# Patient Record
Sex: MALE | Race: WHITE | NOT HISPANIC OR LATINO | Employment: OTHER | URBAN - METROPOLITAN AREA
[De-identification: names, ages, dates, MRNs, and addresses within clinical notes are randomized per-mention and may not be internally consistent; named-entity substitution may affect disease eponyms.]

---

## 2017-02-21 ENCOUNTER — GENERIC CONVERSION - ENCOUNTER (OUTPATIENT)
Dept: OTHER | Facility: OTHER | Age: 62
End: 2017-02-21

## 2017-07-12 ENCOUNTER — GENERIC CONVERSION - ENCOUNTER (OUTPATIENT)
Dept: OTHER | Facility: OTHER | Age: 62
End: 2017-07-12

## 2017-07-24 ENCOUNTER — ALLSCRIPTS OFFICE VISIT (OUTPATIENT)
Dept: OTHER | Facility: OTHER | Age: 62
End: 2017-07-24

## 2017-09-14 LAB
25(OH)D3 SERPL-MCNC: 38 NG/ML (ref 30–100)
A/G RATIO (HISTORICAL): 1.6 (CALC) (ref 1–2.5)
ALBUMIN SERPL BCP-MCNC: 4 G/DL (ref 3.6–5.1)
ALP SERPL-CCNC: 68 U/L (ref 40–115)
ALT SERPL W P-5'-P-CCNC: 21 U/L (ref 9–46)
AST SERPL W P-5'-P-CCNC: 22 U/L (ref 10–35)
BILIRUB SERPL-MCNC: 1.3 MG/DL (ref 0.2–1.2)
BUN SERPL-MCNC: 11 MG/DL (ref 7–25)
BUN/CREA RATIO (HISTORICAL): ABNORMAL (CALC) (ref 6–22)
CALCIUM (ADJUSTED FOR ALBUMIN) (HISTORICAL): 9.9 MG/DL (CALC) (ref 8.6–10.2)
CALCIUM SERPL-MCNC: 9.6 MG/DL (ref 8.6–10.3)
CHLORIDE SERPL-SCNC: 104 MMOL/L (ref 98–110)
CHOLEST SERPL-MCNC: 182 MG/DL
CHOLEST/HDLC SERPL: 3.8 (CALC)
CO2 SERPL-SCNC: 29 MMOL/L (ref 20–31)
CREAT SERPL-MCNC: 0.98 MG/DL (ref 0.7–1.25)
DEPRECATED RDW RBC AUTO: 13.1 % (ref 11–15)
EGFR AFRICAN AMERICAN (HISTORICAL): 96 ML/MIN/1.73M2
EGFR-AMERICAN CALC (HISTORICAL): 83 ML/MIN/1.73M2
EST. AVERAGE GLUCOSE BLD GHB EST-MCNC: 111 (CALC)
EST. AVERAGE GLUCOSE BLD GHB EST-MCNC: 6.2 (CALC)
GAMMA GLOBULIN (HISTORICAL): 2.5 G/DL (CALC) (ref 1.9–3.7)
GLUCOSE (HISTORICAL): 103 MG/DL (ref 65–99)
HBA1C MFR BLD HPLC: 5.5 % OF TOTAL HGB
HCT VFR BLD AUTO: 46.7 % (ref 38.5–50)
HDLC SERPL-MCNC: 48 MG/DL
HGB BLD-MCNC: 15.4 G/DL (ref 13.2–17.1)
LDL CHOLESTEROL (HISTORICAL): 108 MG/DL (CALC)
MCH RBC QN AUTO: 28.4 PG (ref 27–33)
MCHC RBC AUTO-ENTMCNC: 33 G/DL (ref 32–36)
MCV RBC AUTO: 86.2 FL (ref 80–100)
NON-HDL-CHOL (CHOL-HDL) (HISTORICAL): 134 MG/DL (CALC)
PLATELET # BLD AUTO: 299 THOUSAND/UL (ref 140–400)
PMV BLD AUTO: 10.5 FL (ref 7.5–12.5)
POTASSIUM SERPL-SCNC: 3.8 MMOL/L (ref 3.5–5.3)
PROSTATE SPECIFIC ANTIGEN TOTAL (HISTORICAL): 1.2 NG/ML
RBC # BLD AUTO: 5.42 MILLION/UL (ref 4.2–5.8)
SODIUM SERPL-SCNC: 142 MMOL/L (ref 135–146)
TOTAL PROTEIN (HISTORICAL): 6.5 G/DL (ref 6.1–8.1)
TRIGL SERPL-MCNC: 145 MG/DL
TSH SERPL DL<=0.05 MIU/L-ACNC: 3.51 MIU/L (ref 0.4–4.5)
WBC # BLD AUTO: 8.6 THOUSAND/UL (ref 3.8–10.8)

## 2018-01-09 ENCOUNTER — ALLSCRIPTS OFFICE VISIT (OUTPATIENT)
Dept: OTHER | Facility: OTHER | Age: 63
End: 2018-01-09

## 2018-01-09 NOTE — RESULT NOTES
Message   Patient's blood work appeared stable  Thank you     Verified Results  (Q) CBC (INCLUDES DIFF/PLT) (REFL) [id number] 08:59AM Justin Abdalla     Test Name Result Flag Reference   WHITE BLOOD CELL COUNT 9 1 Thousand/uL  3 8-10 8   RED BLOOD CELL COUNT 5 21 Million/uL  4 20-5 80   HEMOGLOBIN 15 2 g/dL  13 2-17 1   HEMATOCRIT 46 6 %  38 5-50 0   MCV 89 4 fL  80 0-100 0   MCH 29 2 pg  27 0-33 0   MCHC 32 7 g/dL  32 0-36 0   RDW 13 4 %  11 0-15 0   PLATELET COUNT 689 Thousand/uL  140-400   MPV 9 1 fL  7 5-12 5   ABSOLUTE NEUTROPHILS 6452 cells/uL  6613-9277   ABSOLUTE LYMPHOCYTES 1629 cells/uL  850-3900   ABSOLUTE MONOCYTES 573 cells/uL  200-950   ABSOLUTE EOSINOPHILS 373 cells/uL     ABSOLUTE BASOPHILS 73 cells/uL  0-200   NEUTROPHILS 70 9 %     LYMPHOCYTES 17 9 %     MONOCYTES 6 3 %     EOSINOPHILS 4 1 %     BASOPHILS 0 8 %       (Q) COMPREHENSIVE METABOLIC PNL W/ADJUSTED CALCIUM [id number] 08:59AM Lianne, Posiba     Test Name Result Flag Reference   GLUCOSE 105 mg/dL H 65-99   Fasting reference interval   UREA NITROGEN (BUN) 13 mg/dL  7-25   CREATININE 1 00 mg/dL  0 70-1 25   For patients >52years of age, the reference limit  for Creatinine is approximately 13% higher for people  identified as -American  eGFR NON-AFR   AMERICAN 81 mL/min/1 73m2  > OR = 60   eGFR AFRICAN AMERICAN 94 mL/min/1 73m2  > OR = 60   BUN/CREATININE RATIO   4-77   NOT APPLICABLE (calc)   SODIUM 139 mmol/L  135-146   POTASSIUM 3 8 mmol/L  3 5-5 3   CHLORIDE 101 mmol/L     CARBON DIOXIDE 31 mmol/L  20-31   CALCIUM 9 2 mg/dL  8 6-10 3   CALCIUM (ADJUSTED FOR$ALBUMIN) 9 4 mg/dL (calc)  8 6-10 2   PROTEIN, TOTAL 6 4 g/dL  6 1-8 1   ALBUMIN 4 1 g/dL  3 6-5 1   GLOBULIN 2 3 g/dL (calc)  1 9-3 7   ALBUMIN/GLOBULIN RATIO 1 8 (calc)  1 0-2 5   BILIRUBIN, TOTAL 1 5 mg/dL H 0 2-1 2   ALKALINE PHOSPHATASE 70 U/L     AST 19 U/L  10-35   ALT 18 U/L  9-46     (Q) HEMOGLOBIN A1c WITH eAG [id number] 08:59AM Amelia Abdalla REPORT COMMENT:  FASTING:YES     Test Name Result Flag Reference   HEMOGLOBIN A1c 5 6 % of total Hgb  <5 7   According to ADA guidelines, hemoglobin A1c <7 0%  represents optimal control in non-pregnant diabetic  patients  Different metrics may apply to specific  patient populations  Standards of Medical Care in    Diabetes Care  2013;36:s11-s66     For the purpose of screening for the presence of  diabetes  <5 7%       Consistent with the absence of diabetes  5 7-6 4%    Consistent with increased risk for diabetes              (prediabetes)  >or=6 5%    Consistent with diabetes     This assay result is consistent with a decreased risk  of diabetes  Currently, no consensus exists for use of hemoglobin  A1c for diagnosis of diabetes for children  eAG (mg/dL) 114 (calc)     eAG (mmol/L) 6 3 (calc)       (Q) LIPID PANEL WITH REFLEX TO DIRECT LDL [id number] 08:59AM Justin Abdalla     Test Name Result Flag Reference   CHOLESTEROL, TOTAL 181 mg/dL  125-200   HDL CHOLESTEROL 47 mg/dL  > OR = 40   TRIGLICERIDES 796 mg/dL  <150   LDL-CHOLESTEROL 111 mg/dL (calc)  <130   Desirable range <100 mg/dL for patients with CHD or  diabetes and <70 mg/dL for diabetic patients with  known heart disease  CHOL/HDLC RATIO 3 9 (calc)  < OR = 5 0   NON HDL CHOLESTEROL 134 mg/dL (calc)     Target for non-HDL cholesterol is 30 mg/dL higher than   LDL cholesterol target       (Q) TSH, 3RD GENERATION W/REFLEX TO FT4 [id number] 08:59AM Justin Abdalla     Test Name Result Flag Reference   TSH W/REFLEX TO FT4 2 66 mIU/L  0 40-4 50     *(Q) VITAMIN D, 25-HYDROXY, LC/MS/MS [id number] 08:59AM Justin Abdalla     Test Name Result Flag Reference   VITAMIN D, 25-OH, TOTAL 29 ng/mL L    Vitamin D Status         25-OH Vitamin D:     Deficiency:                    <20 ng/mL  Insufficiency:             20 - 29 ng/mL  Optimal:                 > or = 30 ng/mL     For 25-OH Vitamin D testing on patients on   D2-supplementation and patients for whom quantitation   of D2 and D3 fractions is required, the [facility](TM)  25-OH VIT D, (D2,D3), LC/MS/MS is recommended: order   code 28555 (patients >2yrs)  For more information on this test, go to:  http://"2,10E+07"/faq/FON992  (This link is being provided for   informational/educational purposes only )

## 2018-01-12 NOTE — PROGRESS NOTES
Assessment   1  Generalized anxiety disorder (300 02) (F41 1)   2  Benign essential hypertension (401 1) (I10)   3  Positive depression screening (796 4) (Z13 89)    Plan   Benign essential hypertension    · HydroCHLOROthiazide 25 MG Oral Tablet; TAKE ONE TABLET BY MOUTH    EVERY DAY    Discussion/Summary      Patient is a 80-year-old M generalized anxiety disorder - patient's symptoms appear increasing secondary to his job function  He states that he has been noticing persistent stress and anxiety all day when he is at work  When he is off work, he has no symptoms  His blood pressure has been persistently elevated as well  At this time, letter was written at patient's request to advised his employer to place patient in a more stress free position  At this time, continue with current treatment Bystolic as well as hydrochlorothiazide  He was advised that at this time, medical treatment for anxiety may not be warranted as if he changes job function, his anxiety disorder will significantly decreased  Follow up if any symptoms are persisting  Chief Complaint   Pt presents for BP f/u  History of Present Illness   Patient is a 80-year-old male presents today with a CC of increasing anxiety and elevated blood pressures  He states that over the past few months, he has been having increasing stress at work  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  This has been causing significant anxiety for him  He has been noticing his blood pressure continuously elevated as well secondary to his anxiety  He has been taking his Bystolic as well as his hydrochlorothiazide  He does check his blood pressures at home however has seen persistent elevations  Review of Systems        Constitutional: not feeling poorly-- and-- not feeling tired  Eyes: no eyesight problems-- and-- no purulent discharge from the eyes  ENT: no sore throat-- and-- no nasal discharge        Cardiovascular: no chest pain-- and-- no palpitations  Respiratory: no cough-- and-- no shortness of breath during exertion  Gastrointestinal: no nausea-- and-- no diarrhea  Genitourinary: no dysuria-- and-- no nocturia  Musculoskeletal: no arthralgias-- and-- no myalgias  Integumentary: no rashes-- and-- no skin lesions  Neurological: no numbness-- and-- no dizziness  Psychiatric: no anxiety-- and-- no depression  Endocrine: no proptosis-- and-- no hot flashes  Hematologic/Lymphatic: no swollen glands-- and-- no tendency for easy bleeding  Active Problems   1  Benign essential hypertension (401 1) (I10)   2  Benign prostatic hypertrophy (600 00) (N40 0)   3  Chronic rhinitis (472 0) (J31 0)   4  Depression screen (V79 0) (Z13 89)   5  Esophageal reflux (530 81) (K21 9)   6  Generalized anxiety disorder (300 02) (F41 1)   7  Hyperlipidemia (272 4) (E78 5)   8  Family history of Lung Cancer (V16 1)   9  Prediabetes (790 29) (R73 03)   10  Umbilical hernia (836 8) (K42 9)   11  Vitamin D deficiency (268 9) (E55 9)    Past Medical History   1  History of Abdominal pain, right lower quadrant (789 03) (R10 31)   2  History of Abnormal blood chemistry (790 6) (R79 9)   3  History of Acute conjunctivitis of right eye (372 00) (H10 31)   4  Acute upper respiratory infection (465 9) (J06 9)   5  History of Blood pressure elevated (401 9) (I10)   6  History of Chronic rhinitis (472 0) (J31 0)   7  History of Contact dermatitis due to poison ivy (692 6) (L23 7)   8  History of Disturbances Of The Oral Epithelium, Including The Tongue (528 79)   9  History of Dysphagia (787 20) (R13 10)   10  History of Hematochezia (578 1) (K92 1)   11  History of acute conjunctivitis (V12 49) (Z86 69)   12  History of allergic urticaria (V13 3) (Z87 2)   13  History of disease of skin and subcutaneous tissue (V13 3) (Z87 2)   14  History of hemorrhoids (V13 89) (Z87 19)   15   History of irritable bowel syndrome (V12 79) (Z87 19)   16  History of Influenza due to unidentified influenza virus with other respiratory manifestation      (487 1) (J11 1)   17  History of Irritable bowel syndrome (564 1) (K58 9)   18  History of Open wound of left lower leg (891 0) (S81 802A)   19  History of Other acute sinusitis (461 8) (J01 80)   20  History of Screening for prostate cancer (V76 44) (Z12 5)   21  History of Skin rash (782 1) (R21)   22  History of Special screening examination for neoplasm of prostate (V76 44) (Z12 5)   23  History of Tingling (782 0) (R20 2)     The active problems and past medical history were reviewed and updated today  Surgical History   1  History of Colonoscopic Polypectomy Via Colostomy     The surgical history was reviewed and updated today  Family History   Mother    1  Family history of Breast cancer (174 9) (C50 919)  Family History    2  Family history of Lung Cancer (V16 1)     The family history was reviewed and updated today  Social History    · Denied: History of Drug use   · Former smoker (V15 82) (P42 064)   · Social alcohol use (Z78 9)  The social history was reviewed and updated today  The social history was reviewed and is unchanged  Current Meds    1  Azelastine HCl - 0 15 % Nasal Solution; INSTILL 2 SPRAYS IN EACH NOSTRIL ONCE     DAILY; Therapy: 48EZY6919 to (Last Rx:20Jun2016) Ordered   2  Bystolic 10 MG Oral Tablet; take 1 tablet by mouth daily; Therapy: 34NTH8260 to (Evaluate:03Feb2018)  Requested for: 89Pgh7152; Last     Rx:20Whp6223 Ordered   3  Fluticasone Propionate 50 MCG/ACT Nasal Suspension; 2 sprays each nostril daily; Therapy: 40TCY1354 to (Last Rx:29Mar2017)  Requested for: 29Mar2017; Status: ACTIVE     - Transmit to Pharmacy - Awaiting Verification Ordered   4  HydroCHLOROthiazide 25 MG Oral Tablet; TAKE ONE TABLET BY MOUTH EVERY DAY;      Therapy: 32Wsc0193 to (Evaluate:07Jan2018)  Requested for: 40RQP4160; Last     Rx:57Frk6020; Status: ACTIVE - Transmit to Pharmacy - Awaiting Verification Ordered     The medication list was reviewed and updated today  Allergies   1  Lisinopril TABS   2  DilTIAZem HCl TABS   3  Norvasc TABS  4  No Known Environmental Allergies   5  No Known Food Allergies    Vitals   Vital Signs    Recorded: 74EXU3518 01:49PM   Temperature 98 4 F, Tympanic   Heart Rate 87   Pulse Quality Normal   Respiration Quality Normal   Respiration 20   Systolic 588, LUE, Sitting   Diastolic 86, LUE, Sitting   Height 5 ft 8 in   Weight 208 lb 4 oz   BMI Calculated 31 66   BSA Calculated 2 08   O2 Saturation 97, RA   Pain Scale 0     Physical Exam        Constitutional      General appearance: No acute distress, well appearing and well nourished  Eyes      Conjunctiva and lids: No swelling, erythema, or discharge  Pupils and irises: Equal, round and reactive to light  Ears, Nose, Mouth, and Throat      External inspection of ears and nose: Normal        Nasal mucosa, septum, and turbinates: Normal without edema or erythema  Oropharynx: Normal with no erythema, edema, exudate or lesions  Pulmonary      Respiratory effort: No increased work of breathing or signs of respiratory distress  Auscultation of lungs: Clear to auscultation, equal breath sounds bilaterally, no wheezes, no rales, no rhonci  Cardiovascular      Auscultation of heart: Normal rate and rhythm, normal S1 and S2, without murmurs  Examination of extremities for edema and/or varicosities: Normal        Abdomen      Abdomen: Non-tender, no masses  Liver and spleen: No hepatomegaly or splenomegaly  Lymphatic      Palpation of lymph nodes in neck: No lymphadenopathy  Musculoskeletal      Gait and station: Normal        Inspection/palpation of joints, bones, and muscles: Normal        Neurologic      Cranial nerves: Cranial nerves 2-12 intact  Sensation: No sensory loss         Psychiatric      Orientation to person, place and time: Normal        Mood and affect: Normal           Results/Data   PHQ-9 Adult Depression Screening 14JAW3266 01:49PM User, Primo      Test Name Result Flag Reference   PHQ-9 Adult Depression Score 7     Over the last two weeks, how often have you been bothered by any of the following problems? Little interest or pleasure in doing things: Several days - 1     Feeling down, depressed, or hopeless: More than half the days - 2     Trouble falling or staying asleep, or sleeping too much: Not at all - 0     Feeling tired or having little energy: Several days - 1     Poor appetite or over eating: Not at all - 0     Feeling bad about yourself - or that you are a failure or have let yourself or your family down: Not at all - 0     Trouble concentrating on things, such as reading the newspaper or watching television: Not at all - 0     Moving or speaking so slowly that other people could have noticed  Or the opposite -  being so fidgety or restless that you have been moving around a lot more than usual: Nearly every day - 3     Thoughts that you would be better off dead, or of hurting yourself in some way: Not at all - 0   PHQ-9 Adult Depression Screening Negative     PHQ-9 Difficulty Level Not difficult at all     PHQ-9 Severity Mild Depression          Health Management   History of Special screening examination for neoplasm of prostate   (1) PSA, DIAGNOSTIC (FOLLOW-UP); every 1 year; Last 31MQX9887; Next Due: 21Evn6309; Overdue    Signatures    Electronically signed by :  Yogesh Meyer DO; Jan 11 2018 10:52AM EST                       (Author)

## 2018-01-14 VITALS
OXYGEN SATURATION: 93 % | BODY MASS INDEX: 30.94 KG/M2 | TEMPERATURE: 98.1 F | HEART RATE: 73 BPM | DIASTOLIC BLOOD PRESSURE: 80 MMHG | WEIGHT: 204.13 LBS | SYSTOLIC BLOOD PRESSURE: 132 MMHG | RESPIRATION RATE: 20 BRPM | HEIGHT: 68 IN

## 2018-01-15 NOTE — RESULT NOTES
Message   Please call patient, all of his recent bloodwork appeared stable  Thank you     Verified Results  (1) CBC/PLT/DIFF [id number] 08:34AM Lianne ab     Test Name Result Flag Reference   WHITE BLOOD CELL COUNT 8 8 Thousand/uL  3 8-10 8   RED BLOOD CELL COUNT 4 88 Million/uL  4 20-5 80   HEMOGLOBIN 14 2 g/dL  13 2-17 1   HEMATOCRIT 43 3 %  38 5-50 0   MCV 88 7 fL  80 0-100 0   MCH 29 0 pg  27 0-33 0   MCHC 32 7 g/dL  32 0-36 0   RDW 13 9 %  11 0-15 0   PLATELET COUNT 029 Thousand/uL  140-400   MPV 9 2 fL  7 5-11 5   ABSOLUTE NEUTROPHILS 5790 cells/uL  6551-3881   ABSOLUTE LYMPHOCYTES 2015 cells/uL  850-3900   ABSOLUTE MONOCYTES 607 cells/uL  200-950   ABSOLUTE EOSINOPHILS 352 cells/uL     ABSOLUTE BASOPHILS 35 cells/uL  0-200   NEUTROPHILS 65 8 %     LYMPHOCYTES 22 9 %     MONOCYTES 6 9 %     EOSINOPHILS 4 0 %     BASOPHILS 0 4 %       (1) COMPREHENSIVE METABOLIC PANEL 46MZZ2295 83:02VQ Lianne ab     Test Name Result Flag Reference   GLUCOSE 100 mg/dL H 65-99   Fasting reference interval   UREA NITROGEN (BUN) 11 mg/dL  7-25   CREATININE 0 91 mg/dL  0 70-1 25   For patients >52years of age, the reference limit  for Creatinine is approximately 13% higher for people  identified as -American  eGFR NON-AFR   AMERICAN 91 mL/min/1 73m2  > OR = 60   eGFR AFRICAN AMERICAN 106 mL/min/1 73m2  > OR = 60   BUN/CREATININE RATIO   6-04   NOT APPLICABLE (calc)   SODIUM 143 mmol/L  135-146   POTASSIUM 3 6 mmol/L  3 5-5 3   CHLORIDE 102 mmol/L     CARBON DIOXIDE 27 mmol/L  19-30   CALCIUM 9 0 mg/dL  8 6-10 3   PROTEIN, TOTAL 6 1 g/dL  6 1-8 1   ALBUMIN 3 9 g/dL  3 6-5 1   GLOBULIN 2 2 g/dL (calc)  1 9-3 7   ALBUMIN/GLOBULIN RATIO 1 8 (calc)  1 0-2 5   BILIRUBIN, TOTAL 1 4 mg/dL H 0 2-1 2   ALKALINE PHOSPHATASE 59 U/L     AST 18 U/L  10-35   ALT 19 U/L  9-46     (Q) HEMOGLOBIN A1c 29Jun2016 08:34AM Lianne [facility]   REPORT COMMENT:  FASTING:YES     Test Name Result Flag Reference   HEMOGLOBIN A1c 5 8 % of total Hgb H <5 7   According to ADA guidelines, hemoglobin A1c <7 0%  represents optimal control in non-pregnant diabetic  patients  Different metrics may apply to specific  patient populations  Standards of Medical Care in    Diabetes Care  2013;36:s11-s66     For the purpose of screening for the presence of  diabetes  <5 7%       Consistent with the absence of diabetes  5 7-6 4%    Consistent with increased risk for diabetes              (prediabetes)  >or=6 5%    Consistent with diabetes     This assay result is consistent with an increased risk  of diabetes  Currently, no consensus exists for use of hemoglobin  A1c for diagnosis of diabetes for children

## 2018-01-16 ENCOUNTER — GENERIC CONVERSION - ENCOUNTER (OUTPATIENT)
Dept: OTHER | Facility: OTHER | Age: 63
End: 2018-01-16

## 2018-01-23 VITALS
TEMPERATURE: 98.4 F | WEIGHT: 208.25 LBS | OXYGEN SATURATION: 97 % | HEART RATE: 87 BPM | RESPIRATION RATE: 20 BRPM | HEIGHT: 68 IN | SYSTOLIC BLOOD PRESSURE: 130 MMHG | BODY MASS INDEX: 31.56 KG/M2 | DIASTOLIC BLOOD PRESSURE: 86 MMHG

## 2018-01-23 NOTE — MISCELLANEOUS
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

## 2018-01-24 VITALS
HEART RATE: 77 BPM | SYSTOLIC BLOOD PRESSURE: 128 MMHG | DIASTOLIC BLOOD PRESSURE: 80 MMHG | OXYGEN SATURATION: 95 % | RESPIRATION RATE: 16 BRPM | HEIGHT: 67 IN | WEIGHT: 209.19 LBS | TEMPERATURE: 98.2 F | BODY MASS INDEX: 32.83 KG/M2

## 2018-03-26 ENCOUNTER — TELEPHONE (OUTPATIENT)
Dept: FAMILY MEDICINE CLINIC | Facility: CLINIC | Age: 63
End: 2018-03-26

## 2018-03-26 DIAGNOSIS — E55.9 VITAMIN D DEFICIENCY: ICD-10-CM

## 2018-03-26 DIAGNOSIS — I10 BENIGN ESSENTIAL HYPERTENSION: Primary | ICD-10-CM

## 2018-03-26 DIAGNOSIS — R73.03 PREDIABETES: ICD-10-CM

## 2018-03-26 DIAGNOSIS — E78.2 MIXED HYPERLIPIDEMIA: ICD-10-CM

## 2018-03-26 NOTE — TELEPHONE ENCOUNTER
Pt called lm that he is due for his routine blood work that he gets every 6m [facility]  Pt would like a call back to let him know order is ready to be picked up  Thank you!

## 2018-04-11 LAB
25(OH)D3 SERPL-MCNC: 42 NG/ML (ref 30–100)
ALBUMIN SERPL-MCNC: 4.1 G/DL (ref 3.6–5.1)
ALBUMIN/GLOB SERPL: 1.7 (CALC) (ref 1–2.5)
ALP SERPL-CCNC: 72 U/L (ref 40–115)
ALT SERPL-CCNC: 20 U/L (ref 9–46)
AST SERPL-CCNC: 17 U/L (ref 10–35)
BILIRUB SERPL-MCNC: 1.2 MG/DL (ref 0.2–1.2)
BUN SERPL-MCNC: 13 MG/DL (ref 7–25)
BUN/CREAT SERPL: ABNORMAL (CALC) (ref 6–22)
CALCIUM SERPL-MCNC: 9.4 MG/DL (ref 8.6–10.3)
CHLORIDE SERPL-SCNC: 101 MMOL/L (ref 98–110)
CHOLEST SERPL-MCNC: 192 MG/DL
CHOLEST/HDLC SERPL: 3.8 (CALC)
CO2 SERPL-SCNC: 30 MMOL/L (ref 20–31)
CREAT SERPL-MCNC: 1.18 MG/DL (ref 0.7–1.25)
GLOBULIN SER CALC-MCNC: 2.4 G/DL (CALC) (ref 1.9–3.7)
GLUCOSE SERPL-MCNC: 108 MG/DL (ref 65–99)
HBA1C MFR BLD: 5.5 % OF TOTAL HGB
HDLC SERPL-MCNC: 51 MG/DL
LDLC SERPL CALC-MCNC: 114 MG/DL (CALC)
NONHDLC SERPL-MCNC: 141 MG/DL (CALC)
POTASSIUM SERPL-SCNC: 3.9 MMOL/L (ref 3.5–5.3)
PROT SERPL-MCNC: 6.5 G/DL (ref 6.1–8.1)
SL AMB EGFR AFRICAN AMERICAN: 76 ML/MIN/1.73M2
SL AMB EGFR NON AFRICAN AMERICAN: 66 ML/MIN/1.73M2
SODIUM SERPL-SCNC: 141 MMOL/L (ref 135–146)
TRIGL SERPL-MCNC: 155 MG/DL

## 2018-04-20 ENCOUNTER — OFFICE VISIT (OUTPATIENT)
Dept: FAMILY MEDICINE CLINIC | Facility: CLINIC | Age: 63
End: 2018-04-20
Payer: COMMERCIAL

## 2018-04-20 VITALS
HEIGHT: 66 IN | DIASTOLIC BLOOD PRESSURE: 80 MMHG | RESPIRATION RATE: 17 BRPM | WEIGHT: 208.1 LBS | HEART RATE: 82 BPM | OXYGEN SATURATION: 97 % | BODY MASS INDEX: 33.44 KG/M2 | SYSTOLIC BLOOD PRESSURE: 130 MMHG | TEMPERATURE: 97.2 F

## 2018-04-20 DIAGNOSIS — R73.03 PREDIABETES: ICD-10-CM

## 2018-04-20 DIAGNOSIS — I10 BENIGN ESSENTIAL HYPERTENSION: Primary | ICD-10-CM

## 2018-04-20 DIAGNOSIS — E55.9 VITAMIN D DEFICIENCY: ICD-10-CM

## 2018-04-20 DIAGNOSIS — E78.2 MIXED HYPERLIPIDEMIA: ICD-10-CM

## 2018-04-20 DIAGNOSIS — F41.1 GENERALIZED ANXIETY DISORDER: ICD-10-CM

## 2018-04-20 PROCEDURE — 3075F SYST BP GE 130 - 139MM HG: CPT | Performed by: FAMILY MEDICINE

## 2018-04-20 PROCEDURE — 3079F DIAST BP 80-89 MM HG: CPT | Performed by: FAMILY MEDICINE

## 2018-04-20 PROCEDURE — 99214 OFFICE O/P EST MOD 30 MIN: CPT | Performed by: FAMILY MEDICINE

## 2018-04-20 RX ORDER — FLUTICASONE PROPIONATE 50 MCG
2 SPRAY, SUSPENSION (ML) NASAL DAILY
COMMUNITY
Start: 2012-10-06 | End: 2018-06-11 | Stop reason: SDUPTHER

## 2018-04-20 RX ORDER — HYDROCHLOROTHIAZIDE 25 MG/1
1 TABLET ORAL DAILY
COMMUNITY
Start: 2013-12-20 | End: 2018-08-03 | Stop reason: SDUPTHER

## 2018-04-20 RX ORDER — NEBIVOLOL 10 MG/1
1 TABLET ORAL DAILY
COMMUNITY
Start: 2014-01-15 | End: 2018-08-03 | Stop reason: SDUPTHER

## 2018-04-20 RX ORDER — AZELASTINE HCL 205.5 UG/1
2 SPRAY NASAL DAILY
COMMUNITY
Start: 2015-12-17 | End: 2018-06-11 | Stop reason: SDUPTHER

## 2018-04-20 NOTE — LETTER
April 20, 2018     Patient: Aleida Postal   YOB: 1955   Date of Visit: 4/20/2018       To Whom it May Concern:    Hadley Meehan is under my professional care  He was seen in my office on 4/20/2018  He is clinically stable to return back to his previous work function with no restrictions  If you have any questions or concerns, please don't hesitate to call           Sincerely,                Justin Abdalla, DO

## 2018-04-20 NOTE — PROGRESS NOTES
Assessment/Plan:   1  Benign essential hypertension  Patient's blood pressure appears stable today  He may benefit greatly from checking his blood pressure at home  At this time, continue with his current treatment of Bystolic and hydrochlorothiazide  2  Generalized anxiety disorder  Patient's symptoms have been improving  He states that he has been seeing a psychologist regularly  He believes that he is ready to return to work with no restrictions  He still attempt to try a different position at his work  Letter releasing patient back to work was written for him today  3  Mixed hyperlipidemia  Reviewed fasting lipid panel with patient today  His cholesterol levels appeared mildly elevated  He was advised on the importance of maintaining a strict low-fat/low-cholesterol diet  Recheck his blood work this summer  If persistently elevated consider further treatment options  4  Prediabetes  A1c appears very well controlled  Continue with a strict diet and exercise plan  5  Vitamin D deficiency  Vitamin-D level also appeared stable today  Continue with routine monitoring as well as daily supplementation  Follow-up in 6 months  There are no diagnoses linked to this encounter  Subjective:    Chief Complaint   Patient presents with    Follow-up     discuss blood work from 04/10/2018        Patient ID: Maegan Benitez is a 58 y o  male  Patient is a 60-year-old male presents today for follow-up on his chronic conditions  He has hypertension, hyperlipidemia, CHRISTINA, prediabetes and vitamin-D deficiency  He states that he has been taking his medications regularly and tolerating them very well  Denies adverse reactions with his medications  He states that he has been off work since January  This was secondary to his worsening anxiety  During his time of, he states that he has been following up regularly with a psychologist   He states that he believes his anxiety has been improving    He would like to return to work  He would like a note stating that he may return to his previous position with no restrictions  Review of Systems   Constitutional: Negative for activity change, chills, fatigue and fever  HENT: Negative for congestion, ear pain, sinus pressure and sore throat  Eyes: Negative for redness, itching and visual disturbance  Respiratory: Negative for cough and shortness of breath  Cardiovascular: Negative for chest pain and palpitations  Gastrointestinal: Negative for abdominal pain, diarrhea and nausea  Endocrine: Negative for cold intolerance and heat intolerance  Genitourinary: Negative for dysuria, flank pain and frequency  Musculoskeletal: Negative for arthralgias, back pain, gait problem and myalgias  Skin: Negative for color change  Allergic/Immunologic: Negative for environmental allergies  Neurological: Negative for dizziness, numbness and headaches  Psychiatric/Behavioral: Negative for behavioral problems and sleep disturbance  The following portions of the patient's history were reviewed and updated as appropriate : past family history, past medical history, past social history and past surgical history  Objective:    Vitals:    04/20/18 1621   BP: 130/80   Pulse: 82   Resp: 17   Temp: (!) 97 2 °F (36 2 °C)   TempSrc: Tympanic   SpO2: 97%   Weight: 94 4 kg (208 lb 1 6 oz)   Height: 5' 6" (1 676 m)        Physical Exam   Constitutional: He is oriented to person, place, and time  He appears well-developed and well-nourished  HENT:   Head: Normocephalic and atraumatic  Nose: Nose normal    Mouth/Throat: No oropharyngeal exudate  Eyes: Pupils are equal, round, and reactive to light  Right eye exhibits no discharge  Left eye exhibits no discharge  Neck: Normal range of motion  Neck supple  No tracheal deviation present  Cardiovascular: Normal rate, regular rhythm and intact distal pulses  Exam reveals no gallop and no friction rub  No murmur heard  Pulses:       Dorsalis pedis pulses are 2+ on the right side, and 2+ on the left side  Posterior tibial pulses are 2+ on the right side, and 2+ on the left side  Pulmonary/Chest: Effort normal and breath sounds normal  No respiratory distress  He has no wheezes  He has no rales  Abdominal: Soft  Bowel sounds are normal  He exhibits no distension  There is no tenderness  There is no rebound and no guarding  Musculoskeletal: Normal range of motion  He exhibits no edema  Lymphadenopathy:        Head (right side): No submental and no submandibular adenopathy present  Head (left side): No submental and no submandibular adenopathy present  He has no cervical adenopathy  Right cervical: No superficial cervical, no deep cervical and no posterior cervical adenopathy present  Left cervical: No superficial cervical, no deep cervical and no posterior cervical adenopathy present  Neurological: He is alert and oriented to person, place, and time  No cranial nerve deficit or sensory deficit  Skin: Skin is warm, dry and intact  Psychiatric: His speech is normal and behavior is normal  Judgment normal  His mood appears not anxious  Cognition and memory are normal  He does not exhibit a depressed mood  Vitals reviewed

## 2018-04-23 ENCOUNTER — TELEPHONE (OUTPATIENT)
Dept: FAMILY MEDICINE CLINIC | Facility: CLINIC | Age: 63
End: 2018-04-23

## 2018-04-23 NOTE — TELEPHONE ENCOUNTER
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

## 2018-04-23 NOTE — TELEPHONE ENCOUNTER
Dot patient has had significant improvement in his symptoms  This is why he is cleared to return back to work    Thank you

## 2018-04-24 NOTE — TELEPHONE ENCOUNTER
Patient Calls     Justin Abdalla, DO   You 1 minute ago (12:39 PM)         Can you please look into this, this is very odd         Documentation       Sebas Socks routed conversation to Matt Chowdhury DO 1 hour ago (10:53 AM)      Kelley Freedman [phone]  Sherle Socks 1 hour ago (10:51 AM)      Sherle Socks 1 hour ago (10:50 AM)         Pt called and would appreciate if you would not give the postal [employer] any more info than what was in the note to clear him to go back to work         Documentation

## 2018-04-24 NOTE — TELEPHONE ENCOUNTER
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

## 2018-06-11 DIAGNOSIS — J31.0 CHRONIC RHINITIS: Primary | ICD-10-CM

## 2018-06-12 RX ORDER — AZELASTINE HCL 205.5 UG/1
2 SPRAY NASAL DAILY
Qty: 30 ML | Refills: 2 | Status: SHIPPED | OUTPATIENT
Start: 2018-06-12 | End: 2022-03-23

## 2018-06-12 RX ORDER — FLUTICASONE PROPIONATE 50 MCG
2 SPRAY, SUSPENSION (ML) NASAL DAILY
Qty: 16 G | Refills: 0 | Status: SHIPPED | OUTPATIENT
Start: 2018-06-12 | End: 2018-11-02 | Stop reason: SDUPTHER

## 2018-06-23 ENCOUNTER — APPOINTMENT (EMERGENCY)
Dept: RADIOLOGY | Facility: HOSPITAL | Age: 63
DRG: 690 | End: 2018-06-23
Payer: COMMERCIAL

## 2018-06-23 ENCOUNTER — HOSPITAL ENCOUNTER (INPATIENT)
Facility: HOSPITAL | Age: 63
LOS: 4 days | Discharge: HOME | DRG: 690 | End: 2018-06-27
Attending: EMERGENCY MEDICINE | Admitting: INTERNAL MEDICINE
Payer: COMMERCIAL

## 2018-06-23 DIAGNOSIS — N12 PYELONEPHRITIS: Primary | ICD-10-CM

## 2018-06-23 DIAGNOSIS — A41.9 SEPSIS (HCC): ICD-10-CM

## 2018-06-23 DIAGNOSIS — R65.10 SIRS (SYSTEMIC INFLAMMATORY RESPONSE SYNDROME) (HCC): ICD-10-CM

## 2018-06-23 LAB
ALBUMIN SERPL BCP-MCNC: 3.6 G/DL (ref 3.5–5)
ALP SERPL-CCNC: 65 U/L (ref 46–116)
ALT SERPL W P-5'-P-CCNC: 31 U/L (ref 12–78)
ANION GAP SERPL CALCULATED.3IONS-SCNC: 8 MMOL/L (ref 4–13)
APTT PPP: 39 SECONDS (ref 24–36)
AST SERPL W P-5'-P-CCNC: 29 U/L (ref 5–45)
BACTERIA UR QL AUTO: ABNORMAL /HPF
BASOPHILS # BLD AUTO: 0.04 THOUSANDS/ΜL (ref 0–0.1)
BASOPHILS NFR BLD AUTO: 0 % (ref 0–1)
BILIRUB SERPL-MCNC: 1.53 MG/DL (ref 0.2–1)
BILIRUB UR QL STRIP: NEGATIVE
BUN SERPL-MCNC: 12 MG/DL (ref 5–25)
CALCIUM SERPL-MCNC: 9 MG/DL (ref 8.3–10.1)
CHLORIDE SERPL-SCNC: 101 MMOL/L (ref 100–108)
CLARITY UR: CLEAR
CO2 SERPL-SCNC: 27 MMOL/L (ref 21–32)
COLOR UR: ABNORMAL
CREAT SERPL-MCNC: 1.19 MG/DL (ref 0.6–1.3)
EOSINOPHIL # BLD AUTO: 0 THOUSAND/ΜL (ref 0–0.61)
EOSINOPHIL NFR BLD AUTO: 0 % (ref 0–6)
ERYTHROCYTE [DISTWIDTH] IN BLOOD BY AUTOMATED COUNT: 12.5 % (ref 11.6–15.1)
GFR SERPL CREATININE-BSD FRML MDRD: 65 ML/MIN/1.73SQ M
GLUCOSE SERPL-MCNC: 122 MG/DL (ref 65–140)
GLUCOSE UR STRIP-MCNC: NEGATIVE MG/DL
HCT VFR BLD AUTO: 43.2 % (ref 36.5–49.3)
HGB BLD-MCNC: 14.6 G/DL (ref 12–17)
HGB UR QL STRIP.AUTO: ABNORMAL
HYALINE CASTS #/AREA URNS LPF: ABNORMAL /LPF
IMM GRANULOCYTES # BLD AUTO: 0.03 THOUSAND/UL (ref 0–0.2)
IMM GRANULOCYTES NFR BLD AUTO: 0 % (ref 0–2)
INR PPP: 1.09 (ref 0.86–1.17)
KETONES UR STRIP-MCNC: NEGATIVE MG/DL
LACTATE SERPL-SCNC: 1.2 MMOL/L (ref 0.5–2)
LEUKOCYTE ESTERASE UR QL STRIP: NEGATIVE
LYMPHOCYTES # BLD AUTO: 0.64 THOUSANDS/ΜL (ref 0.6–4.47)
LYMPHOCYTES NFR BLD AUTO: 7 % (ref 14–44)
MCH RBC QN AUTO: 29.7 PG (ref 26.8–34.3)
MCHC RBC AUTO-ENTMCNC: 33.8 G/DL (ref 31.4–37.4)
MCV RBC AUTO: 88 FL (ref 82–98)
MONOCYTES # BLD AUTO: 0.58 THOUSAND/ΜL (ref 0.17–1.22)
MONOCYTES NFR BLD AUTO: 6 % (ref 4–12)
NEUTROPHILS # BLD AUTO: 7.82 THOUSANDS/ΜL (ref 1.85–7.62)
NEUTS SEG NFR BLD AUTO: 87 % (ref 43–75)
NITRITE UR QL STRIP: NEGATIVE
NON-SQ EPI CELLS URNS QL MICRO: ABNORMAL /HPF
NRBC BLD AUTO-RTO: 0 /100 WBCS
PH UR STRIP.AUTO: 6 [PH] (ref 4.5–8)
PLATELET # BLD AUTO: 193 THOUSANDS/UL (ref 149–390)
PMV BLD AUTO: 10.2 FL (ref 8.9–12.7)
POTASSIUM SERPL-SCNC: 3.3 MMOL/L (ref 3.5–5.3)
PROT SERPL-MCNC: 7.4 G/DL (ref 6.4–8.2)
PROT UR STRIP-MCNC: ABNORMAL MG/DL
PROTHROMBIN TIME: 14.2 SECONDS (ref 11.8–14.2)
RBC # BLD AUTO: 4.91 MILLION/UL (ref 3.88–5.62)
RBC #/AREA URNS AUTO: ABNORMAL /HPF
SODIUM SERPL-SCNC: 136 MMOL/L (ref 136–145)
SP GR UR STRIP.AUTO: 1.02 (ref 1–1.03)
UROBILINOGEN UR QL STRIP.AUTO: 0.2 E.U./DL
WBC # BLD AUTO: 9.11 THOUSAND/UL (ref 4.31–10.16)
WBC #/AREA URNS AUTO: ABNORMAL /HPF

## 2018-06-23 PROCEDURE — 36415 COLL VENOUS BLD VENIPUNCTURE: CPT

## 2018-06-23 PROCEDURE — 85610 PROTHROMBIN TIME: CPT | Performed by: EMERGENCY MEDICINE

## 2018-06-23 PROCEDURE — 93005 ELECTROCARDIOGRAM TRACING: CPT

## 2018-06-23 PROCEDURE — 99223 1ST HOSP IP/OBS HIGH 75: CPT | Performed by: INTERNAL MEDICINE

## 2018-06-23 PROCEDURE — 96365 THER/PROPH/DIAG IV INF INIT: CPT

## 2018-06-23 PROCEDURE — 74176 CT ABD & PELVIS W/O CONTRAST: CPT

## 2018-06-23 PROCEDURE — 87040 BLOOD CULTURE FOR BACTERIA: CPT | Performed by: EMERGENCY MEDICINE

## 2018-06-23 PROCEDURE — 99285 EMERGENCY DEPT VISIT HI MDM: CPT

## 2018-06-23 PROCEDURE — 81001 URINALYSIS AUTO W/SCOPE: CPT | Performed by: EMERGENCY MEDICINE

## 2018-06-23 PROCEDURE — 85730 THROMBOPLASTIN TIME PARTIAL: CPT | Performed by: EMERGENCY MEDICINE

## 2018-06-23 PROCEDURE — 71046 X-RAY EXAM CHEST 2 VIEWS: CPT

## 2018-06-23 PROCEDURE — 83605 ASSAY OF LACTIC ACID: CPT | Performed by: EMERGENCY MEDICINE

## 2018-06-23 PROCEDURE — 85025 COMPLETE CBC W/AUTO DIFF WBC: CPT | Performed by: EMERGENCY MEDICINE

## 2018-06-23 PROCEDURE — 80053 COMPREHEN METABOLIC PANEL: CPT | Performed by: EMERGENCY MEDICINE

## 2018-06-23 RX ORDER — FLUTICASONE PROPIONATE 50 MCG
2 SPRAY, SUSPENSION (ML) NASAL DAILY
Status: DISCONTINUED | OUTPATIENT
Start: 2018-06-24 | End: 2018-06-27 | Stop reason: HOSPADM

## 2018-06-23 RX ORDER — ACETAMINOPHEN 325 MG/1
650 TABLET ORAL EVERY 6 HOURS PRN
Status: DISCONTINUED | OUTPATIENT
Start: 2018-06-23 | End: 2018-06-27 | Stop reason: HOSPADM

## 2018-06-23 RX ORDER — IBUPROFEN 600 MG/1
600 TABLET ORAL ONCE
Status: COMPLETED | OUTPATIENT
Start: 2018-06-23 | End: 2018-06-23

## 2018-06-23 RX ORDER — AZELASTINE 1 MG/ML
1 SPRAY, METERED NASAL DAILY
Status: DISCONTINUED | OUTPATIENT
Start: 2018-06-24 | End: 2018-06-27 | Stop reason: HOSPADM

## 2018-06-23 RX ORDER — NEBIVOLOL 10 MG/1
10 TABLET ORAL DAILY
Status: DISCONTINUED | OUTPATIENT
Start: 2018-06-24 | End: 2018-06-27 | Stop reason: HOSPADM

## 2018-06-23 RX ORDER — POTASSIUM CHLORIDE 20 MEQ/1
40 TABLET, EXTENDED RELEASE ORAL ONCE
Status: COMPLETED | OUTPATIENT
Start: 2018-06-23 | End: 2018-06-23

## 2018-06-23 RX ORDER — ACETAMINOPHEN 325 MG/1
650 TABLET ORAL ONCE
Status: COMPLETED | OUTPATIENT
Start: 2018-06-23 | End: 2018-06-23

## 2018-06-23 RX ORDER — HYDROCHLOROTHIAZIDE 25 MG/1
25 TABLET ORAL DAILY
Status: DISCONTINUED | OUTPATIENT
Start: 2018-06-24 | End: 2018-06-27 | Stop reason: HOSPADM

## 2018-06-23 RX ADMIN — ACETAMINOPHEN 650 MG: 325 TABLET ORAL at 17:34

## 2018-06-23 RX ADMIN — CEFTRIAXONE SODIUM 1000 MG: 10 INJECTION, POWDER, FOR SOLUTION INTRAVENOUS at 20:27

## 2018-06-23 RX ADMIN — SODIUM CHLORIDE 1000 ML: 0.9 INJECTION, SOLUTION INTRAVENOUS at 20:27

## 2018-06-23 RX ADMIN — POTASSIUM CHLORIDE 40 MEQ: 1500 TABLET, EXTENDED RELEASE ORAL at 22:27

## 2018-06-23 RX ADMIN — IBUPROFEN 600 MG: 600 TABLET ORAL at 22:28

## 2018-06-23 RX ADMIN — ACETAMINOPHEN 650 MG: 325 TABLET ORAL at 20:26

## 2018-06-23 NOTE — ED ATTENDING ATTESTATION
Lucille Dubois MD, saw and evaluated the patient  I have discussed the patient with the resident/non-physician practitioner and agree with the resident's/non-physician practitioner's findings, Plan of Care, and MDM as documented in the resident's/non-physician practitioner's note, except where noted  All available labs and Radiology studies were reviewed  At this point I agree with the current assessment done in the Emergency Department  I have conducted an independent evaluation of this patient a history and physical is as follows:  r flank pain off and on for 1 month now with fevers and occasional burning when he urinates  Patient denies nausea vomiting or diarrhea  Patient denies cough or upper respiratory symptoms patient denies headache or neck pain  PE alert heart regular lungs clear neck supple there is no rash abdomen soft mild right flank tenderness to palpation MDM:  Will do CT abdomen to rule out stone will do a urine and laboratory work to evaluate for the fever suspect will require IV antibiotics for possible complicated urinary tract infection      Critical Care Time  CritCare Time    Procedures

## 2018-06-24 ENCOUNTER — APPOINTMENT (INPATIENT)
Dept: RADIOLOGY | Facility: HOSPITAL | Age: 63
DRG: 690 | End: 2018-06-24
Payer: COMMERCIAL

## 2018-06-24 PROBLEM — E87.6 HYPOKALEMIA: Status: ACTIVE | Noted: 2018-06-24

## 2018-06-24 PROBLEM — A41.9 SEPSIS (HCC): Status: ACTIVE | Noted: 2018-06-24

## 2018-06-24 PROBLEM — I10 HYPERTENSION: Status: ACTIVE | Noted: 2018-06-24

## 2018-06-24 PROBLEM — K58.9 IBS (IRRITABLE BOWEL SYNDROME): Status: ACTIVE | Noted: 2018-06-24

## 2018-06-24 PROBLEM — N12 PYELONEPHRITIS: Status: ACTIVE | Noted: 2018-06-24

## 2018-06-24 LAB
ANION GAP SERPL CALCULATED.3IONS-SCNC: 7 MMOL/L (ref 4–13)
ATRIAL RATE: 116 BPM
BUN SERPL-MCNC: 14 MG/DL (ref 5–25)
CALCIUM SERPL-MCNC: 8.3 MG/DL (ref 8.3–10.1)
CHLORIDE SERPL-SCNC: 105 MMOL/L (ref 100–108)
CO2 SERPL-SCNC: 26 MMOL/L (ref 21–32)
CREAT SERPL-MCNC: 1.09 MG/DL (ref 0.6–1.3)
ERYTHROCYTE [DISTWIDTH] IN BLOOD BY AUTOMATED COUNT: 12.7 % (ref 11.6–15.1)
GFR SERPL CREATININE-BSD FRML MDRD: 72 ML/MIN/1.73SQ M
GLUCOSE SERPL-MCNC: 108 MG/DL (ref 65–140)
HCT VFR BLD AUTO: 44.7 % (ref 36.5–49.3)
HGB BLD-MCNC: 14.9 G/DL (ref 12–17)
LACTATE SERPL-SCNC: 1.5 MMOL/L (ref 0.5–2)
MAGNESIUM SERPL-MCNC: 2.2 MG/DL (ref 1.6–2.6)
MCH RBC QN AUTO: 29.7 PG (ref 26.8–34.3)
MCHC RBC AUTO-ENTMCNC: 33.3 G/DL (ref 31.4–37.4)
MCV RBC AUTO: 89 FL (ref 82–98)
P AXIS: 31 DEGREES
PLATELET # BLD AUTO: 151 THOUSANDS/UL (ref 149–390)
PMV BLD AUTO: 10.6 FL (ref 8.9–12.7)
POTASSIUM SERPL-SCNC: 3.5 MMOL/L (ref 3.5–5.3)
PR INTERVAL: 132 MS
PROCALCITONIN SERPL-MCNC: 2.32 NG/ML
QRS AXIS: 33 DEGREES
QRSD INTERVAL: 84 MS
QT INTERVAL: 304 MS
QTC INTERVAL: 422 MS
RBC # BLD AUTO: 5.02 MILLION/UL (ref 3.88–5.62)
SODIUM SERPL-SCNC: 138 MMOL/L (ref 136–145)
T WAVE AXIS: 47 DEGREES
VENTRICULAR RATE: 116 BPM
WBC # BLD AUTO: 11.69 THOUSAND/UL (ref 4.31–10.16)

## 2018-06-24 PROCEDURE — 83605 ASSAY OF LACTIC ACID: CPT | Performed by: PHYSICIAN ASSISTANT

## 2018-06-24 PROCEDURE — 84145 PROCALCITONIN (PCT): CPT | Performed by: PHYSICIAN ASSISTANT

## 2018-06-24 PROCEDURE — 80048 BASIC METABOLIC PNL TOTAL CA: CPT | Performed by: INTERNAL MEDICINE

## 2018-06-24 PROCEDURE — 74177 CT ABD & PELVIS W/CONTRAST: CPT

## 2018-06-24 PROCEDURE — 85027 COMPLETE CBC AUTOMATED: CPT | Performed by: INTERNAL MEDICINE

## 2018-06-24 PROCEDURE — 83735 ASSAY OF MAGNESIUM: CPT | Performed by: INTERNAL MEDICINE

## 2018-06-24 PROCEDURE — 99232 SBSQ HOSP IP/OBS MODERATE 35: CPT | Performed by: PHYSICIAN ASSISTANT

## 2018-06-24 PROCEDURE — 93010 ELECTROCARDIOGRAM REPORT: CPT | Performed by: INTERNAL MEDICINE

## 2018-06-24 RX ORDER — IBUPROFEN 400 MG/1
400 TABLET ORAL ONCE
Status: COMPLETED | OUTPATIENT
Start: 2018-06-24 | End: 2018-06-24

## 2018-06-24 RX ORDER — ONDANSETRON 2 MG/ML
4 INJECTION INTRAMUSCULAR; INTRAVENOUS EVERY 6 HOURS PRN
Status: DISCONTINUED | OUTPATIENT
Start: 2018-06-24 | End: 2018-06-27 | Stop reason: HOSPADM

## 2018-06-24 RX ORDER — SODIUM CHLORIDE 9 MG/ML
125 INJECTION, SOLUTION INTRAVENOUS CONTINUOUS
Status: DISCONTINUED | OUTPATIENT
Start: 2018-06-24 | End: 2018-06-26

## 2018-06-24 RX ORDER — IBUPROFEN 400 MG/1
400 TABLET ORAL EVERY 6 HOURS PRN
Status: DISCONTINUED | OUTPATIENT
Start: 2018-06-24 | End: 2018-06-27 | Stop reason: HOSPADM

## 2018-06-24 RX ADMIN — FLUTICASONE PROPIONATE 2 SPRAY: 50 SPRAY, METERED NASAL at 10:36

## 2018-06-24 RX ADMIN — SODIUM CHLORIDE 125 ML/HR: 0.9 INJECTION, SOLUTION INTRAVENOUS at 20:43

## 2018-06-24 RX ADMIN — ENOXAPARIN SODIUM 40 MG: 40 INJECTION SUBCUTANEOUS at 10:35

## 2018-06-24 RX ADMIN — AZELASTINE HYDROCHLORIDE 1 SPRAY: 137 SPRAY, METERED NASAL at 10:36

## 2018-06-24 RX ADMIN — ACETAMINOPHEN 650 MG: 325 TABLET ORAL at 06:11

## 2018-06-24 RX ADMIN — ACETAMINOPHEN 650 MG: 325 TABLET ORAL at 12:13

## 2018-06-24 RX ADMIN — VANCOMYCIN HYDROCHLORIDE 1250 MG: 10 INJECTION, POWDER, LYOPHILIZED, FOR SOLUTION INTRAVENOUS at 17:30

## 2018-06-24 RX ADMIN — ONDANSETRON 4 MG: 2 INJECTION, SOLUTION INTRAMUSCULAR; INTRAVENOUS at 14:07

## 2018-06-24 RX ADMIN — IBUPROFEN 400 MG: 400 TABLET, FILM COATED ORAL at 23:23

## 2018-06-24 RX ADMIN — SODIUM CHLORIDE 100 ML/HR: 0.9 INJECTION, SOLUTION INTRAVENOUS at 12:14

## 2018-06-24 RX ADMIN — CEFEPIME HYDROCHLORIDE 2000 MG: 2 INJECTION, POWDER, FOR SOLUTION INTRAVENOUS at 16:48

## 2018-06-24 RX ADMIN — IBUPROFEN 400 MG: 400 TABLET, FILM COATED ORAL at 14:11

## 2018-06-24 RX ADMIN — IOHEXOL 100 ML: 350 INJECTION, SOLUTION INTRAVENOUS at 17:10

## 2018-06-24 NOTE — ASSESSMENT & PLAN NOTE
· SIRS vs sepsis, POA, evidenced by fever, tachycardia  · No clear cause of infection  · There was concern for pyelo cause patient was having intermittent RIGHT flank pain  However, CT scan showed some mild LEFT perinephric stranding and mild bladder wall thickening and UA was bland  He is on Rocephin for this  · F/U culture results, trend white count, fever  Check PCT  · Continue Rocephin for now  · Consider viral syndrome or non-infectious cause of fever  · BPs getting soft   Give fluids

## 2018-06-24 NOTE — ASSESSMENT & PLAN NOTE
· He reports diarrhea which is not unusual for him   No recent antibiotic usage  · If persisting fevers and no other source of infection, check stool

## 2018-06-24 NOTE — PROGRESS NOTES
Progress Note - Ronna Patel 1955, 58 y o  male MRN: [id number]    Unit/Bed#: [facility] 609-01 Encounter: [id number]    Primary Care Provider: Brett Hyatt DO   Date and time admitted to hospital: 6/23/2018  6:33 PM    * Sepsis [facility])   Assessment & Plan    · SIRS vs sepsis, POA, evidenced by fever, tachycardia  · No clear cause of infection  · There was concern for pyelo cause patient was having intermittent RIGHT flank pain  However, CT scan showed some mild LEFT perinephric stranding and mild bladder wall thickening and UA was bland  He is on Rocephin for this  · F/U culture results, trend white count, fever  Check PCT  · Continue Rocephin for now  · Consider viral syndrome or non-infectious cause of fever  · BPs getting soft  Give fluids        Hypertension   Assessment & Plan    · BPs soft this morning  · Hold parameters on BP meds        IBS (irritable bowel syndrome)   Assessment & Plan    · He reports diarrhea which is not unusual for him  No recent antibiotic usage  · If persisting fevers and no other source of infection, check stool          VTE Pharmacologic Prophylaxis:   Pharmacologic: Enoxaparin (Lovenox)  Mechanical VTE Prophylaxis in Place: Yes    Patient Centered Rounds: Discussed with RN    Discussions with Specialists or Other Care Team Provider:      Education and Discussions with Family / Patient: Spouse at bedside    Time Spent for Care: 30 minutes  More than 50% of total time spent on counseling and coordination of care as described above  Current Length of Stay: 1 day(s)    Current Patient Status: Inpatient   Certification Statement: The patient will continue to require additional inpatient hospital stay due to sepsis workup, ongoing fevers  Discharge Plan: Pending clinical course  Possibly tomorrow if afebrile overnight and no infectious source found  Code Status: Level 1 - Full Code      Subjective:   Reports fevers and rigors overnight but now he feels better   Reports fevers since Friday  States he occasionally has intermittent right flank pain but has not over the past few days  Reports occasional dysuria but none recently  Has a history of IBS  Reports diarrhea this morning which is not unusual  He gets routine colonoscopies every 3 years for this and has history of polyps  No congestion, otalgia, sore throat, weight loss, joint swelling, abdominal pain, nausea, vomiting, calf pain, leg swelling, travel, recent antibiotic use, lymphadenopathy, cough, SOB  Objective:     Vitals:   Temp (24hrs), Av 6 °F (38 1 °C), Min:98 9 °F (37 2 °C), Max:102 1 °F (38 9 °C)    HR:  [] 81  Resp:  [18-20] 18  BP: (109-166)/(56-76) 109/56  SpO2:  [94 %-97 %] 96 %  Body mass index is 31 21 kg/m²  Input and Output Summary (last 24 hours): Intake/Output Summary (Last 24 hours) at 18 1139  Last data filed at 18 0857   Gross per 24 hour   Intake             1890 ml   Output              875 ml   Net             1015 ml       Physical Exam:     Physical Exam   Constitutional: He is oriented to person, place, and time  No distress  NAD  Non-toxic appearing  HENT:   Head: Normocephalic and atraumatic  Eyes: No scleral icterus  Neck: Normal range of motion  Neck supple  Cardiovascular: Normal rate, regular rhythm and normal heart sounds  Pulmonary/Chest: Effort normal and breath sounds normal  No respiratory distress  He has no wheezes  He has no rales  Abdominal: Soft  Bowel sounds are normal  He exhibits no distension  There is no tenderness  There is no rebound  Musculoskeletal: He exhibits no edema  Mahad negative   Lymphadenopathy:     He has no cervical adenopathy  Neurological: He is alert and oriented to person, place, and time  Skin: Skin is warm and dry  He is not diaphoretic  Psychiatric: He has a normal mood and affect   His behavior is normal  Thought content normal        Additional Data:     Labs:      Results from last 7 days  Lab Units 06/24/18  0538 06/23/18  1748   WBC Thousand/uL 11 69* 9 11   HEMOGLOBIN g/dL 14 9 14 6   HEMATOCRIT % 44 7 43 2   PLATELETS Thousands/uL 151 193   NEUTROS PCT %  --  87*   LYMPHS PCT %  --  7*   MONOS PCT %  --  6   EOS PCT %  --  0       Results from last 7 days  Lab Units 06/24/18  0538 06/23/18  1748   SODIUM mmol/L 138 136   POTASSIUM mmol/L 3 5 3 3*   CHLORIDE mmol/L 105 101   CO2 mmol/L 26 27   BUN mg/dL 14 12   CREATININE mg/dL 1 09 1 19   CALCIUM mg/dL 8 3 9 0   TOTAL PROTEIN g/dL  --  7 4   BILIRUBIN TOTAL mg/dL  --  1 53*   ALK PHOS U/L  --  65   ALT U/L  --  31   AST U/L  --  29   GLUCOSE RANDOM mg/dL 108 122       Results from last 7 days  Lab Units 06/23/18  1748   INR  1 09       * I Have Reviewed All Lab Data Listed Above  * Additional Pertinent Lab Tests Reviewed: All Labs Within Last 24 Hours Reviewed    Imaging:    Imaging Reports Reviewed Today Include: CXR- normal, CT renal stone study- left perinephric stranding  Mild urinary bladder wall thickening  Imaging Personally Reviewed by Myself Includes:  None    Recent Cultures (last 7 days):           Last 24 Hours Medication List:     Current Facility-Administered Medications:  acetaminophen 650 mg Oral Q6H PRN Aneta Fam MD   azelastine 1 spray Each Nare Daily Aneta Fam MD   cefTRIAXone 1,000 mg Intravenous Q24H Aneta Fam MD   enoxaparin 40 mg Subcutaneous Daily Aneta Fam MD   fluticasone 2 spray Nasal Daily Aneta Fam MD   hydrochlorothiazide 25 mg Oral Daily Aneta Fam MD   nebivolol 10 mg Oral Daily Aneta Fam MD   sodium chloride 100 mL/hr Intravenous Continuous Fanny Cornell PA-C        Today, Patient Was Seen By: Fanny Cornell PA-C    ** Please Note: Dragon 360 Dictation voice to text software may have been used in the creation of this document   **

## 2018-06-24 NOTE — CASE MANAGEMENT
Initial Clinical Review    Thank you,  [facility] in the [facility] by Kenn Clifton for 2017  Network Utilization Review Department  Phone: [phone]; Fax [phone]  ATTENTION: The Network Utilization Review Department is now centralized for our 7 Facilities  Make a note that we have a new phone and fax numbers for our Department  Please call with any questions or concerns to [phone] and carefully follow the prompts so that you are directed to the right person  All voicemails are confidential  Fax any determinations, approvals, denials, and requests for initial or continue stay review clinical to [phone]  Due to HIGH CALL volume, it would be easier if you could please send faxed requests to expedite your requests and in part, help us provide discharge notifications faster  Admission: Date/Time/Statement: 6/23/18 @ 2046     Orders Placed This Encounter   Procedures    Inpatient Admission (expected length of stay for this patient is greater than two midnights)     Standing Status:   Standing     Number of Occurrences:   1     Order Specific Question:   Admitting Physician     Answer:   Ila Julian [[id number]]     Order Specific Question:   Level of Care     Answer:   Med Surg [16]     Order Specific Question:   Estimated length of stay     Answer:   More than 2 Midnights     Order Specific Question:   Certification     Answer:   I certify that inpatient services are medically necessary for this patient for a duration of greater than two midnights  See H&P and MD Progress Notes for additional information about the patient's course of treatment         ED: Date/Time/Mode of Arrival:   ED Arrival Information     Expected Arrival Acuity Means of Arrival Escorted By Service Admission Type    - 6/23/2018 17:19 Urgent Walk-In Self General Medicine Urgent    Arrival Complaint    flank pain          Chief Complaint:   Chief Complaint   Patient presents with  Flank Pain     Pt has a c/o R flank pain since yesterday  Pt states that he had it off/and on for a month  History of Illness:  58 y o  male with a medical history significant for GERD, IBS, HLD, HTN, anxiety who presents with fever  He says that yesterday around 6-7pm, he had chills, felt warm and did not feel well  Today, he went to work but still did not feel well and had low energy and came to the ER  He says he has right sided flank pain that has been present intermittently for over 1 month and occasionally has dysuria  He notes heaviness in his chest on Wednesday but none presently  He does note feeing gassy at times  No shortness of breath but has a dry cough today  No sick contacts, rashes, headache, neck stiffness, diarrhea, vomiting, or abdominal pain  He has had melena at times with last episode on Monday  No leg swelling, weakness/numbness      ED Vital Signs:   ED Triage Vitals [06/23/18 1726]   Temperature Pulse Respirations Blood Pressure SpO2   (!) 101 5 °F (38 6 °C) (!) 110 18 166/76 97 %      Temp Source Heart Rate Source Patient Position - Orthostatic VS BP Location FiO2 (%)   Tympanic Monitor Sitting Left arm --      Pain Score       2        Wt Readings from Last 1 Encounters:   06/23/18 90 4 kg (199 lb 4 7 oz)       Vital Signs:  06/23 0701  06/24 0700 06/24 0701  06/24 1715  Most Recent     Temperature (°F)  1 98 9-103  100 2 (37 9)    Pulse    92    Respirations 18-20 18-20  20    Blood Pressure 141//76 109//83  135/63    SpO2 (%) 94-97 95-96  95        Abnormal Labs/Diagnostic Test Results: K 3 3, Tl bili 1 53  Color, UA  Orange    Clarity, UA  Clear    Specific [place], UA 1 003 - 1 030 1 020    pH, UA 4 5 - 8 0 6 0    Leukocytes, UA Negative Negative    Nitrite, UA Negative Negative    Protein, UA Negative mg/dl Trace     Glucose, UA Negative mg/dl Negative    Ketones, UA Negative mg/dl Negative    Urobilinogen, UA 0 2, 1 0 E U /dl E U /dl 0 2 Bilirubin, UA Negative Negative    Blood, UA Negative Large       CT a/p --  1   Mild left-sided perinephric stranding  Clinical correlation for infection is recommended  No evidence of hydronephrosis or nephrolithiasis  2   Mild urinary bladder wall thickening which may represent cystitis  Follow-up is recommended       ED Treatment:   Medication Administration from 06/23/2018 1719 to 06/23/2018 2141       Date/Time Order Dose Route Action Action by Comments     06/23/2018 1734 acetaminophen (TYLENOL) tablet 650 mg 650 mg Oral Given Nils Garcia RN      06/23/2018 2026 acetaminophen (TYLENOL) tablet 650 mg 650 mg Oral Given Lurdes Dozier RN      06/23/2018 2027 sodium chloride 0 9 % bolus 1,000 mL 1,000 mL Intravenous New Bag Lurdes Dozier RN                 06/23/2018 2027 ceftriaxone (ROCEPHIN) 1 g/50 mL in dextrose IVPB 1,000 mg Intravenous New Bag Lurdes Dozier RN           Past Medical/Surgical History:    Active Ambulatory Problems     Diagnosis Date Noted    Benign essential hypertension 08/29/2013    Hyperlipidemia 11/25/2014    Prediabetes 05/20/2014    Vitamin D deficiency 11/25/2014    Chronic rhinitis 12/17/2015    Esophageal reflux 09/14/2012    Benign prostatic hyperplasia 12/12/2012    Generalized anxiety disorder 05/20/2014     Past Medical History:   Diagnosis Date    Abdominal pain, right lower quadrant     Abnormal blood chemistry     Acid reflux     Contact dermatitis due to poison ivy     Depression screen     Flu     High blood pressure     History of acute conjunctivitis     History of allergic urticaria     History of chronic rhinitis     History of disease of skin and subcutaneous tissue     History of dysphagia     History of hemorrhoids     History of IBS     History of URI (upper respiratory infection)     Open wound of left lower leg     Other acute sinusitis     Other disturbances of oral epithelium, including tongue     Positive depression screening     Screening for prostate cancer     Skin rash     Special screening examination for neoplasm of prostate     Tingling        Admitting Diagnosis: Pyelonephritis [N12]  Flank pain [R10 9]  Sepsis ([address] ) [A41 9]    Age/Sex: 58 y o  male    Assessment/Plan:  58 y o  male with a medical history significant for GERD, IBS, HLD, HTN, anxiety who presents with fever      # Fever, sepsis   - perinephric stranding noted on CT abdomen, CXR clear, no other sources of infection  UA is negative however except for microscopic hematuria  - cont CTX for pyelonephritis as there is no other source  - f/u BCx     # Hypokalemia  - kdur ordered for replacement     # HTN - cont nebivolol and HCTZ  # HLD - not on meds     FEN: Diet Regular; Regular House  VTE Prophylaxis: Enoxaparin (Lovenox)  / sequential compression device   Code: Level 1 - Full Code, as discussed at bedside on admission     I have discussed the plan of care with: patient     Anticipated Length of Stay:  Patient will be admitted on an Inpatient basis with an anticipated length of stay of greater than 2 midnights     Justification for Hospital Stay: sepsis, pyelonephritis       Admission Orders:  M/S/Tele unit  Regular diet  SCD's  Monitor labs    Scheduled Meds:   Current Facility-Administered Medications:  acetaminophen 650 mg Oral Q6H PRN Katherine Alegre MD    azelastine 1 spray Each Nare Daily Katherine Alegre MD    cefepime 2,000 mg Intravenous Q12H Dean Hermosillo PA-C Last Rate: 2,000 mg (06/24/18 1648)   enoxaparin 40 mg Subcutaneous Daily Katherine Alegre MD    fluticasone 2 spray Nasal Daily Katherine Alegre MD    hydrochlorothiazide 25 mg Oral Daily Katherine Alegre MD    nebivolol 10 mg Oral Daily Katherine Alegre MD    ondansetron 4 mg Intravenous Q6H PRN MIRI Hackett-BETO    sodium chloride 125 mL/hr Intravenous Continuous Dean Hermosillo PA-BETO Last Rate: 125 mL/hr (06/24/18 1412)   vancomycin 15 mg/kg Intravenous Q12H Dean Hermosillo PA-BETO      Continuous Infusions:   sodium chloride 125 mL/hr Last Rate: 125 mL/hr (06/24/18 1412)     PRN Meds:   acetaminophen    ondansetron      6/24 --       * Sepsis ([address] )   Assessment & Plan     · SIRS vs sepsis, POA, evidenced by fever, tachycardia  · No clear cause of infection  · There was concern for pyelo cause patient was having intermittent RIGHT flank pain  However, CT scan showed some mild LEFT perinephric stranding and mild bladder wall thickening and UA was bland  He is on Rocephin for this  · F/U culture results, trend white count, fever  Check PCT  · Continue Rocephin for now  · Consider viral syndrome or non-infectious cause of fever  · BPs getting soft  Give fluids          Hypertension   Assessment & Plan     · BPs soft this morning  · Hold parameters on BP meds          IBS (irritable bowel syndrome)   Assessment & Plan     · He reports diarrhea which is not unusual for him   No recent antibiotic usage  · If persisting fevers and no other source of infection, check stool

## 2018-06-24 NOTE — PLAN OF CARE
Problem: INFECTION - ADULT  Goal: Absence or prevention of progression during hospitalization  INTERVENTIONS:  - Assess and monitor for signs and symptoms of infection  - Monitor lab/diagnostic results  - Monitor all insertion sites, i e  indwelling lines, tubes, and drains  - Monitor endotracheal (as able) and nasal secretions for changes in amount and color  - [place] appropriate cooling/warming therapies per order  - Administer medications as ordered  - Instruct and encourage patient and family to use good hand hygiene technique  - Identify and instruct in appropriate isolation precautions for identified infection/condition  Outcome: Progressing      Problem: SAFETY ADULT  Goal: Patient will remain free of falls  INTERVENTIONS:  - Assess patient frequently for physical needs  -  Identify cognitive and physical deficits and behaviors that affect risk of falls    -  [place] fall precautions as indicated by assessment   - Educate patient/family on patient safety including physical limitations  - Instruct patient to call for assistance with activity based on assessment  - Modify environment to reduce risk of injury  - Consider OT/PT consult to assist with strengthening/mobility  Outcome: Progressing      Problem: DISCHARGE PLANNING  Goal: Discharge to home or other facility with appropriate resources  INTERVENTIONS:  - Identify barriers to discharge w/patient and caregiver  - Arrange for needed discharge resources and transportation as appropriate  - Identify discharge learning needs (meds, wound care, etc )  - Arrange for interpretive services to assist at discharge as needed  - Refer to Case Management Department for coordinating discharge planning if the patient needs post-hospital services based on physician/advanced practitioner order or complex needs related to functional status, cognitive ability, or social support system  Outcome: Progressing

## 2018-06-24 NOTE — PLAN OF CARE
DISCHARGE PLANNING     Discharge to home or other facility with appropriate resources Progressing        INFECTION - ADULT     Absence or prevention of progression during hospitalization Progressing        SAFETY ADULT     Patient will remain free of falls Progressing

## 2018-06-24 NOTE — H&P
H&P- Mary Levy 1955, 58 y o  male MRN: [id number]    Unit/Bed#: [facility] 609-01 Encounter: [id number]    Primary Care Provider: Hedy Villar DO   Date and time admitted to hospital: 6/23/2018  6:33 PM        Assessment/Plan:  58 y o  male with a medical history significant for GERD, IBS, HLD, HTN, anxiety who presents with fever  # Fever, sepsis   - perinephric stranding noted on CT abdomen, CXR clear, no other sources of infection  UA is negative however except for microscopic hematuria  - cont CTX for pyelonephritis as there is no other source  - f/u BCx    # Hypokalemia  - kdur ordered for replacement    # HTN - cont nebivolol and HCTZ  # HLD - not on meds    FEN: Diet Regular; Regular House  VTE Prophylaxis: Enoxaparin (Lovenox)  / sequential compression device   Code: Level 1 - Full Code, as discussed at bedside on admission    I have discussed the plan of care with: patient    Anticipated Length of Stay:  Patient will be admitted on an Inpatient basis with an anticipated length of stay of greater than 2 midnights  Justification for Hospital Stay: sepsis, pyelonephritis    Total Time for Visit, including Counseling / Coordination of Care: 1 hour  Greater than 50% of this total time spent on direct patient counseling and coordination of care  Chief Complaint: fever    History of Present Illness:  Mary Levy is a 58 y o  male with a medical history significant for GERD, IBS, HLD, HTN, anxiety who presents with fever  He says that yesterday around 6-7pm, he had chills, felt warm and did not feel well  Today, he went to work but still did not feel well and had low energy and came to the ER  He says he has right sided flank pain that has been present intermittently for over 1 month and occasionally has dysuria  He notes heaviness in his chest on Wednesday but none presently  He does note feeing gassy at times  No shortness of breath but has a dry cough today   No sick contacts, rashes, headache, neck stiffness, diarrhea, vomiting, or abdominal pain  He has had melena at times with last episode on Monday  NO leg swelling, weakness/numbness  In the ER, he was given tylenol 650mg x 2, CTX, and 1L NS  Review of Systems:  All other review of systems reviewed and are negative except for as above in HPI  Past Medical History:     Past Medical History:   Diagnosis Date    Abdominal pain, right lower quadrant     Abnormal blood chemistry     Acid reflux     Contact dermatitis due to poison ivy     Depression screen     Flu     High blood pressure     History of acute conjunctivitis     right eye    History of allergic urticaria     History of chronic rhinitis     History of disease of skin and subcutaneous tissue     History of dysphagia     History of hemorrhoids     History of IBS     History of URI (upper respiratory infection)     Open wound of left lower leg     Other acute sinusitis     Other disturbances of oral epithelium, including tongue     Positive depression screening     Screening for prostate cancer     Skin rash     Special screening examination for neoplasm of prostate     Tingling        Past Surgical History:    Past Surgical History:   Procedure Laterality Date    COLONOSCOPY W/ POLYPECTOMY      Via Colostomy    TONSILLECTOMY         Home Medications:    Prior to Admission medications    Medication Sig Start Date End Date Taking? Authorizing Provider   Azelastine HCl 0 15 % SOLN 2 sprays into each nostril daily 6/12/18  Yes Justin Abdalla DO   fluticasone (FLONASE) 50 mcg/act nasal spray 2 sprays into each nostril daily 6/12/18  Yes Justin Abdalla DO   hydrochlorothiazide (HYDRODIURIL) 25 mg tablet Take 1 tablet by mouth daily 12/20/13  Yes Historical Provider, MD   nebivolol (BYSTOLIC) 10 mg tablet Take 1 tablet by mouth daily 1/15/14  Yes Historical Provider, MD       Home meds reviewed and reconciled with patient  Allergies:   Allergies   Allergen Reactions    Lisinopril Throat Swelling and Edema    Amlodipine Palpitations    Diltiazem Rash     Hot rash on feet       Social History:     Marital Status:    Substance Use History:   History   Alcohol Use    Yes     Comment: Social     History   Smoking Status    Former Smoker    Packs/day: 0 50    Years: 3 00    Quit date: 6/23/1982   Smokeless Tobacco    Never Used     History   Drug Use No       Family History:  Family History   Problem Relation Age of Onset    Breast cancer Mother     Lung cancer Family     Substance Abuse Neg Hx         Mother and Father    Mental illness Neg Hx         Mother and Father       Physical Exam:     Vitals:   Blood Pressure: 141/62 (06/23/18 2200)  Pulse: 99 (06/23/18 2200)  Temperature: 99 °F (37 2 °C) (06/23/18 2300)  Temp Source: Oral (06/23/18 2200)  Respirations: 20 (06/23/18 2200)  Height: 5' 7" (170 2 cm) (06/23/18 2200)  Weight - Scale: 90 4 kg (199 lb 4 7 oz) (06/23/18 2200)  SpO2: 94 % (06/23/18 2200)    General: Awake, alert, no acute distress  HEENT: NC/AT, EOMI, mmm  Neck: supple, no LAD  Lungs: CTA bl, no w/c/r  Heart: regular, nl S1/S2, no appreciable murmurs  Abdomen: +BS, soft, NT/ND  Back: no spinal tenderness, no CVA tenderness  Ext: no LE edema  Skin: no rash  Neuro: 5/5 strength throughout, nl sensation, O x 3    Additional Data:     Lab Results: I have personally reviewed pertinent reports     and I have personally reviewed pertinent films in PACS      Results from last 7 days  Lab Units 06/23/18  1748   WBC Thousand/uL 9 11   HEMOGLOBIN g/dL 14 6   HEMATOCRIT % 43 2   PLATELETS Thousands/uL 193   NEUTROS PCT % 87*   LYMPHS PCT % 7*   MONOS PCT % 6   EOS PCT % 0       Results from last 7 days  Lab Units 06/23/18  1748   SODIUM mmol/L 136   POTASSIUM mmol/L 3 3*   CHLORIDE mmol/L 101   CO2 mmol/L 27   BUN mg/dL 12   CREATININE mg/dL 1 19   CALCIUM mg/dL 9 0   TOTAL PROTEIN g/dL 7 4   BILIRUBIN TOTAL mg/dL 1 53*   ALK PHOS U/L 65   ALT U/L 31   AST U/L 29   GLUCOSE RANDOM mg/dL 122       Results from last 7 days  Lab Units 06/23/18  1748   INR  1 09       Imaging: I have personally reviewed pertinent reports  and I have personally reviewed pertinent films in PACS    Ct Renal Stone Study Abdomen Pelvis Wo Contrast    Result Date: 6/23/2018  Narrative: CT ABDOMEN AND PELVIS WITHOUT IV CONTRAST - LOW DOSE RENAL STONE INDICATION:   Flank pain, stone disease suspected  COMPARISON:  None  TECHNIQUE:  Low dose thin section CT examination of the abdomen and pelvis was performed without intravenous or oral contrast according to a protocol specifically designed to evaluate for urinary tract calculus  Axial, sagittal, and coronal 2D reformatted images were created from the source data and submitted for interpretation  Evaluation for pathology in the abdomen and pelvis that is unrelated to urinary tract calculi is limited  Radiation dose length product (DLP) for this visit:  541 92 mGy-cm   This examination, like all CT scans performed in the [facility], was performed utilizing techniques to minimize radiation dose exposure, including the use of iterative  reconstruction and automated exposure control  FINDINGS: RIGHT KIDNEY AND URETER: No urinary tract calculi  No hydronephrosis or hydroureter  LEFT KIDNEY AND URETER: Mild left-sided perinephric stranding is seen  There is no evidence of hydronephrosis or hydroureter  There is no evidence of nephrolithiasis  URINARY BLADDER: Mild urinary bladder wall thickening is seen  No significant abnormality in the visualized lung bases  Limited low radiation dose noncontrast CT evaluation demonstrates no clinically significant abnormality of liver, spleen, pancreas, or adrenal glands  No calcified gallstones or gallbladder wall thickening noted  No ascites or bulky lymphadenopathy on this limited noncontrast study  Bowel loops appear unremarkable   Limited evaluation demonstrates no evidence to suggest acute appendicitis  No acute fracture or destructive osseous lesion is identified  Impression: 1  Mild left-sided perinephric stranding  Clinical correlation for infection is recommended  No evidence of hydronephrosis or nephrolithiasis  2   Mild urinary bladder wall thickening which may represent cystitis  Follow-up is recommended Workstation performed: [id number]     CXR: clear on my read    EKG, Pathology, and Other Studies Reviewed on Admission:   EKbpm, sinus, no acute st-t wave changes    Allscripts / Epic Records Reviewed:  Yes

## 2018-06-25 LAB
ALBUMIN SERPL BCP-MCNC: 2.6 G/DL (ref 3.5–5)
ALP SERPL-CCNC: 88 U/L (ref 46–116)
ALT SERPL W P-5'-P-CCNC: 68 U/L (ref 12–78)
ANION GAP SERPL CALCULATED.3IONS-SCNC: 7 MMOL/L (ref 4–13)
ANION GAP SERPL CALCULATED.3IONS-SCNC: 9 MMOL/L (ref 4–13)
AST SERPL W P-5'-P-CCNC: 62 U/L (ref 5–45)
BASOPHILS # BLD AUTO: 0.02 THOUSANDS/ΜL (ref 0–0.1)
BASOPHILS NFR BLD AUTO: 0 % (ref 0–1)
BILIRUB SERPL-MCNC: 1.33 MG/DL (ref 0.2–1)
BUN SERPL-MCNC: 8 MG/DL (ref 5–25)
BUN SERPL-MCNC: 9 MG/DL (ref 5–25)
CALCIUM SERPL-MCNC: 7.8 MG/DL (ref 8.3–10.1)
CALCIUM SERPL-MCNC: 8.1 MG/DL (ref 8.3–10.1)
CHLORIDE SERPL-SCNC: 109 MMOL/L (ref 100–108)
CHLORIDE SERPL-SCNC: 110 MMOL/L (ref 100–108)
CO2 SERPL-SCNC: 23 MMOL/L (ref 21–32)
CO2 SERPL-SCNC: 24 MMOL/L (ref 21–32)
CREAT SERPL-MCNC: 0.86 MG/DL (ref 0.6–1.3)
CREAT SERPL-MCNC: 0.87 MG/DL (ref 0.6–1.3)
EOSINOPHIL # BLD AUTO: 0.1 THOUSAND/ΜL (ref 0–0.61)
EOSINOPHIL NFR BLD AUTO: 2 % (ref 0–6)
ERYTHROCYTE [DISTWIDTH] IN BLOOD BY AUTOMATED COUNT: 12.8 % (ref 11.6–15.1)
GFR SERPL CREATININE-BSD FRML MDRD: 92 ML/MIN/1.73SQ M
GFR SERPL CREATININE-BSD FRML MDRD: 93 ML/MIN/1.73SQ M
GLUCOSE SERPL-MCNC: 116 MG/DL (ref 65–140)
GLUCOSE SERPL-MCNC: 120 MG/DL (ref 65–140)
HCT VFR BLD AUTO: 39.5 % (ref 36.5–49.3)
HGB BLD-MCNC: 13.6 G/DL (ref 12–17)
IMM GRANULOCYTES # BLD AUTO: 0.03 THOUSAND/UL (ref 0–0.2)
IMM GRANULOCYTES NFR BLD AUTO: 1 % (ref 0–2)
LYMPHOCYTES # BLD AUTO: 0.54 THOUSANDS/ΜL (ref 0.6–4.47)
LYMPHOCYTES NFR BLD AUTO: 11 % (ref 14–44)
MCH RBC QN AUTO: 30 PG (ref 26.8–34.3)
MCHC RBC AUTO-ENTMCNC: 34.4 G/DL (ref 31.4–37.4)
MCV RBC AUTO: 87 FL (ref 82–98)
MONOCYTES # BLD AUTO: 0.52 THOUSAND/ΜL (ref 0.17–1.22)
MONOCYTES NFR BLD AUTO: 11 % (ref 4–12)
NEUTROPHILS # BLD AUTO: 3.68 THOUSANDS/ΜL (ref 1.85–7.62)
NEUTS SEG NFR BLD AUTO: 75 % (ref 43–75)
NRBC BLD AUTO-RTO: 0 /100 WBCS
PLATELET # BLD AUTO: 124 THOUSANDS/UL (ref 149–390)
PMV BLD AUTO: 10.8 FL (ref 8.9–12.7)
POTASSIUM SERPL-SCNC: 3.2 MMOL/L (ref 3.5–5.3)
POTASSIUM SERPL-SCNC: 3.4 MMOL/L (ref 3.5–5.3)
PROCALCITONIN SERPL-MCNC: 1.95 NG/ML
PROT SERPL-MCNC: 5.5 G/DL (ref 6.4–8.2)
RBC # BLD AUTO: 4.53 MILLION/UL (ref 3.88–5.62)
SODIUM SERPL-SCNC: 141 MMOL/L (ref 136–145)
SODIUM SERPL-SCNC: 141 MMOL/L (ref 136–145)
TSH SERPL DL<=0.05 MIU/L-ACNC: 4.19 UIU/ML (ref 0.36–3.74)
VANCOMYCIN TROUGH SERPL-MCNC: 8.5 UG/ML (ref 10–20)
WBC # BLD AUTO: 4.89 THOUSAND/UL (ref 4.31–10.16)

## 2018-06-25 PROCEDURE — 80202 ASSAY OF VANCOMYCIN: CPT | Performed by: PHYSICIAN ASSISTANT

## 2018-06-25 PROCEDURE — 80053 COMPREHEN METABOLIC PANEL: CPT | Performed by: INTERNAL MEDICINE

## 2018-06-25 PROCEDURE — 87798 DETECT AGENT NOS DNA AMP: CPT | Performed by: INTERNAL MEDICINE

## 2018-06-25 PROCEDURE — 80048 BASIC METABOLIC PNL TOTAL CA: CPT | Performed by: PHYSICIAN ASSISTANT

## 2018-06-25 PROCEDURE — 84145 PROCALCITONIN (PCT): CPT | Performed by: PHYSICIAN ASSISTANT

## 2018-06-25 PROCEDURE — 86618 LYME DISEASE ANTIBODY: CPT | Performed by: INTERNAL MEDICINE

## 2018-06-25 PROCEDURE — 99254 IP/OBS CNSLTJ NEW/EST MOD 60: CPT | Performed by: INTERNAL MEDICINE

## 2018-06-25 PROCEDURE — 84443 ASSAY THYROID STIM HORMONE: CPT | Performed by: PHYSICIAN ASSISTANT

## 2018-06-25 PROCEDURE — 99232 SBSQ HOSP IP/OBS MODERATE 35: CPT | Performed by: PHYSICIAN ASSISTANT

## 2018-06-25 PROCEDURE — 85025 COMPLETE CBC W/AUTO DIFF WBC: CPT | Performed by: PHYSICIAN ASSISTANT

## 2018-06-25 RX ORDER — METOCLOPRAMIDE HYDROCHLORIDE 5 MG/ML
5 INJECTION INTRAMUSCULAR; INTRAVENOUS ONCE
Status: DISCONTINUED | OUTPATIENT
Start: 2018-06-25 | End: 2018-06-27 | Stop reason: HOSPADM

## 2018-06-25 RX ORDER — POTASSIUM CHLORIDE 20 MEQ/1
40 TABLET, EXTENDED RELEASE ORAL ONCE
Status: COMPLETED | OUTPATIENT
Start: 2018-06-25 | End: 2018-06-25

## 2018-06-25 RX ORDER — DOXYCYCLINE HYCLATE 100 MG/1
100 CAPSULE ORAL EVERY 12 HOURS SCHEDULED
Status: DISCONTINUED | OUTPATIENT
Start: 2018-06-25 | End: 2018-06-27 | Stop reason: HOSPADM

## 2018-06-25 RX ADMIN — DOXYCYCLINE 100 MG: 100 CAPSULE ORAL at 18:11

## 2018-06-25 RX ADMIN — CEFEPIME HYDROCHLORIDE 2000 MG: 2 INJECTION, POWDER, FOR SOLUTION INTRAVENOUS at 04:50

## 2018-06-25 RX ADMIN — AZELASTINE HYDROCHLORIDE 1 SPRAY: 137 SPRAY, METERED NASAL at 10:44

## 2018-06-25 RX ADMIN — VANCOMYCIN HYDROCHLORIDE 1250 MG: 10 INJECTION, POWDER, LYOPHILIZED, FOR SOLUTION INTRAVENOUS at 05:29

## 2018-06-25 RX ADMIN — ENOXAPARIN SODIUM 40 MG: 40 INJECTION SUBCUTANEOUS at 10:41

## 2018-06-25 RX ADMIN — POTASSIUM CHLORIDE 40 MEQ: 1500 TABLET, EXTENDED RELEASE ORAL at 10:42

## 2018-06-25 RX ADMIN — VANCOMYCIN HYDROCHLORIDE 1250 MG: 10 INJECTION, POWDER, LYOPHILIZED, FOR SOLUTION INTRAVENOUS at 18:11

## 2018-06-25 RX ADMIN — FLUTICASONE PROPIONATE 2 SPRAY: 50 SPRAY, METERED NASAL at 10:42

## 2018-06-25 RX ADMIN — ACETAMINOPHEN 650 MG: 325 TABLET ORAL at 14:36

## 2018-06-25 RX ADMIN — HYDROCHLOROTHIAZIDE 25 MG: 25 TABLET ORAL at 10:41

## 2018-06-25 RX ADMIN — ONDANSETRON 4 MG: 2 INJECTION, SOLUTION INTRAMUSCULAR; INTRAVENOUS at 05:27

## 2018-06-25 RX ADMIN — SODIUM CHLORIDE 125 ML/HR: 0.9 INJECTION, SOLUTION INTRAVENOUS at 10:38

## 2018-06-25 RX ADMIN — SODIUM CHLORIDE 125 ML/HR: 0.9 INJECTION, SOLUTION INTRAVENOUS at 23:26

## 2018-06-25 NOTE — PROGRESS NOTES
Elizabeth with [facility] was notified re: pts temp of 102 8  with rigors  Stat labs orderd as well as to increase IVF's and pending labs, may change IV antibx's  Motrin administered re: tylenol not being affective

## 2018-06-25 NOTE — ASSESSMENT & PLAN NOTE
· SIRS vs sepsis, POA, evidenced by fever, tachycardia  · There was concern for pyelo given some mild perinephric stranding on imaging but patient has no urinary symptoms and bland UA  · Patient continues to spike high fevers and had elevated procalcitonin so antibiotics were broadened  Will continue for now   PCT is trending downward  · Will consult ID for assistance given unclear source  · Consider viral or noninfectious source of fever although elevated PCT suggests bacterial cause

## 2018-06-25 NOTE — CONSULTS
Consultation - Infectious Disease   Juan Mccoy 58 y o  male MRN: [id number]  Unit/Bed#: [facility] 609-01 Encounter: [id number]      IMPRESSION & RECOMMENDATIONS:   Impression/Recommendations: This is a 58 y o  male, otherwise healthy, admitted 2 days ago with fever and constitutional symptoms  Abdomen/pelvis CT with mild bilateral perinephric stranding  UA was normal in patient had no urinary symptoms  He is not improve on IV antibiotic for presumptive pyelonephritis  Patient has tick exposure  He is becoming leukopenic and thrombocytopenic  1   Fever, with constitutional symptoms  Although patient had normal platelet count and mild leukocytosis on admission, he is becoming leukopenic and thrombocytopenic  LFTs had been normal on admission  He has extensive outdoor exposure without tick repellent and history of tick bite forehead  His symptoms a suggestive of tick-borne infection, especially anaplasmosis and babesiosis  Despite fever, patient is clinically and systemically well, without evidence of sepsis or systemic toxicity  He is not hypotensive  With no travel to developing well, probability of malaria is extremely low  Anaplasmosis and especially babesiosis have symptoms similar to malaria  Check Anaplasma PCR, parasite blood smear, Lyme screen  Recheck LFTs  Monitor temperature/WBC  Follow-up on pending blood cultures  Trial of doxycycline empirically, while waiting for above studies  Trend PCT  2   Slightly abnormal abdomen/pelvis CT with mild bilateral perinephric stranding  I doubt that these findings are pathogenic  Patient has no flank pain  He has no urinary symptoms  UA is abnormal   Clinically, patient does not have UTI or pyelonephritis  PCT was elevated but could be secondary to above  Discontinue cefepime  Monitor for development of urinary symptoms  Monitor for development of flank pain  3   Headache, with mild photophobia when temperature is up    No stiff neck   Exam is not consistent with meningitis  I suspect headache is secondary to infections above  No clinical signs of meningitis  Patient has no mental status changes  Therefore, doubt encephalitis  Monitor headache  Discussed with patient in detail regarding above plan  Discussed with [facility] service  Thank you for this consultation  We will follow along with you  HISTORY OF PRESENT ILLNESS:  Reason for Consult:  Fever  HPI: Sarika Cao is a 58 y o  male, relatively healthy, came to the ER on 06/23 with 24 hours progressing fever/chills  With his fever, patient describes constitutional symptoms including headache, myalgias/arthralgias  Otherwise, he had no focal symptoms  Patient had abdomen/pelvis CT with mild left-sided perinephric stranding  Although UA was normal, ceftriaxone was started for presumptive pyelonephritis  Due to persistent fever, antibiotic was broadened to cefepime  We are asked to evaluate the patient  Patient states since admission, he has not felt any better  All his symptoms are unchanged  Patient has headache with this fever  He has mild photophobia with headache but no stiff neck  Patient has extensive outdoor exposure  He reported tick bite in his forehead a few weeks ago, while cutting the grass  Interestingly, patient states that he and his son had look at the Internet and felt that he has malaria, due to symptoms  Patient has not traveled overseas to the developing world  He denies any urinary symptoms  He denies any flank pain  No prior history of UTI  Patient is a post , delivering mail  REVIEW OF SYSTEMS:  A complete 12 point system-based review of systems was done  Except for what is noted in HPI above, ROS is otherwise negative      PAST MEDICAL HISTORY:  Past Medical History:   Diagnosis Date    Abdominal pain, right lower quadrant     Abnormal blood chemistry     Acid reflux     Contact dermatitis due to poison ivy  Depression screen     Flu     High blood pressure     History of acute conjunctivitis     right eye    History of allergic urticaria     History of chronic rhinitis     History of disease of skin and subcutaneous tissue     History of dysphagia     History of hemorrhoids     History of IBS     History of URI (upper respiratory infection)     Open wound of left lower leg     Other acute sinusitis     Other disturbances of oral epithelium, including tongue     Positive depression screening     Screening for prostate cancer     Skin rash     Special screening examination for neoplasm of prostate     Tingling      Past Surgical History:   Procedure Laterality Date    COLONOSCOPY W/ POLYPECTOMY      Via Colostomy    TONSILLECTOMY       Problem list reviewed  FAMILY HISTORY:  Non-contributory    SOCIAL HISTORY:  History   Alcohol Use    Yes     Comment: Social     History   Drug Use No     History   Smoking Status    Former Smoker    Packs/day: 0 50    Years: 3 00    Quit date: 1982   Smokeless Tobacco    Never Used       ALLERGIES:  Allergies   Allergen Reactions    Lisinopril Throat Swelling and Edema    Amlodipine Palpitations    Diltiazem Rash     Hot rash on feet       MEDICATIONS:  All current active medications have been reviewed  Patient is currently on IV cefepime  PHYSICAL EXAM:  Vitals:  HR:  [69-92] 69  Resp:  [18-20] 18  BP: (112-135)/(57-66) 112/58  SpO2:  [96 %-97 %] 96 %  Temp (24hrs), Av 2 °F (37 3 °C), Min:98 2 °F (36 8 °C), Max:100 3 °F (37 9 °C)  Current: Temperature: 99 4 °F (37 4 °C)     Physical Exam:  General:  Well-nourished, well-developed, in no acute distress  Awake, alert and oriented x 3    Eyes:  Conjunctive clear with no hemorrhages or effusions  Oropharynx:  No ulcers, no lesions, pharynx benign, no tonsillitis  Neck:  Supple, no meningismus, no lymphadenopathy, no mass, nontender  Lungs:  Expansion symmetric, no rales, no wheezing, no accessory muscle use  Cardiac:  Regular rate and rhythm, normal S1, normal S2, no murmurs  Abdomen:  Soft, nondistended, non-tender, no HSM  Extremities:  No edema, no erythema, nontender  No ulcers  Skin:  No rashes, no ulcers  Neurological:  Moves all four extremities spontaneously, sensation grossly intact    LABS, IMAGING, & OTHER STUDIES:  Lab Results:  I have personally reviewed pertinent labs  Results from last 7 days  Lab Units 06/25/18  0635 06/24/18  0538 06/23/18  1748   SODIUM mmol/L 141 138 136   POTASSIUM mmol/L 3 2* 3 5 3 3*   CHLORIDE mmol/L 109* 105 101   CO2 mmol/L 23 26 27   ANION GAP mmol/L 9 7 8   BUN mg/dL 9 14 12   CREATININE mg/dL 0 87 1 09 1 19   EGFR ml/min/1 73sq m 92 72 65   GLUCOSE RANDOM mg/dL 116 108 122   CALCIUM mg/dL 7 8* 8 3 9 0   AST U/L  --   --  29   ALT U/L  --   --  31   ALK PHOS U/L  --   --  65   TOTAL PROTEIN g/dL  --   --  7 4   BILIRUBIN TOTAL mg/dL  --   --  1 53*       Results from last 7 days  Lab Units 06/25/18  0635 06/24/18  0538 06/23/18  1748   WBC Thousand/uL 4 89 11 69* 9 11   HEMOGLOBIN g/dL 13 6 14 9 14 6   PLATELETS Thousands/uL 124* 151 193       Results from last 7 days  Lab Units 06/23/18  1748   BLOOD CULTURE  No Growth at 24 hrs  No Growth at 24 hrs  Imaging Studies:   I have personally reviewed pertinent imaging study reports and images in PACS  CXR reviewed personally  No infiltrates or consolidations  Abdomen/pelvis CT reviewed personally  Mild perinephric stranding bilaterally  Otherwise normal CT  EKG, Pathology, and Other Studies:   I have personally reviewed pertinent reports

## 2018-06-25 NOTE — SOCIAL WORK
Cm met w pt today & explained Cm role  Pt lives w S O  In ranch home w walkout basement  Was [employer] Prior, works & drives  No dme  No h/o vna or rhb  Denies any MH,D&A tx  Uses [employer] in [place]  PCP [facility]  POA sons [employer] [facility] [address] [phone]  Requested copy  Emergency contact, S O  Germán Bledsoeey [phone]  Informed will take preferences into account if any services are needed  Will have transport home  Patient/caregiver received discharge checklist  Content reviewed  Patient/caregiver encouraged to participate in discharge plan of care prior to discharge home  CM reviewed d/c planning process including the following: identifying help at home, patient preference for d/c planning needs, Discharge Lounge, [facility] Meds to Bed program, availability of treatment team to discuss questions or concerns patient and/or family may have regarding understanding medications and recognizing signs and symptoms once discharged  CM also encouraged patient to follow up with all recommended appointments after discharge  Patient advised of importance for patient and family to participate in managing patients medical well being

## 2018-06-25 NOTE — SOCIAL WORK
MCG Guide Used for Initial Round: Sepsis RRG  Optimal GLOS: 3  Hospital Day: 2  DC Readiness:   · Discharge readiness is indicated by patient meeting Recovery Milestones, including ALL of the following:  ? Hemodynamic stability  ? Fever absent or reduced  ? Hypoxemia absent  ? Mental status at baseline  ? End-organ dysfunction (eg, myocardial ischemia, renal failure) absent  ? Metabolic derangement (eg, dehydration, acidosis) absent  ? Cultures negative or infection identified and under adequate treatment  ? Ambulatory  ? Oral hydration, medications[L]  ?  Discharge plans and education understood    Identified Barriers: ID consult, iv abx  Discussion Date (Time): 06/25/18 with Drew Tubbs

## 2018-06-25 NOTE — ASSESSMENT & PLAN NOTE
· He reports diarrhea which is not unusual for him   No recent antibiotic usage  · If diarrhea recurs will send stool studies given lack of source of fever

## 2018-06-25 NOTE — PROGRESS NOTES
Pts temp is now 100 2 after tylenol and no longer has rigors  Ordered to continue to monitor  IV antibx's changed re: procalciton level elevated  LA 1 5   CT abd/pelvis ordered re: pts request

## 2018-06-25 NOTE — PROGRESS NOTES
Progress Note - Sadia Lacy 1955, 58 y o  male MRN: [id number]    Unit/Bed#: [facility] 609-01 Encounter: [id number]    Primary Care Provider: Matt Chowdhury DO   Date and time admitted to hospital: 6/23/2018  6:33 PM    * Sepsis [facility])   Assessment & Plan    · SIRS vs sepsis, POA, evidenced by fever, tachycardia  · There was concern for pyelo given some mild perinephric stranding on imaging but patient has no urinary symptoms and bland UA  · Patient continues to spike high fevers and had elevated procalcitonin so antibiotics were broadened  Will continue for now  PCT is trending downward  · Will consult ID for assistance given unclear source  · Consider viral or noninfectious source of fever although elevated PCT suggests bacterial cause        Hypertension   Assessment & Plan    · Continue meds        IBS (irritable bowel syndrome)   Assessment & Plan    · He reports diarrhea which is not unusual for him  No recent antibiotic usage  · If diarrhea recurs will send stool studies given lack of source of fever          VTE Pharmacologic Prophylaxis:   Pharmacologic: Enoxaparin (Lovenox)  Mechanical VTE Prophylaxis in Place: Yes    Patient Centered Rounds:      Discussions with Specialists or Other Care Team Provider:      Education and Discussions with Family / Patient: Patient    Time Spent for Care: 20 minutes  More than 50% of total time spent on counseling and coordination of care as described above  Current Length of Stay: 2 day(s)    Current Patient Status: Inpatient   Certification Statement: The patient will continue to require additional inpatient hospital stay due to sepsis unknown origin    Discharge Plan: None yet-pending clinical course    Code Status: Level 1 - Full Code      Subjective:   Feeling generally unwell today  Vomited once this morning and once yesterday  Does not feel like eating or getting out of bed  One episode of diarrhea yesterday morning but none today   Also reports sore throat today  No SOB, chest pain  Occasional cough  No abdominal pain or urinary symptoms  Objective:     Vitals:   Temp (24hrs), Av 6 °F (38 1 °C), Min:98 2 °F (36 8 °C), Max:103 °F (39 4 °C)    HR:  [] 69  Resp:  [18-20] 18  BP: (112-141)/(57-83) 112/58  SpO2:  [95 %-97 %] 96 %  Body mass index is 31 21 kg/m²  Input and Output Summary (last 24 hours): Intake/Output Summary (Last 24 hours) at 18 1038  Last data filed at 18 1006   Gross per 24 hour   Intake           3097 5 ml   Output             1750 ml   Net           1347 5 ml       Physical Exam:     Physical Exam   Constitutional: He is oriented to person, place, and time  Appears unwell   HENT:   Head: Normocephalic and atraumatic  Mouth/Throat: No oropharyngeal exudate or posterior oropharyngeal erythema  Difficult to assess oropharynx given significant gag reflex but limited exam unremarkable   Eyes: No scleral icterus  Neck: Normal range of motion  Neck supple  Cardiovascular: Normal rate, regular rhythm and normal heart sounds  Pulmonary/Chest: Effort normal and breath sounds normal  No respiratory distress  He has no wheezes  He has no rales  Abdominal: Soft  Bowel sounds are normal  He exhibits no distension  There is no tenderness  There is no rebound  Musculoskeletal: He exhibits no edema  Neurological: He is alert and oriented to person, place, and time  Skin: Skin is warm and dry  Psychiatric: He has a normal mood and affect   His behavior is normal  Thought content normal        Additional Data:     Labs:      Results from last 7 days  Lab Units 18  0635   WBC Thousand/uL 4 89   HEMOGLOBIN g/dL 13 6   HEMATOCRIT % 39 5   PLATELETS Thousands/uL 124*   NEUTROS PCT % 75   LYMPHS PCT % 11*   MONOS PCT % 11   EOS PCT % 2       Results from last 7 days  Lab Units 18  0635  18  1748   SODIUM mmol/L 141  < > 136   POTASSIUM mmol/L 3 2*  < > 3 3*   CHLORIDE mmol/L 109*  < > 101 CO2 mmol/L 23  < > 27   BUN mg/dL 9  < > 12   CREATININE mg/dL 0 87  < > 1 19   CALCIUM mg/dL 7 8*  < > 9 0   TOTAL PROTEIN g/dL  --   --  7 4   BILIRUBIN TOTAL mg/dL  --   --  1 53*   ALK PHOS U/L  --   --  65   ALT U/L  --   --  31   AST U/L  --   --  29   GLUCOSE RANDOM mg/dL 116  < > 122   < > = values in this interval not displayed  Results from last 7 days  Lab Units 06/23/18  1748   INR  1 09       * I Have Reviewed All Lab Data Listed Above  * Additional Pertinent Lab Tests Reviewed: All Labs Within Last 24 Hours Reviewed    Imaging:    Imaging Reports Reviewed Today Include: CT abd/pelvis: Very mild perinephric stranding  Imaging Personally Reviewed by Myself Includes:  None    Recent Cultures (last 7 days):       Results from last 7 days  Lab Units 06/23/18 1748   BLOOD CULTURE  No Growth at 24 hrs  No Growth at 24 hrs         Last 24 Hours Medication List:     Current Facility-Administered Medications:  acetaminophen 650 mg Oral Q6H PRN Kavon Zacarias MD    azelastine 1 spray Each Nare Daily Kavon Zacarias MD    cefepime 2,000 mg Intravenous Q12H Trae Jolley PA-C Last Rate: Stopped (06/25/18 0520)   enoxaparin 40 mg Subcutaneous Daily Kavon Zacarias MD    fluticasone 2 spray Nasal Daily Kavon Zacarias MD    hydrochlorothiazide 25 mg Oral Daily Kavon Zacarias MD    ibuprofen 400 mg Oral Q6H PRN Sherley Gauthier MD    metoclopramide 5 mg Intravenous Once Armaan Valdez PA-C    nebivolol 10 mg Oral Daily Kavon Zacarias MD    ondansetron 4 mg Intravenous Q6H PRN Trae Jolley PA-C    potassium chloride 40 mEq Oral Once Trae Jolley PA-C    sodium chloride 125 mL/hr Intravenous Continuous Trae Jolley PA-C Last Rate: 125 mL/hr (06/24/18 2043)   vancomycin 15 mg/kg Intravenous Q12H Trae Jolley PA-C Last Rate: 1,250 mg (06/25/18 0529)        Today, Patient Was Seen By: Trae Jolley PA-C    ** Please Note: Dragon 360 Dictation voice to text software may have been used in the creation of this document   **

## 2018-06-26 PROBLEM — R65.10 SIRS (SYSTEMIC INFLAMMATORY RESPONSE SYNDROME) (HCC): Status: ACTIVE | Noted: 2018-06-24

## 2018-06-26 LAB
ANION GAP SERPL CALCULATED.3IONS-SCNC: 8 MMOL/L (ref 4–13)
B BURGDOR IGG SER IA-ACNC: 0.09
B BURGDOR IGM SER IA-ACNC: 0.33
BASOPHILS # BLD MANUAL: 0 THOUSAND/UL (ref 0–0.1)
BASOPHILS NFR MAR MANUAL: 0 % (ref 0–1)
BUN SERPL-MCNC: 6 MG/DL (ref 5–25)
CALCIUM SERPL-MCNC: 8 MG/DL (ref 8.3–10.1)
CHLORIDE SERPL-SCNC: 108 MMOL/L (ref 100–108)
CO2 SERPL-SCNC: 25 MMOL/L (ref 21–32)
CREAT SERPL-MCNC: 0.76 MG/DL (ref 0.6–1.3)
EOSINOPHIL # BLD MANUAL: 0.23 THOUSAND/UL (ref 0–0.4)
EOSINOPHIL NFR BLD MANUAL: 5 % (ref 0–6)
ERYTHROCYTE [DISTWIDTH] IN BLOOD BY AUTOMATED COUNT: 12.7 % (ref 11.6–15.1)
GFR SERPL CREATININE-BSD FRML MDRD: 98 ML/MIN/1.73SQ M
GLUCOSE SERPL-MCNC: 109 MG/DL (ref 65–140)
HCT VFR BLD AUTO: 38.6 % (ref 36.5–49.3)
HGB BLD-MCNC: 12.7 G/DL (ref 12–17)
LYMPHOCYTES # BLD AUTO: 0.78 THOUSAND/UL (ref 0.6–4.47)
LYMPHOCYTES # BLD AUTO: 17 % (ref 14–44)
MCH RBC QN AUTO: 28.9 PG (ref 26.8–34.3)
MCHC RBC AUTO-ENTMCNC: 32.9 G/DL (ref 31.4–37.4)
MCV RBC AUTO: 88 FL (ref 82–98)
MONOCYTES # BLD AUTO: 0.14 THOUSAND/UL (ref 0–1.22)
MONOCYTES NFR BLD: 3 % (ref 4–12)
NEUTROPHILS # BLD MANUAL: 3.35 THOUSAND/UL (ref 1.85–7.62)
NEUTS SEG NFR BLD AUTO: 73 % (ref 43–75)
NRBC BLD AUTO-RTO: 0 /100 WBCS
PLATELET # BLD AUTO: 132 THOUSANDS/UL (ref 149–390)
PLATELET BLD QL SMEAR: ABNORMAL
PMV BLD AUTO: 11.7 FL (ref 8.9–12.7)
POTASSIUM SERPL-SCNC: 3.6 MMOL/L (ref 3.5–5.3)
PROCALCITONIN SERPL-MCNC: 0.97 NG/ML
RBC # BLD AUTO: 4.4 MILLION/UL (ref 3.88–5.62)
RBC MORPH BLD: NORMAL
SODIUM SERPL-SCNC: 141 MMOL/L (ref 136–145)
VARIANT LYMPHS # BLD AUTO: 2 %
WBC # BLD AUTO: 4.59 THOUSAND/UL (ref 4.31–10.16)

## 2018-06-26 PROCEDURE — 80048 BASIC METABOLIC PNL TOTAL CA: CPT | Performed by: PHYSICIAN ASSISTANT

## 2018-06-26 PROCEDURE — 84145 PROCALCITONIN (PCT): CPT | Performed by: PHYSICIAN ASSISTANT

## 2018-06-26 PROCEDURE — 99232 SBSQ HOSP IP/OBS MODERATE 35: CPT | Performed by: INTERNAL MEDICINE

## 2018-06-26 PROCEDURE — 99232 SBSQ HOSP IP/OBS MODERATE 35: CPT | Performed by: PHYSICIAN ASSISTANT

## 2018-06-26 PROCEDURE — 85027 COMPLETE CBC AUTOMATED: CPT | Performed by: PHYSICIAN ASSISTANT

## 2018-06-26 PROCEDURE — 85007 BL SMEAR W/DIFF WBC COUNT: CPT | Performed by: PHYSICIAN ASSISTANT

## 2018-06-26 PROCEDURE — 87207 SMEAR SPECIAL STAIN: CPT | Performed by: INTERNAL MEDICINE

## 2018-06-26 RX ADMIN — VANCOMYCIN HYDROCHLORIDE 1250 MG: 10 INJECTION, POWDER, LYOPHILIZED, FOR SOLUTION INTRAVENOUS at 17:03

## 2018-06-26 RX ADMIN — FLUTICASONE PROPIONATE 2 SPRAY: 50 SPRAY, METERED NASAL at 09:02

## 2018-06-26 RX ADMIN — DOXYCYCLINE 100 MG: 100 CAPSULE ORAL at 17:03

## 2018-06-26 RX ADMIN — VANCOMYCIN HYDROCHLORIDE 1250 MG: 10 INJECTION, POWDER, LYOPHILIZED, FOR SOLUTION INTRAVENOUS at 03:43

## 2018-06-26 RX ADMIN — IBUPROFEN 400 MG: 400 TABLET, FILM COATED ORAL at 14:04

## 2018-06-26 RX ADMIN — AZELASTINE HYDROCHLORIDE 1 SPRAY: 137 SPRAY, METERED NASAL at 09:01

## 2018-06-26 RX ADMIN — DOXYCYCLINE 100 MG: 100 CAPSULE ORAL at 06:10

## 2018-06-26 RX ADMIN — NEBIVOLOL HYDROCHLORIDE 10 MG: 10 TABLET ORAL at 09:01

## 2018-06-26 RX ADMIN — ENOXAPARIN SODIUM 40 MG: 40 INJECTION SUBCUTANEOUS at 09:01

## 2018-06-26 RX ADMIN — HYDROCHLOROTHIAZIDE 25 MG: 25 TABLET ORAL at 09:01

## 2018-06-26 RX ADMIN — SODIUM CHLORIDE 125 ML/HR: 0.9 INJECTION, SOLUTION INTRAVENOUS at 09:01

## 2018-06-26 NOTE — PLAN OF CARE
Problem: INFECTION - ADULT  Goal: Absence or prevention of progression during hospitalization  INTERVENTIONS:  - Assess and monitor for signs and symptoms of infection  - Monitor lab/diagnostic results  - Monitor all insertion sites  - Administer medications as ordered  - Instruct and encourage patient and family to use good hand hygiene technique   Outcome: Not Progressing      Problem: SAFETY ADULT  Goal: Patient will remain free of falls  INTERVENTIONS:  - Assess patient frequently for physical needs  -  Identify cognitive and physical deficits and behaviors that affect risk of falls    -  [place] fall precautions as indicated by assessment   - Educate patient/family on patient safety including physical limitations  - Instruct patient to call for assistance with activity based on assessment  - Modify environment to reduce risk of injury  - Consider OT/PT consult to assist with strengthening/mobility   Outcome: Progressing      Problem: DISCHARGE PLANNING  Goal: Discharge to home or other facility with appropriate resources  INTERVENTIONS:  - Identify barriers to discharge w/patient and caregiver  - Arrange for needed discharge resources and transportation as appropriate  - Identify discharge learning needs (meds, wound care, etc )  - Arrange for interpretive services to assist at discharge as needed  - Refer to Case Management Department for coordinating discharge planning if the patient needs post-hospital services based on physician/advanced practitioner order or complex needs related to functional status, cognitive ability, or social support system   Outcome: Progressing      Problem: DISCHARGE PLANNING - CARE MANAGEMENT  Goal: Discharge to post-acute care or home with appropriate resources  INTERVENTIONS:  - Conduct assessment to determine patient/family and health care team treatment goals, and need for post-acute services based on payer coverage, community resources, and patient preferences, and barriers to discharge  - Address psychosocial, clinical, and financial barriers to discharge as identified in assessment in conjunction with the patient/family and health care team  - Arrange appropriate level of post-acute services according to patient's   needs and preference and payer coverage in collaboration with the physician and health care team  - Communicate with and update the patient/family, physician, and health care team regarding progress on the discharge plan  - Arrange appropriate transportation to post-acute venues  - Home w family support   Outcome: Progressing

## 2018-06-26 NOTE — PROGRESS NOTES
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  Dramatic improvement with doxycycline is also consistent with it  PCT also decreasing  We need to consider the possibility of co-infection  Continue p o  doxycycline  Follow-up Anaplasma PCR, parasite blood smear, Lyme screen  Monitor temperature/WBC  Follow-up on pending blood cultures      2   Slightly abnormal abdomen/pelvis CT with mild bilateral perinephric stranding  I doubt that these findings are pathogenic  Patient has no flank pain  He has no urinary symptoms  UA is abnormal   Clinically, patient does not have UTI or pyelonephritis  PCT was elevated but could be secondary to above  Patient remains well off cefepime  No antibiotic for this  Monitor for development of urinary symptoms  Monitor for development of flank pain      3   Headache, with mild photophobia when temperature is up  No stiff neck  Exam is not consistent with meningitis  I suspect headache is secondary to infections above  No clinical signs of meningitis  Patient has no mental status changes  Therefore, doubt encephalitis  Symptoms much improved with doxycycline  Monitor headache      Discussed with patient in detail regarding above plan  Discussed with [facility] service  Anticipate discharge in 24 hours, if patient continues to improve  Antibiotics:  Doxycycline # 2    Subjective:  Patient feels a lot better today  Headache, myalgia, arthralgia have nearly resolved  Temperature is down  No further chills  He is tolerating antibiotic well  No nausea, vomiting or diarrhea      Objective:  Vitals:  HR:  [78-80] 80  Resp:  [18-20] 18  BP: (126-146)/(65-76) 146/76  SpO2:  [96 %-98 %] 97 %  Temp (24hrs), Av 8 °F (37 1 °C), Min:98 2 °F (36 8 °C), Max:99 4 °F (37 4 °C)  Current: Temperature: 98 8 °F (37 1 °C)    Physical Exam:     General: Awake, alert, cooperative, no distress  Neck:  Supple  No meningismus  No lymph node  No mass  Lungs: Expansion symmetric, no rales, no wheezing, respirations unlabored  Heart[de-identified]  Regular rate and rhythm, S1 and S2 normal, no murmur  Abdomen: Soft, nondistended, non-tender, bowel sounds active all four quadrants,        no masses, no organomegaly  Extremities: No edema  No erythema/warmth  No ulcer  Nontender to palpation  Skin:  No rash  Invasive Devices     Peripheral Intravenous Line            Peripheral IV 18 Left Wrist less than 1 day                Labs studies:   I have personally reviewed pertinent labs  Results from last 7 days  Lab Units 18  0629 18  1429 18  0635  18  1748   SODIUM mmol/L 141 141 141  < > 136   POTASSIUM mmol/L 3 6 3 4* 3 2*  < > 3 3*   CHLORIDE mmol/L 108 110* 109*  < > 101   CO2 mmol/L 25 24 23  < > 27   ANION GAP mmol/L 8 7 9  < > 8   BUN mg/dL 6 8 9  < > 12   CREATININE mg/dL 0 76 0 86 0 87  < > 1 19   EGFR ml/min/1 73sq m 98 93 92  < > 65   GLUCOSE RANDOM mg/dL 109 120 116  < > 122   CALCIUM mg/dL 8 0* 8 1* 7 8*  < > 9 0   AST U/L  --  62*  --   --  29   ALT U/L  --  68  --   --  31   ALK PHOS U/L  --  88  --   --  65   TOTAL PROTEIN g/dL  --  5 5*  --   --  7 4   BILIRUBIN TOTAL mg/dL  --  1 33*  --   --  1 53*   < > = values in this interval not displayed  Results from last 7 days  Lab Units 18  0629 18  0635 18  0538   WBC Thousand/uL 4 59 4 89 11 69*   HEMOGLOBIN g/dL 12 7 13 6 14 9   PLATELETS Thousands/uL 132* 124* 151       Results from last 7 days  Lab Units 18  1748   BLOOD CULTURE  No Growth at 48 hrs  No Growth at 48 hrs  Imaging Studies:   I have personally reviewed pertinent imaging study reports and images in PACS      EKG, Pathology, and Other Studies:   I have personally reviewed pertinent reports

## 2018-06-26 NOTE — PLAN OF CARE
Problem: INFECTION - ADULT  Goal: Absence or prevention of progression during hospitalization  INTERVENTIONS:  - Assess and monitor for signs and symptoms of infection  - Monitor lab/diagnostic results  - Monitor all insertion sites  - Administer medications as ordered  - Instruct and encourage patient and family to use good hand hygiene technique   Outcome: Progressing      Problem: SAFETY ADULT  Goal: Patient will remain free of falls  INTERVENTIONS:  - Assess patient frequently for physical needs  -  Identify cognitive and physical deficits and behaviors that affect risk of falls    -  [place] fall precautions as indicated by assessment   - Educate patient/family on patient safety including physical limitations  - Instruct patient to call for assistance with activity based on assessment  - Modify environment to reduce risk of injury  - Consider OT/PT consult to assist with strengthening/mobility   Outcome: Progressing      Problem: DISCHARGE PLANNING  Goal: Discharge to home or other facility with appropriate resources  INTERVENTIONS:  - Identify barriers to discharge w/patient and caregiver  - Arrange for needed discharge resources and transportation as appropriate  - Identify discharge learning needs (meds, wound care, etc )  - Arrange for interpretive services to assist at discharge as needed  - Refer to Case Management Department for coordinating discharge planning if the patient needs post-hospital services based on physician/advanced practitioner order or complex needs related to functional status, cognitive ability, or social support system   Outcome: Progressing      Problem: DISCHARGE PLANNING - CARE MANAGEMENT  Goal: Discharge to post-acute care or home with appropriate resources  INTERVENTIONS:  - Conduct assessment to determine patient/family and health care team treatment goals, and need for post-acute services based on payer coverage, community resources, and patient preferences, and barriers to discharge  - Address psychosocial, clinical, and financial barriers to discharge as identified in assessment in conjunction with the patient/family and health care team  - Arrange appropriate level of post-acute services according to patient's   needs and preference and payer coverage in collaboration with the physician and health care team  - Communicate with and update the patient/family, physician, and health care team regarding progress on the discharge plan  - Arrange appropriate transportation to post-acute venues  - Home w family support   Outcome: Progressing

## 2018-06-26 NOTE — ASSESSMENT & PLAN NOTE
· SIRS vs sepsis, POA, evidenced by fever, tachycardia  · There was concern initially for pyelo given some mild perinephric stranding on imaging but patient has no urinary symptoms and bland UA  · Appreciate ID input- suspected tick-borne infection such as anaplasmosis or babesiosis  · Started on doxycycline and feeling much better  · F/U anaplasma PCR, parasite blood smear, Lyme screen  · Discussed with Dr Kaylin Childers- anticipate discharge home tomorrow if he remains afebrile overnight

## 2018-06-26 NOTE — PROGRESS NOTES
Progress Note - Sadie Eid 1955, 58 y o  male MRN: [id number]    Unit/Bed#: [facility] 609-01 Encounter: [id number]    Primary Care Provider: Micky Landers DO   Date and time admitted to hospital: 2018  6:33 PM    * SIRS (systemic inflammatory response syndrome) ([facility])   Assessment & Plan    · SIRS vs sepsis, POA, evidenced by fever, tachycardia  · There was concern initially for pyelo given some mild perinephric stranding on imaging but patient has no urinary symptoms and bland UA  · Appreciate ID input- suspected tick-borne infection such as anaplasmosis or babesiosis  · Started on doxycycline and feeling much better  · F/U anaplasma PCR, parasite blood smear, Lyme screen  · Discussed with Dr Sukhwinder Paulino- anticipate discharge home tomorrow if he remains afebrile overnight        Hypertension   Assessment & Plan    · Continue meds        IBS (irritable bowel syndrome)   Assessment & Plan    · He reports diarrhea which is not unusual for him  No recent antibiotic usage          VTE Pharmacologic Prophylaxis:   Pharmacologic: Enoxaparin (Lovenox)  Mechanical VTE Prophylaxis in Place: Yes    Patient Centered Rounds: I have performed bedside rounds with nursing staff today  Discussions with Specialists or Other Care Team Provider: Dr Sukhwinder Paulino    Education and Discussions with Family / Patient: Patient    Time Spent for Care: 30 minutes  More than 50% of total time spent on counseling and coordination of care as described above  Current Length of Stay: 3 day(s)    Current Patient Status: Inpatient   Certification Statement: The patient will continue to require additional inpatient hospital stay due to awaiting cultures, response to therapy    Discharge Plan: Discharge home tomorrow if afebrile    Code Status: Level 1 - Full Code      Subjective:   He feels much better today  Afebrile overnight      Objective:     Vitals:   Temp (24hrs), Av 8 °F (37 1 °C), Min:98 2 °F (36 8 °C), Max:99 4 °F (37 4 °C)    HR: [78-80] 80  Resp:  [18-20] 18  BP: (126-146)/(65-76) 146/76  SpO2:  [96 %-98 %] 97 %  Body mass index is 31 21 kg/m²  Input and Output Summary (last 24 hours): Intake/Output Summary (Last 24 hours) at 06/26/18 1655  Last data filed at 06/26/18 6479   Gross per 24 hour   Intake          3033 75 ml   Output             2750 ml   Net           283 75 ml       Physical Exam:     Physical Exam   Constitutional: He is oriented to person, place, and time  No distress  HENT:   Head: Normocephalic and atraumatic  Eyes: No scleral icterus  Neck: Normal range of motion  Neck supple  Cardiovascular: Normal rate, regular rhythm and normal heart sounds  Pulmonary/Chest: Effort normal and breath sounds normal  No respiratory distress  He has no wheezes  He has no rales  Musculoskeletal: He exhibits no edema  Neurological: He is alert and oriented to person, place, and time  Skin: Skin is warm and dry  He is not diaphoretic  Psychiatric: His behavior is normal  Thought content normal        Additional Data:     Labs:      Results from last 7 days  Lab Units 06/26/18  0629 06/25/18  0635   WBC Thousand/uL 4 59 4 89   HEMOGLOBIN g/dL 12 7 13 6   HEMATOCRIT % 38 6 39 5   PLATELETS Thousands/uL 132* 124*   NEUTROS PCT %  --  75   LYMPHS PCT %  --  11*   LYMPHO PCT % 17  --    MONOS PCT %  --  11   MONO PCT MAN % 3*  --    EOS PCT %  --  2   EOSINO PCT MANUAL % 5  --        Results from last 7 days  Lab Units 06/26/18  0629 06/25/18  1429   SODIUM mmol/L 141 141   POTASSIUM mmol/L 3 6 3 4*   CHLORIDE mmol/L 108 110*   CO2 mmol/L 25 24   BUN mg/dL 6 8   CREATININE mg/dL 0 76 0 86   CALCIUM mg/dL 8 0* 8 1*   TOTAL PROTEIN g/dL  --  5 5*   BILIRUBIN TOTAL mg/dL  --  1 33*   ALK PHOS U/L  --  88   ALT U/L  --  68   AST U/L  --  62*   GLUCOSE RANDOM mg/dL 109 120       Results from last 7 days  Lab Units 06/23/18  1748   INR  1 09       * I Have Reviewed All Lab Data Listed Above    * Additional Pertinent Lab Tests Reviewed: All Labs Within Last 24 Hours Reviewed    Imaging:    Imaging Reports Reviewed Today Include: None  Imaging Personally Reviewed by Myself Includes:  None    Recent Cultures (last 7 days):       Results from last 7 days  Lab Units 06/23/18  9418   BLOOD CULTURE  No Growth at 48 hrs  No Growth at 48 hrs  Last 24 Hours Medication List:     Current Facility-Administered Medications:  acetaminophen 650 mg Oral Q6H PRN Eve Licona MD    azelastine 1 spray Each Nare Daily Eve Licona MD    doxycycline hyclate 100 mg Oral Q12H Mahamed Rico MD    enoxaparin 40 mg Subcutaneous Daily Eve Licona MD    fluticasone 2 spray Nasal Daily Eve Licona MD    hydrochlorothiazide 25 mg Oral Daily Eve Licona MD    ibuprofen 400 mg Oral Q6H PRN Tu Beatty MD    metoclopramide 5 mg Intravenous Once Onnie Ip, JESIKA    nebivolol 10 mg Oral Daily Eve Licona MD    ondansetron 4 mg Intravenous Q6H PRN Jack Patel PA-C    vancomycin 15 mg/kg Intravenous Q12H Jack Patel PA-C Last Rate: 1,250 mg (06/26/18 0343)        Today, Patient Was Seen By: Jack Patel PA-C    ** Please Note: Dragon 360 Dictation voice to text software may have been used in the creation of this document   **

## 2018-06-27 ENCOUNTER — TELEPHONE (OUTPATIENT)
Dept: FAMILY MEDICINE CLINIC | Facility: CLINIC | Age: 63
End: 2018-06-27

## 2018-06-27 VITALS
RESPIRATION RATE: 20 BRPM | BODY MASS INDEX: 31.28 KG/M2 | WEIGHT: 199.3 LBS | DIASTOLIC BLOOD PRESSURE: 75 MMHG | SYSTOLIC BLOOD PRESSURE: 136 MMHG | OXYGEN SATURATION: 97 % | TEMPERATURE: 97.4 F | HEIGHT: 67 IN | HEART RATE: 77 BPM

## 2018-06-27 LAB
% PARASITEMIA: 0
PARASITE BLD: NO
PATHOLOGIST INTERPRETATION: NORMAL

## 2018-06-27 PROCEDURE — 99239 HOSP IP/OBS DSCHRG MGMT >30: CPT | Performed by: NURSE PRACTITIONER

## 2018-06-27 PROCEDURE — 99232 SBSQ HOSP IP/OBS MODERATE 35: CPT | Performed by: INTERNAL MEDICINE

## 2018-06-27 RX ORDER — DOXYCYCLINE HYCLATE 100 MG/1
100 CAPSULE ORAL EVERY 12 HOURS SCHEDULED
Qty: 16 CAPSULE | Refills: 0 | Status: SHIPPED | OUTPATIENT
Start: 2018-06-27 | End: 2018-07-05

## 2018-06-27 RX ADMIN — VANCOMYCIN HYDROCHLORIDE 1250 MG: 10 INJECTION, POWDER, LYOPHILIZED, FOR SOLUTION INTRAVENOUS at 06:02

## 2018-06-27 RX ADMIN — FLUTICASONE PROPIONATE 2 SPRAY: 50 SPRAY, METERED NASAL at 08:24

## 2018-06-27 RX ADMIN — NEBIVOLOL HYDROCHLORIDE 10 MG: 10 TABLET ORAL at 08:24

## 2018-06-27 RX ADMIN — ENOXAPARIN SODIUM 40 MG: 40 INJECTION SUBCUTANEOUS at 08:24

## 2018-06-27 RX ADMIN — DOXYCYCLINE 100 MG: 100 CAPSULE ORAL at 06:02

## 2018-06-27 RX ADMIN — HYDROCHLOROTHIAZIDE 25 MG: 25 TABLET ORAL at 08:24

## 2018-06-27 RX ADMIN — AZELASTINE HYDROCHLORIDE 1 SPRAY: 137 SPRAY, METERED NASAL at 08:24

## 2018-06-27 NOTE — DISCHARGE INSTRUCTIONS
Tick Bite   WHAT YOU NEED TO KNOW:   Most tick bites are not dangerous, but ticks can pass disease or infection when they bite  A tick will bite and then move further into the skin, where it stays to feed on blood  Ticks need to be removed quickly  Serious symptoms of a tick bite need immediate treatment  DISCHARGE INSTRUCTIONS:   Medicines:   · Antibiotics:  Healthcare providers may give you antibiotics if you get an infection from the tick bite  Do not stop taking the antibiotics until you talk to your healthcare provider, even if you feel better  · Antihistamines:  Antihistamines decrease swelling and itching  · Local anesthetic:  This medicine helps to decrease pain and itching  · Skin protectant:  Skin protectants help soothe itchy, red skin  They may also keep out infection  Some examples of these medicines are calamine and zinc oxide  · Steroids: You may need to take steroids if you have a very bad reaction to your tick bite  Take steroids with food to keep your stomach from becoming upset from the medicine  Do not stop taking this medicine until you talk to your healthcare provider  · Topical steroids:  A topical steroid is medicine that you rub into your skin to decrease redness and itching  Topical steroids are available without a doctor's order  Do not use this medicine on areas of skin that are cut, scratched, or infected  · Take your medicine as directed  Call your healthcare provider if you think your medicine is not helping or if you have side effects  Tell him if you are allergic to any medicine  Keep a list of the medicines, vitamins, and herbs you take  Include the amounts, and when and why you take them  Bring the list or the pill bottles to follow-up visits  Carry your medicine list with you in case of an emergency  Follow up with your healthcare provider as directed:  Write down your questions so you remember to ask them during your visits     How to remove a tick: Ticks must be removed as soon as possible to help prevent them from passing disease or infection  You are less likely to get sick from a tick bite if you remove the tick within 24 hours  Do the following to remove a tick:  · Soak a cotton ball with rubbing alcohol, or use a disposable alcohol wipe  Gently clean the skin around the tick  · Grasp the tick as close to your skin as possible  Pull the tick straight up and out with tweezers, or with fingertips protected by a tissue or gloves  Do not touch the tick with your bare hands  · Pull gently until the tick lets go  Do not twist or jerk the tick suddenly, because this may break off the tick's head or mouth parts  You can buy a special V-shaped device that help lift ticks out safely  Do not leave any part of the tick in your skin  · Do not crush or squeeze the tick since its body may be infected with germs  Flush the tick down the toilet  · Do not put a hot match, petroleum jelly, or fingernail polish on the tick  This does not help and may be dangerous  · After the tick is removed, clean the area of the bite with rubbing alcohol  Then wash your hands with soap and water  Care for your tick bite:  Apply ice to the bite orly to help to decrease pain, itching and swelling  Put ice in a plastic bag  Cover the bag with a towel  Put the bag on your bite for 15 to 20 minutes every hour or as directed by your healthcare provider  Try not to scratch the bite  Prevent another tick bite:  Ticks live in areas covered by brush and grass  They may even be found in your lawn if you live in certain areas  Outdoor pets can carry ticks inside the house  Ticks can grab onto you or your clothes when you walk by grass or brush  If you go into areas that contain many trees, tall grasses, and underbrush, do the following:  · Wear protective clothing:  Wear pants and a long-sleeved shirt  Tuck your pants into your socks or boots  Tuck in your shirt   Wear sleeves that fit close to the skin at your wrists and neck  This will help prevent ticks from crawling through gaps in your clothing and onto your skin  Wear a hat in areas with trees  Wear light-colored clothing to make finding ticks easier  · Use insect repellant:  Put insect repellent on skin that is showing  The insect repellant should contain DEET  Do not put insect repellant on skin that is cut, scratched, or irritated  Do not put insect repellent on a child's face or hands  Always use soap and water to wash the insect repellant off as soon as possible once you are indoors  · Spray insect repellant onto your clothes:  Use permethrin spray  This spray kills ticks that crawl on your clothing  Be sure to spray the tops of your boots, bottom of pant legs, and sleeve cuffs  As soon as possible, wash and dry clothing that has been worn outdoors  · Check for ticks often:  Check your clothing, hair, and skin for ticks every 2 to 3 hours while you are outdoors  Carefully check the hairline, armpits, neck, and waist  Check your pets and children for ticks  Remove ticks from pets the same way as you remove them from people  · Decrease the risk of ticks in your yard:  Ticks like to live in [place], moist areas  Mojgan Freiberg your lawn regularly to keep the grass short  Trim the grass around birdbaths and fences  Cut branches that are overgrown and take them out of the yard  Clear out leaf piles  Robson ramirez in a dry, samantha area  Contact your healthcare provider if:   · You cannot remove the tick  · The tick's head is stuck in the skin  · You have questions or concerns about your condition or care  Seek care immediately or call 911 if:   · You get a fever, rash, headache, or muscle or joint pains within 1 month of a tick bite  These may be signs of a more serious disease  · You are having trouble walking or moving your legs    © 2016 [address] is for End User's use only and may not be sold, redistributed or otherwise used for commercial purposes  All illustrations and images included in CareNotes® are the copyrighted property of A D A M [employer]  or Kenn Clifton  The above information is an  only  It is not intended as medical advice for individual conditions or treatments  Talk to your doctor, nurse or pharmacist before following any medical regimen to see if it is safe and effective for you

## 2018-06-27 NOTE — DISCHARGE SUMMARY
Discharge Summary - [address] [facility]    Patient Information: Carley Woo 58 y o  male MRN: [id number]  Unit/Bed#: [facility] 335-13 Encounter: [id number]    Discharging Physician / Practitioner: GABRIELLA Matthew  PCP: Sweta Ray DO  Admission Date: 6/23/2018  Discharge Date: 06/27/18    Disposition:     Home    Reason for Admission: fever    Discharge Diagnoses:     Principal Problem:    SIRS (systemic inflammatory response syndrome) ([address] )  Active Problems:    Pyelonephritis    Hypokalemia    IBS (irritable bowel syndrome)    Hypertension  Resolved Problems:    * No resolved hospital problems  *      Consultations During Hospital Stay:  · Dr Karen Lee infectious disease    Procedures Performed:     · Ct renal stone study: 1   Mild left-sided perinephric stranding   Clinical correlation for infection is recommended   No evidence of hydronephrosis or nephrolithiasis  2   Mild urinary bladder wall thickening which may represent cystitis   Follow-up is recommended   · Chest xray: No acute cardiopulmonary disease  · Ct abdomen and pelvis: No acute abnormality within the abdomen or pelvis       There is no significant change in very mild left perinephric stranding which is not significantly different from perinephric stranding surrounding the right kidney     · Blood parasite smear no parasite seen  · procalcitonin 0 97  · Lyme antibody normal    Significant Findings / Test Results:     · See above    Incidental Findings:   · None      Test Results Pending at Discharge (will require follow up): · Blood cultures at 4 days no growth  · 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     Outpatient Tests Requested:  · Follow up with dr Karen Lee at discharge in office call to make mercedes  · Call to schedule follow up with your pcp    Complications:  None     Hospital Course:     Carley Woo is a 58 y o  male patient who originally presented to the hospital on 6/23/2018 due to 2 days of fever and constitutional symptoms   Pt was admitted and underwent a ct which demonstrated some perinephric stranding  UA was normal and pt did not have any urinary complaints  He was started on abx for suspected pyelonephritis and did not improve  ID were consulted and after further exam pt did have exposure to ticks  He was leukopenic and thrombocytopenic and was started on treatment doxy  Pt symptoms have improved and fever has since resolved some of the above mentioned testing is pending and pt is wanting to leave the hospital  At this time pt will be discharged to follow up with Dr Valadez So as an outpt to complete full course of abx  He should also follow up with pcp in one week   Condition at Discharge: fair     Discharge Day Visit / Exam:     Subjective:  Pt was very unhappy with food through to linens and overall service  He was wanting to leave  He stated the smell of the iv site dressing was making him feel sick he wanted it removed  He felt like he wanted to vomit  At this time he is requesting to leave  He has discussed this with dr Allyson Alexander and he will follow up in the office   Vitals: Blood Pressure: 136/75 (06/27/18 0755)  Pulse: 77 (06/27/18 0755)  Temperature: (!) 97 4 °F (36 3 °C) (06/27/18 0755)  Temp Source: Oral (06/27/18 0755)  Respirations: 20 (06/27/18 0755)  Height: 5' 7" (170 2 cm) (06/23/18 2200)  Weight - Scale: 90 4 kg (199 lb 4 7 oz) (06/23/18 2200)  SpO2: 97 % (06/27/18 0755)  Exam:   Physical Exam   Constitutional: He is oriented to person, place, and time  He appears well-developed and well-nourished  No distress  HENT:   Head: Normocephalic and atraumatic  Eyes: Conjunctivae are normal  Right eye exhibits no discharge  Left eye exhibits no discharge  No scleral icterus  Neck: No JVD present  No tracheal deviation present  No thyromegaly present  Cardiovascular: Normal rate and regular rhythm  Exam reveals no gallop and no friction rub  No murmur heard  Pulmonary/Chest: No stridor  No respiratory distress   He has no wheezes  He has no rales  He exhibits no tenderness  Abdominal: Soft  He exhibits no distension and no mass  There is no tenderness  There is no rebound and no guarding  Musculoskeletal: Normal range of motion  He exhibits no edema, tenderness or deformity  Lymphadenopathy:     He has no cervical adenopathy  Neurological: He is oriented to person, place, and time  Skin: No rash noted  He is not diaphoretic  No erythema  No pallor  Psychiatric:   unhappy and nauseated        Discussion with Family: none at the bedside     Discharge instructions/Information to patient and family:   See after visit summary for information provided to patient and family  Provisions for Follow-Up Care:  See after visit summary for information related to follow-up care and any pertinent home health orders  Planned Readmission: no plan      Discharge Statement:  I spent 50minutes discharging the patient  This time was spent on the day of discharge  I had direct contact with the patient on the day of discharge  Greater than 50% of the total time was spent examining patient, answering all patient questions, arranging and discussing plan of care with patient as well as directly providing post-discharge instructions  Additional time then spent on discharge activities  Discharge Medications:  See after visit summary for reconciled discharge medications provided to patient and family        ** Please Note: This note has been constructed using a voice recognition system **

## 2018-06-27 NOTE — TELEPHONE ENCOUNTER
----- Message from Cierra Garcia sent at 6/27/2018 12:59 PM EDT -----  Regarding: TCM Patient  D/C'd from [facility] on 06/26/18 for SIRS (systemic inflammatory response syndrome)

## 2018-06-27 NOTE — PROGRESS NOTES
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  Dramatic improvement with doxycycline is also consistent with it  PCT also decreasing  We need to consider the possibility of co-infection  Lyme screen negative  Admission blood cultures remain negative  Continue p o  doxycycline times 10 days total   Follow-up Anaplasma PCR, parasite blood smear  Monitor temperature/WBC  Follow-up on pending blood cultures      2   Slightly abnormal abdomen/pelvis CT with mild bilateral perinephric stranding   I doubt that these findings are pathogenic   Patient has no flank pain   He has no urinary symptoms  Lorel Ross is abnormal   Clinically, patient does not have UTI or pyelonephritis   PCT was elevated but most likely secondary to above  Patient remains well off cefepime  No antibiotic for this  Monitor for development of urinary symptoms  Monitor for development of flank pain      3   Headache, with mild photophobia when temperature is up   No stiff neck   Exam is not consistent with meningitis   I suspect headache is secondary to infections above   No clinical signs of meningitis   Patient has no mental status changes   Therefore, doubt encephalitis  Symptoms much improved with doxycycline  Monitor headache      Discussed with patient in detail regarding above plan  Discussed with slim service  Okay for discharge from ID viewpoint  Follow-up with me next week      Antibiotics:  Doxycycline # 3     Subjective:  Patient feels well  Headache, myalgia, arthralgia have resolved  Temperature is down  No further chills  He is tolerating antibiotic well  No nausea, vomiting or diarrhea      Objective:  Vitals:  HR:  [72-77] 77  Resp: [20] 20  BP: (125-136)/(75-79) 136/75  SpO2:  [95 %-97 %] 97 %  Temp (24hrs), Av °F (36 7 °C), Min:97 4 °F (36 3 °C), Max:98 4 °F (36 9 °C)  Current: Temperature: (!) 97 4 °F (36 3 °C)    Physical Exam:     General: Awake, alert, cooperative, no distress  Lungs: Expansion symmetric, no rales, no wheezing, respirations unlabored  Heart[de-identified]  Regular rate and rhythm, S1 and S2 normal, no murmur  Abdomen: Soft, nondistended, non-tender, bowel sounds active all four quadrants,        no masses, no organomegaly  Extremities: No edema  No erythema/warmth  No ulcer  Nontender to palpation  Skin:  No rash  Invasive Devices     Peripheral Intravenous Line            Peripheral IV 18 Left Wrist 1 day                Labs studies:   I have personally reviewed pertinent labs  Results from last 7 days  Lab Units 18  0629 18  1429 18  0635  18  1748   SODIUM mmol/L 141 141 141  < > 136   POTASSIUM mmol/L 3 6 3 4* 3 2*  < > 3 3*   CHLORIDE mmol/L 108 110* 109*  < > 101   CO2 mmol/L 25 24 23  < > 27   ANION GAP mmol/L 8 7 9  < > 8   BUN mg/dL 6 8 9  < > 12   CREATININE mg/dL 0 76 0 86 0 87  < > 1 19   EGFR ml/min/1 73sq m 98 93 92  < > 65   GLUCOSE RANDOM mg/dL 109 120 116  < > 122   CALCIUM mg/dL 8 0* 8 1* 7 8*  < > 9 0   AST U/L  --  62*  --   --  29   ALT U/L  --  68  --   --  31   ALK PHOS U/L  --  88  --   --  65   TOTAL PROTEIN g/dL  --  5 5*  --   --  7 4   BILIRUBIN TOTAL mg/dL  --  1 33*  --   --  1 53*   < > = values in this interval not displayed  Results from last 7 days  Lab Units 18  0629 18  0635 18  0538   WBC Thousand/uL 4 59 4 89 11 69*   HEMOGLOBIN g/dL 12 7 13 6 14 9   PLATELETS Thousands/uL 132* 124* 151       Results from last 7 days  Lab Units 18  1748   BLOOD CULTURE  No Growth at 72 hrs  No Growth at 72 hrs  Imaging Studies:   I have personally reviewed pertinent imaging study reports and images in PACS      EKG, Pathology, and Other Studies:   I have personally reviewed pertinent reports

## 2018-06-28 LAB
A PHAGOCYTOPH DNA BLD QL NAA+PROBE: NEGATIVE
BACTERIA BLD CULT: NORMAL
BACTERIA BLD CULT: NORMAL

## 2018-06-29 ENCOUNTER — TRANSITIONAL CARE MANAGEMENT (OUTPATIENT)
Dept: FAMILY MEDICINE CLINIC | Facility: CLINIC | Age: 63
End: 2018-06-29

## 2018-07-05 ENCOUNTER — OFFICE VISIT (OUTPATIENT)
Dept: INFECTIOUS DISEASES | Facility: CLINIC | Age: 63
End: 2018-07-05
Payer: COMMERCIAL

## 2018-07-05 VITALS
SYSTOLIC BLOOD PRESSURE: 102 MMHG | DIASTOLIC BLOOD PRESSURE: 78 MMHG | HEART RATE: 83 BPM | TEMPERATURE: 98.3 F | RESPIRATION RATE: 20 BRPM | BODY MASS INDEX: 30.37 KG/M2 | HEIGHT: 68 IN | WEIGHT: 200.4 LBS

## 2018-07-05 DIAGNOSIS — R50.9 FEVER, UNSPECIFIED FEVER CAUSE: Primary | ICD-10-CM

## 2018-07-05 DIAGNOSIS — R51.9 ACUTE NONINTRACTABLE HEADACHE, UNSPECIFIED HEADACHE TYPE: ICD-10-CM

## 2018-07-05 DIAGNOSIS — R93.5 ABNORMAL CT OF THE ABDOMEN: ICD-10-CM

## 2018-07-05 PROCEDURE — 1111F DSCHRG MED/CURRENT MED MERGE: CPT | Performed by: INTERNAL MEDICINE

## 2018-07-05 PROCEDURE — 99214 OFFICE O/P EST MOD 30 MIN: CPT | Performed by: INTERNAL MEDICINE

## 2018-07-05 RX ORDER — ASPIRIN 81 MG/1
81 TABLET ORAL DAILY
COMMUNITY
End: 2018-11-02

## 2018-07-05 NOTE — PROGRESS NOTES
Progress Note - Infectious Disease   Maegan Benitez 58 y o  male MRN: [id number]  Unit/Bed#:  Encounter: [id number]      Impression/Recommendations:  1   Suspect tick-borne fever  Patient had resolution of fever and symptoms of doxycycline  Lyme screen was negative  Anaplasma PCR also negative  Parasite blood smear also negative  Perhaps this is Ehrlichiosis  Regardless, patient completed empiric doxycycline course  With him clinically well now, no further antibiotic necessary  Discontinue doxycycline  Recheck labs (CBC, CMP, procalcitonin)  Will also check Ehrlichia serologies      2   Slightly abnormal abdomen/pelvis CT with mild bilateral perinephric stranding  Patient has no flank pain or urinary symptoms  Urine culture was negative  I suspect that these are nonspecific symptoms rather than pyelonephritis  No antibiotic for this  Monitor for development of urinary symptoms  Monitor for development of flank pain      3   Headache, with mild photophobia when temperature is up   No stiff neck   Symptoms have resolved  Monitor headache      Discussed with patient in detail regarding above plan  Follow-up with me p tori candelario        Antibiotics:  Doxycycline # 11     Subjective:  Patient was admitted at [facility], 2 weeks ago with fever, myalgia, arthralgia  He had leukopenia, thrombocytopenia and elevated LFTs  He was treated with doxycycline for presumptive anaplasmosis  He did well, with resolution of fever and symptoms  He continues to do well at home  He has mild fatigue but otherwise feels well  No further fever or myalgias/arthralgia  He is back to work  Objective:  Vitals:   Temperature: 98 3 °F (36 8 °C)    Physical Exam:     General: Awake, alert, cooperative, no distress  Neck:  Supple  No lymphadenopathy  No mass  Lungs: Expansion symmetric, no rales, no wheezing, respirations unlabored     Heart:  Regular rate and rhythm, S1 and S2 normal, no murmur  Abdomen: Soft, nondistended, non-tender, bowel sounds active all four quadrants,        no masses, no organomegaly  Extremities: No edema  No erythema/warmth  No ulcer  Nontender to palpation  Skin:  No rash  Neuro: Moves all extremities  Invasive Devices          No matching active lines, drains, or airways          Labs studies:   I have personally reviewed pertinent labs  Imaging Studies:   I have personally reviewed pertinent imaging study reports and images in PACS  EKG, Pathology, and Other Studies:   I have personally reviewed pertinent reports

## 2018-07-06 ENCOUNTER — OFFICE VISIT (OUTPATIENT)
Dept: FAMILY MEDICINE CLINIC | Facility: CLINIC | Age: 63
End: 2018-07-06
Payer: COMMERCIAL

## 2018-07-06 VITALS
TEMPERATURE: 97.6 F | SYSTOLIC BLOOD PRESSURE: 144 MMHG | WEIGHT: 200.4 LBS | DIASTOLIC BLOOD PRESSURE: 86 MMHG | HEART RATE: 100 BPM | BODY MASS INDEX: 30.37 KG/M2 | HEIGHT: 68 IN | OXYGEN SATURATION: 98 %

## 2018-07-06 DIAGNOSIS — R50.9 FEVER OF UNDETERMINED ORIGIN: ICD-10-CM

## 2018-07-06 DIAGNOSIS — N12 PYELONEPHRITIS: ICD-10-CM

## 2018-07-06 DIAGNOSIS — R65.10 SIRS (SYSTEMIC INFLAMMATORY RESPONSE SYNDROME) (HCC): Primary | ICD-10-CM

## 2018-07-06 PROCEDURE — 99495 TRANSJ CARE MGMT MOD F2F 14D: CPT | Performed by: FAMILY MEDICINE

## 2018-07-06 NOTE — PROGRESS NOTES
Assessment/Plan:   1  SIRS (systemic inflammatory response syndrome) ([facility])/Fever of undetermined origin  Reviewed patient's hospital record with him  At this time, he appears clinically stable  He states that he has been feeling significant improvement  It is unclear as to the exact cause of his symptoms  He has been following up with Infectious Disease  Working diagnosis is a suspected tick borne illness  He has completed his doxycycline  He states that he has been able to return to work and has been working normally  At this time, he was advised to continue with proper hydration  He was educated on specific precautions to call if his symptoms are worsening or go directly to the emergency department  2  Pyelonephritis  Patient denies any urologic symptoms today  He denies pain as well  He has been afebrile  It is unclear as to the reason for the development of his pyelonephritis  Will continue with routine evaluation  There are no diagnoses linked to this encounter  Subjective:    Chief Complaint   Patient presents with    Transition of Care Management        Patient ID: Anant Boo is a 58 y o  male      Date and time hospital follow up call was made:  6/27/2018  2:04 PM  Hospital care reviewed:  Records reviewed  Patient was hopsitalized at:  [facility]  Date of admission:  6/23/18  Date of discharge:  6/27/18  Diagnosis:  SIRS (systemic inflammatory response syndrome) ([facility] [address] )   Disposition:  Home  Were the patients medicaitons reviewed and updated:  Yes  Current symptoms:  None  Post hospital issues:  None  Should patient be enrolled in anticoag monitoring?:  No  Scheduled for follow up?:  Yes  Patients specialists:  Other (comment)  Other specialists Name:  Kaylee Martínez   Other specialists contact #:  [phone]  Referrals needed:  None   Did you obtain your prescribed medications:  Yes  Do you need help managing your perscriptions or medications:  No  Is transportation to your appointments needed:  No  I have advised the patient to call PCP with any new or worsening symptoms   (please type in name along with any credentials):  Leodan Lieberman MA   Comments:  Pt is scheduled for a TCM on 7/06/2018             Review of Systems   Constitutional: Negative for activity change, chills, fatigue and fever  HENT: Negative for congestion, ear pain, sinus pressure and sore throat  Eyes: Negative for redness, itching and visual disturbance  Respiratory: Negative for cough and shortness of breath  Cardiovascular: Negative for chest pain and palpitations  Gastrointestinal: Negative for abdominal pain, diarrhea and nausea  Endocrine: Negative for cold intolerance and heat intolerance  Genitourinary: Negative for dysuria, flank pain and frequency  Musculoskeletal: Negative for arthralgias, back pain, gait problem and myalgias  Skin: Negative for color change  Allergic/Immunologic: Negative for environmental allergies  Neurological: Negative for dizziness, numbness and headaches  Psychiatric/Behavioral: Negative for behavioral problems and sleep disturbance  The following portions of the patient's history were reviewed and updated as appropriate : past family history, past medical history, past social history and past surgical history        Current Outpatient Prescriptions:     aspirin (ECOTRIN LOW STRENGTH) 81 mg EC tablet, Take 81 mg by mouth daily, Disp: , Rfl:     Azelastine HCl 0 15 % SOLN, 2 sprays into each nostril daily, Disp: 30 mL, Rfl: 2    fluticasone (FLONASE) 50 mcg/act nasal spray, 2 sprays into each nostril daily, Disp: 16 g, Rfl: 0    hydrochlorothiazide (HYDRODIURIL) 25 mg tablet, Take 1 tablet by mouth daily, Disp: , Rfl:     nebivolol (BYSTOLIC) 10 mg tablet, Take 1 tablet by mouth daily, Disp: , Rfl:     Objective:    Vitals:    07/06/18 0918   BP: 144/86   BP Location: Left arm   Patient Position: Sitting   Pulse: 100   Temp: 97 6 °F (36 4 °C) TempSrc: Tympanic   SpO2: 98%   Weight: 90 9 kg (200 lb 6 4 oz)   Height: 5' 8" (1 727 m)        Physical Exam   Constitutional: He is oriented to person, place, and time  He appears well-developed and well-nourished  HENT:   Head: Normocephalic and atraumatic  Nose: Nose normal    Mouth/Throat: No oropharyngeal exudate  Eyes: Pupils are equal, round, and reactive to light  Right eye exhibits no discharge  Left eye exhibits no discharge  Neck: Normal range of motion  Neck supple  No tracheal deviation present  Cardiovascular: Normal rate, regular rhythm and intact distal pulses  Exam reveals no gallop and no friction rub  No murmur heard  Pulses:       Dorsalis pedis pulses are 2+ on the right side, and 2+ on the left side  Posterior tibial pulses are 2+ on the right side, and 2+ on the left side  Pulmonary/Chest: Effort normal and breath sounds normal  No respiratory distress  He has no wheezes  He has no rales  Abdominal: Soft  Bowel sounds are normal  He exhibits no distension  There is no tenderness  There is no rebound and no guarding  Musculoskeletal: Normal range of motion  He exhibits no edema  Lymphadenopathy:        Head (right side): No submental and no submandibular adenopathy present  Head (left side): No submental and no submandibular adenopathy present  He has no cervical adenopathy  Right cervical: No superficial cervical, no deep cervical and no posterior cervical adenopathy present  Left cervical: No superficial cervical, no deep cervical and no posterior cervical adenopathy present  Neurological: He is alert and oriented to person, place, and time  No cranial nerve deficit or sensory deficit  Skin: Skin is warm, dry and intact  Psychiatric: His speech is normal and behavior is normal  Judgment normal  His mood appears not anxious  Cognition and memory are normal  He does not exhibit a depressed mood  Vitals reviewed

## 2018-07-10 ENCOUNTER — APPOINTMENT (OUTPATIENT)
Dept: LAB | Facility: HOSPITAL | Age: 63
End: 2018-07-10
Attending: INTERNAL MEDICINE
Payer: COMMERCIAL

## 2018-07-10 LAB
ALBUMIN SERPL BCP-MCNC: 3.5 G/DL (ref 3.5–5)
ALP SERPL-CCNC: 72 U/L (ref 46–116)
ALT SERPL W P-5'-P-CCNC: 27 U/L (ref 12–78)
ANION GAP SERPL CALCULATED.3IONS-SCNC: 8 MMOL/L (ref 4–13)
AST SERPL W P-5'-P-CCNC: 16 U/L (ref 5–45)
BASOPHILS # BLD AUTO: 0.07 THOUSANDS/ΜL (ref 0–0.1)
BASOPHILS NFR BLD AUTO: 1 % (ref 0–1)
BILIRUB SERPL-MCNC: 1.03 MG/DL (ref 0.2–1)
BUN SERPL-MCNC: 12 MG/DL (ref 5–25)
CALCIUM SERPL-MCNC: 9 MG/DL (ref 8.3–10.1)
CHLORIDE SERPL-SCNC: 105 MMOL/L (ref 100–108)
CO2 SERPL-SCNC: 28 MMOL/L (ref 21–32)
CREAT SERPL-MCNC: 0.99 MG/DL (ref 0.6–1.3)
EOSINOPHIL # BLD AUTO: 0.19 THOUSAND/ΜL (ref 0–0.61)
EOSINOPHIL NFR BLD AUTO: 3 % (ref 0–6)
ERYTHROCYTE [DISTWIDTH] IN BLOOD BY AUTOMATED COUNT: 12.7 % (ref 11.6–15.1)
GFR SERPL CREATININE-BSD FRML MDRD: 81 ML/MIN/1.73SQ M
GLUCOSE P FAST SERPL-MCNC: 103 MG/DL (ref 65–99)
HCT VFR BLD AUTO: 41.8 % (ref 36.5–49.3)
HGB BLD-MCNC: 13.6 G/DL (ref 12–17)
IMM GRANULOCYTES # BLD AUTO: 0.03 THOUSAND/UL (ref 0–0.2)
IMM GRANULOCYTES NFR BLD AUTO: 0 % (ref 0–2)
LYMPHOCYTES # BLD AUTO: 1.65 THOUSANDS/ΜL (ref 0.6–4.47)
LYMPHOCYTES NFR BLD AUTO: 22 % (ref 14–44)
MCH RBC QN AUTO: 29.1 PG (ref 26.8–34.3)
MCHC RBC AUTO-ENTMCNC: 32.5 G/DL (ref 31.4–37.4)
MCV RBC AUTO: 90 FL (ref 82–98)
MONOCYTES # BLD AUTO: 0.64 THOUSAND/ΜL (ref 0.17–1.22)
MONOCYTES NFR BLD AUTO: 8 % (ref 4–12)
NEUTROPHILS # BLD AUTO: 5.08 THOUSANDS/ΜL (ref 1.85–7.62)
NEUTS SEG NFR BLD AUTO: 66 % (ref 43–75)
NRBC BLD AUTO-RTO: 0 /100 WBCS
PLATELET # BLD AUTO: 358 THOUSANDS/UL (ref 149–390)
PMV BLD AUTO: 10.4 FL (ref 8.9–12.7)
POTASSIUM SERPL-SCNC: 3.7 MMOL/L (ref 3.5–5.3)
PROCALCITONIN SERPL-MCNC: <0.05 NG/ML
PROT SERPL-MCNC: 6.8 G/DL (ref 6.4–8.2)
RBC # BLD AUTO: 4.67 MILLION/UL (ref 3.88–5.62)
SODIUM SERPL-SCNC: 141 MMOL/L (ref 136–145)
WBC # BLD AUTO: 7.66 THOUSAND/UL (ref 4.31–10.16)

## 2018-07-10 PROCEDURE — 85025 COMPLETE CBC W/AUTO DIFF WBC: CPT | Performed by: INTERNAL MEDICINE

## 2018-07-10 PROCEDURE — 36415 COLL VENOUS BLD VENIPUNCTURE: CPT | Performed by: INTERNAL MEDICINE

## 2018-07-10 PROCEDURE — 80053 COMPREHEN METABOLIC PANEL: CPT | Performed by: INTERNAL MEDICINE

## 2018-07-10 PROCEDURE — 86666 EHRLICHIA ANTIBODY: CPT | Performed by: INTERNAL MEDICINE

## 2018-07-10 PROCEDURE — 84145 PROCALCITONIN (PCT): CPT | Performed by: INTERNAL MEDICINE

## 2018-07-13 LAB
A PHAGOCYTOPH IGG TITR SER IF: NEGATIVE {TITER}
A PHAGOCYTOPH IGM TITR SER IF: NEGATIVE {TITER}
E CHAFFEENSIS IGG TITR SER IF: NEGATIVE {TITER}
E CHAFFEENSIS IGM TITR SER IF: NEGATIVE {TITER}

## 2018-07-24 ENCOUNTER — OFFICE VISIT (OUTPATIENT)
Dept: FAMILY MEDICINE CLINIC | Facility: CLINIC | Age: 63
End: 2018-07-24
Payer: COMMERCIAL

## 2018-07-24 VITALS
OXYGEN SATURATION: 98 % | WEIGHT: 200.9 LBS | HEIGHT: 68 IN | HEART RATE: 72 BPM | BODY MASS INDEX: 30.45 KG/M2 | RESPIRATION RATE: 15 BRPM | DIASTOLIC BLOOD PRESSURE: 86 MMHG | SYSTOLIC BLOOD PRESSURE: 134 MMHG | TEMPERATURE: 98.7 F

## 2018-07-24 DIAGNOSIS — R19.09 INGUINAL SWELLING: Primary | ICD-10-CM

## 2018-07-24 PROCEDURE — 99213 OFFICE O/P EST LOW 20 MIN: CPT | Performed by: FAMILY MEDICINE

## 2018-07-24 NOTE — PROGRESS NOTES
Assessment/Plan:   1  Inguinal swelling  Unclear as to the exact cause of patient's symptoms today  At this time, he does not appear to have any constitutional symptoms  Will check ultrasound of groin/inguinal area  He was advised to continue to monitor for signs of fevers or chills  He develops any worsening symptoms, he must call immediately  Will hold off on starting antibiotics empirically  - US groin/inguinal area; Future     There are no diagnoses linked to this encounter  Subjective:    Chief Complaint   Patient presents with    Mass     Pt c/o mass on upper right thigh x1 week  Pt states at times the area at times feels warm to the touch and slight tenderness  Patient ID: Mj Reyes is a 58 y o  male  Patient is a 70-year-old male presents today with a CC of right anterior groin swelling  He has noticed mild warmth in this area  He states his symptoms started over the past few weeks  He is concerned if this may be secondary to his recent infection  He denies fevers or chills at this time  He denies any fatigue  He has not been taking anything for his current symptoms  He has completed his antibiotics since his discharge from inpatient  Review of Systems   Constitutional: Negative for activity change, chills, fatigue and fever  HENT: Negative for congestion, ear pain, sinus pressure and sore throat  Eyes: Negative for redness, itching and visual disturbance  Respiratory: Negative for cough and shortness of breath  Cardiovascular: Negative for chest pain and palpitations  Gastrointestinal: Negative for abdominal pain, diarrhea and nausea  Endocrine: Negative for cold intolerance and heat intolerance  Genitourinary: Negative for dysuria, flank pain and frequency  Musculoskeletal: Negative for arthralgias, back pain, gait problem and myalgias  Skin: Negative for color change  Allergic/Immunologic: Negative for environmental allergies     Neurological: Negative for dizziness, numbness and headaches  Psychiatric/Behavioral: Negative for behavioral problems and sleep disturbance  The following portions of the patient's history were reviewed and updated as appropriate : past family history, past medical history, past social history and past surgical history  Current Outpatient Prescriptions:     aspirin (ECOTRIN LOW STRENGTH) 81 mg EC tablet, Take 81 mg by mouth daily, Disp: , Rfl:     Azelastine HCl 0 15 % SOLN, 2 sprays into each nostril daily, Disp: 30 mL, Rfl: 2    fluticasone (FLONASE) 50 mcg/act nasal spray, 2 sprays into each nostril daily, Disp: 16 g, Rfl: 0    hydrochlorothiazide (HYDRODIURIL) 25 mg tablet, Take 1 tablet by mouth daily, Disp: , Rfl:     nebivolol (BYSTOLIC) 10 mg tablet, Take 1 tablet by mouth daily, Disp: , Rfl:     Objective:    Vitals:    07/24/18 1543   BP: 134/86   BP Location: Right arm   Patient Position: Sitting   Cuff Size: Adult   Pulse: 72   Resp: 15   Temp: 98 7 °F (37 1 °C)   TempSrc: Tympanic   SpO2: 98%   Weight: 91 1 kg (200 lb 14 4 oz)   Height: 5' 8" (1 727 m)        Physical Exam   Constitutional: He is oriented to person, place, and time  Vital signs are normal  He appears well-developed and well-nourished  HENT:   Head: Normocephalic and atraumatic  Right Ear: Hearing normal    Left Ear: Hearing normal    Nose: Nose normal  No septal deviation  Mouth/Throat: Oropharynx is clear and moist and mucous membranes are normal    Eyes: EOM and lids are normal  Pupils are equal, round, and reactive to light  Neck: Trachea normal and normal range of motion  No thyroid mass and no thyromegaly present  Cardiovascular: Normal rate  Pulmonary/Chest: Effort normal  No respiratory distress  He has no decreased breath sounds  Abdominal: Normal appearance  There is no guarding  Musculoskeletal: Normal range of motion  Neurological: He is alert and oriented to person, place, and time   He has normal strength  No cranial nerve deficit or sensory deficit  Skin: Skin is warm and dry  Psychiatric: He has a normal mood and affect   His speech is normal and behavior is normal  Judgment and thought content normal  Cognition and memory are normal

## 2018-07-26 ENCOUNTER — HOSPITAL ENCOUNTER (OUTPATIENT)
Dept: RADIOLOGY | Facility: HOSPITAL | Age: 63
Discharge: HOME | End: 2018-07-26
Payer: COMMERCIAL

## 2018-07-26 DIAGNOSIS — R19.09 INGUINAL SWELLING: ICD-10-CM

## 2018-07-26 PROCEDURE — 76705 ECHO EXAM OF ABDOMEN: CPT

## 2018-08-03 DIAGNOSIS — I10 BENIGN ESSENTIAL HYPERTENSION: Primary | ICD-10-CM

## 2018-08-04 RX ORDER — NEBIVOLOL 10 MG/1
10 TABLET ORAL DAILY
Qty: 30 TABLET | Refills: 5 | Status: SHIPPED | OUTPATIENT
Start: 2018-08-04 | End: 2019-06-14 | Stop reason: SDUPTHER

## 2018-08-04 RX ORDER — HYDROCHLOROTHIAZIDE 25 MG/1
25 TABLET ORAL DAILY
Qty: 90 TABLET | Refills: 1 | Status: SHIPPED | OUTPATIENT
Start: 2018-08-04 | End: 2019-02-01 | Stop reason: SDUPTHER

## 2018-08-16 ENCOUNTER — OFFICE VISIT (OUTPATIENT)
Dept: FAMILY MEDICINE CLINIC | Facility: CLINIC | Age: 63
End: 2018-08-16
Payer: COMMERCIAL

## 2018-08-16 VITALS
BODY MASS INDEX: 31.22 KG/M2 | TEMPERATURE: 97.9 F | RESPIRATION RATE: 16 BRPM | SYSTOLIC BLOOD PRESSURE: 138 MMHG | OXYGEN SATURATION: 97 % | HEIGHT: 67 IN | DIASTOLIC BLOOD PRESSURE: 82 MMHG | HEART RATE: 84 BPM | WEIGHT: 198.9 LBS

## 2018-08-16 DIAGNOSIS — R07.9 CHEST PAIN, UNSPECIFIED TYPE: ICD-10-CM

## 2018-08-16 DIAGNOSIS — I26.99 RIGHT PULMONARY EMBOLUS (HCC): Primary | ICD-10-CM

## 2018-08-16 PROCEDURE — 99214 OFFICE O/P EST MOD 30 MIN: CPT | Performed by: FAMILY MEDICINE

## 2018-08-16 NOTE — PROGRESS NOTES
Assessment/Plan:    1  Right pulmonary embolus (HCC)/Chest pain, unspecified type  Patient's symptoms appear much better controlled  Reviewed the pathophysiology this problem with him  At this time, there does not appear to be a cause for his symptoms  Will continue with his Eliquis 5 mg b i d  He does not need to take aspirin on top dot he was advised that he may return back to his regular work position  If any symptoms should worsen, he should call immediately  He was advised that he would likely need to be on his anticoagulants for 3 months  Follow-up in 3 months to reassess his symptom control  There are no diagnoses linked to this encounter  Subjective:    Chief Complaint   Patient presents with    Follow-up        Patient ID: Juan Mccoy is a 58 y o  male  Patient is a 28-year-old male presents today for an ER follow-up  He states he was in the ED on 08/11 secondary to the development of chest pain across his chest   He presented himself to the ED  He states that he did have extensive testing  He was found to have a right-sided pulmonary embolism  He was placed initially on Lovenox and then transitioned to Eliquis  Since discharge, he has been taking his medications regularly  He dot adverse reactions with this medication  Review of Systems   Constitutional: Negative for activity change, chills, fatigue and fever  HENT: Negative for congestion, ear pain, sinus pressure and sore throat  Eyes: Negative for redness, itching and visual disturbance  Respiratory: Negative for cough and shortness of breath  Cardiovascular: Negative for chest pain and palpitations  Gastrointestinal: Negative for abdominal pain, diarrhea and nausea  Endocrine: Negative for cold intolerance and heat intolerance  Genitourinary: Negative for dysuria, flank pain and frequency  Musculoskeletal: Negative for arthralgias, back pain, gait problem and myalgias     Skin: Negative for color change  Allergic/Immunologic: Negative for environmental allergies  Neurological: Negative for dizziness, numbness and headaches  Psychiatric/Behavioral: Negative for behavioral problems and sleep disturbance  The following portions of the patient's history were reviewed and updated as appropriate : past family history, past medical history, past social history and past surgical history  Current Outpatient Prescriptions:     apixaban (ELIQUIS) 5 mg, Take 5 mg by mouth 2 (two) times a day, Disp: , Rfl:     Azelastine HCl 0 15 % SOLN, 2 sprays into each nostril daily, Disp: 30 mL, Rfl: 2    fluticasone (FLONASE) 50 mcg/act nasal spray, 2 sprays into each nostril daily, Disp: 16 g, Rfl: 0    hydrochlorothiazide (HYDRODIURIL) 25 mg tablet, Take 1 tablet (25 mg total) by mouth daily, Disp: 90 tablet, Rfl: 1    nebivolol (BYSTOLIC) 10 mg tablet, Take 1 tablet (10 mg total) by mouth daily, Disp: 30 tablet, Rfl: 5    aspirin (ECOTRIN LOW STRENGTH) 81 mg EC tablet, Take 81 mg by mouth daily, Disp: , Rfl:     Objective:    Vitals:    08/16/18 0900   BP: 138/82   BP Location: Left arm   Patient Position: Sitting   Cuff Size: Adult   Pulse: 84   Resp: 16   Temp: 97 9 °F (36 6 °C)   TempSrc: Tympanic   SpO2: 97%   Weight: 90 2 kg (198 lb 14 4 oz)   Height: 5' 7" (1 702 m)        Physical Exam   Constitutional: He is oriented to person, place, and time  He appears well-developed and well-nourished  HENT:   Head: Normocephalic and atraumatic  Nose: Nose normal    Mouth/Throat: No oropharyngeal exudate  Eyes: Pupils are equal, round, and reactive to light  Right eye exhibits no discharge  Left eye exhibits no discharge  Neck: Normal range of motion  Neck supple  No tracheal deviation present  Cardiovascular: Normal rate, regular rhythm and intact distal pulses  Exam reveals no gallop and no friction rub  No murmur heard    Pulses:       Dorsalis pedis pulses are 2+ on the right side, and 2+ on the left side  Posterior tibial pulses are 2+ on the right side, and 2+ on the left side  Pulmonary/Chest: Effort normal and breath sounds normal  No respiratory distress  He has no wheezes  He has no rales  Abdominal: Soft  Bowel sounds are normal  He exhibits no distension  There is no tenderness  There is no rebound and no guarding  Musculoskeletal: Normal range of motion  He exhibits no edema  Lymphadenopathy:        Head (right side): No submental and no submandibular adenopathy present  Head (left side): No submental and no submandibular adenopathy present  He has no cervical adenopathy  Right cervical: No superficial cervical, no deep cervical and no posterior cervical adenopathy present  Left cervical: No superficial cervical, no deep cervical and no posterior cervical adenopathy present  Neurological: He is alert and oriented to person, place, and time  No cranial nerve deficit or sensory deficit  Skin: Skin is warm, dry and intact  Psychiatric: His speech is normal and behavior is normal  Judgment normal  His mood appears not anxious  Cognition and memory are normal  He does not exhibit a depressed mood  Vitals reviewed

## 2018-08-20 ENCOUNTER — TELEPHONE (OUTPATIENT)
Dept: FAMILY MEDICINE CLINIC | Facility: CLINIC | Age: 63
End: 2018-08-20

## 2018-08-20 NOTE — TELEPHONE ENCOUNTER
He was just in the hospital put on Eliquis he forgot if he can take Sawpalmetto for his prostate health? Wondering if it would be beneficial for compression socks? Dr Lv Segal this message is for you, could you please advise patient  Please call patient at [phone]

## 2018-08-21 NOTE — TELEPHONE ENCOUNTER
Generally speaking, there should not be any interaction however there have been reports of this herbal supplement interacting with blood thinners  At this time, given the acuity of his thrombus, it may be beneficial to discontinue it for a short duration  If he is having significant swelling, he may use a compression stocking however this as well as not generally needed continue to elevate his leg    Thank you

## 2018-08-21 NOTE — TELEPHONE ENCOUNTER
LMOM with message details, consent ok  Pt was advised to contact the office if he has any other concerns or questions

## 2018-08-27 ENCOUNTER — OFFICE VISIT (OUTPATIENT)
Dept: FAMILY MEDICINE CLINIC | Facility: CLINIC | Age: 63
End: 2018-08-27
Payer: COMMERCIAL

## 2018-08-27 VITALS
OXYGEN SATURATION: 97 % | WEIGHT: 199.3 LBS | TEMPERATURE: 98.2 F | HEIGHT: 67 IN | BODY MASS INDEX: 31.28 KG/M2 | SYSTOLIC BLOOD PRESSURE: 142 MMHG | DIASTOLIC BLOOD PRESSURE: 78 MMHG | RESPIRATION RATE: 15 BRPM | HEART RATE: 78 BPM

## 2018-08-27 DIAGNOSIS — H53.9 VISUAL CHANGES: ICD-10-CM

## 2018-08-27 DIAGNOSIS — I26.99 RIGHT PULMONARY EMBOLUS (HCC): Primary | ICD-10-CM

## 2018-08-27 DIAGNOSIS — R07.89 CHEST PRESSURE: ICD-10-CM

## 2018-08-27 PROCEDURE — 93000 ELECTROCARDIOGRAM COMPLETE: CPT | Performed by: FAMILY MEDICINE

## 2018-08-27 PROCEDURE — 99214 OFFICE O/P EST MOD 30 MIN: CPT | Performed by: FAMILY MEDICINE

## 2018-08-27 NOTE — PROGRESS NOTES
Assessment/Plan:   1  Right pulmonary embolus (HCC)/Chest pressure/Visual changes  Reviewed patient's symptoms with him  At this time, he states that he was in to see his ophthalmologist however his exam was completely negative  He states that he still has been elevated developing the symptoms  He also has been noticing mild chest pressure  He believes that this symptoms may likely be secondary to reflux  His EKG today appear to show NSR/no ST or T-wave changes  Patient is concerned as to the possibility of polycythemia vera  He will was advised that his previous CBC counts have all been normal   Patient prefers to have testing  Will check CBC, erythropoietin as well as JAK2 mutation  - apixaban (ELIQUIS) 5 mg; Take 1 tablet (5 mg total) by mouth 2 (two) times a day  Dispense: 60 tablet; Refill: 0  - CBC and differential; Future  - Erythropoietin; Future  - JAK2 V617F Mutation, Quantitative; Future            There are no diagnoses linked to this encounter  Subjective:    Chief Complaint   Patient presents with    Follow-up     Pt presents for a f/u         Patient ID: Bonnie Monday is a 58 y o  male  Patient is a 70-year-old male presents today with CC of visual changes for the past week  He is also noticing persistent fatigue  He states that he previously he has seen his eye doctor in [place]  He did have a full exam which was negative for abnormalities  He states that he has been still having the symptoms however is concerned for the possibility of polycythemia vera  Review of Systems   Constitutional: Negative for activity change, chills, fatigue and fever  HENT: Negative for congestion, ear pain, sinus pressure and sore throat  Eyes: Positive for visual disturbance  Negative for redness and itching  Respiratory: Negative for cough and shortness of breath  Cardiovascular: Negative for chest pain and palpitations     Gastrointestinal: Negative for abdominal pain, diarrhea and nausea  Endocrine: Negative for cold intolerance and heat intolerance  Genitourinary: Negative for dysuria, flank pain and frequency  Musculoskeletal: Negative for arthralgias, back pain, gait problem and myalgias  Skin: Negative for color change  Allergic/Immunologic: Negative for environmental allergies  Neurological: Negative for dizziness, numbness and headaches  Psychiatric/Behavioral: Negative for behavioral problems and sleep disturbance  The following portions of the patient's history were reviewed and updated as appropriate : past family history, past medical history, past social history and past surgical history  Current Outpatient Prescriptions:     apixaban (ELIQUIS) 5 mg, Take 5 mg by mouth 2 (two) times a day, Disp: , Rfl:     Azelastine HCl 0 15 % SOLN, 2 sprays into each nostril daily, Disp: 30 mL, Rfl: 2    fluticasone (FLONASE) 50 mcg/act nasal spray, 2 sprays into each nostril daily, Disp: 16 g, Rfl: 0    hydrochlorothiazide (HYDRODIURIL) 25 mg tablet, Take 1 tablet (25 mg total) by mouth daily, Disp: 90 tablet, Rfl: 1    nebivolol (BYSTOLIC) 10 mg tablet, Take 1 tablet (10 mg total) by mouth daily, Disp: 30 tablet, Rfl: 5    aspirin (ECOTRIN LOW STRENGTH) 81 mg EC tablet, Take 81 mg by mouth daily, Disp: , Rfl:     Objective:    Vitals:    08/27/18 1011   BP: 142/78   BP Location: Left arm   Patient Position: Sitting   Cuff Size: Adult   Pulse: 78   Resp: 15   Temp: 98 2 °F (36 8 °C)   TempSrc: Tympanic   SpO2: 97%   Weight: 90 4 kg (199 lb 4 8 oz)   Height: 5' 7" (1 702 m)        Physical Exam   Constitutional: He is oriented to person, place, and time  He appears well-developed and well-nourished  HENT:   Head: Normocephalic and atraumatic  Nose: Nose normal    Mouth/Throat: No oropharyngeal exudate  Eyes: Pupils are equal, round, and reactive to light  Right eye exhibits no discharge  Left eye exhibits no discharge  Neck: Normal range of motion  Neck supple  No tracheal deviation present  Cardiovascular: Normal rate, regular rhythm and intact distal pulses  Exam reveals no gallop and no friction rub  No murmur heard  Pulses:       Dorsalis pedis pulses are 2+ on the right side, and 2+ on the left side  Posterior tibial pulses are 2+ on the right side, and 2+ on the left side  Pulmonary/Chest: Effort normal and breath sounds normal  No respiratory distress  He has no wheezes  He has no rales  Abdominal: Soft  Bowel sounds are normal  He exhibits no distension  There is no tenderness  There is no rebound and no guarding  Musculoskeletal: Normal range of motion  He exhibits no edema  Lymphadenopathy:        Head (right side): No submental and no submandibular adenopathy present  Head (left side): No submental and no submandibular adenopathy present  He has no cervical adenopathy  Right cervical: No superficial cervical, no deep cervical and no posterior cervical adenopathy present  Left cervical: No superficial cervical, no deep cervical and no posterior cervical adenopathy present  Neurological: He is alert and oriented to person, place, and time  No cranial nerve deficit or sensory deficit  Skin: Skin is warm, dry and intact  Psychiatric: His speech is normal and behavior is normal  Judgment normal  His mood appears not anxious  Cognition and memory are normal  He does not exhibit a depressed mood  Vitals reviewed

## 2018-09-04 LAB
BASOPHILS # BLD AUTO: 73 CELLS/UL (ref 0–200)
BASOPHILS NFR BLD AUTO: 0.8 %
EOSINOPHIL # BLD AUTO: 446 CELLS/UL (ref 15–500)
EOSINOPHIL NFR BLD AUTO: 4.9 %
EPO SERPL-ACNC: 10.5 MIU/ML (ref 2.6–18.5)
ERYTHROCYTE [DISTWIDTH] IN BLOOD BY AUTOMATED COUNT: 12.6 % (ref 11–15)
HCT VFR BLD AUTO: 42.1 % (ref 38.5–50)
HGB BLD-MCNC: 14.4 G/DL (ref 13.2–17.1)
JAK2 P.V617F BLD/T QL: 0 % MUTATION
LYMPHOCYTES # BLD AUTO: 2066 CELLS/UL (ref 850–3900)
LYMPHOCYTES NFR BLD AUTO: 22.7 %
MCH RBC QN AUTO: 29.8 PG (ref 27–33)
MCHC RBC AUTO-ENTMCNC: 34.2 G/DL (ref 32–36)
MCV RBC AUTO: 87 FL (ref 80–100)
MONOCYTES # BLD AUTO: 783 CELLS/UL (ref 200–950)
MONOCYTES NFR BLD AUTO: 8.6 %
NEUTROPHILS # BLD AUTO: 5733 CELLS/UL (ref 1500–7800)
NEUTROPHILS NFR BLD AUTO: 63 %
PLATELET # BLD AUTO: 294 THOUSAND/UL (ref 140–400)
PMV BLD REES-ECKER: 10.9 FL (ref 7.5–12.5)
RBC # BLD AUTO: 4.84 MILLION/UL (ref 4.2–5.8)
REF LAB TEST METHOD: NORMAL
SERVICE CMNT-IMP: NORMAL
SL AMB REVIEWER: NORMAL
SPECIMEN SOURCE: NORMAL
WBC # BLD AUTO: 9.1 THOUSAND/UL (ref 3.8–10.8)

## 2018-09-05 ENCOUNTER — TELEPHONE (OUTPATIENT)
Dept: FAMILY MEDICINE CLINIC | Facility: CLINIC | Age: 63
End: 2018-09-05

## 2018-09-05 NOTE — TELEPHONE ENCOUNTER
----- Message from Cali Nuno sent at 9/4/2018  8:33 AM EDT -----  Regarding: ER Follow Up  [facility] ER 09/02/2018 for Chest pain with low risk of acute coronary syndrome

## 2018-10-04 DIAGNOSIS — R07.89 CHEST PRESSURE: ICD-10-CM

## 2018-10-04 DIAGNOSIS — H53.9 VISUAL CHANGES: ICD-10-CM

## 2018-10-04 DIAGNOSIS — I26.99 RIGHT PULMONARY EMBOLUS (HCC): ICD-10-CM

## 2018-10-29 ENCOUNTER — OFFICE VISIT (OUTPATIENT)
Dept: URGENT CARE | Facility: MEDICAL CENTER | Age: 63
End: 2018-10-29
Payer: COMMERCIAL

## 2018-10-29 VITALS
BODY MASS INDEX: 32.33 KG/M2 | TEMPERATURE: 97.3 F | SYSTOLIC BLOOD PRESSURE: 148 MMHG | DIASTOLIC BLOOD PRESSURE: 83 MMHG | RESPIRATION RATE: 16 BRPM | OXYGEN SATURATION: 96 % | HEART RATE: 74 BPM | WEIGHT: 206 LBS | HEIGHT: 67 IN

## 2018-10-29 DIAGNOSIS — H57.11 PAIN OF RIGHT EYE: Primary | ICD-10-CM

## 2018-10-29 PROCEDURE — 99213 OFFICE O/P EST LOW 20 MIN: CPT | Performed by: FAMILY MEDICINE

## 2018-10-29 RX ORDER — PROPARACAINE HYDROCHLORIDE 5 MG/ML
1 SOLUTION/ DROPS OPHTHALMIC ONCE
Status: COMPLETED | OUTPATIENT
Start: 2018-10-29 | End: 2018-10-29

## 2018-10-29 RX ADMIN — PROPARACAINE HYDROCHLORIDE 1 DROP: 5 SOLUTION/ DROPS OPHTHALMIC at 21:48

## 2018-10-30 NOTE — PATIENT INSTRUCTIONS
I apply 2 drops of proparacaine followed byfluorescein dye  There is no evidence of corneal abrasion  I recommended patient use over-the-counter stringent drops such as Visine or clear eyedrops at least 2 drops 2 to 3 times a day  Apply cool compress  If after for 24 hours redness of burning persists, he is to see eye care specialist   He expressed understanding  Conjunctivitis   WHAT YOU SHOULD KNOW:   Conjunctivitis, or pink eye, is inflammation of your conjunctiva  The conjunctiva is a thin tissue that covers the front of your eye and the back of your eyelids  The conjunctiva helps protect your eye and keep it moist         INSTRUCTIONS:   Medicines:   · Allergy medicine: This medicine helps decrease itchy, red, swollen eyes caused by allergies  It may be given as a pill, eye drops, or nasal spray  · Antibiotics:  You will need antibiotics if your conjunctivitis is caused by bacteria  This medicine may be given as eye drops or eye ointment  · Steroid medicine: This medicine helps decrease inflammation  It may be given as a pill, eye drops, or nasal spray  · Take your medicine as directed  Call your healthcare provider if you think your medicine is not helping or if you have side effects  Tell him if you are allergic to any medicine  Keep a list of the medicines, vitamins, and herbs you take  Include the amounts, and when and why you take them  Bring the list or the pill bottles to follow-up visits  Carry your medicine list with you in case of an emergency  Follow up with your primary healthcare provider as directed: You may need to return for more tests on your eyes  These will help your primary healthcare provider check for eye damage  Write down your questions so you remember to ask them during your visits  Avoid the spread of conjunctivitis:   · Wash your hands often:  Wash your hands before you touch your eyes   Also wash your hands before you prepare or eat food and after you use the bathroom or change a diaper  · Avoid allergens:  Try to avoid the things that cause your allergies, such as pets, dust, or grass  · Avoid contact:  Do not share towels or washcloths  Try to stay away from others as much as possible  Ask when you can return to work or school  · Throw away eye makeup:  Throw away mascara and other eye makeup  Manage your symptoms:  · Apply a cool compress:  Wet a washcloth with cold water and place it on your eye  This will help decrease swelling  · Use eye drops:  Eye drops, or artificial tears, can be bought without a doctor's order  They help keep your eye moist     · Do not wear contact lenses: They can irritate your eye  Throw away the pair you are using and ask when you can wear them again  Use a new pair of lenses when your primary healthcare provider says it is okay  · Flush your eye:  You may need to flush your eye with saline to help decrease your symptoms  Ask for more information on how to flush your eye  Contact your primary healthcare provider if:   · Your eyesight becomes blurry  · You have tiny bumps or spots of blood on your eye  · You have questions or concerns about your condition or care  Return to the emergency department if:   · The swelling in your eye gets worse, even after treatment  · Your vision suddenly becomes worse or you cannot see at all  · Your eye begins to bleed  © 2014 [address] is for End User's use only and may not be sold, redistributed or otherwise used for commercial purposes  All illustrations and images included in CareNotes® are the copyrighted property of [employer]  or [employer]  The above information is an  only  It is not intended as medical advice for individual conditions or treatments  Talk to your doctor, nurse or pharmacist before following any medical regimen to see if it is safe and effective for you

## 2018-10-30 NOTE — PROGRESS NOTES
[facility]        NAME: Pinky Thayer is a 58 y o  male  : 1955    MRN: [id number]  DATE: 2018  TIME: 10:14 PM    Assessment and Plan   Pain of right eye [H57 11]  1  Pain of right eye  proparacaine (ALCAINE) 0 5 % ophthalmic solution 1 drop    fluorescein sodium sterile 1 mg ophthalmic strip 1 strip         Patient Instructions       Follow up with PCP in 3-5 days  Proceed to  ER if symptoms worsen  Chief Complaint     Chief Complaint   Patient presents with    Eye Pain     Patient here wih pain and redness right eye for 3days  History of Present Illness       Patient complaining of right eye redness over the last 3 days  He describes a 3 days ago, while at the [place], he believes air fell into his eye  He describes burning sensation  Denies any purulent discharge  He tried using some lubricating eye drops with no significant improvement  Denies light sensitivity  Patient describes a remote history of retinal disease  Review of Systems   Review of Systems   Eyes: Positive for pain and redness  Negative for photophobia and visual disturbance  Current Medications       Current Outpatient Prescriptions:     apixaban (ELIQUIS) 5 mg, Take 1 tablet (5 mg total) by mouth 2 (two) times a day, Disp: 60 tablet, Rfl: 0    Azelastine HCl 0 15 % SOLN, 2 sprays into each nostril daily, Disp: 30 mL, Rfl: 2    fluticasone (FLONASE) 50 mcg/act nasal spray, 2 sprays into each nostril daily, Disp: 16 g, Rfl: 0    hydrochlorothiazide (HYDRODIURIL) 25 mg tablet, Take 1 tablet (25 mg total) by mouth daily, Disp: 90 tablet, Rfl: 1    nebivolol (BYSTOLIC) 10 mg tablet, Take 1 tablet (10 mg total) by mouth daily, Disp: 30 tablet, Rfl: 5    aspirin (ECOTRIN LOW STRENGTH) 81 mg EC tablet, Take 81 mg by mouth daily, Disp: , Rfl:   No current facility-administered medications for this visit       Current Allergies     Allergies as of 10/29/2018 - Reviewed 10/29/2018 Allergen Reaction Noted    Lisinopril Throat Swelling, Edema, and Angioedema 12/20/2013    Amlodipine Palpitations 12/26/2013    Diltiazem Rash 01/15/2014            The following portions of the patient's history were reviewed and updated as appropriate: allergies, current medications, past family history, past medical history, past social history, past surgical history and problem list      Past Medical History:   Diagnosis Date    Abdominal pain, right lower quadrant     Abnormal blood chemistry     Acid reflux     Contact dermatitis due to poison ivy     Depression screen     Flu     High blood pressure     History of acute conjunctivitis     right eye    History of allergic urticaria     History of chronic rhinitis     History of disease of skin and subcutaneous tissue     History of dysphagia     History of hemorrhoids     History of IBS     History of URI (upper respiratory infection)     Open wound of left lower leg     Other acute sinusitis     Other disturbances of oral epithelium, including tongue     Positive depression screening     Screening for prostate cancer     Skin rash     Special screening examination for neoplasm of prostate     Tingling        Past Surgical History:   Procedure Laterality Date    COLONOSCOPY W/ POLYPECTOMY      Via Colostomy    TONSILLECTOMY         Family History   Problem Relation Age of Onset    Breast cancer Mother     Lung cancer Family     Lung cancer Father     Substance Abuse Neg Hx         Mother and Father    Mental illness Neg Hx         Mother and Father         Medications have been verified  Objective   /83   Pulse 74   Temp (!) 97 3 °F (36 3 °C)   Resp 16   Ht 5' 7" (1 702 m)   Wt 93 4 kg (206 lb)   SpO2 96%   BMI 32 26 kg/m²        Physical Exam     Physical Exam   Eyes: Pupils are equal, round, and reactive to light  EOM are normal    I apply 2 drops of proparacaine followed by fluorescein dye    There is no evidence of corneal abrasion  Sclera is injected  There is no purulent discharge observed  Nursing note and vitals reviewed

## 2018-11-02 ENCOUNTER — OFFICE VISIT (OUTPATIENT)
Dept: FAMILY MEDICINE CLINIC | Facility: CLINIC | Age: 63
End: 2018-11-02
Payer: COMMERCIAL

## 2018-11-02 VITALS
RESPIRATION RATE: 16 BRPM | WEIGHT: 201.7 LBS | TEMPERATURE: 98.6 F | HEIGHT: 67 IN | BODY MASS INDEX: 31.66 KG/M2 | SYSTOLIC BLOOD PRESSURE: 128 MMHG | OXYGEN SATURATION: 95 % | HEART RATE: 87 BPM | DIASTOLIC BLOOD PRESSURE: 82 MMHG

## 2018-11-02 DIAGNOSIS — J31.0 CHRONIC RHINITIS: ICD-10-CM

## 2018-11-02 DIAGNOSIS — J01.90 ACUTE SINUSITIS, RECURRENCE NOT SPECIFIED, UNSPECIFIED LOCATION: Primary | ICD-10-CM

## 2018-11-02 DIAGNOSIS — Z12.11 SCREENING FOR COLON CANCER: ICD-10-CM

## 2018-11-02 PROCEDURE — 99214 OFFICE O/P EST MOD 30 MIN: CPT | Performed by: FAMILY MEDICINE

## 2018-11-02 PROCEDURE — 3008F BODY MASS INDEX DOCD: CPT | Performed by: FAMILY MEDICINE

## 2018-11-02 RX ORDER — FLUTICASONE PROPIONATE 50 MCG
2 SPRAY, SUSPENSION (ML) NASAL DAILY
Qty: 16 G | Refills: 3 | Status: SHIPPED | OUTPATIENT
Start: 2018-11-02 | End: 2020-01-07 | Stop reason: SDUPTHER

## 2018-11-02 RX ORDER — TOBRAMYCIN AND DEXAMETHASONE 3; 1 MG/ML; MG/ML
SUSPENSION/ DROPS OPHTHALMIC
COMMUNITY
Start: 2018-10-31 | End: 2018-11-19

## 2018-11-02 RX ORDER — LEVOFLOXACIN 500 MG/1
500 TABLET, FILM COATED ORAL EVERY 24 HOURS
Qty: 7 TABLET | Refills: 0 | Status: SHIPPED | OUTPATIENT
Start: 2018-11-02 | End: 2018-11-09

## 2018-11-02 NOTE — PROGRESS NOTES
Assessment/Plan:   1  Acute sinusitis, recurrence not specified, unspecified location  Symptoms appear likely secondary to possible chemical conjunctivitis  There may be also signs of acute sinusitis  Start supportive care  Maintain hydration  Take over-the-counter Mucinex  He may continue with his nasal sprays  Start treatment with Levaquin 500 mg daily for 7 days  Follow up if any symptoms persist   - levofloxacin (LEVAQUIN) 500 mg tablet; Take 1 tablet (500 mg total) by mouth every 24 hours for 7 days  Dispense: 7 tablet; Refill: 0    2  Chronic rhinitis  - fluticasone (FLONASE) 50 mcg/act nasal spray; 2 sprays into each nostril daily  Dispense: 16 g; Refill: 3    3  Screening for colon cancer  - Ambulatory referral to Gastroenterology; Future     Diagnoses and all orders for this visit:    Chronic rhinitis  -     fluticasone (FLONASE) 50 mcg/act nasal spray; 2 sprays into each nostril daily    Other orders  -     tobramycin-dexamethasone (TOBRADEX) ophthalmic suspension;           Subjective:    Chief Complaint   Patient presents with    Cold Like Symptoms    Conjunctivitis    Immunizations     denies flu shot, pneumo, and shingles vacc        Patient ID: Jodi Carrasco is a 58 y o  male  Conjunctivitis    Episode onset: 6 days ago  The onset was gradual  The problem occurs continuously  The problem has been unchanged  The problem is mild  Nothing relieves the symptoms  Associated symptoms include congestion, rhinorrhea, eye pain and eye redness  Pertinent negatives include no decreased vision  Review of Systems   HENT: Positive for congestion and rhinorrhea  Eyes: Positive for pain and redness  The following portions of the patient's history were reviewed and updated as appropriate : past family history, past medical history, past social history and past surgical history        Current Outpatient Prescriptions:     apixaban (ELIQUIS) 5 mg, Take 1 tablet (5 mg total) by mouth 2 (two) times a day, Disp: 60 tablet, Rfl: 0    Azelastine HCl 0 15 % SOLN, 2 sprays into each nostril daily, Disp: 30 mL, Rfl: 2    fluticasone (FLONASE) 50 mcg/act nasal spray, 2 sprays into each nostril daily, Disp: 16 g, Rfl: 0    hydrochlorothiazide (HYDRODIURIL) 25 mg tablet, Take 1 tablet (25 mg total) by mouth daily, Disp: 90 tablet, Rfl: 1    nebivolol (BYSTOLIC) 10 mg tablet, Take 1 tablet (10 mg total) by mouth daily, Disp: 30 tablet, Rfl: 5    tobramycin-dexamethasone (TOBRADEX) ophthalmic suspension, , Disp: , Rfl:     Objective:    Vitals:    11/02/18 0944   BP: 128/82   BP Location: Left arm   Patient Position: Sitting   Cuff Size: Adult   Pulse: 87   Resp: 16   Temp: 98 6 °F (37 °C)   TempSrc: Tympanic   SpO2: 95%   Weight: 91 5 kg (201 lb 11 2 oz)   Height: 5' 7" (1 702 m)        Physical Exam   Constitutional: He is oriented to person, place, and time  He appears well-developed and well-nourished  HENT:   Head: Normocephalic and atraumatic  Nose: Nose normal    Mouth/Throat: No oropharyngeal exudate  Eyes: Pupils are equal, round, and reactive to light  Right eye exhibits no discharge  Left eye exhibits no discharge  Right conjunctiva is injected  Neck: Normal range of motion  Neck supple  No tracheal deviation present  Cardiovascular: Normal rate, regular rhythm and intact distal pulses  Exam reveals no gallop and no friction rub  No murmur heard  Pulses:       Dorsalis pedis pulses are 2+ on the right side, and 2+ on the left side  Posterior tibial pulses are 2+ on the right side, and 2+ on the left side  Pulmonary/Chest: Effort normal and breath sounds normal  No respiratory distress  He has no wheezes  He has no rales  Abdominal: Soft  Bowel sounds are normal  He exhibits no distension  There is no tenderness  There is no rebound and no guarding  Musculoskeletal: Normal range of motion  He exhibits no edema  Lymphadenopathy:        Head (right side):  No submental and no submandibular adenopathy present  Head (left side): No submental and no submandibular adenopathy present  He has no cervical adenopathy  Right cervical: No superficial cervical, no deep cervical and no posterior cervical adenopathy present  Left cervical: No superficial cervical, no deep cervical and no posterior cervical adenopathy present  Neurological: He is alert and oriented to person, place, and time  No cranial nerve deficit or sensory deficit  Skin: Skin is warm, dry and intact  Psychiatric: His speech is normal and behavior is normal  Judgment normal  His mood appears not anxious  Cognition and memory are normal  He does not exhibit a depressed mood  Vitals reviewed

## 2018-11-07 ENCOUNTER — TELEPHONE (OUTPATIENT)
Dept: FAMILY MEDICINE CLINIC | Facility: CLINIC | Age: 63
End: 2018-11-07

## 2018-11-07 DIAGNOSIS — R07.89 CHEST PRESSURE: ICD-10-CM

## 2018-11-07 DIAGNOSIS — I26.99 RIGHT PULMONARY EMBOLUS (HCC): ICD-10-CM

## 2018-11-07 DIAGNOSIS — H53.9 VISUAL CHANGES: ICD-10-CM

## 2018-11-07 NOTE — TELEPHONE ENCOUNTER
Pt called, he thinks he has a blood clot in his leg and he wanted to know if you were here  He thinks he is going to the hospital because the pain has been going on for a week and he is having trouble walking

## 2018-11-19 ENCOUNTER — OFFICE VISIT (OUTPATIENT)
Dept: FAMILY MEDICINE CLINIC | Facility: CLINIC | Age: 63
End: 2018-11-19
Payer: COMMERCIAL

## 2018-11-19 VITALS
OXYGEN SATURATION: 97 % | RESPIRATION RATE: 15 BRPM | BODY MASS INDEX: 31.5 KG/M2 | HEIGHT: 67 IN | TEMPERATURE: 96.7 F | HEART RATE: 75 BPM | DIASTOLIC BLOOD PRESSURE: 82 MMHG | WEIGHT: 200.7 LBS | SYSTOLIC BLOOD PRESSURE: 144 MMHG

## 2018-11-19 DIAGNOSIS — E78.2 MIXED HYPERLIPIDEMIA: ICD-10-CM

## 2018-11-19 DIAGNOSIS — R73.03 PREDIABETES: ICD-10-CM

## 2018-11-19 DIAGNOSIS — I26.99 RIGHT PULMONARY EMBOLUS (HCC): ICD-10-CM

## 2018-11-19 DIAGNOSIS — I10 BENIGN ESSENTIAL HYPERTENSION: Primary | ICD-10-CM

## 2018-11-19 PROCEDURE — 99214 OFFICE O/P EST MOD 30 MIN: CPT | Performed by: FAMILY MEDICINE

## 2018-11-19 PROCEDURE — 1036F TOBACCO NON-USER: CPT | Performed by: FAMILY MEDICINE

## 2018-11-19 NOTE — PROGRESS NOTES
Assessment/Plan:   1  Benign essential hypertension  Patient's blood pressure appears mildly elevated today  He states that his home readings have been all normal   Will continue with current daily blood pressure monitoring  Continue as well with a strict diet and exercise plan  Continue as well with current treatment of Bystolic and hydrochlorothiazide    2  Mixed hyperlipidemia  Reviewed fasting lipid panel with patient today  At this time, he was advised on importance of maintaining a strict low-fat/low-cholesterol diet  3  Prediabetes  Patient's fasting blood sugar was mildly elevated  She was advised on the importance of maintaining a very strict low-carbohydrate diet  Will recheck A1c level in 6 months  4  Right pulmonary embolus [facility])  Patient appears clinically asymptomatic  He has been taking his Eliquis regularly  It appears that his lower extremity pain was not due to the thrombus formation  He was advised that would be highly unlikely to developing DVT while on anticoagulation of Eliquis  Will continue with his Eliquis at this time for his pulmonary embolism  Will continue for a total of 6 months  He will be following up with Hematology at the end of the month  There are no diagnoses linked to this encounter  Subjective:    Chief Complaint   Patient presents with    Follow-up     3 month follow up / ER follow up leg pain         Patient ID: Hossein Villagomez is a 58 y o  male  Patient is a 60-year-old male presents today for follow-up on his chronic conditions  He has hypertension, dyslipidemia, prediabetes as well as a previous right pulmonary embolism  He has been taking his medications regularly and tolerating them very well  He denies adverse reactions with medications  He states that he was in the ER recently secondary to bilateral lower extremity pain  He did have an evaluation including extremity ultrasounds    It was determined that his treatment with recent antibiotics most likely was causing his symptoms  He states that his lower leg pain has been improving  Review of Systems   Constitutional: Negative for activity change, chills, fatigue and fever  HENT: Negative for congestion, ear pain, sinus pressure and sore throat  Eyes: Negative for redness, itching and visual disturbance  Respiratory: Negative for cough and shortness of breath  Cardiovascular: Negative for chest pain and palpitations  Gastrointestinal: Negative for abdominal pain, diarrhea and nausea  Endocrine: Negative for cold intolerance and heat intolerance  Genitourinary: Negative for dysuria, flank pain and frequency  Musculoskeletal: Negative for arthralgias, back pain, gait problem and myalgias  Skin: Negative for color change  Allergic/Immunologic: Negative for environmental allergies  Neurological: Negative for dizziness, numbness and headaches  Psychiatric/Behavioral: Negative for behavioral problems and sleep disturbance  The following portions of the patient's history were reviewed and updated as appropriate : past family history, past medical history, past social history and past surgical history        Current Outpatient Prescriptions:     apixaban (ELIQUIS) 5 mg, Take 1 tablet (5 mg total) by mouth 2 (two) times a day, Disp: 60 tablet, Rfl: 0    Azelastine HCl 0 15 % SOLN, 2 sprays into each nostril daily, Disp: 30 mL, Rfl: 2    fluticasone (FLONASE) 50 mcg/act nasal spray, 2 sprays into each nostril daily, Disp: 16 g, Rfl: 3    hydrochlorothiazide (HYDRODIURIL) 25 mg tablet, Take 1 tablet (25 mg total) by mouth daily, Disp: 90 tablet, Rfl: 1    nebivolol (BYSTOLIC) 10 mg tablet, Take 1 tablet (10 mg total) by mouth daily, Disp: 30 tablet, Rfl: 5    Objective:    Vitals:    11/19/18 0837   BP: 144/82   BP Location: Left arm   Patient Position: Sitting   Cuff Size: Adult   Pulse: 75   Resp: 15   Temp: (!) 96 7 °F (35 9 °C)   TempSrc: Tympanic SpO2: 97%   Weight: 91 kg (200 lb 11 2 oz)   Height: 5' 7" (1 702 m)        Physical Exam   Constitutional: He is oriented to person, place, and time  He appears well-developed and well-nourished  HENT:   Head: Normocephalic and atraumatic  Nose: Nose normal    Mouth/Throat: No oropharyngeal exudate  Eyes: Pupils are equal, round, and reactive to light  Right eye exhibits no discharge  Left eye exhibits no discharge  Neck: Normal range of motion  Neck supple  No tracheal deviation present  Cardiovascular: Normal rate, regular rhythm and intact distal pulses  Exam reveals no gallop and no friction rub  No murmur heard  Pulses:       Dorsalis pedis pulses are 2+ on the right side, and 2+ on the left side  Posterior tibial pulses are 2+ on the right side, and 2+ on the left side  Pulmonary/Chest: Effort normal and breath sounds normal  No respiratory distress  He has no wheezes  He has no rales  Abdominal: Soft  Bowel sounds are normal  He exhibits no distension  There is no tenderness  There is no rebound and no guarding  Musculoskeletal: Normal range of motion  He exhibits no edema  Lymphadenopathy:        Head (right side): No submental and no submandibular adenopathy present  Head (left side): No submental and no submandibular adenopathy present  He has no cervical adenopathy  Right cervical: No superficial cervical, no deep cervical and no posterior cervical adenopathy present  Left cervical: No superficial cervical, no deep cervical and no posterior cervical adenopathy present  Neurological: He is alert and oriented to person, place, and time  No cranial nerve deficit or sensory deficit  Skin: Skin is warm, dry and intact  Psychiatric: His speech is normal and behavior is normal  Judgment normal  His mood appears not anxious  Cognition and memory are normal  He does not exhibit a depressed mood  Vitals reviewed

## 2018-12-07 DIAGNOSIS — I26.99 RIGHT PULMONARY EMBOLUS (HCC): ICD-10-CM

## 2018-12-07 DIAGNOSIS — H53.9 VISUAL CHANGES: ICD-10-CM

## 2018-12-07 DIAGNOSIS — R07.89 CHEST PRESSURE: ICD-10-CM

## 2019-01-10 DIAGNOSIS — R07.89 CHEST PRESSURE: ICD-10-CM

## 2019-01-10 DIAGNOSIS — I26.99 RIGHT PULMONARY EMBOLUS (HCC): ICD-10-CM

## 2019-01-10 DIAGNOSIS — H53.9 VISUAL CHANGES: ICD-10-CM

## 2019-02-01 DIAGNOSIS — I10 BENIGN ESSENTIAL HYPERTENSION: ICD-10-CM

## 2019-02-02 RX ORDER — HYDROCHLOROTHIAZIDE 25 MG/1
25 TABLET ORAL DAILY
Qty: 90 TABLET | Refills: 1 | Status: SHIPPED | OUTPATIENT
Start: 2019-02-02 | End: 2019-08-05 | Stop reason: SDUPTHER

## 2019-02-07 ENCOUNTER — OFFICE VISIT (OUTPATIENT)
Dept: FAMILY MEDICINE CLINIC | Facility: CLINIC | Age: 64
End: 2019-02-07
Payer: COMMERCIAL

## 2019-02-07 VITALS
HEIGHT: 67 IN | BODY MASS INDEX: 31.88 KG/M2 | OXYGEN SATURATION: 97 % | WEIGHT: 203.1 LBS | RESPIRATION RATE: 17 BRPM | HEART RATE: 72 BPM | TEMPERATURE: 96.5 F | SYSTOLIC BLOOD PRESSURE: 110 MMHG | DIASTOLIC BLOOD PRESSURE: 78 MMHG

## 2019-02-07 DIAGNOSIS — J01.90 ACUTE SINUSITIS, RECURRENCE NOT SPECIFIED, UNSPECIFIED LOCATION: Primary | ICD-10-CM

## 2019-02-07 DIAGNOSIS — I26.99 RIGHT PULMONARY EMBOLUS (HCC): ICD-10-CM

## 2019-02-07 PROCEDURE — 99214 OFFICE O/P EST MOD 30 MIN: CPT | Performed by: FAMILY MEDICINE

## 2019-02-07 PROCEDURE — 3008F BODY MASS INDEX DOCD: CPT | Performed by: FAMILY MEDICINE

## 2019-02-07 RX ORDER — AMOXICILLIN AND CLAVULANATE POTASSIUM 875; 125 MG/1; MG/1
1 TABLET, FILM COATED ORAL EVERY 12 HOURS SCHEDULED
Qty: 14 TABLET | Refills: 0 | Status: SHIPPED | OUTPATIENT
Start: 2019-02-07 | End: 2019-02-14

## 2019-02-07 NOTE — PROGRESS NOTES
Assessment/Plan:   1  Acute sinusitis, recurrence not specified, unspecified location  Start supportive care dot with Augmentin b i d  For 7 days  Follow up if any symptoms persist   - amoxicillin-clavulanate (AUGMENTIN) 875-125 mg per tablet; Take 1 tablet by mouth every 12 (twelve) hours for 7 days  Dispense: 14 tablet; Refill: 0    2  Right pulmonary embolus [facility])  Patient has been tolerating dot very well  It appears that he was recommended to continue with his Eliquis for at least 6 months  Will continue for the next few months  Will consider discontinuing once he has his colonoscopy  - apixaban (ELIQUIS) 5 mg; Take 1 tablet (5 mg total) by mouth 2 (two) times a day  Dispense: 60 tablet; Refill: 3     There are no diagnoses linked to this encounter  Subjective:    Chief Complaint   Patient presents with    Sinus Problem     right side pain and pressure around right eye  green mucus  pt is using Azelastine with some reflief         Patient ID: Tanya Luis is a 61 y o  male  Sinus Problem   This is a new problem  The current episode started in the past 7 days  The problem is unchanged  There has been no fever  The pain is mild  Associated symptoms include congestion, coughing, headaches, sinus pressure and sneezing  Pertinent negatives include no chills, ear pain, shortness of breath or sore throat  Treatments tried: Flonase and Azelastine  The treatment provided mild relief  Review of Systems   Constitutional: Negative for activity change, chills, fatigue and fever  HENT: Positive for congestion, sinus pressure and sneezing  Negative for ear pain and sore throat  Eyes: Negative for redness, itching and visual disturbance  Respiratory: Positive for cough  Negative for shortness of breath  Cardiovascular: Negative for chest pain and palpitations  Gastrointestinal: Negative for abdominal pain, diarrhea and nausea  Endocrine: Negative for cold intolerance and heat intolerance  Genitourinary: Negative for dysuria, flank pain and frequency  Musculoskeletal: Negative for arthralgias, back pain, gait problem and myalgias  Skin: Negative for color change  Allergic/Immunologic: Negative for environmental allergies  Neurological: Positive for headaches  Negative for dizziness and numbness  Psychiatric/Behavioral: Negative for behavioral problems and sleep disturbance  The following portions of the patient's history were reviewed and updated as appropriate : past family history, past medical history, past social history and past surgical history  Current Outpatient Prescriptions:     apixaban (ELIQUIS) 5 mg, Take 1 tablet (5 mg total) by mouth 2 (two) times a day, Disp: 60 tablet, Rfl: 0    Azelastine HCl 0 15 % SOLN, 2 sprays into each nostril daily, Disp: 30 mL, Rfl: 2    fluticasone (FLONASE) 50 mcg/act nasal spray, 2 sprays into each nostril daily, Disp: 16 g, Rfl: 3    hydrochlorothiazide (HYDRODIURIL) 25 mg tablet, Take 1 tablet (25 mg total) by mouth daily, Disp: 90 tablet, Rfl: 1    nebivolol (BYSTOLIC) 10 mg tablet, Take 1 tablet (10 mg total) by mouth daily, Disp: 30 tablet, Rfl: 5    Objective:    Vitals:    02/07/19 1107   BP: 110/78   BP Location: Left arm   Patient Position: Sitting   Cuff Size: Large   Pulse: 72   Resp: 17   Temp: (!) 96 5 °F (35 8 °C)   TempSrc: Tympanic   SpO2: 97%   Weight: 92 1 kg (203 lb 1 6 oz)   Height: 5' 6 93" (1 7 m)        Physical Exam   Constitutional: He is oriented to person, place, and time  He appears well-developed and well-nourished  HENT:   Head: Normocephalic and atraumatic  Nose: Nose normal    Mouth/Throat: No oropharyngeal exudate  Eyes: Pupils are equal, round, and reactive to light  Right eye exhibits no discharge  Left eye exhibits no discharge  Neck: Normal range of motion  Neck supple  No tracheal deviation present  Cardiovascular: Normal rate, regular rhythm and intact distal pulses    Exam reveals no gallop and no friction rub  No murmur heard  Pulses:       Dorsalis pedis pulses are 2+ on the right side, and 2+ on the left side  Posterior tibial pulses are 2+ on the right side, and 2+ on the left side  Pulmonary/Chest: Effort normal and breath sounds normal  No respiratory distress  He has no wheezes  He has no rales  Abdominal: Soft  Bowel sounds are normal  He exhibits no distension  There is no tenderness  There is no rebound and no guarding  Musculoskeletal: Normal range of motion  He exhibits no edema  Lymphadenopathy:        Head (right side): No submental and no submandibular adenopathy present  Head (left side): No submental and no submandibular adenopathy present  He has no cervical adenopathy  Right cervical: No superficial cervical, no deep cervical and no posterior cervical adenopathy present  Left cervical: No superficial cervical, no deep cervical and no posterior cervical adenopathy present  Neurological: He is alert and oriented to person, place, and time  No cranial nerve deficit or sensory deficit  Skin: Skin is warm, dry and intact  Psychiatric: His speech is normal and behavior is normal  Judgment normal  His mood appears not anxious  Cognition and memory are normal  He does not exhibit a depressed mood  Vitals reviewed

## 2019-02-19 ENCOUNTER — TELEPHONE (OUTPATIENT)
Dept: FAMILY MEDICINE CLINIC | Facility: CLINIC | Age: 64
End: 2019-02-19

## 2019-02-22 ENCOUNTER — OFFICE VISIT (OUTPATIENT)
Dept: GASTROENTEROLOGY | Facility: MEDICAL CENTER | Age: 64
End: 2019-02-22
Payer: COMMERCIAL

## 2019-02-22 ENCOUNTER — TELEPHONE (OUTPATIENT)
Dept: GASTROENTEROLOGY | Facility: MEDICAL CENTER | Age: 64
End: 2019-02-22

## 2019-02-22 VITALS
HEART RATE: 72 BPM | WEIGHT: 205 LBS | DIASTOLIC BLOOD PRESSURE: 76 MMHG | TEMPERATURE: 96.4 F | BODY MASS INDEX: 32.18 KG/M2 | SYSTOLIC BLOOD PRESSURE: 122 MMHG | HEIGHT: 67 IN

## 2019-02-22 DIAGNOSIS — I26.99 RIGHT PULMONARY EMBOLUS (HCC): ICD-10-CM

## 2019-02-22 DIAGNOSIS — K58.0 IRRITABLE BOWEL SYNDROME WITH DIARRHEA: ICD-10-CM

## 2019-02-22 DIAGNOSIS — K63.5 POLYP OF SIGMOID COLON, UNSPECIFIED TYPE: ICD-10-CM

## 2019-02-22 DIAGNOSIS — Z12.11 SCREENING FOR COLON CANCER: ICD-10-CM

## 2019-02-22 DIAGNOSIS — K21.9 GASTROESOPHAGEAL REFLUX DISEASE, ESOPHAGITIS PRESENCE NOT SPECIFIED: Primary | ICD-10-CM

## 2019-02-22 PROCEDURE — 99244 OFF/OP CNSLTJ NEW/EST MOD 40: CPT | Performed by: INTERNAL MEDICINE

## 2019-02-22 RX ORDER — MELATONIN
1000 DAILY
COMMUNITY

## 2019-02-22 RX ORDER — SODIUM, POTASSIUM,MAG SULFATES 17.5-3.13G
1 SOLUTION, RECONSTITUTED, ORAL ORAL ONCE
Qty: 2 BOTTLE | Refills: 0 | Status: SHIPPED | OUTPATIENT
Start: 2019-02-22 | End: 2019-06-19 | Stop reason: ALTCHOICE

## 2019-02-22 NOTE — LETTER
February 26, 2019     Reshma Lopez DO  [address] [facility]  [address] [facility]    Patient: Rose Hobbs   YOB: 1955   Date of Visit: 2/22/2019       Dear Dr Terrie Kamara:    Thank you for referring Windy Failing to me for evaluation  Below are my notes for this consultation  If you have questions, please do not hesitate to call me  I look forward to following your patient along with you  Sincerely,        Carmina Finch MD        CC: DO Carmina Dong MD  2/22/2019  9:23 AM  Incomplete    Outpatient Consultation  [facility] [address]  [address]  [address] [address]  Ph : [phone]  Fax : [phone]      Rose Hobbs 61 y o  male MRN: [id number]    PCP: Reshma Lopez DO  Referring: Reshma Lopez DO  [address], [address]      ASSESSMENT AND PLAN:      No problem-specific Assessment & Plan notes found for this encounter        Diagnoses and all orders for this visit:    Gastroesophageal reflux disease, esophagitis presence not specified  -     Case request operating room: ESOPHAGOGASTRODUODENOSCOPY (EGD), COLONOSCOPY; Standing  -     Case request operating room: ESOPHAGOGASTRODUODENOSCOPY (EGD), COLONOSCOPY    Screening for colon cancer  -     Ambulatory referral to Gastroenterology  -     [facility] BOWEL PREP KIT 17 5-3 13-1 6 GM/177ML SOLN; Take 1 kit by mouth once for 1 dose Please follow instructions as per the office   -     Case request operating room: ESOPHAGOGASTRODUODENOSCOPY (EGD), COLONOSCOPY; Standing  -     Case request operating room: ESOPHAGOGASTRODUODENOSCOPY (EGD), COLONOSCOPY    Irritable bowel syndrome with diarrhea  -     Case request operating room: ESOPHAGOGASTRODUODENOSCOPY (EGD), COLONOSCOPY; Standing  -     Case request operating room: ESOPHAGOGASTRODUODENOSCOPY (EGD), COLONOSCOPY    Polyp of sigmoid colon, unspecified type  -     Case request operating room: ESOPHAGOGASTRODUODENOSCOPY (EGD), COLONOSCOPY; Standing  -     Case request operating room: ESOPHAGOGASTRODUODENOSCOPY (EGD), COLONOSCOPY    Other orders  -     Omega-3 Fatty Acids (FISH OIL PO); Take 2 g by mouth  -     cholecalciferol (VITAMIN D3) 1,000 units tablet; Take 1,000 Units by mouth daily  -     Diet NPO; Sips with meds; Standing  -     Void on call to OR; Standing  -     Insert peripheral IV; Standing        ______________________________________________________________________    HPI:  He has a diagnosis of IBS  He has had colon polyps as well  His last colonoscopy was in 2013 with Dr Sera Wallace and showed a small sigmoid polyp  He is due now for a colonoscopy  Last year he had a long fever and persistent and possible babesiosis or Lyme  He was also diagnosed with a PE last year and was started on anticoagulant- eliquis  Seems like this was in August  He was recommended to stay on Eliquis for 6 - 9 months and he is due to see Dr Elida Stoner [facility] on 3/4/19 and will discuss with him further  He was recently on Amoxicillin for URTI/ sinusitis  He has some issues with diarrhea which he thinks have exacerbated with the Amoxicillin  He does have irregular bowel movements due to his h/o IBS  No affect on daily life  He does have GERD and does not take anything for that  He gets GERD symptoms every day  He has occasional dysphagia  No family history of colon cancer or colon polyps  REVIEW OF SYSTEMS:    CONSTITUTIONAL: Denies any fever, chills, rigors, and weight loss  HEENT: No earache or tinnitus  Denies hearing loss or visual disturbances  CARDIOVASCULAR: No chest pain or palpitations  RESPIRATORY: Denies any cough, hemoptysis, shortness of breath or dyspnea on exertion  GASTROINTESTINAL: As noted in the History of Present Illness  GENITOURINARY: No problems with urination  Denies any hematuria or dysuria    NEUROLOGIC: No dizziness or vertigo, denies headaches  MUSCULOSKELETAL: Denies any muscle or joint pain  SKIN: Denies skin rashes or itching  ENDOCRINE: Denies excessive thirst  Denies intolerance to heat or cold  PSYCHOSOCIAL: Denies depression or anxiety  Denies any recent memory loss         Historical Information   Past Medical History:   Diagnosis Date    Abdominal pain, right lower quadrant     Abnormal blood chemistry     Acid reflux     Contact dermatitis due to poison ivy     Depression screen     Flu     High blood pressure     History of acute conjunctivitis     right eye    History of allergic urticaria     History of chronic rhinitis     History of disease of skin and subcutaneous tissue     History of dysphagia     History of hemorrhoids     History of IBS     History of URI (upper respiratory infection)     Open wound of left lower leg     Other acute sinusitis     Other disturbances of oral epithelium, including tongue     Positive depression screening     Screening for prostate cancer     Skin rash     Special screening examination for neoplasm of prostate     Tingling      Past Surgical History:   Procedure Laterality Date    COLONOSCOPY W/ POLYPECTOMY      Via Colostomy    TONSILLECTOMY       Social History   Social History     Substance and Sexual Activity   Alcohol Use Yes    Comment: Social     Social History     Substance and Sexual Activity   Drug Use No     Social History     Tobacco Use   Smoking Status Former Smoker    Packs/day: 0 50    Years: 3 00    Pack years: 1 50    Last attempt to quit: 1982    Years since quittin 6   Smokeless Tobacco Never Used     Family History   Problem Relation Age of Onset    Breast cancer Mother     Lung cancer Family     Lung cancer Father     Substance Abuse Neg Hx         Mother and Father    Mental illness Neg Hx         Mother and Father       Meds/Allergies       Current Outpatient Medications:     apixaban (ELIQUIS) 5 mg    Azelastine HCl 0 15 % SOLN    cholecalciferol (VITAMIN D3) 1,000 units tablet    fluticasone (FLONASE) 50 mcg/act nasal spray    hydrochlorothiazide (HYDRODIURIL) 25 mg tablet    nebivolol (BYSTOLIC) 10 mg tablet    Omega-3 Fatty Acids (FISH OIL PO)    SUPREP BOWEL PREP KIT 17 5-3 13-1 6 GM/177ML SOLN    Allergies   Allergen Reactions    Lisinopril Throat Swelling, Edema and Angioedema    Amlodipine Palpitations    Diltiazem Rash     Hot rash on feet           Objective     Blood pressure 122/76, pulse 72, temperature (!) 96 4 °F (35 8 °C), temperature source Tympanic, height 5' 6 93" (1 7 m), weight 93 kg (205 lb)  Body mass index is 32 17 kg/m²  PHYSICAL EXAM:      Physical Exam   Constitutional: He is oriented to person, place, and time  Vital signs are normal  He appears well-developed and well-nourished  HENT:   Head: Normocephalic and atraumatic  Eyes: Pupils are equal, round, and reactive to light  Conjunctivae are normal  No scleral icterus  Neck: Normal range of motion  Cardiovascular: Normal rate, regular rhythm and normal heart sounds  Pulmonary/Chest: Effort normal and breath sounds normal  No respiratory distress  Abdominal: Soft  Normal appearance and bowel sounds are normal  He exhibits no distension, no ascites and no mass  There is no hepatosplenomegaly  There is no tenderness  No hernia  Musculoskeletal: Normal range of motion  Lymphadenopathy:     He has no cervical adenopathy  Neurological: He is alert and oriented to person, place, and time  Skin: Skin is warm  Psychiatric: He has a normal mood and affect   His behavior is normal  Thought content normal            Lab Results:     Lab Results   Component Value Date    WBC 9 1 08/30/2018    HGB 14 4 08/30/2018    HCT 42 1 08/30/2018    MCV 87 0 08/30/2018     08/30/2018       Lab Results   Component Value Date     09/13/2017    K 3 7 07/10/2018     07/10/2018    CO2 28 07/10/2018    ANIONGAP 5 01/12/2014 BUN 12 07/10/2018    CREATININE 0 99 07/10/2018    GLUCOSE 101 01/12/2014    GLUF 103 (H) 07/10/2018    CALCIUM 9 0 07/10/2018    AST 16 07/10/2018    ALT 27 07/10/2018    ALKPHOS 72 07/10/2018    PROT 6 5 09/13/2017    BILITOT 1 3 (H) 09/13/2017    EGFR 81 07/10/2018       Lab Results   Component Value Date    INR 1 09 06/23/2018    INR 0 99 01/12/2014    PROTIME 14 2 06/23/2018    PROTIME 12 6 01/12/2014         Radiology Results:   No results found

## 2019-02-22 NOTE — TELEPHONE ENCOUNTER
Dr Gena Gonzalez, the patient called his prior GI Dr and he was told him his last colon was with Dr Rosalita Nageotte in August of 2016  I asked him to have a copy faxed to us  I wanted to make you aware because your next day at [address] is 5/15/19

## 2019-02-22 NOTE — PROGRESS NOTES
Outpatient Consultation  [facility]  [address]  [address]  [place]   [address]  [address]  Ph : [phone]  Fax : [phone]      Robert Whitman 61 y o  male MRN: [id number]    PCP: Omega Sánchez DO  Referring: Omega Sánchez DO  [address], [address]      ASSESSMENT AND PLAN:      Polyp of sigmoid colon  Repeat colonoscopy now since the last 1 was 2013  Unclear about the pathology of the sigmoid polyp  He is on Eliquis and will need to discuss with his cardiologist (Dr John Paul Jean Baptiste)  when he sees them in March regarding being able to come off the Eliquis since it has almost been 9 months since initiating a for a PE     IBS (irritable bowel syndrome)  Symptoms consistent with IBS with diarrhea  However would like to rule out microscopic colitis with colonoscopy and random biopsies  Continue dietary and lifestyle modifications  Imodium as needed  Screening for colon cancer  He has had a colonoscopy done in 2013 hence this would be a surveillance colonoscopy for a sigmoid polyp  Esophageal reflux  Longstanding symptoms of GERD  He also has some mild dysphagia at this time  Plan for EGD with possible dilation and/or esophageal biopsies  Again he would need to be off his Eliquis  Will plan on treatment with PPIs depending on his findings however his symptoms are every day and may benefit from dietary, lifestyle changes as well as short term PPI use        Diagnoses and all orders for this visit:    Gastroesophageal reflux disease, esophagitis presence not specified  -     Case request operating room: ESOPHAGOGASTRODUODENOSCOPY (EGD), COLONOSCOPY; Standing  -     Case request operating room: ESOPHAGOGASTRODUODENOSCOPY (EGD), COLONOSCOPY    Screening for colon cancer  -     Ambulatory referral to Gastroenterology  -     [facility] BOWEL PREP KIT 17 5-3 13-1 6 GM/177ML SOLN; Take 1 kit by mouth once for 1 dose Please follow instructions as per the office   -     Case request operating room: ESOPHAGOGASTRODUODENOSCOPY (EGD), COLONOSCOPY; Standing  -     Case request operating room: ESOPHAGOGASTRODUODENOSCOPY (EGD), COLONOSCOPY    Irritable bowel syndrome with diarrhea  -     Case request operating room: ESOPHAGOGASTRODUODENOSCOPY (EGD), COLONOSCOPY; Standing  -     Case request operating room: ESOPHAGOGASTRODUODENOSCOPY (EGD), COLONOSCOPY    Polyp of sigmoid colon, unspecified type  -     Case request operating room: ESOPHAGOGASTRODUODENOSCOPY (EGD), COLONOSCOPY; Standing  -     Case request operating room: ESOPHAGOGASTRODUODENOSCOPY (EGD), COLONOSCOPY    Right pulmonary embolus ([address] )    Other orders  -     Omega-3 Fatty Acids (FISH OIL PO); Take 2 g by mouth  -     cholecalciferol (VITAMIN D3) 1,000 units tablet; Take 1,000 Units by mouth daily  -     Diet NPO; Sips with meds; Standing  -     Void on call to OR; Standing  -     Insert peripheral IV; Standing        ______________________________________________________________________    HPI:  He has a diagnosis of IBS  He has had colon polyps as well  His last colonoscopy was in 2013 with Dr Stephanie Toledo and showed a small sigmoid polyp  He is due now for a colonoscopy  Last year he had a long episode of recurrent fever and possible babesiosis or Lyme  He was also diagnosed with a PE last year and was started on anticoagulant- eliquis  Seems like this was in August  He was recommended to stay on Eliquis for 6 - 9 months and he is due to see Dr Jaydon Kelly [facility] on 3/4/19 and will discuss with him further  He was recently on Amoxicillin for URTI/ sinusitis  He has some issues with diarrhea which he thinks have exacerbated with the Amoxicillin  He does have irregular bowel movements due to his h/o IBS  No affect on daily life  He does have GERD and does not take anything for that  He gets GERD symptoms every day  He has occasional dysphagia     No family history of colon cancer or colon polyps  REVIEW OF SYSTEMS:    CONSTITUTIONAL: Denies any fever, chills, rigors, and weight loss  HEENT: No earache or tinnitus  Denies hearing loss or visual disturbances  CARDIOVASCULAR: No chest pain or palpitations  RESPIRATORY: Denies any cough, hemoptysis, shortness of breath or dyspnea on exertion  GASTROINTESTINAL: As noted in the History of Present Illness  GENITOURINARY: No problems with urination  Denies any hematuria or dysuria  NEUROLOGIC: No dizziness or vertigo, denies headaches  MUSCULOSKELETAL: Denies any muscle or joint pain  SKIN: Denies skin rashes or itching  ENDOCRINE: Denies excessive thirst  Denies intolerance to heat or cold  PSYCHOSOCIAL: Denies depression or anxiety  Denies any recent memory loss         Historical Information   Past Medical History:   Diagnosis Date    Abdominal pain, right lower quadrant     Abnormal blood chemistry     Acid reflux     Contact dermatitis due to poison ivy     Depression screen     Flu     High blood pressure     History of acute conjunctivitis     right eye    History of allergic urticaria     History of chronic rhinitis     History of disease of skin and subcutaneous tissue     History of dysphagia     History of hemorrhoids     History of IBS     History of URI (upper respiratory infection)     Open wound of left lower leg     Other acute sinusitis     Other disturbances of oral epithelium, including tongue     Positive depression screening     Screening for prostate cancer     Skin rash     Special screening examination for neoplasm of prostate     Tingling      Past Surgical History:   Procedure Laterality Date    COLONOSCOPY W/ POLYPECTOMY      Via Colostomy    TONSILLECTOMY       Social History   Social History     Substance and Sexual Activity   Alcohol Use Yes    Comment: Social     Social History     Substance and Sexual Activity   Drug Use No     Social History     Tobacco Use Smoking Status Former Smoker    Packs/day: 0 50    Years: 3 00    Pack years: 1 50    Last attempt to quit: 1982    Years since quittin 7   Smokeless Tobacco Never Used     Family History   Problem Relation Age of Onset    Breast cancer Mother     Lung cancer Family     Lung cancer Father     Substance Abuse Neg Hx         Mother and Father    Mental illness Neg Hx         Mother and Father       Meds/Allergies       Current Outpatient Medications:     apixaban (ELIQUIS) 5 mg    Azelastine HCl 0 15 % SOLN    cholecalciferol (VITAMIN D3) 1,000 units tablet    fluticasone (FLONASE) 50 mcg/act nasal spray    hydrochlorothiazide (HYDRODIURIL) 25 mg tablet    nebivolol (BYSTOLIC) 10 mg tablet    Omega-3 Fatty Acids (FISH OIL PO)    SUPREP BOWEL PREP KIT 17 5-3 13-1 6 GM/177ML SOLN    Allergies   Allergen Reactions    Lisinopril Throat Swelling, Edema and Angioedema    Amlodipine Palpitations    Diltiazem Rash     Hot rash on feet           Objective     Blood pressure 122/76, pulse 72, temperature (!) 96 4 °F (35 8 °C), temperature source Tympanic, height 5' 6 93" (1 7 m), weight 93 kg (205 lb)  Body mass index is 32 17 kg/m²  PHYSICAL EXAM:      Physical Exam   Constitutional: He is oriented to person, place, and time  Vital signs are normal  He appears well-developed and well-nourished  HENT:   Head: Normocephalic and atraumatic  Eyes: Pupils are equal, round, and reactive to light  Conjunctivae are normal  No scleral icterus  Neck: Normal range of motion  Cardiovascular: Normal rate, regular rhythm and normal heart sounds  Pulmonary/Chest: Effort normal and breath sounds normal  No respiratory distress  Abdominal: Soft  Normal appearance and bowel sounds are normal  He exhibits no distension, no ascites and no mass  There is no hepatosplenomegaly  There is no tenderness  No hernia  Musculoskeletal: Normal range of motion     Lymphadenopathy:     He has no cervical adenopathy  Neurological: He is alert and oriented to person, place, and time  Skin: Skin is warm  Psychiatric: He has a normal mood and affect  His behavior is normal  Thought content normal            Lab Results:     Lab Results   Component Value Date    WBC 9 1 08/30/2018    HGB 14 4 08/30/2018    HCT 42 1 08/30/2018    MCV 87 0 08/30/2018     08/30/2018       Lab Results   Component Value Date     09/13/2017    K 3 7 07/10/2018     07/10/2018    CO2 28 07/10/2018    ANIONGAP 5 01/12/2014    BUN 12 07/10/2018    CREATININE 0 99 07/10/2018    GLUCOSE 101 01/12/2014    GLUF 103 (H) 07/10/2018    CALCIUM 9 0 07/10/2018    AST 16 07/10/2018    ALT 27 07/10/2018    ALKPHOS 72 07/10/2018    PROT 6 5 09/13/2017    BILITOT 1 3 (H) 09/13/2017    EGFR 81 07/10/2018       Lab Results   Component Value Date    INR 1 09 06/23/2018    INR 0 99 01/12/2014    PROTIME 14 2 06/23/2018    PROTIME 12 6 01/12/2014         Radiology Results:   No results found

## 2019-02-25 PROBLEM — K21.9 GASTROESOPHAGEAL REFLUX DISEASE: Status: ACTIVE | Noted: 2019-02-25

## 2019-02-25 PROBLEM — K58.0 IRRITABLE BOWEL SYNDROME WITH DIARRHEA: Status: ACTIVE | Noted: 2019-02-25

## 2019-02-25 PROBLEM — Z12.11 SCREENING FOR COLON CANCER: Status: ACTIVE | Noted: 2019-02-25

## 2019-02-25 NOTE — TELEPHONE ENCOUNTER
The patient would like to know if he will need both procedures ( colon/egd) done at this time  A copy of his August 2016 is scanned in  At the time he was in the office he did not know when exactly that procedure date was  Thank you

## 2019-02-26 NOTE — ASSESSMENT & PLAN NOTE
He has had a colonoscopy done in 2013 hence this would be a surveillance colonoscopy for a sigmoid polyp

## 2019-02-26 NOTE — ASSESSMENT & PLAN NOTE
Repeat colonoscopy now since the last 1 was 2013  Unclear about the pathology of the sigmoid polyp  He is on Eliquis and will need to discuss with his cardiologist (Dr Ana Rosa Hardin)  when he sees them in March regarding being able to come off the Eliquis since it has almost been 9 months since initiating a for a PE

## 2019-02-26 NOTE — TELEPHONE ENCOUNTER
Called pt and LMOM to schedule his colon/egd for him  I do have him added to the 4/16/19 schedule with Dr Yahaira Zafar at [address]  Today I mailed him a letter asking for him to please return my call

## 2019-02-26 NOTE — ASSESSMENT & PLAN NOTE
Longstanding symptoms of GERD  He also has some mild dysphagia at this time  Plan for EGD with possible dilation and/or esophageal biopsies  Again he would need to be off his Eliquis  Will plan on treatment with PPIs depending on his findings however his symptoms are every day and may benefit from dietary, lifestyle changes as well as short term PPI use

## 2019-02-26 NOTE — ASSESSMENT & PLAN NOTE
Symptoms consistent with IBS with diarrhea  However would like to rule out microscopic colitis with colonoscopy and random biopsies  Continue dietary and lifestyle modifications  Imodium as needed

## 2019-02-27 NOTE — TELEPHONE ENCOUNTER
Mr Sylvia Ruiz called me back  He states that he will go back to Dr Rasmussen to do his procedures  He told me he is used to having his procedures on a Monday and at this time, Dr Lowe, did not have a Monday available  He wanted only Dr Yahaira Zafar to do his procedures  He also said [facility] is too big of a facility for him  I did make him aware that [facility] said that he should have an EGD/Colon done at this time  He will make Dr Eliezer Wallace aware of this  I made Raquel aware due to the Eluquis hold  I also cancelled the procedures for him on April 16, 2019  I will also make Dr Yahaira Zafar aware

## 2019-02-28 NOTE — TELEPHONE ENCOUNTER
LMOM for the patient to call me back if he is interested in a Monday with Dr Matilde Avila  I gave him my direct phone number

## 2019-06-14 DIAGNOSIS — I10 BENIGN ESSENTIAL HYPERTENSION: ICD-10-CM

## 2019-06-14 DIAGNOSIS — I26.99 RIGHT PULMONARY EMBOLUS (HCC): ICD-10-CM

## 2019-06-17 RX ORDER — NEBIVOLOL 10 MG/1
10 TABLET ORAL DAILY
Qty: 30 TABLET | Refills: 1 | Status: SHIPPED | OUTPATIENT
Start: 2019-06-17 | End: 2019-10-12 | Stop reason: SDUPTHER

## 2019-06-19 ENCOUNTER — TELEPHONE (OUTPATIENT)
Dept: FAMILY MEDICINE CLINIC | Facility: CLINIC | Age: 64
End: 2019-06-19

## 2019-06-19 ENCOUNTER — OFFICE VISIT (OUTPATIENT)
Dept: FAMILY MEDICINE CLINIC | Facility: CLINIC | Age: 64
End: 2019-06-19
Payer: COMMERCIAL

## 2019-06-19 VITALS
RESPIRATION RATE: 16 BRPM | SYSTOLIC BLOOD PRESSURE: 128 MMHG | OXYGEN SATURATION: 95 % | DIASTOLIC BLOOD PRESSURE: 80 MMHG | HEIGHT: 67 IN | BODY MASS INDEX: 31.04 KG/M2 | TEMPERATURE: 97.9 F | WEIGHT: 197.8 LBS | HEART RATE: 69 BPM

## 2019-06-19 DIAGNOSIS — Z13.29 SCREENING FOR THYROID DISORDER: ICD-10-CM

## 2019-06-19 DIAGNOSIS — I10 BENIGN ESSENTIAL HYPERTENSION: Primary | ICD-10-CM

## 2019-06-19 DIAGNOSIS — Z13.0 SCREENING FOR IRON DEFICIENCY ANEMIA: ICD-10-CM

## 2019-06-19 DIAGNOSIS — I26.99 RIGHT PULMONARY EMBOLUS (HCC): ICD-10-CM

## 2019-06-19 DIAGNOSIS — J31.0 CHRONIC RHINITIS: ICD-10-CM

## 2019-06-19 PROCEDURE — 3044F HG A1C LEVEL LT 7.0%: CPT | Performed by: FAMILY MEDICINE

## 2019-06-19 PROCEDURE — 3074F SYST BP LT 130 MM HG: CPT | Performed by: FAMILY MEDICINE

## 2019-06-19 PROCEDURE — 3008F BODY MASS INDEX DOCD: CPT | Performed by: FAMILY MEDICINE

## 2019-06-19 PROCEDURE — 99214 OFFICE O/P EST MOD 30 MIN: CPT | Performed by: FAMILY MEDICINE

## 2019-06-19 PROCEDURE — 3079F DIAST BP 80-89 MM HG: CPT | Performed by: FAMILY MEDICINE

## 2019-06-19 RX ORDER — PNV NO.95/FERROUS FUM/FOLIC AC 28MG-0.8MG
2 TABLET ORAL DAILY
COMMUNITY
End: 2021-09-23

## 2019-08-05 DIAGNOSIS — I10 BENIGN ESSENTIAL HYPERTENSION: ICD-10-CM

## 2019-08-05 RX ORDER — HYDROCHLOROTHIAZIDE 25 MG/1
TABLET ORAL
Qty: 90 TABLET | Refills: 0 | Status: SHIPPED | OUTPATIENT
Start: 2019-08-05 | End: 2019-11-04 | Stop reason: SDUPTHER

## 2019-08-15 DIAGNOSIS — I26.99 RIGHT PULMONARY EMBOLUS (HCC): ICD-10-CM

## 2019-08-21 ENCOUNTER — TELEPHONE (OUTPATIENT)
Dept: FAMILY MEDICINE CLINIC | Facility: CLINIC | Age: 64
End: 2019-08-21

## 2019-08-21 NOTE — TELEPHONE ENCOUNTER
Called Dr Tacho Valle office gave them direction for Pt to hold Eliquis for 24 hours prior to procedure and resume after

## 2019-08-21 NOTE — TELEPHONE ENCOUNTER
----- Message from Apollo Bonilla DO sent at 8/21/2019 10:51 AM EDT -----  Received fax request from Dr Janelle Han office  Patient will be completing a colonoscopy on 9/27/2019  Please advise them that patient may hold his Eliquis 24 hours prior to the colonoscopy  He may resume Eliquis directly after the procedure    Thank you

## 2019-09-12 ENCOUNTER — OFFICE VISIT (OUTPATIENT)
Dept: FAMILY MEDICINE CLINIC | Facility: CLINIC | Age: 64
End: 2019-09-12
Payer: COMMERCIAL

## 2019-09-12 VITALS
DIASTOLIC BLOOD PRESSURE: 76 MMHG | SYSTOLIC BLOOD PRESSURE: 130 MMHG | HEART RATE: 110 BPM | OXYGEN SATURATION: 97 % | BODY MASS INDEX: 30.51 KG/M2 | WEIGHT: 194.4 LBS | TEMPERATURE: 98.3 F | HEIGHT: 67 IN

## 2019-09-12 DIAGNOSIS — E78.2 MIXED HYPERLIPIDEMIA: ICD-10-CM

## 2019-09-12 DIAGNOSIS — I10 BENIGN ESSENTIAL HYPERTENSION: ICD-10-CM

## 2019-09-12 DIAGNOSIS — N52.9 ERECTILE DYSFUNCTION, UNSPECIFIED ERECTILE DYSFUNCTION TYPE: ICD-10-CM

## 2019-09-12 DIAGNOSIS — Z11.59 NEED FOR HEPATITIS C SCREENING TEST: Primary | ICD-10-CM

## 2019-09-12 DIAGNOSIS — I26.99 RIGHT PULMONARY EMBOLUS (HCC): ICD-10-CM

## 2019-09-12 PROCEDURE — 3008F BODY MASS INDEX DOCD: CPT | Performed by: FAMILY MEDICINE

## 2019-09-12 PROCEDURE — 99214 OFFICE O/P EST MOD 30 MIN: CPT | Performed by: FAMILY MEDICINE

## 2019-09-12 PROCEDURE — 3075F SYST BP GE 130 - 139MM HG: CPT | Performed by: FAMILY MEDICINE

## 2019-09-12 PROCEDURE — 3078F DIAST BP <80 MM HG: CPT | Performed by: FAMILY MEDICINE

## 2019-09-12 RX ORDER — SILDENAFIL 100 MG/1
TABLET, FILM COATED ORAL
Qty: 10 TABLET | Refills: 0 | Status: SHIPPED | OUTPATIENT
Start: 2019-09-12 | End: 2019-09-13

## 2019-09-12 NOTE — PROGRESS NOTES
Assessment/Plan:   1  Erectile dysfunction, unspecified erectile dysfunction type  Reviewed patient's symptoms today  At this time, symptoms appear likely secondary to rectal dysfunction  Will start treatment with Viagra 100 mg p r n  For symptom relief  If this medication improves too expensive for him, will consider 20 mg dose prescription sent to mail order pharmacy  - sildenafil (VIAGRA) 100 mg tablet; Take 1 tab 30 min prior to desired activity  Dispense: 10 tablet; Refill: 0    2  Benign essential hypertension  Stable today  Will continue at this time with strict blood pressure control as well as regular monitoring  Continue with his Bystolic as well as his hydrochlorothiazide  3  Right pulmonary embolus [facility])  Patient appears clinically stable  He has been tolerating his Eliquis very well  Will continue with his current treatment with anticoagulation    4  Mixed hyperlipidemia  Previously well controlled  Continue with a strict low-fat/low-cholesterol diet as well as his current treatment with his Omega 3 fatty acids  Follow up with patient in 6 months    5  Need for hepatitis C screening test  - Hepatitis C antibody; Future     Diagnoses and all orders for this visit:    Need for hepatitis C screening test  -     Hepatitis C antibody; Future          Subjective:    Chief Complaint   Patient presents with    Follow-up     med check        Patient ID: Sadie Eid is a 61 y o  male  Patient is a 20-year-old male presents today for follow-up on his chronic conditions  He has hypertension, right pulmonary embolism, dyslipidemia  He has been taking his medications regularly  He denies adverse reactions with medications  He states that he does have an acute complaint today of problems with achieving as well as maintaining an erection  He has had this problem for the past few months  He states that he has tried sildenafil 20 mg from a Specialty Pharmacy    He states that he has taken approximately 3 and half tablets of this medication for proper efficacy  Review of Systems   Constitutional: Negative for activity change, chills, fatigue and fever  HENT: Negative for congestion, ear pain, sinus pressure and sore throat  Eyes: Negative for redness, itching and visual disturbance  Respiratory: Negative for cough and shortness of breath  Cardiovascular: Negative for chest pain and palpitations  Gastrointestinal: Negative for abdominal pain, diarrhea and nausea  Endocrine: Negative for cold intolerance and heat intolerance  Genitourinary: Negative for dysuria, flank pain and frequency  Musculoskeletal: Negative for arthralgias, back pain, gait problem and myalgias  Skin: Negative for color change  Allergic/Immunologic: Negative for environmental allergies  Neurological: Negative for dizziness, numbness and headaches  Psychiatric/Behavioral: Negative for behavioral problems and sleep disturbance  The following portions of the patient's history were reviewed and updated as appropriate : past family history, past medical history, past social history and past surgical history        Current Outpatient Medications:     apixaban (ELIQUIS) 5 mg, Take 1 tablet (5 mg total) by mouth 2 (two) times a day, Disp: 60 tablet, Rfl: 1    Azelastine HCl 0 15 % SOLN, 2 sprays into each nostril daily, Disp: 30 mL, Rfl: 2    cholecalciferol (VITAMIN D3) 1,000 units tablet, Take 1,000 Units by mouth daily, Disp: , Rfl:     fluticasone (FLONASE) 50 mcg/act nasal spray, 2 sprays into each nostril daily, Disp: 16 g, Rfl: 3    hydrochlorothiazide (HYDRODIURIL) 25 mg tablet, TAKE ONE TABLET BY MOUTH EVERY DAY , Disp: 90 tablet, Rfl: 0    nebivolol (BYSTOLIC) 10 mg tablet, Take 1 tablet (10 mg total) by mouth daily, Disp: 30 tablet, Rfl: 1    Omega-3 Fatty Acids (FISH OIL OMEGA-3) 1000 MG CAPS, Take 2 g by mouth daily, Disp: , Rfl:     Objective:    Vitals:    09/12/19 1438   BP: 130/76   BP Location: Left arm   Patient Position: Sitting   Cuff Size: Adult   Pulse: (!) 110   Temp: 98 3 °F (36 8 °C)   SpO2: 97%   Weight: 88 2 kg (194 lb 6 4 oz)   Height: 5' 6 9" (1 699 m)        Physical Exam   Constitutional: He is oriented to person, place, and time  He appears well-developed and well-nourished  HENT:   Head: Normocephalic and atraumatic  Nose: Nose normal    Mouth/Throat: No oropharyngeal exudate  Eyes: Pupils are equal, round, and reactive to light  Right eye exhibits no discharge  Left eye exhibits no discharge  Neck: Normal range of motion  Neck supple  No tracheal deviation present  Cardiovascular: Normal rate, regular rhythm and intact distal pulses  Exam reveals no gallop and no friction rub  No murmur heard  Pulses:       Dorsalis pedis pulses are 2+ on the right side, and 2+ on the left side  Posterior tibial pulses are 2+ on the right side, and 2+ on the left side  Pulmonary/Chest: Effort normal and breath sounds normal  No respiratory distress  He has no wheezes  He has no rales  Abdominal: Soft  Bowel sounds are normal  He exhibits no distension  There is no tenderness  There is no rebound and no guarding  Musculoskeletal: Normal range of motion  He exhibits no edema  Lymphadenopathy:        Head (right side): No submental and no submandibular adenopathy present  Head (left side): No submental and no submandibular adenopathy present  He has no cervical adenopathy  Right cervical: No superficial cervical, no deep cervical and no posterior cervical adenopathy present  Left cervical: No superficial cervical, no deep cervical and no posterior cervical adenopathy present  Neurological: He is alert and oriented to person, place, and time  No cranial nerve deficit or sensory deficit  Skin: Skin is warm, dry and intact  Psychiatric: His speech is normal and behavior is normal  Judgment normal  His mood appears not anxious  Cognition and memory are normal  He does not exhibit a depressed mood  Vitals reviewed

## 2019-09-13 DIAGNOSIS — N52.9 ERECTILE DYSFUNCTION, UNSPECIFIED ERECTILE DYSFUNCTION TYPE: Primary | ICD-10-CM

## 2019-09-13 RX ORDER — TADALAFIL 20 MG/1
TABLET ORAL
Qty: 10 TABLET | Refills: 0 | Status: SHIPPED | OUTPATIENT
Start: 2019-09-13 | End: 2020-05-13

## 2019-09-25 NOTE — PRE-PROCEDURE INSTRUCTIONS
Pre-Surgery Instructions:   Medication Instructions    Azelastine HCl 0 15 % SOLN Instructed patient per Anesthesia Guidelines   fluticasone (FLONASE) 50 mcg/act nasal spray Instructed patient per Anesthesia Guidelines   hydrochlorothiazide (HYDRODIURIL) 25 mg tablet Instructed patient per Anesthesia Guidelines   nebivolol (BYSTOLIC) 10 mg tablet Instructed patient per Anesthesia Guidelines

## 2019-09-26 ENCOUNTER — ANESTHESIA EVENT (OUTPATIENT)
Dept: GASTROENTEROLOGY | Facility: HOSPITAL | Age: 64
End: 2019-09-26

## 2019-09-26 NOTE — ANESTHESIA PREPROCEDURE EVALUATION
Review of Systems/Medical History  Patient summary reviewed  Chart reviewed  No history of anesthetic complications     Cardiovascular  EKG reviewed, Exercise tolerance (METS): >4,  Hyperlipidemia, Hypertension controlled,   PE (On Eliquis  dc'd 3 days ago),  Pulmonary  Smoker ex-smoker  Cumulative Pack Years: 3, Sleep apnea (likely by hx  No study done  Pt counseled) ,        GI/Hepatic  Dysphagia, Dysphagia type: solids  GERD well controlled, Bowel prep            Endo/Other  Diabetes Diet controlled,      GYN       Hematology  Negative hematology ROS      Musculoskeletal    Arthritis     Neurology  Negative neurology ROS      Psychology   Anxiety, Depression , being treated for depression,              Physical Exam    Airway    Mallampati score: II  TM Distance: >3 FB  Neck ROM: full     Dental       Cardiovascular  Cardiovascular exam normal    Pulmonary  Pulmonary exam normal     Other Findings  Fixed upper and lower teeth and in good repair      Anesthesia Plan  ASA Score- 2     Anesthesia Type- IV sedation with anesthesia with ASA Monitors  Additional Monitors:   Airway Plan:     Comment: Likely CHARLENE  Plan Factors-Patient not instructed to abstain from smoking on day of procedure  Patient did not smoke on day of surgery  Induction- intravenous  Postoperative Plan-     Informed Consent- Anesthetic plan and risks discussed with patient  I personally reviewed this patient with the CRNA  Discussed and agreed on the Anesthesia Plan with the CRNA  Kamala Steinberg

## 2019-09-27 ENCOUNTER — HOSPITAL ENCOUNTER (OUTPATIENT)
Dept: GASTROENTEROLOGY | Facility: HOSPITAL | Age: 64
Setting detail: OUTPATIENT SURGERY
Discharge: HOME | End: 2019-09-27
Attending: COLON & RECTAL SURGERY
Payer: COMMERCIAL

## 2019-09-27 ENCOUNTER — ANESTHESIA (OUTPATIENT)
Dept: GASTROENTEROLOGY | Facility: HOSPITAL | Age: 64
End: 2019-09-27

## 2019-09-27 VITALS
DIASTOLIC BLOOD PRESSURE: 67 MMHG | TEMPERATURE: 97.5 F | HEART RATE: 62 BPM | OXYGEN SATURATION: 97 % | SYSTOLIC BLOOD PRESSURE: 142 MMHG | RESPIRATION RATE: 18 BRPM

## 2019-09-27 DIAGNOSIS — Z86.010 HISTORY OF COLONIC POLYPS: ICD-10-CM

## 2019-09-27 DIAGNOSIS — Z12.11 ENCOUNTER FOR SCREENING FOR MALIGNANT NEOPLASM OF COLON: ICD-10-CM

## 2019-09-27 PROCEDURE — 88305 TISSUE EXAM BY PATHOLOGIST: CPT | Performed by: PATHOLOGY

## 2019-09-27 RX ORDER — PROPOFOL 10 MG/ML
INJECTION, EMULSION INTRAVENOUS AS NEEDED
Status: DISCONTINUED | OUTPATIENT
Start: 2019-09-27 | End: 2019-09-27 | Stop reason: SURG

## 2019-09-27 RX ORDER — SODIUM CHLORIDE 9 MG/ML
125 INJECTION, SOLUTION INTRAVENOUS CONTINUOUS
Status: DISCONTINUED | OUTPATIENT
Start: 2019-09-27 | End: 2019-10-01 | Stop reason: HOSPADM

## 2019-09-27 RX ORDER — SODIUM CHLORIDE 9 MG/ML
INJECTION, SOLUTION INTRAVENOUS CONTINUOUS PRN
Status: DISCONTINUED | OUTPATIENT
Start: 2019-09-27 | End: 2019-09-27 | Stop reason: SURG

## 2019-09-27 RX ORDER — SODIUM CHLORIDE 9 MG/ML
50 INJECTION, SOLUTION INTRAVENOUS CONTINUOUS
Status: DISCONTINUED | OUTPATIENT
Start: 2019-09-27 | End: 2019-10-01 | Stop reason: HOSPADM

## 2019-09-27 RX ORDER — LIDOCAINE HYDROCHLORIDE 10 MG/ML
INJECTION, SOLUTION INFILTRATION; PERINEURAL AS NEEDED
Status: DISCONTINUED | OUTPATIENT
Start: 2019-09-27 | End: 2019-09-27 | Stop reason: SURG

## 2019-09-27 RX ADMIN — PROPOFOL 100 MG: 10 INJECTION, EMULSION INTRAVENOUS at 09:36

## 2019-09-27 RX ADMIN — PROPOFOL 20 MG: 10 INJECTION, EMULSION INTRAVENOUS at 09:48

## 2019-09-27 RX ADMIN — SODIUM CHLORIDE 125 ML/HR: 0.9 INJECTION, SOLUTION INTRAVENOUS at 07:38

## 2019-09-27 RX ADMIN — PROPOFOL 100 MG: 10 INJECTION, EMULSION INTRAVENOUS at 09:52

## 2019-09-27 RX ADMIN — PROPOFOL 50 MG: 10 INJECTION, EMULSION INTRAVENOUS at 09:44

## 2019-09-27 RX ADMIN — PROPOFOL 30 MG: 10 INJECTION, EMULSION INTRAVENOUS at 09:40

## 2019-09-27 RX ADMIN — LIDOCAINE HYDROCHLORIDE 50 MG: 10 INJECTION, SOLUTION INFILTRATION; PERINEURAL at 09:36

## 2019-09-27 RX ADMIN — SODIUM CHLORIDE: 0.9 INJECTION, SOLUTION INTRAVENOUS at 07:37

## 2019-09-27 NOTE — ANESTHESIA POSTPROCEDURE EVALUATION
Post-Op Assessment Note    CV Status:  Stable  Pain Score: 0    Pain management: adequate     Mental Status:  Alert and awake   Hydration Status:  Euvolemic   PONV Controlled:  Controlled   Airway Patency:  Patent   Post Op Vitals Reviewed: Yes      Staff: CRNA           BP   118/75   Temp     Pulse  63   Resp   16   SpO2   96

## 2019-09-27 NOTE — DISCHARGE INSTRUCTIONS
[facility] [address], [address] [place] [address] [address]  Phone: [phone]    DISCHARGE INSTRUCTIONS:    1   ___ Complete Exam - Normal    2   ___ Exam normal, but entire colon not seen  We will discuss this with you  3   ___ Polyp(s) removed by "burning" - NO pathology report will follow    4   __2_ Polyp(s) removed by excision  Pathology report will be available in 4-5 days   Someone from our office will call you with results  5   ___ Exam prompted biopsies  Pathology report will be available in 4-5 days   Someone from our office will call you with results  6   ___ Exam demonstrated findings that need treatment  Prescriptions will be   Given to you  Return to our office in ____ weeks  Please call for appt  7   ___ Original office visit or colonoscopy findings necessitate an office visit  Please call to set up a new appointment    8   ___ Medication  __________________________________________        [address]:    - Go straight home and rest today    - No driving or drinking alcohol for 24 hours    - Resume regular diet and medications unless otherwise instructed  Coumadin and Plavix are blood thinners  You can resume these medications on __________      IF YOU ARE HAVING ANY FEVER, BLEEDING OR PERSISTENT PAIN IN THE ABDOMEN, PLEASE CALL OUR OFFICE ANY DAY OR TIME  [phone]

## 2019-09-27 NOTE — NURSING NOTE
Call placed to Dr Nadia Mcwilliams in GI lab  Pt asking when he should restart Eliquis  Pt instructed to restart Eliquis on Monday, 9/30/19

## 2019-09-27 NOTE — INTERVAL H&P NOTE
H&P reviewed  After examining the patient I find no changes in the patients condition since the H&P had been written      Vitals:    09/27/19 0718   BP: 156/76   Pulse: 63   Resp: 20   Temp: (!) 96 8 °F (36 °C)   SpO2: 97%

## 2019-10-01 LAB
ALBUMIN SERPL-MCNC: 4.1 G/DL (ref 3.6–5.1)
ALBUMIN/GLOB SERPL: 2 (CALC) (ref 1–2.5)
ALP SERPL-CCNC: 59 U/L (ref 40–115)
ALT SERPL-CCNC: 15 U/L (ref 9–46)
AST SERPL-CCNC: 16 U/L (ref 10–35)
BILIRUB SERPL-MCNC: 1.4 MG/DL (ref 0.2–1.2)
BUN SERPL-MCNC: 13 MG/DL (ref 7–25)
BUN/CREAT SERPL: ABNORMAL (CALC) (ref 6–22)
CALCIUM SERPL-MCNC: 9 MG/DL (ref 8.6–10.3)
CHLORIDE SERPL-SCNC: 102 MMOL/L (ref 98–110)
CHOLEST SERPL-MCNC: 177 MG/DL
CHOLEST/HDLC SERPL: 3.1 (CALC)
CO2 SERPL-SCNC: 27 MMOL/L (ref 20–32)
CREAT SERPL-MCNC: 1.11 MG/DL (ref 0.7–1.25)
GLOBULIN SER CALC-MCNC: 2.1 G/DL (CALC) (ref 1.9–3.7)
GLUCOSE SERPL-MCNC: 100 MG/DL (ref 65–99)
HBA1C MFR BLD: 5.6 % OF TOTAL HGB
HDLC SERPL-MCNC: 58 MG/DL
LDLC SERPL CALC-MCNC: 98 MG/DL (CALC)
NONHDLC SERPL-MCNC: 119 MG/DL (CALC)
POTASSIUM SERPL-SCNC: 3.7 MMOL/L (ref 3.5–5.3)
PROT SERPL-MCNC: 6.2 G/DL (ref 6.1–8.1)
SL AMB EGFR AFRICAN AMERICAN: 81 ML/MIN/1.73M2
SL AMB EGFR NON AFRICAN AMERICAN: 70 ML/MIN/1.73M2
SODIUM SERPL-SCNC: 137 MMOL/L (ref 135–146)
TRIGL SERPL-MCNC: 112 MG/DL

## 2019-10-12 DIAGNOSIS — I10 BENIGN ESSENTIAL HYPERTENSION: ICD-10-CM

## 2019-10-13 RX ORDER — NEBIVOLOL 10 MG/1
10 TABLET ORAL DAILY
Qty: 30 TABLET | Refills: 5 | Status: SHIPPED | OUTPATIENT
Start: 2019-10-13 | End: 2020-08-31 | Stop reason: SDUPTHER

## 2019-10-24 DIAGNOSIS — I26.99 RIGHT PULMONARY EMBOLUS (HCC): ICD-10-CM

## 2019-11-04 DIAGNOSIS — I10 BENIGN ESSENTIAL HYPERTENSION: ICD-10-CM

## 2019-11-04 RX ORDER — HYDROCHLOROTHIAZIDE 25 MG/1
25 TABLET ORAL DAILY
Qty: 90 TABLET | Refills: 1 | Status: SHIPPED | OUTPATIENT
Start: 2019-11-04 | End: 2020-05-07 | Stop reason: SDUPTHER

## 2019-11-18 ENCOUNTER — OFFICE VISIT (OUTPATIENT)
Dept: FAMILY MEDICINE CLINIC | Facility: CLINIC | Age: 64
End: 2019-11-18
Payer: COMMERCIAL

## 2019-11-18 VITALS
OXYGEN SATURATION: 96 % | HEIGHT: 67 IN | TEMPERATURE: 98.1 F | HEART RATE: 73 BPM | RESPIRATION RATE: 18 BRPM | WEIGHT: 190 LBS | BODY MASS INDEX: 29.82 KG/M2 | DIASTOLIC BLOOD PRESSURE: 82 MMHG | SYSTOLIC BLOOD PRESSURE: 126 MMHG

## 2019-11-18 DIAGNOSIS — K21.9 GASTROESOPHAGEAL REFLUX DISEASE WITHOUT ESOPHAGITIS: Primary | ICD-10-CM

## 2019-11-18 DIAGNOSIS — F41.1 GENERALIZED ANXIETY DISORDER: ICD-10-CM

## 2019-11-18 PROCEDURE — 99214 OFFICE O/P EST MOD 30 MIN: CPT | Performed by: FAMILY MEDICINE

## 2019-11-18 PROCEDURE — 1036F TOBACCO NON-USER: CPT | Performed by: FAMILY MEDICINE

## 2019-11-18 RX ORDER — ALPRAZOLAM 0.5 MG/1
0.5 TABLET ORAL DAILY PRN
Qty: 30 TABLET | Refills: 0 | Status: SHIPPED | OUTPATIENT
Start: 2019-11-18 | End: 2020-12-01 | Stop reason: SDUPTHER

## 2019-11-18 RX ORDER — OMEPRAZOLE 20 MG/1
20 CAPSULE, DELAYED RELEASE ORAL
Qty: 30 CAPSULE | Refills: 0 | Status: SHIPPED | OUTPATIENT
Start: 2019-11-18 | End: 2020-05-13

## 2019-11-18 NOTE — PROGRESS NOTES
Assessment/Plan:   1  Gastroesophageal reflux disease without esophagitis  Reviewed patient's symptoms today  Symptoms appear likely secondary to possible gastric irritation/gastritis  He was advised on importance of avoiding specific dietary triggers  Will start treatment empirically with omeprazole 20 mg daily  He may continue with this for 3-4 weeks  He may discontinue at that time  If any symptoms are worsening, he was advised to call or follow up  - omeprazole (PriLOSEC) 20 mg delayed release capsule; Take 1 capsule (20 mg total) by mouth daily before breakfast  Dispense: 30 capsule; Refill: 0    2  Generalized anxiety disorder  Patient has been noticing increasing anxiety  At this time, patient periodically develops panic symptoms  He was given a prescription for alprazolam today only use p r n  For symptom relief  If he notes that he is using this prescription daily, he must follow up to discuss alternate treatment options   - ALPRAZolam (XANAX) 0 5 mg tablet; Take 1 tablet (0 5 mg total) by mouth daily as needed for anxiety  Dispense: 30 tablet; Refill: 0           There are no diagnoses linked to this encounter  Subjective:       Chief Complaint   Patient presents with    Heartburn     thinks this may be a side effect from Bystolic      Patient ID: Hubert Acevedo is a 61 y o  male  Heartburn   He complains of abdominal pain and heartburn  He reports no chest pain, no coughing, no nausea or no sore throat  This is a recurrent problem  Episode onset: Friday after high alcohol consumption at concert  The problem occurs constantly  The problem has been unchanged  The heartburn is located in the substernum  The heartburn is of mild intensity  The heartburn wakes him from sleep  The heartburn does not limit his activity  The heartburn doesn't change with position  Nothing aggravates the symptoms  Pertinent negatives include no fatigue or melena  Risk factors include ETOH use   He has tried an antacid for the symptoms  The treatment provided mild relief  Review of Systems   Constitutional: Negative for activity change, chills, fatigue and fever  HENT: Negative for congestion, ear pain, sinus pressure and sore throat  Eyes: Negative for redness, itching and visual disturbance  Respiratory: Negative for cough and shortness of breath  Cardiovascular: Negative for chest pain and palpitations  Gastrointestinal: Positive for abdominal pain and heartburn  Negative for diarrhea, melena and nausea  Endocrine: Negative for cold intolerance and heat intolerance  Genitourinary: Negative for dysuria, flank pain and frequency  Musculoskeletal: Negative for arthralgias, back pain, gait problem and myalgias  Skin: Negative for color change  Allergic/Immunologic: Negative for environmental allergies  Neurological: Negative for dizziness, numbness and headaches  Psychiatric/Behavioral: Negative for behavioral problems and sleep disturbance  The following portions of the patient's history were reviewed and updated as appropriate : past family history, past medical history, past social history and past surgical history      Current Outpatient Medications:     apixaban (ELIQUIS) 5 mg, Take 1 tablet (5 mg total) by mouth 2 (two) times a day, Disp: 60 tablet, Rfl: 1    Azelastine HCl 0 15 % SOLN, 2 sprays into each nostril daily, Disp: 30 mL, Rfl: 2    cholecalciferol (VITAMIN D3) 1,000 units tablet, Take 1,000 Units by mouth daily, Disp: , Rfl:     fluticasone (FLONASE) 50 mcg/act nasal spray, 2 sprays into each nostril daily, Disp: 16 g, Rfl: 3    hydrochlorothiazide (HYDRODIURIL) 25 mg tablet, Take 1 tablet (25 mg total) by mouth daily, Disp: 90 tablet, Rfl: 1    nebivolol (BYSTOLIC) 10 mg tablet, Take 1 tablet (10 mg total) by mouth daily, Disp: 30 tablet, Rfl: 5    Omega-3 Fatty Acids (FISH OIL OMEGA-3) 1000 MG CAPS, Take 2 g by mouth daily, Disp: , Rfl:     tadalafil (CIALIS) 20 MG tablet, Take 1 tab 30 min prior to desired activity PRN, Disp: 10 tablet, Rfl: 0         Objective:       Vitals:    11/18/19 1127   BP: 126/82   BP Location: Left arm   Patient Position: Sitting   Cuff Size: Large   Pulse: 73   Resp: 18   Temp: 98 1 °F (36 7 °C)   TempSrc: Tympanic   SpO2: 96%   Weight: 86 2 kg (190 lb)   Height: 5' 7" (1 702 m)     Physical Exam   Constitutional: He is oriented to person, place, and time  He appears well-developed and well-nourished  HENT:   Head: Normocephalic and atraumatic  Nose: Nose normal    Mouth/Throat: No oropharyngeal exudate  Eyes: Pupils are equal, round, and reactive to light  Right eye exhibits no discharge  Left eye exhibits no discharge  Neck: Normal range of motion  Neck supple  No tracheal deviation present  Cardiovascular: Normal rate, regular rhythm and intact distal pulses  Exam reveals no gallop and no friction rub  No murmur heard  Pulses:       Dorsalis pedis pulses are 2+ on the right side, and 2+ on the left side  Posterior tibial pulses are 2+ on the right side, and 2+ on the left side  Pulmonary/Chest: Effort normal and breath sounds normal  No respiratory distress  He has no wheezes  He has no rales  Abdominal: Soft  Bowel sounds are normal  He exhibits no distension  There is no tenderness  There is no rebound and no guarding  Musculoskeletal: Normal range of motion  He exhibits no edema  Lymphadenopathy:        Head (right side): No submental and no submandibular adenopathy present  Head (left side): No submental and no submandibular adenopathy present  He has no cervical adenopathy  Right cervical: No superficial cervical, no deep cervical and no posterior cervical adenopathy present  Left cervical: No superficial cervical, no deep cervical and no posterior cervical adenopathy present  Neurological: He is alert and oriented to person, place, and time   No cranial nerve deficit or sensory deficit  Skin: Skin is warm, dry and intact  Psychiatric: His speech is normal and behavior is normal  Judgment normal  His mood appears not anxious  Cognition and memory are normal  He does not exhibit a depressed mood  Vitals reviewed

## 2019-12-16 ENCOUNTER — OFFICE VISIT (OUTPATIENT)
Dept: FAMILY MEDICINE CLINIC | Facility: CLINIC | Age: 64
End: 2019-12-16
Payer: COMMERCIAL

## 2019-12-16 VITALS
HEIGHT: 67 IN | TEMPERATURE: 98.1 F | HEART RATE: 75 BPM | SYSTOLIC BLOOD PRESSURE: 126 MMHG | BODY MASS INDEX: 29.6 KG/M2 | WEIGHT: 188.6 LBS | DIASTOLIC BLOOD PRESSURE: 86 MMHG | RESPIRATION RATE: 17 BRPM | OXYGEN SATURATION: 97 %

## 2019-12-16 DIAGNOSIS — F41.1 GENERALIZED ANXIETY DISORDER: ICD-10-CM

## 2019-12-16 DIAGNOSIS — K21.9 GASTROESOPHAGEAL REFLUX DISEASE WITHOUT ESOPHAGITIS: ICD-10-CM

## 2019-12-16 DIAGNOSIS — R07.89 ATYPICAL CHEST PAIN: Primary | ICD-10-CM

## 2019-12-16 DIAGNOSIS — Z23 NEED FOR PROPHYLACTIC VACCINATION AND INOCULATION AGAINST INFLUENZA: ICD-10-CM

## 2019-12-16 PROCEDURE — 3008F BODY MASS INDEX DOCD: CPT | Performed by: FAMILY MEDICINE

## 2019-12-16 PROCEDURE — 90682 RIV4 VACC RECOMBINANT DNA IM: CPT | Performed by: FAMILY MEDICINE

## 2019-12-16 PROCEDURE — 99214 OFFICE O/P EST MOD 30 MIN: CPT | Performed by: FAMILY MEDICINE

## 2019-12-16 PROCEDURE — 1036F TOBACCO NON-USER: CPT | Performed by: FAMILY MEDICINE

## 2019-12-16 PROCEDURE — 93000 ELECTROCARDIOGRAM COMPLETE: CPT | Performed by: FAMILY MEDICINE

## 2019-12-16 PROCEDURE — 90471 IMMUNIZATION ADMIN: CPT | Performed by: FAMILY MEDICINE

## 2019-12-16 RX ORDER — CITALOPRAM 10 MG/1
10 TABLET ORAL DAILY
Qty: 30 TABLET | Refills: 2 | Status: SHIPPED | OUTPATIENT
Start: 2019-12-16 | End: 2020-05-13

## 2019-12-16 RX ORDER — SILDENAFIL 100 MG/1
TABLET, FILM COATED ORAL
Refills: 11 | COMMUNITY
Start: 2019-11-19

## 2019-12-16 NOTE — PROGRESS NOTES
Assessment/Plan:   1  Atypical chest pain  Reviewed patient's symptoms today  At this time, symptoms may likely be secondary to his GERD as well as his anxiety  He has been under significant stress recently  Reviewed the differential with him  At this time, his EKG appeared to show normal sinus rhythm, no ST or T-wave changes  Will continue with routine monitoring  Will address his other conditions to aid in his chest pain relief  - POCT ECG    2  Gastroesophageal reflux disease without esophagitis  Symptoms appear persistent secondary to stopping his PPI  Will continue with dietary trigger avoidance as well as current treatment with his omeprazole    3  Generalized anxiety disorder  Patient's anxiety has been mildly elevated  At this time, he was advised on importance exercising regularly  Reviewed different treatment options with him  At this time, will start treatment with citalopram 10 mg daily  Follow-up if any symptoms persist   - citalopram (CeleXA) 10 mg tablet; Take 1 tablet (10 mg total) by mouth daily  Dispense: 30 tablet; Refill: 2           There are no diagnoses linked to this encounter  Subjective:       Chief Complaint   Patient presents with    Follow-up     gastro reflux  Pt states at the 3 week period of taking new medication he is experiencing some more reflux      Patient ID: Mabel Soto is a 59 y o  male  Patient is a 71-year-old male presents today for a follow-up on his GERD  Since last visit  He completed his omeprazole  He states that after completed this treatment, he notices reflex returning  He has been having mild epigastric discomfort as well as discomfort in his chest   He describes as a heaviness  He would like to have an EKG today to assess his cardiac function  Review of Systems   Constitutional: Negative for activity change, chills, fatigue and fever  HENT: Negative for congestion, ear pain, sinus pressure and sore throat      Eyes: Negative for redness, itching and visual disturbance  Respiratory: Negative for cough and shortness of breath  Cardiovascular: Negative for chest pain and palpitations  Gastrointestinal: Negative for abdominal pain, diarrhea and nausea  Endocrine: Negative for cold intolerance and heat intolerance  Genitourinary: Negative for dysuria, flank pain and frequency  Musculoskeletal: Negative for arthralgias, back pain, gait problem and myalgias  Skin: Negative for color change  Allergic/Immunologic: Negative for environmental allergies  Neurological: Negative for dizziness, numbness and headaches  Psychiatric/Behavioral: Negative for behavioral problems and sleep disturbance  The following portions of the patient's history were reviewed and updated as appropriate : past family history, past medical history, past social history and past surgical history      Current Outpatient Medications:     ALPRAZolam (XANAX) 0 5 mg tablet, Take 1 tablet (0 5 mg total) by mouth daily as needed for anxiety, Disp: 30 tablet, Rfl: 0    apixaban (ELIQUIS) 5 mg, Take 1 tablet (5 mg total) by mouth 2 (two) times a day, Disp: 60 tablet, Rfl: 1    Azelastine HCl 0 15 % SOLN, 2 sprays into each nostril daily, Disp: 30 mL, Rfl: 2    cholecalciferol (VITAMIN D3) 1,000 units tablet, Take 1,000 Units by mouth daily, Disp: , Rfl:     co-enzyme Q-10 30 MG capsule, Take 30 mg by mouth 3 (three) times a day, Disp: , Rfl:     fluticasone (FLONASE) 50 mcg/act nasal spray, 2 sprays into each nostril daily, Disp: 16 g, Rfl: 3    hydrochlorothiazide (HYDRODIURIL) 25 mg tablet, Take 1 tablet (25 mg total) by mouth daily, Disp: 90 tablet, Rfl: 1    mupirocin (BACTROBAN) 2 % ointment, , Disp: , Rfl:     nebivolol (BYSTOLIC) 10 mg tablet, Take 1 tablet (10 mg total) by mouth daily, Disp: 30 tablet, Rfl: 5    Omega-3 Fatty Acids (FISH OIL OMEGA-3) 1000 MG CAPS, Take 2 g by mouth daily, Disp: , Rfl:     omeprazole (PriLOSEC) 20 mg delayed release capsule, Take 1 capsule (20 mg total) by mouth daily before breakfast, Disp: 30 capsule, Rfl: 0    sildenafil (VIAGRA) 100 mg tablet, , Disp: , Rfl: 11    tadalafil (CIALIS) 20 MG tablet, Take 1 tab 30 min prior to desired activity PRN, Disp: 10 tablet, Rfl: 0         Objective:         Vitals:    12/16/19 1051   BP: 126/86   BP Location: Left arm   Patient Position: Sitting   Cuff Size: Adult   Pulse: 75   Resp: 17   Temp: 98 1 °F (36 7 °C)   TempSrc: Tympanic   SpO2: 97%   Weight: 85 5 kg (188 lb 9 6 oz)   Height: 5' 7" (1 702 m)     Physical Exam   Constitutional: He is oriented to person, place, and time  He appears well-developed and well-nourished  HENT:   Head: Normocephalic and atraumatic  Nose: Nose normal    Mouth/Throat: No oropharyngeal exudate  Eyes: Pupils are equal, round, and reactive to light  Right eye exhibits no discharge  Left eye exhibits no discharge  Neck: Normal range of motion  Neck supple  No tracheal deviation present  Cardiovascular: Normal rate, regular rhythm and intact distal pulses  Exam reveals no gallop and no friction rub  No murmur heard  Pulses:       Dorsalis pedis pulses are 2+ on the right side, and 2+ on the left side  Posterior tibial pulses are 2+ on the right side, and 2+ on the left side  Pulmonary/Chest: Effort normal and breath sounds normal  No respiratory distress  He has no wheezes  He has no rales  Abdominal: Soft  Bowel sounds are normal  He exhibits no distension  There is no tenderness  There is no rebound and no guarding  Musculoskeletal: Normal range of motion  He exhibits no edema  Lymphadenopathy:        Head (right side): No submental and no submandibular adenopathy present  Head (left side): No submental and no submandibular adenopathy present  He has no cervical adenopathy  Right cervical: No superficial cervical, no deep cervical and no posterior cervical adenopathy present         Left cervical: No superficial cervical, no deep cervical and no posterior cervical adenopathy present  Neurological: He is alert and oriented to person, place, and time  No cranial nerve deficit or sensory deficit  Skin: Skin is warm, dry and intact  Psychiatric: His speech is normal and behavior is normal  Judgment normal  His mood appears not anxious  Cognition and memory are normal  He does not exhibit a depressed mood  Vitals reviewed

## 2019-12-24 DIAGNOSIS — I26.99 RIGHT PULMONARY EMBOLUS (HCC): ICD-10-CM

## 2019-12-30 ENCOUNTER — OFFICE VISIT (OUTPATIENT)
Dept: FAMILY MEDICINE CLINIC | Facility: CLINIC | Age: 64
End: 2019-12-30
Payer: COMMERCIAL

## 2019-12-30 VITALS
OXYGEN SATURATION: 99 % | HEIGHT: 68 IN | WEIGHT: 189.6 LBS | BODY MASS INDEX: 28.73 KG/M2 | HEART RATE: 70 BPM | TEMPERATURE: 96.4 F | DIASTOLIC BLOOD PRESSURE: 80 MMHG | SYSTOLIC BLOOD PRESSURE: 122 MMHG

## 2019-12-30 DIAGNOSIS — R10.13 EPIGASTRIC ABDOMINAL PAIN: Primary | ICD-10-CM

## 2019-12-30 DIAGNOSIS — K21.9 GASTROESOPHAGEAL REFLUX DISEASE WITHOUT ESOPHAGITIS: ICD-10-CM

## 2019-12-30 PROCEDURE — 1036F TOBACCO NON-USER: CPT | Performed by: FAMILY MEDICINE

## 2019-12-30 PROCEDURE — 99214 OFFICE O/P EST MOD 30 MIN: CPT | Performed by: FAMILY MEDICINE

## 2019-12-30 PROCEDURE — 3008F BODY MASS INDEX DOCD: CPT | Performed by: FAMILY MEDICINE

## 2019-12-30 NOTE — PROGRESS NOTES
Assessment/Plan:    1  Epigastric abdominal pain  Reviewed patient's symptoms today  At this time, is unclear as to the exact cause of his epigastric abdominal discomfort  Reviewed differential with him  He has recently stopped his PPI medication which may likely be the cause of his symptoms  He is concerned for liver as well as pancreatic abnormalities given his previous history with alcohol abuse  He will be checking his blood work next month  Will add a lipase level to this blood work  Will also check right upper quadrant ultrasound  He was advised on importance of dietary trigger avoidance  Follow up with patient if any symptoms are worsening   - US right upper quadrant; Future  - Lipase; Future  - Lipase    2  Gastroesophageal reflux disease without esophagitis  Is unclear if patient's symptoms are secondary to his GERD  Will restart trial of his PPI  Follow up if any symptoms persist            There are no diagnoses linked to this encounter  Subjective:       Chief Complaint   Patient presents with    Abdominal Pain     Stomach pain x11 days  Review labs for new year with concentration on liver, pancreas, kidneys      Patient ID: Yohana Nevarez is a 59 y o  male  Abdominal Pain   This is a new problem  Episode onset: 10 days ago  The onset quality is gradual  The problem occurs intermittently  The problem has been unchanged  The pain is located in the epigastric region  The pain is at a severity of 3/10  The pain is mild  The quality of the pain is burning  The abdominal pain radiates to the RUQ and right flank  Associated symptoms include diarrhea  Pertinent negatives include no arthralgias, belching, constipation, dysuria, fever, frequency, headaches, hematochezia, melena, myalgias, nausea or vomiting  The pain is relieved by palpation  He has tried nothing for the symptoms  The treatment provided no relief         Review of Systems   Constitutional: Negative for activity change, chills, fatigue and fever  HENT: Negative for congestion, ear pain, sinus pressure and sore throat  Eyes: Negative for redness, itching and visual disturbance  Respiratory: Negative for cough and shortness of breath  Cardiovascular: Negative for chest pain and palpitations  Gastrointestinal: Positive for abdominal pain and diarrhea  Negative for constipation, hematochezia, melena, nausea and vomiting  Endocrine: Negative for cold intolerance and heat intolerance  Genitourinary: Negative for dysuria, flank pain and frequency  Musculoskeletal: Negative for arthralgias, back pain, gait problem and myalgias  Skin: Negative for color change  Allergic/Immunologic: Negative for environmental allergies  Neurological: Negative for dizziness, numbness and headaches  Psychiatric/Behavioral: Negative for behavioral problems and sleep disturbance  The following portions of the patient's history were reviewed and updated as appropriate : past family history, past medical history, past social history and past surgical history      Current Outpatient Medications:     ALPRAZolam (XANAX) 0 5 mg tablet, Take 1 tablet (0 5 mg total) by mouth daily as needed for anxiety, Disp: 30 tablet, Rfl: 0    apixaban (ELIQUIS) 5 mg, Take 1 tablet (5 mg total) by mouth 2 (two) times a day, Disp: 60 tablet, Rfl: 1    Azelastine HCl 0 15 % SOLN, 2 sprays into each nostril daily, Disp: 30 mL, Rfl: 2    cholecalciferol (VITAMIN D3) 1,000 units tablet, Take 1,000 Units by mouth daily, Disp: , Rfl:     citalopram (CeleXA) 10 mg tablet, Take 1 tablet (10 mg total) by mouth daily, Disp: 30 tablet, Rfl: 2    co-enzyme Q-10 30 MG capsule, Take 30 mg by mouth 3 (three) times a day, Disp: , Rfl:     fluticasone (FLONASE) 50 mcg/act nasal spray, 2 sprays into each nostril daily, Disp: 16 g, Rfl: 3    hydrochlorothiazide (HYDRODIURIL) 25 mg tablet, Take 1 tablet (25 mg total) by mouth daily, Disp: 90 tablet, Rfl: 1    nebivolol (BYSTOLIC) 10 mg tablet, Take 1 tablet (10 mg total) by mouth daily, Disp: 30 tablet, Rfl: 5    omeprazole (PriLOSEC) 20 mg delayed release capsule, Take 1 capsule (20 mg total) by mouth daily before breakfast, Disp: 30 capsule, Rfl: 0    sildenafil (VIAGRA) 100 mg tablet, , Disp: , Rfl: 11    mupirocin (BACTROBAN) 2 % ointment, , Disp: , Rfl:     Omega-3 Fatty Acids (FISH OIL OMEGA-3) 1000 MG CAPS, Take 2 g by mouth daily, Disp: , Rfl:     tadalafil (CIALIS) 20 MG tablet, Take 1 tab 30 min prior to desired activity PRN (Patient not taking: Reported on 12/30/2019), Disp: 10 tablet, Rfl: 0         Objective:         Vitals:    12/30/19 1035   BP: 122/80   BP Location: Left arm   Patient Position: Sitting   Cuff Size: Adult   Pulse: 70   Temp: (!) 96 4 °F (35 8 °C)   TempSrc: Tympanic   SpO2: 99%   Weight: 86 kg (189 lb 9 6 oz)   Height: 5' 8 15" (1 731 m)     Physical Exam   Constitutional: He is oriented to person, place, and time  He appears well-developed and well-nourished  HENT:   Head: Normocephalic and atraumatic  Nose: Nose normal    Mouth/Throat: No oropharyngeal exudate  Eyes: Pupils are equal, round, and reactive to light  Right eye exhibits no discharge  Left eye exhibits no discharge  Neck: Normal range of motion  Neck supple  No tracheal deviation present  Cardiovascular: Normal rate, regular rhythm and intact distal pulses  Exam reveals no gallop and no friction rub  No murmur heard  Pulses:       Dorsalis pedis pulses are 2+ on the right side, and 2+ on the left side  Posterior tibial pulses are 2+ on the right side, and 2+ on the left side  Pulmonary/Chest: Effort normal and breath sounds normal  No respiratory distress  He has no wheezes  He has no rales  Abdominal: Soft  Bowel sounds are normal  He exhibits no distension  There is no tenderness  There is no rebound and no guarding  Musculoskeletal: Normal range of motion  He exhibits no edema     Lymphadenopathy: Head (right side): No submental and no submandibular adenopathy present  Head (left side): No submental and no submandibular adenopathy present  He has no cervical adenopathy  Right cervical: No superficial cervical, no deep cervical and no posterior cervical adenopathy present  Left cervical: No superficial cervical, no deep cervical and no posterior cervical adenopathy present  Neurological: He is alert and oriented to person, place, and time  No cranial nerve deficit or sensory deficit  Skin: Skin is warm, dry and intact  Psychiatric: His speech is normal and behavior is normal  Judgment normal  His mood appears not anxious  Cognition and memory are normal  He does not exhibit a depressed mood  Vitals reviewed

## 2020-01-07 ENCOUNTER — HOSPITAL ENCOUNTER (OUTPATIENT)
Dept: ULTRASOUND IMAGING | Facility: MEDICAL CENTER | Age: 65
Discharge: HOME | End: 2020-01-07
Payer: COMMERCIAL

## 2020-01-07 DIAGNOSIS — J31.0 CHRONIC RHINITIS: ICD-10-CM

## 2020-01-07 DIAGNOSIS — R10.13 EPIGASTRIC ABDOMINAL PAIN: ICD-10-CM

## 2020-01-07 LAB
ALBUMIN SERPL-MCNC: 4 G/DL (ref 3.6–5.1)
ALBUMIN/GLOB SERPL: 2 (CALC) (ref 1–2.5)
ALP SERPL-CCNC: 58 U/L (ref 40–115)
ALT SERPL-CCNC: 13 U/L (ref 9–46)
AST SERPL-CCNC: 16 U/L (ref 10–35)
BILIRUB SERPL-MCNC: 1.1 MG/DL (ref 0.2–1.2)
BUN SERPL-MCNC: 13 MG/DL (ref 7–25)
BUN/CREAT SERPL: ABNORMAL (CALC) (ref 6–22)
CALCIUM SERPL-MCNC: 9.3 MG/DL (ref 8.6–10.3)
CHLORIDE SERPL-SCNC: 106 MMOL/L (ref 98–110)
CHOLEST SERPL-MCNC: 173 MG/DL
CHOLEST/HDLC SERPL: 3.5 (CALC)
CO2 SERPL-SCNC: 31 MMOL/L (ref 20–32)
CREAT SERPL-MCNC: 1.09 MG/DL (ref 0.7–1.25)
ERYTHROCYTE [DISTWIDTH] IN BLOOD BY AUTOMATED COUNT: 12.2 % (ref 11–15)
GLOBULIN SER CALC-MCNC: 2 G/DL (CALC) (ref 1.9–3.7)
GLUCOSE SERPL-MCNC: 110 MG/DL (ref 65–99)
HCT VFR BLD AUTO: 44.4 % (ref 38.5–50)
HDLC SERPL-MCNC: 50 MG/DL
HGB BLD-MCNC: 14.8 G/DL (ref 13.2–17.1)
LDLC SERPL CALC-MCNC: 109 MG/DL (CALC)
LIPASE SERPL-CCNC: 16 U/L (ref 7–60)
MCH RBC QN AUTO: 29.5 PG (ref 27–33)
MCHC RBC AUTO-ENTMCNC: 33.3 G/DL (ref 32–36)
MCV RBC AUTO: 88.4 FL (ref 80–100)
NONHDLC SERPL-MCNC: 123 MG/DL (CALC)
PLATELET # BLD AUTO: 258 THOUSAND/UL (ref 140–400)
PMV BLD REES-ECKER: 11 FL (ref 7.5–12.5)
POTASSIUM SERPL-SCNC: 4 MMOL/L (ref 3.5–5.3)
PROT SERPL-MCNC: 6 G/DL (ref 6.1–8.1)
RBC # BLD AUTO: 5.02 MILLION/UL (ref 4.2–5.8)
SL AMB EGFR AFRICAN AMERICAN: 83 ML/MIN/1.73M2
SL AMB EGFR NON AFRICAN AMERICAN: 71 ML/MIN/1.73M2
SODIUM SERPL-SCNC: 144 MMOL/L (ref 135–146)
TRIGL SERPL-MCNC: 59 MG/DL
TSH SERPL-ACNC: 3.24 MIU/L (ref 0.4–4.5)
WBC # BLD AUTO: 8.2 THOUSAND/UL (ref 3.8–10.8)

## 2020-01-07 PROCEDURE — 76705 ECHO EXAM OF ABDOMEN: CPT

## 2020-01-08 RX ORDER — FLUTICASONE PROPIONATE 50 MCG
2 SPRAY, SUSPENSION (ML) NASAL DAILY
Qty: 16 G | Refills: 3 | Status: SHIPPED | OUTPATIENT
Start: 2020-01-08 | End: 2021-02-04 | Stop reason: SDUPTHER

## 2020-02-28 DIAGNOSIS — I26.99 RIGHT PULMONARY EMBOLUS (HCC): ICD-10-CM

## 2020-04-18 ENCOUNTER — PATIENT MESSAGE (OUTPATIENT)
Dept: FAMILY MEDICINE CLINIC | Facility: CLINIC | Age: 65
End: 2020-04-18

## 2020-04-30 DIAGNOSIS — I26.99 RIGHT PULMONARY EMBOLUS (HCC): ICD-10-CM

## 2020-05-06 DIAGNOSIS — I10 BENIGN ESSENTIAL HYPERTENSION: ICD-10-CM

## 2020-05-08 RX ORDER — HYDROCHLOROTHIAZIDE 25 MG/1
25 TABLET ORAL DAILY
Qty: 90 TABLET | Refills: 1 | Status: SHIPPED | OUTPATIENT
Start: 2020-05-08 | End: 2020-10-30 | Stop reason: SDUPTHER

## 2020-05-13 ENCOUNTER — OFFICE VISIT (OUTPATIENT)
Dept: FAMILY MEDICINE CLINIC | Facility: CLINIC | Age: 65
End: 2020-05-13
Payer: COMMERCIAL

## 2020-05-13 VITALS
BODY MASS INDEX: 29.95 KG/M2 | HEART RATE: 62 BPM | OXYGEN SATURATION: 96 % | TEMPERATURE: 98.1 F | DIASTOLIC BLOOD PRESSURE: 82 MMHG | SYSTOLIC BLOOD PRESSURE: 128 MMHG | HEIGHT: 68 IN | WEIGHT: 197.6 LBS

## 2020-05-13 DIAGNOSIS — F41.1 GENERALIZED ANXIETY DISORDER: ICD-10-CM

## 2020-05-13 DIAGNOSIS — J31.0 CHRONIC RHINITIS: ICD-10-CM

## 2020-05-13 DIAGNOSIS — I26.99 RIGHT PULMONARY EMBOLUS (HCC): ICD-10-CM

## 2020-05-13 DIAGNOSIS — K21.9 GASTROESOPHAGEAL REFLUX DISEASE WITHOUT ESOPHAGITIS: ICD-10-CM

## 2020-05-13 DIAGNOSIS — R73.03 PREDIABETES: ICD-10-CM

## 2020-05-13 DIAGNOSIS — I10 BENIGN ESSENTIAL HYPERTENSION: Primary | ICD-10-CM

## 2020-05-13 DIAGNOSIS — E78.2 MIXED HYPERLIPIDEMIA: ICD-10-CM

## 2020-05-13 PROCEDURE — 1036F TOBACCO NON-USER: CPT | Performed by: FAMILY MEDICINE

## 2020-05-13 PROCEDURE — 99214 OFFICE O/P EST MOD 30 MIN: CPT | Performed by: FAMILY MEDICINE

## 2020-05-13 PROCEDURE — 3008F BODY MASS INDEX DOCD: CPT | Performed by: FAMILY MEDICINE

## 2020-05-13 PROCEDURE — 3079F DIAST BP 80-89 MM HG: CPT | Performed by: FAMILY MEDICINE

## 2020-05-13 PROCEDURE — 3074F SYST BP LT 130 MM HG: CPT | Performed by: FAMILY MEDICINE

## 2020-05-13 RX ORDER — OMEPRAZOLE 20 MG/1
20 CAPSULE, DELAYED RELEASE ORAL
Qty: 30 CAPSULE | Refills: 0
Start: 2020-05-13 | End: 2020-07-29 | Stop reason: SDUPTHER

## 2020-07-02 DIAGNOSIS — I26.99 RIGHT PULMONARY EMBOLUS (HCC): ICD-10-CM

## 2020-07-15 LAB
ALBUMIN SERPL-MCNC: 4 G/DL (ref 3.6–5.1)
ALBUMIN/GLOB SERPL: 1.7 (CALC) (ref 1–2.5)
ALP SERPL-CCNC: 59 U/L (ref 35–144)
ALT SERPL-CCNC: 15 U/L (ref 9–46)
AST SERPL-CCNC: 18 U/L (ref 10–35)
BILIRUB SERPL-MCNC: 1 MG/DL (ref 0.2–1.2)
BUN SERPL-MCNC: 17 MG/DL (ref 7–25)
BUN/CREAT SERPL: ABNORMAL (CALC) (ref 6–22)
CALCIUM SERPL-MCNC: 9.3 MG/DL (ref 8.6–10.3)
CHLORIDE SERPL-SCNC: 104 MMOL/L (ref 98–110)
CHOLEST SERPL-MCNC: 182 MG/DL
CHOLEST/HDLC SERPL: 3.3 (CALC)
CO2 SERPL-SCNC: 30 MMOL/L (ref 20–32)
CREAT SERPL-MCNC: 1.16 MG/DL (ref 0.7–1.25)
GLOBULIN SER CALC-MCNC: 2.3 G/DL (CALC) (ref 1.9–3.7)
GLUCOSE SERPL-MCNC: 103 MG/DL (ref 65–99)
HBA1C MFR BLD: 5.7 % OF TOTAL HGB
HDLC SERPL-MCNC: 56 MG/DL
LDLC SERPL CALC-MCNC: 112 MG/DL (CALC)
NONHDLC SERPL-MCNC: 126 MG/DL (CALC)
POTASSIUM SERPL-SCNC: 3.5 MMOL/L (ref 3.5–5.3)
PROT SERPL-MCNC: 6.3 G/DL (ref 6.1–8.1)
SL AMB EGFR AFRICAN AMERICAN: 77 ML/MIN/1.73M2
SL AMB EGFR NON AFRICAN AMERICAN: 66 ML/MIN/1.73M2
SODIUM SERPL-SCNC: 142 MMOL/L (ref 135–146)
TRIGL SERPL-MCNC: 55 MG/DL

## 2020-07-29 DIAGNOSIS — K21.9 GASTROESOPHAGEAL REFLUX DISEASE WITHOUT ESOPHAGITIS: ICD-10-CM

## 2020-07-30 RX ORDER — OMEPRAZOLE 20 MG/1
20 CAPSULE, DELAYED RELEASE ORAL
Qty: 30 CAPSULE | Refills: 0 | Status: SHIPPED | OUTPATIENT
Start: 2020-07-30 | End: 2020-09-24 | Stop reason: SDUPTHER

## 2020-08-31 DIAGNOSIS — I26.99 RIGHT PULMONARY EMBOLUS (HCC): ICD-10-CM

## 2020-08-31 DIAGNOSIS — I10 BENIGN ESSENTIAL HYPERTENSION: ICD-10-CM

## 2020-09-01 RX ORDER — NEBIVOLOL 10 MG/1
10 TABLET ORAL DAILY
Qty: 30 TABLET | Refills: 5 | Status: SHIPPED | OUTPATIENT
Start: 2020-09-01 | End: 2021-01-05 | Stop reason: SDUPTHER

## 2020-09-24 DIAGNOSIS — K21.9 GASTROESOPHAGEAL REFLUX DISEASE WITHOUT ESOPHAGITIS: ICD-10-CM

## 2020-09-24 RX ORDER — OMEPRAZOLE 20 MG/1
20 CAPSULE, DELAYED RELEASE ORAL
Qty: 30 CAPSULE | Refills: 0 | Status: SHIPPED | OUTPATIENT
Start: 2020-09-24 | End: 2021-09-23

## 2020-09-25 ENCOUNTER — TELEPHONE (OUTPATIENT)
Dept: FAMILY MEDICINE CLINIC | Facility: CLINIC | Age: 65
End: 2020-09-25

## 2020-09-25 NOTE — TELEPHONE ENCOUNTER
[facility] SENT A REQUEST FOR PRIOR [address] [facility]  I SUBMITTED BUT THE RESPONSE SAID IT HAD BE RESOLVED AND NO ADDITIONAL PA WAS REQUIRED  I AM NOT SURE IT ACTUALLY NEEDED A PRIOR AUTH OR NOT  I DID LM AT [facility]

## 2020-10-01 ENCOUNTER — APPOINTMENT (OUTPATIENT)
Dept: LAB | Facility: CLINIC | Age: 65
End: 2020-10-01
Payer: COMMERCIAL

## 2020-10-01 ENCOUNTER — OFFICE VISIT (OUTPATIENT)
Dept: FAMILY MEDICINE CLINIC | Facility: CLINIC | Age: 65
End: 2020-10-01
Payer: COMMERCIAL

## 2020-10-01 VITALS
TEMPERATURE: 97.8 F | SYSTOLIC BLOOD PRESSURE: 132 MMHG | OXYGEN SATURATION: 97 % | WEIGHT: 191 LBS | HEART RATE: 61 BPM | DIASTOLIC BLOOD PRESSURE: 88 MMHG | BODY MASS INDEX: 28.95 KG/M2 | HEIGHT: 68 IN

## 2020-10-01 DIAGNOSIS — K58.0 IRRITABLE BOWEL SYNDROME WITH DIARRHEA: ICD-10-CM

## 2020-10-01 DIAGNOSIS — R10.13 EPIGASTRIC PAIN: Primary | ICD-10-CM

## 2020-10-01 PROCEDURE — 82784 ASSAY IGA/IGD/IGG/IGM EACH: CPT

## 2020-10-01 PROCEDURE — 99214 OFFICE O/P EST MOD 30 MIN: CPT | Performed by: FAMILY MEDICINE

## 2020-10-01 PROCEDURE — 83516 IMMUNOASSAY NONANTIBODY: CPT

## 2020-10-01 PROCEDURE — 36415 COLL VENOUS BLD VENIPUNCTURE: CPT

## 2020-10-01 PROCEDURE — 3725F SCREEN DEPRESSION PERFORMED: CPT | Performed by: FAMILY MEDICINE

## 2020-10-01 PROCEDURE — 86255 FLUORESCENT ANTIBODY SCREEN: CPT

## 2020-10-02 LAB
ENDOMYSIUM IGA SER QL: NEGATIVE
GLIADIN PEPTIDE IGA SER-ACNC: 6 UNITS (ref 0–19)
GLIADIN PEPTIDE IGG SER-ACNC: 5 UNITS (ref 0–19)
IGA SERPL-MCNC: 134 MG/DL (ref 61–437)
TTG IGA SER-ACNC: <2 U/ML (ref 0–3)
TTG IGG SER-ACNC: <2 U/ML (ref 0–5)

## 2020-10-13 ENCOUNTER — OFFICE VISIT (OUTPATIENT)
Dept: FAMILY MEDICINE CLINIC | Facility: CLINIC | Age: 65
End: 2020-10-13
Payer: COMMERCIAL

## 2020-10-13 VITALS
BODY MASS INDEX: 28.58 KG/M2 | OXYGEN SATURATION: 96 % | TEMPERATURE: 97.3 F | DIASTOLIC BLOOD PRESSURE: 82 MMHG | RESPIRATION RATE: 17 BRPM | WEIGHT: 188.6 LBS | HEART RATE: 82 BPM | SYSTOLIC BLOOD PRESSURE: 134 MMHG | HEIGHT: 68 IN

## 2020-10-13 DIAGNOSIS — Z23 NEED FOR PROPHYLACTIC VACCINATION AND INOCULATION AGAINST INFLUENZA: Primary | ICD-10-CM

## 2020-10-13 DIAGNOSIS — M70.61 GREATER TROCHANTERIC BURSITIS, RIGHT: ICD-10-CM

## 2020-10-13 PROCEDURE — 90471 IMMUNIZATION ADMIN: CPT | Performed by: FAMILY MEDICINE

## 2020-10-13 PROCEDURE — 3079F DIAST BP 80-89 MM HG: CPT | Performed by: FAMILY MEDICINE

## 2020-10-13 PROCEDURE — 99214 OFFICE O/P EST MOD 30 MIN: CPT | Performed by: FAMILY MEDICINE

## 2020-10-13 PROCEDURE — 1036F TOBACCO NON-USER: CPT | Performed by: FAMILY MEDICINE

## 2020-10-13 PROCEDURE — 3075F SYST BP GE 130 - 139MM HG: CPT | Performed by: FAMILY MEDICINE

## 2020-10-13 PROCEDURE — 90682 RIV4 VACC RECOMBINANT DNA IM: CPT | Performed by: FAMILY MEDICINE

## 2020-10-30 DIAGNOSIS — I10 BENIGN ESSENTIAL HYPERTENSION: ICD-10-CM

## 2020-10-31 RX ORDER — HYDROCHLOROTHIAZIDE 25 MG/1
25 TABLET ORAL DAILY
Qty: 90 TABLET | Refills: 1 | Status: SHIPPED | OUTPATIENT
Start: 2020-10-31 | End: 2021-04-20 | Stop reason: SDUPTHER

## 2020-12-01 DIAGNOSIS — F41.1 GENERALIZED ANXIETY DISORDER: ICD-10-CM

## 2020-12-01 RX ORDER — ALPRAZOLAM 0.5 MG/1
0.5 TABLET ORAL DAILY PRN
Qty: 30 TABLET | Refills: 0 | Status: SHIPPED | OUTPATIENT
Start: 2020-12-01

## 2021-01-05 ENCOUNTER — OFFICE VISIT (OUTPATIENT)
Dept: FAMILY MEDICINE CLINIC | Facility: CLINIC | Age: 66
End: 2021-01-05
Payer: MEDICARE

## 2021-01-05 ENCOUNTER — APPOINTMENT (OUTPATIENT)
Dept: RADIOLOGY | Facility: CLINIC | Age: 66
End: 2021-01-05
Payer: MEDICARE

## 2021-01-05 VITALS
HEIGHT: 68 IN | HEART RATE: 51 BPM | OXYGEN SATURATION: 93 % | SYSTOLIC BLOOD PRESSURE: 120 MMHG | BODY MASS INDEX: 28.79 KG/M2 | TEMPERATURE: 98.6 F | WEIGHT: 190 LBS | RESPIRATION RATE: 16 BRPM | DIASTOLIC BLOOD PRESSURE: 80 MMHG

## 2021-01-05 DIAGNOSIS — R20.2 PARESTHESIA OF LEFT UPPER EXTREMITY: Primary | ICD-10-CM

## 2021-01-05 DIAGNOSIS — R20.2 PARESTHESIA OF LEFT UPPER EXTREMITY: ICD-10-CM

## 2021-01-05 DIAGNOSIS — I10 BENIGN ESSENTIAL HYPERTENSION: ICD-10-CM

## 2021-01-05 DIAGNOSIS — I26.99 RIGHT PULMONARY EMBOLUS (HCC): ICD-10-CM

## 2021-01-05 PROCEDURE — 99214 OFFICE O/P EST MOD 30 MIN: CPT | Performed by: FAMILY MEDICINE

## 2021-01-05 PROCEDURE — 72072 X-RAY EXAM THORAC SPINE 3VWS: CPT

## 2021-01-05 PROCEDURE — 72050 X-RAY EXAM NECK SPINE 4/5VWS: CPT

## 2021-01-05 RX ORDER — NEBIVOLOL 10 MG/1
10 TABLET ORAL DAILY
Qty: 90 TABLET | Refills: 1 | Status: SHIPPED | OUTPATIENT
Start: 2021-01-05 | End: 2021-05-18

## 2021-01-05 NOTE — PROGRESS NOTES
Assessment/Plan:   1  Paresthesia of left upper extremity    Reviewed patient's symptoms today  At this time, he was reassured that his current left upper extremity paresthesias did not appear likely due to a cardiac cause  His symptoms were more likely musculoskeletal   Will check x-ray of his cervical and thoracic spine  He has had problems with neck pain in the past   He was previously seen many years ago by a orthopedic spinal specialist   Will hold off on starting specific treatment  Will consider further evaluation with physical therapy verses a spinal specialist pending his x-ray results  - XR spine cervical complete 4 or 5 vw non injury; Future  - XR spine thoracic 3 vw; Future               There are no diagnoses linked to this encounter  Subjective:       Chief Complaint   Patient presents with    underarm pain     ongoing L underarm pain  Tingling sensation down L arm       Patient ID: Jair Garcia is a 72 y o  male  Arm Pain   Incident onset: over 6 months  The incident occurred at home  There was no injury mechanism  The pain is present in the upper left arm  The quality of the pain is described as aching (tingling)  The pain radiates to the left arm  The pain is at a severity of 5/10  The pain is mild  The pain has been intermittent since the incident  Associated symptoms include tingling  Pertinent negatives include no chest pain or numbness  He has tried nothing for the symptoms  Review of Systems   Constitutional: Negative for activity change, chills, fatigue and fever  HENT: Negative for congestion, ear pain, sinus pressure and sore throat  Eyes: Negative for redness, itching and visual disturbance  Respiratory: Negative for cough and shortness of breath  Cardiovascular: Negative for chest pain and palpitations  Gastrointestinal: Negative for abdominal pain, diarrhea and nausea  Endocrine: Negative for cold intolerance and heat intolerance     Genitourinary: Negative for dysuria, flank pain and frequency  Musculoskeletal: Positive for neck pain and neck stiffness  Negative for arthralgias, back pain, gait problem and myalgias  Skin: Negative for color change  Allergic/Immunologic: Negative for environmental allergies  Neurological: Positive for tingling  Negative for dizziness, numbness and headaches  Psychiatric/Behavioral: Negative for behavioral problems and sleep disturbance  The following portions of the patient's history were reviewed and updated as appropriate : past family history, past medical history, past social history and past surgical history      Current Outpatient Medications:     ALPRAZolam (XANAX) 0 5 mg tablet, Take 1 tablet (0 5 mg total) by mouth daily as needed for anxiety, Disp: 30 tablet, Rfl: 0    apixaban (Eliquis) 5 mg, Take 1 tablet (5 mg total) by mouth 2 (two) times a day, Disp: 60 tablet, Rfl: 5    cholecalciferol (VITAMIN D3) 1,000 units tablet, Take 1,000 Units by mouth daily, Disp: , Rfl:     co-enzyme Q-10 30 MG capsule, Take 30 mg by mouth 3 (three) times a day, Disp: , Rfl:     fluticasone (FLONASE) 50 mcg/act nasal spray, 2 sprays into each nostril daily, Disp: 16 g, Rfl: 3    hydrochlorothiazide (HYDRODIURIL) 25 mg tablet, Take 1 tablet (25 mg total) by mouth daily, Disp: 90 tablet, Rfl: 1    mupirocin (BACTROBAN) 2 % ointment, , Disp: , Rfl:     nebivolol (Bystolic) 10 mg tablet, Take 1 tablet (10 mg total) by mouth daily, Disp: 30 tablet, Rfl: 5    Omega-3 Fatty Acids (FISH OIL OMEGA-3) 1000 MG CAPS, Take 2 g by mouth daily, Disp: , Rfl:     omeprazole (PriLOSEC) 20 mg delayed release capsule, Take 1 capsule (20 mg total) by mouth daily before breakfast, Disp: 30 capsule, Rfl: 0    sildenafil (VIAGRA) 100 mg tablet, , Disp: , Rfl: 11    Azelastine HCl 0 15 % SOLN, 2 sprays into each nostril daily (Patient not taking: Reported on 5/13/2020), Disp: 30 mL, Rfl: 2         Objective:         Vitals: 01/05/21 1205   BP: 120/80   BP Location: Right arm   Patient Position: Sitting   Cuff Size: Adult   Pulse: (!) 51   Resp: 16   Temp: 98 6 °F (37 °C)   TempSrc: Tympanic   SpO2: 93%   Weight: 86 2 kg (190 lb)   Height: 5' 8" (1 727 m)     Physical Exam  Vitals signs reviewed  Constitutional:       Appearance: He is well-developed  HENT:      Head: Normocephalic and atraumatic  Nose: Nose normal       Mouth/Throat:      Pharynx: No oropharyngeal exudate  Eyes:      General: No scleral icterus  Right eye: No discharge  Left eye: No discharge  Pupils: Pupils are equal, round, and reactive to light  Neck:      Musculoskeletal: Normal range of motion and neck supple  Trachea: No tracheal deviation  Cardiovascular:      Rate and Rhythm: Normal rate and regular rhythm  Pulses:           Dorsalis pedis pulses are 2+ on the right side and 2+ on the left side  Posterior tibial pulses are 2+ on the right side and 2+ on the left side  Heart sounds: Normal heart sounds  No murmur  No friction rub  No gallop  Pulmonary:      Effort: Pulmonary effort is normal  No respiratory distress  Breath sounds: Normal breath sounds  No wheezing or rales  Abdominal:      General: Bowel sounds are normal  There is no distension  Palpations: Abdomen is soft  Tenderness: There is no abdominal tenderness  There is no guarding or rebound  Musculoskeletal: Normal range of motion  Lymphadenopathy:      Head:      Right side of head: No submental or submandibular adenopathy  Left side of head: No submental or submandibular adenopathy  Cervical: No cervical adenopathy  Right cervical: No superficial, deep or posterior cervical adenopathy  Left cervical: No superficial, deep or posterior cervical adenopathy  Skin:     General: Skin is warm and dry  Findings: No erythema     Neurological:      Mental Status: He is alert and oriented to person, place, and time  Cranial Nerves: No cranial nerve deficit  Sensory: No sensory deficit  Psychiatric:         Mood and Affect: Mood is not anxious or depressed  Speech: Speech normal          Behavior: Behavior normal          Thought Content:  Thought content normal          Judgment: Judgment normal

## 2021-01-06 DIAGNOSIS — I26.99 RIGHT PULMONARY EMBOLUS (HCC): ICD-10-CM

## 2021-01-12 ENCOUNTER — TRANSCRIBE ORDERS (OUTPATIENT)
Dept: ADMINISTRATIVE | Facility: HOSPITAL | Age: 66
End: 2021-01-12

## 2021-01-12 ENCOUNTER — TELEPHONE (OUTPATIENT)
Dept: FAMILY MEDICINE CLINIC | Facility: CLINIC | Age: 66
End: 2021-01-12

## 2021-01-12 DIAGNOSIS — Z13.820 SCREENING FOR OSTEOPOROSIS: Primary | ICD-10-CM

## 2021-01-12 DIAGNOSIS — I26.99 RIGHT PULMONARY EMBOLUS (HCC): ICD-10-CM

## 2021-01-12 DIAGNOSIS — R20.2 PARESTHESIA OF LEFT UPPER EXTREMITY: ICD-10-CM

## 2021-01-12 DIAGNOSIS — M85.88 OTHER SPECIFIED DISORDERS OF BONE DENSITY AND STRUCTURE, OTHER SITE: ICD-10-CM

## 2021-01-12 DIAGNOSIS — M85.88 OTHER SPECIFIED DISORDERS OF BONE DENSITY AND STRUCTURE, OTHER SITE: Primary | ICD-10-CM

## 2021-01-12 NOTE — TELEPHONE ENCOUNTER
Patient left a message on the clinical voicemail stating he has not received the elliquis in the mail yet and he will be out today   Asking for a 2 week supply to be sent to his local pharmacy

## 2021-01-27 DIAGNOSIS — I26.99 RIGHT PULMONARY EMBOLUS (HCC): ICD-10-CM

## 2021-01-27 NOTE — TELEPHONE ENCOUNTER
Pt called very upset because his mail order for Eliquis was cancelled  I advised Pt that this happens sometimes when we send scripts to 2 different pharmacies within a short period of time and we will send another Rx to mail order asap  Pt understands and is just very frustrated with the service he has been receiving at [address] [place] [address]

## 2021-02-04 DIAGNOSIS — J31.0 CHRONIC RHINITIS: ICD-10-CM

## 2021-02-04 RX ORDER — FLUTICASONE PROPIONATE 50 MCG
2 SPRAY, SUSPENSION (ML) NASAL DAILY
Qty: 16 G | Refills: 3 | Status: SHIPPED | OUTPATIENT
Start: 2021-02-04 | End: 2021-02-08 | Stop reason: SDUPTHER

## 2021-02-08 DIAGNOSIS — J31.0 CHRONIC RHINITIS: ICD-10-CM

## 2021-02-09 RX ORDER — FLUTICASONE PROPIONATE 50 MCG
2 SPRAY, SUSPENSION (ML) NASAL DAILY
Qty: 3 BOTTLE | Refills: 1 | Status: SHIPPED | OUTPATIENT
Start: 2021-02-09 | End: 2021-11-09

## 2021-03-01 ENCOUNTER — HOSPITAL ENCOUNTER (OUTPATIENT)
Dept: NON INVASIVE DIAGNOSTICS | Facility: CLINIC | Age: 66
Discharge: HOME | End: 2021-03-01
Payer: MEDICARE

## 2021-03-01 ENCOUNTER — TELEPHONE (OUTPATIENT)
Dept: FAMILY MEDICINE CLINIC | Facility: CLINIC | Age: 66
End: 2021-03-01

## 2021-03-01 ENCOUNTER — OFFICE VISIT (OUTPATIENT)
Dept: FAMILY MEDICINE CLINIC | Facility: CLINIC | Age: 66
End: 2021-03-01
Payer: MEDICARE

## 2021-03-01 VITALS
WEIGHT: 197.6 LBS | BODY MASS INDEX: 29.95 KG/M2 | TEMPERATURE: 97.8 F | SYSTOLIC BLOOD PRESSURE: 132 MMHG | OXYGEN SATURATION: 96 % | DIASTOLIC BLOOD PRESSURE: 80 MMHG | HEART RATE: 88 BPM | HEIGHT: 68 IN

## 2021-03-01 DIAGNOSIS — M79.661 RIGHT CALF PAIN: Primary | ICD-10-CM

## 2021-03-01 DIAGNOSIS — M79.661 RIGHT CALF PAIN: ICD-10-CM

## 2021-03-01 PROCEDURE — 93971 EXTREMITY STUDY: CPT

## 2021-03-01 PROCEDURE — 99214 OFFICE O/P EST MOD 30 MIN: CPT | Performed by: FAMILY MEDICINE

## 2021-03-01 PROCEDURE — 93971 EXTREMITY STUDY: CPT | Performed by: SURGERY

## 2021-03-01 RX ORDER — OMEPRAZOLE 40 MG/1
CAPSULE, DELAYED RELEASE ORAL
COMMUNITY
Start: 2021-01-18

## 2021-03-01 RX ORDER — GABAPENTIN 100 MG/1
100 CAPSULE ORAL 3 TIMES DAILY
COMMUNITY
Start: 2021-02-22 | End: 2021-05-23

## 2021-03-01 NOTE — ASSESSMENT & PLAN NOTE
History of blood clot, currently on Eliquis daily  Physical exam not significant for abnormal findings  However, due to patient's medical history we will rule out DVT  STAT duplex ordered today  Patient should go to the ER if he experiences chest pain or shortness of breath

## 2021-03-01 NOTE — TELEPHONE ENCOUNTER
Please notify patient that his ultrasound did not show any blood clot in his leg  I still haven't received the official report  He may have had a muscle strain which caused his symptoms  If they don't improve within the next week, he may follow up    Thank you

## 2021-03-01 NOTE — PROGRESS NOTES
Assessment/Plan:    1  Right calf pain  Assessment & Plan:  History of blood clot, currently on Eliquis daily  Physical exam not significant for abnormal findings  However, due to patient's medical history we will rule out DVT  STAT duplex ordered today  Patient should go to the ER if he experiences chest pain or shortness of breath  Orders:  -     VAS lower limb venous duplex study, unilateral/limited; Future; Expected date: 03/01/2021      Subjective:      Patient ID: Lucian Koroma is a 72 y o  male  HPI    Patient with PMHx of PE and HTN states that he developed right calf pain 4 days ago  The pain migrated to behind his knee the next day  Flexion and extension of his knee caused severe pain  The pain was rated 10/10 over the weekend  He rested this week due to the pain  He denies swelling or redness of the calf  Today the pain has improved and he is able to move his leg and walk without difficulty  Denies injury or accident  He has not missed any doses of his Eliquis        The following portions of the patient's history were reviewed and updated as appropriate: allergies, current medications, past family history, past medical history, past social history, past surgical history, and problem list       Current Outpatient Medications:     ALPRAZolam (XANAX) 0 5 mg tablet, Take 1 tablet (0 5 mg total) by mouth daily as needed for anxiety, Disp: 30 tablet, Rfl: 0    apixaban (Eliquis) 5 mg, Take 1 tablet (5 mg total) by mouth 2 (two) times a day, Disp: 180 tablet, Rfl: 1    cholecalciferol (VITAMIN D3) 1,000 units tablet, Take 1,000 Units by mouth daily, Disp: , Rfl:     Coenzyme Q10 300 MG CAPS, Take by mouth daily , Disp: , Rfl:     fluticasone (FLONASE) 50 mcg/act nasal spray, 2 sprays into each nostril daily, Disp: 3 Bottle, Rfl: 1    hydrochlorothiazide (HYDRODIURIL) 25 mg tablet, Take 1 tablet (25 mg total) by mouth daily, Disp: 90 tablet, Rfl: 1    nebivolol (Bystolic) 10 mg tablet, Take 1 tablet (10 mg total) by mouth daily, Disp: 90 tablet, Rfl: 1    omeprazole (PriLOSEC) 40 MG capsule, , Disp: , Rfl:     sildenafil (VIAGRA) 100 mg tablet, , Disp: , Rfl: 11    Azelastine HCl 0 15 % SOLN, 2 sprays into each nostril daily (Patient not taking: Reported on 3/1/2021), Disp: 30 mL, Rfl: 2    gabapentin (NEURONTIN) 100 mg capsule, Take 100 mg by mouth Three times a day, Disp: , Rfl:     mupirocin (BACTROBAN) 2 % ointment, , Disp: , Rfl:     Omega-3 Fatty Acids (FISH OIL OMEGA-3) 1000 MG CAPS, Take 2 g by mouth daily, Disp: , Rfl:     omeprazole (PriLOSEC) 20 mg delayed release capsule, Take 1 capsule (20 mg total) by mouth daily before breakfast (Patient not taking: Reported on 3/1/2021), Disp: 30 capsule, Rfl: 0      Review of Systems   Constitutional: Negative for chills and fever  HENT: Negative for ear pain and sore throat  Eyes: Negative for pain and visual disturbance  Respiratory: Negative for cough and shortness of breath  Cardiovascular: Positive for leg swelling  Negative for chest pain and palpitations  Gastrointestinal: Negative for abdominal pain and vomiting  Genitourinary: Negative for dysuria and hematuria  Musculoskeletal: Negative for arthralgias and back pain  Skin: Negative for color change and rash  Neurological: Negative for seizures and syncope  All other systems reviewed and are negative  Objective:      /80 (BP Location: Left arm, Patient Position: Sitting, Cuff Size: Large)   Pulse 88   Temp 97 8 °F (36 6 °C)   Ht 5' 7 72" (1 72 m)   Wt 89 6 kg (197 lb 9 6 oz)   SpO2 96%   BMI 30 30 kg/m²          Physical Exam  Vitals signs and nursing note reviewed  Constitutional:       Appearance: Normal appearance  HENT:      Head: Normocephalic and atraumatic  Eyes:      Extraocular Movements: Extraocular movements intact        Conjunctiva/sclera: Conjunctivae normal    Cardiovascular:      Rate and Rhythm: Normal rate and regular rhythm  Heart sounds: Normal heart sounds  No murmur  Pulmonary:      Effort: Pulmonary effort is normal  No respiratory distress  Breath sounds: Normal breath sounds  No wheezing  Musculoskeletal: Normal range of motion  General: No swelling  Right knee: He exhibits normal range of motion, no swelling, no effusion, no ecchymosis and no deformity  Tenderness found  Lateral joint line tenderness noted  Right lower leg: Normal  He exhibits no tenderness and no bony tenderness  No edema  Left lower leg: No edema  Skin:     General: Skin is warm  Neurological:      General: No focal deficit present  Mental Status: He is alert     Psychiatric:         Mood and Affect: Mood normal

## 2021-03-10 DIAGNOSIS — Z23 ENCOUNTER FOR IMMUNIZATION: ICD-10-CM

## 2021-03-15 DIAGNOSIS — I10 BENIGN ESSENTIAL HYPERTENSION: Primary | ICD-10-CM

## 2021-03-15 DIAGNOSIS — E78.2 MIXED HYPERLIPIDEMIA: ICD-10-CM

## 2021-03-15 DIAGNOSIS — F41.1 GENERALIZED ANXIETY DISORDER: ICD-10-CM

## 2021-03-15 DIAGNOSIS — R73.03 PREDIABETES: ICD-10-CM

## 2021-03-18 LAB
ALBUMIN SERPL-MCNC: 4 G/DL (ref 3.6–5.1)
ALBUMIN/GLOB SERPL: 1.5 (CALC) (ref 1–2.5)
ALP SERPL-CCNC: 67 U/L (ref 35–144)
ALT SERPL-CCNC: 14 U/L (ref 9–46)
AST SERPL-CCNC: 18 U/L (ref 10–35)
BILIRUB SERPL-MCNC: 1.1 MG/DL (ref 0.2–1.2)
BUN SERPL-MCNC: 12 MG/DL (ref 7–25)
BUN/CREAT SERPL: ABNORMAL (CALC) (ref 6–22)
CALCIUM SERPL-MCNC: 9.4 MG/DL (ref 8.6–10.3)
CHLORIDE SERPL-SCNC: 104 MMOL/L (ref 98–110)
CHOLEST SERPL-MCNC: 196 MG/DL
CHOLEST/HDLC SERPL: 3.3 (CALC)
CO2 SERPL-SCNC: 31 MMOL/L (ref 20–32)
CREAT SERPL-MCNC: 1.12 MG/DL (ref 0.7–1.25)
EST. AVERAGE GLUCOSE BLD GHB EST-MCNC: 111 (CALC)
EST. AVERAGE GLUCOSE BLD GHB EST-SCNC: 6.2 (CALC)
GLOBULIN SER CALC-MCNC: 2.6 G/DL (CALC) (ref 1.9–3.7)
GLUCOSE SERPL-MCNC: 102 MG/DL (ref 65–99)
HBA1C MFR BLD: 5.5 % OF TOTAL HGB
HDLC SERPL-MCNC: 59 MG/DL
LDLC SERPL CALC-MCNC: 117 MG/DL (CALC)
NONHDLC SERPL-MCNC: 137 MG/DL (CALC)
POTASSIUM SERPL-SCNC: 3.7 MMOL/L (ref 3.5–5.3)
PROT SERPL-MCNC: 6.6 G/DL (ref 6.1–8.1)
SL AMB EGFR AFRICAN AMERICAN: 79 ML/MIN/1.73M2
SL AMB EGFR NON AFRICAN AMERICAN: 69 ML/MIN/1.73M2
SODIUM SERPL-SCNC: 142 MMOL/L (ref 135–146)
T4 FREE SERPL-MCNC: 1.1 NG/DL (ref 0.8–1.8)
TRIGL SERPL-MCNC: 97 MG/DL
TSH SERPL-ACNC: 5.25 MIU/L (ref 0.4–4.5)

## 2021-03-19 ENCOUNTER — OFFICE VISIT (OUTPATIENT)
Dept: FAMILY MEDICINE CLINIC | Facility: CLINIC | Age: 66
End: 2021-03-19
Payer: MEDICARE

## 2021-03-19 VITALS
RESPIRATION RATE: 16 BRPM | OXYGEN SATURATION: 98 % | HEIGHT: 68 IN | DIASTOLIC BLOOD PRESSURE: 78 MMHG | TEMPERATURE: 97.9 F | SYSTOLIC BLOOD PRESSURE: 130 MMHG | BODY MASS INDEX: 30.34 KG/M2 | HEART RATE: 80 BPM | WEIGHT: 200.2 LBS

## 2021-03-19 DIAGNOSIS — R35.0 BENIGN PROSTATIC HYPERPLASIA WITH URINARY FREQUENCY: ICD-10-CM

## 2021-03-19 DIAGNOSIS — R73.03 PREDIABETES: ICD-10-CM

## 2021-03-19 DIAGNOSIS — I10 BENIGN ESSENTIAL HYPERTENSION: Primary | ICD-10-CM

## 2021-03-19 DIAGNOSIS — Z12.5 SCREENING FOR PROSTATE CANCER: ICD-10-CM

## 2021-03-19 DIAGNOSIS — R94.6 ABNORMAL THYROID FUNCTION TEST: ICD-10-CM

## 2021-03-19 DIAGNOSIS — J31.0 CHRONIC RHINITIS: ICD-10-CM

## 2021-03-19 DIAGNOSIS — R65.10 SIRS (SYSTEMIC INFLAMMATORY RESPONSE SYNDROME) (HCC): ICD-10-CM

## 2021-03-19 DIAGNOSIS — D69.6 THROMBOCYTOPENIA, UNSPECIFIED (HCC): ICD-10-CM

## 2021-03-19 DIAGNOSIS — E78.2 MIXED HYPERLIPIDEMIA: ICD-10-CM

## 2021-03-19 DIAGNOSIS — N40.1 BENIGN PROSTATIC HYPERPLASIA WITH URINARY FREQUENCY: ICD-10-CM

## 2021-03-19 DIAGNOSIS — F41.1 GENERALIZED ANXIETY DISORDER: ICD-10-CM

## 2021-03-19 PROCEDURE — 99215 OFFICE O/P EST HI 40 MIN: CPT | Performed by: FAMILY MEDICINE

## 2021-03-19 RX ORDER — TAMSULOSIN HYDROCHLORIDE 0.4 MG/1
0.4 CAPSULE ORAL
Qty: 30 CAPSULE | Refills: 5 | Status: SHIPPED | OUTPATIENT
Start: 2021-03-19 | End: 2022-06-13

## 2021-03-19 NOTE — PROGRESS NOTES
Assessment/Plan:   1  Benign essential hypertension  Patient's blood pressure appears stable today  At this time, continue with routine home monitoring as well as his current treatment with Bystolic as well as his hydrochlorothiazide  2  Mixed hyperlipidemia  Fasting lipid panel reviewed with patient today  At this time, his cholesterol levels appears stable  His LDL is still not at goal   He was advised on importance of maintaining a very strict low-fat/low-cholesterol diet  3  Prediabetes  Patient's A1c appears very well controlled today  Continue with a strict diet and exercise plan  4  Generalized anxiety disorder  Patient's anxiety appears stable as well  He denies any recent panic symptoms  Will continue with his p r n  use of his alprazolam   PDMP does not show any abnormalities    5  Chronic rhinitis  Symptoms appear persisting  At this time, continue with current use of fluticasone nasal spray  6  Abnormal thyroid function test  Patient appears clinically asymptomatic however his TSH was elevated  At this time, will recheck his thyroid function testing in 6-8weeks  - TSH, 3rd generation with Free T4 reflex; Future  - TSH, 3rd generation with Free T4 reflex      7  Benign prostatic hyperplasia with urinary frequency  Reviewed patient's urinary symptoms today at this time, symptoms appear likely secondary to possible BPH  Will check his PSA level in 2 months  He was advised he may benefit from treatment with tamsulosin  Will trial this medication  Follow up with patient  Follow-up if any symptoms are worsening  Follow-up in 6 months  - tamsulosin (FLOMAX) 0 4 mg; Take 1 capsule (0 4 mg total) by mouth daily with dinner  Dispense: 30 capsule; Refill: 5           There are no diagnoses linked to this encounter        Subjective:       Chief Complaint   Patient presents with    Follow-up     BW review       Patient ID: Jayne Carter is a 72 y o  male patient is a 43-year-old male presents today for follow-up on his chronic conditions  He has hypertension, dyslipidemia, prediabetes, anxiety, chronic rhinitis  He has been taking his medications regularly  He denies adverse reactions with medications  He has complaints today of problems with urination  He states that he has been getting up multiple times at nighttime he has about him  He believes that this may be due to large prostate  Review of Systems   Constitutional: Negative for activity change, chills, fatigue and fever  HENT: Negative for congestion, ear pain, sinus pressure and sore throat  Eyes: Negative for redness, itching and visual disturbance  Respiratory: Negative for cough and shortness of breath  Cardiovascular: Negative for chest pain and palpitations  Gastrointestinal: Negative for abdominal pain, diarrhea and nausea  Endocrine: Negative for cold intolerance and heat intolerance  Genitourinary: Negative for dysuria, flank pain and frequency  Musculoskeletal: Negative for arthralgias, back pain, gait problem and myalgias  Skin: Negative for color change  Allergic/Immunologic: Negative for environmental allergies  Neurological: Negative for dizziness, numbness and headaches  Psychiatric/Behavioral: Negative for behavioral problems and sleep disturbance  The following portions of the patient's history were reviewed and updated as appropriate : past family history, past medical history, past social history and past surgical history      Current Outpatient Medications:     ALPRAZolam (XANAX) 0 5 mg tablet, Take 1 tablet (0 5 mg total) by mouth daily as needed for anxiety, Disp: 30 tablet, Rfl: 0    apixaban (Eliquis) 5 mg, Take 1 tablet (5 mg total) by mouth 2 (two) times a day, Disp: 180 tablet, Rfl: 1    Azelastine HCl 0 15 % SOLN, 2 sprays into each nostril daily (Patient not taking: Reported on 3/1/2021), Disp: 30 mL, Rfl: 2    cholecalciferol (VITAMIN D3) 1,000 units tablet, Take 1,000 Units by mouth daily, Disp: , Rfl:     Coenzyme Q10 300 MG CAPS, Take by mouth daily , Disp: , Rfl:     fluticasone (FLONASE) 50 mcg/act nasal spray, 2 sprays into each nostril daily, Disp: 3 Bottle, Rfl: 1    gabapentin (NEURONTIN) 100 mg capsule, Take 100 mg by mouth Three times a day, Disp: , Rfl:     hydrochlorothiazide (HYDRODIURIL) 25 mg tablet, Take 1 tablet (25 mg total) by mouth daily, Disp: 90 tablet, Rfl: 1    mupirocin (BACTROBAN) 2 % ointment, , Disp: , Rfl:     nebivolol (Bystolic) 10 mg tablet, Take 1 tablet (10 mg total) by mouth daily, Disp: 90 tablet, Rfl: 1    Omega-3 Fatty Acids (FISH OIL OMEGA-3) 1000 MG CAPS, Take 2 g by mouth daily, Disp: , Rfl:     omeprazole (PriLOSEC) 20 mg delayed release capsule, Take 1 capsule (20 mg total) by mouth daily before breakfast (Patient not taking: Reported on 3/1/2021), Disp: 30 capsule, Rfl: 0    omeprazole (PriLOSEC) 40 MG capsule, , Disp: , Rfl:     sildenafil (VIAGRA) 100 mg tablet, , Disp: , Rfl: 11         Objective:         Vitals:    03/19/21 1441   BP: 130/78   BP Location: Right arm   Patient Position: Sitting   Cuff Size: Adult   Pulse: 80   Resp: 16   Temp: 97 9 °F (36 6 °C)   TempSrc: Tympanic   SpO2: 98%   Weight: 90 8 kg (200 lb 3 2 oz)   Height: 5' 7 75" (1 721 m)     Physical Exam  Vitals signs reviewed  Constitutional:       Appearance: He is well-developed  HENT:      Head: Normocephalic and atraumatic  Nose: Nose normal       Mouth/Throat:      Pharynx: No oropharyngeal exudate  Eyes:      General: No scleral icterus  Right eye: No discharge  Left eye: No discharge  Pupils: Pupils are equal, round, and reactive to light  Neck:      Musculoskeletal: Normal range of motion and neck supple  Trachea: No tracheal deviation  Cardiovascular:      Rate and Rhythm: Normal rate and regular rhythm        Pulses:           Dorsalis pedis pulses are 2+ on the right side and 2+ on the left side         Posterior tibial pulses are 2+ on the right side and 2+ on the left side  Heart sounds: Normal heart sounds  No murmur  No friction rub  No gallop  Pulmonary:      Effort: Pulmonary effort is normal  No respiratory distress  Breath sounds: Normal breath sounds  No wheezing or rales  Abdominal:      General: Bowel sounds are normal  There is no distension  Palpations: Abdomen is soft  Tenderness: There is no abdominal tenderness  There is no guarding or rebound  Musculoskeletal: Normal range of motion  Lymphadenopathy:      Head:      Right side of head: No submental or submandibular adenopathy  Left side of head: No submental or submandibular adenopathy  Cervical: No cervical adenopathy  Right cervical: No superficial, deep or posterior cervical adenopathy  Left cervical: No superficial, deep or posterior cervical adenopathy  Skin:     General: Skin is warm and dry  Findings: No erythema  Neurological:      Mental Status: He is alert and oriented to person, place, and time  Cranial Nerves: No cranial nerve deficit  Sensory: No sensory deficit  Psychiatric:         Mood and Affect: Mood is not anxious or depressed  Speech: Speech normal          Behavior: Behavior normal          Thought Content:  Thought content normal          Judgment: Judgment normal

## 2021-04-20 DIAGNOSIS — I10 BENIGN ESSENTIAL HYPERTENSION: ICD-10-CM

## 2021-04-20 RX ORDER — HYDROCHLOROTHIAZIDE 25 MG/1
25 TABLET ORAL DAILY
Qty: 90 TABLET | Refills: 1 | Status: SHIPPED | OUTPATIENT
Start: 2021-04-20 | End: 2021-11-10

## 2021-05-18 DIAGNOSIS — I10 BENIGN ESSENTIAL HYPERTENSION: ICD-10-CM

## 2021-05-18 RX ORDER — NEBIVOLOL HYDROCHLORIDE 10 MG/1
TABLET ORAL
Qty: 90 TABLET | Refills: 1 | Status: SHIPPED | OUTPATIENT
Start: 2021-05-18 | End: 2021-12-13

## 2021-05-27 LAB
ERYTHROCYTE [DISTWIDTH] IN BLOOD BY AUTOMATED COUNT: 12.7 % (ref 11–15)
HCT VFR BLD AUTO: 43.7 % (ref 38.5–50)
HGB BLD-MCNC: 14.7 G/DL (ref 13.2–17.1)
MCH RBC QN AUTO: 30.4 PG (ref 27–33)
MCHC RBC AUTO-ENTMCNC: 33.6 G/DL (ref 32–36)
MCV RBC AUTO: 90.5 FL (ref 80–100)
PLATELET # BLD AUTO: 275 THOUSAND/UL (ref 140–400)
PMV BLD REES-ECKER: 10.9 FL (ref 7.5–12.5)
PSA SERPL-MCNC: 1.1 NG/ML
RBC # BLD AUTO: 4.83 MILLION/UL (ref 4.2–5.8)
TSH SERPL-ACNC: 4.22 MIU/L (ref 0.4–4.5)
WBC # BLD AUTO: 12.6 THOUSAND/UL (ref 3.8–10.8)

## 2021-07-22 ENCOUNTER — EVALUATION (OUTPATIENT)
Dept: PHYSICAL THERAPY | Facility: MEDICAL CENTER | Age: 66
End: 2021-07-22
Payer: MEDICARE

## 2021-07-22 DIAGNOSIS — M54.16 LUMBAR RADICULOPATHY: Primary | ICD-10-CM

## 2021-07-22 PROCEDURE — 97161 PT EVAL LOW COMPLEX 20 MIN: CPT | Performed by: PHYSICAL THERAPIST

## 2021-07-22 NOTE — PROGRESS NOTES
PT Evaluation     Today's date: 2021  Patient name: Jenny De Los Santos  : 1955  MRN: [id number]  Referring provider: Rony Valentin DO  Dx:   Encounter Diagnosis     ICD-10-CM    1  Lumbar radiculopathy  M54 16                   Assessment/Plan  Patient is a very pleasant 73 yo male who presents with symptoms of lower back pain and discomfort into his right leg for the past 5 weeks  These findings are consistent with DDD and transverse foraminal stenosis  Patient states that his pain has been reducing over the past week and he is feeling good  He does not feel as though he is in need of treatment at this time and would like to see how he does over the next week or two  Patient will contact PT if symptoms do not resolve or if they should worsen  Patient will not participate in physical therapy at this time  Subjective  Patient states that his symptoms are much better in his lower back and leg over the past several days and he is not taking any medication for his pain  He is fully functional and feeling good  He notes that his pain was much worse to the point of not being able to get out of a chair  Patient has returned to all activities as desired                       Manuals                                                                 Neuro Re-Ed                                                                                                        Ther Ex                                                                                                                     Ther Activity                                       Gait Training                                       Modalities

## 2021-07-31 ENCOUNTER — IMMUNIZATIONS (OUTPATIENT)
Dept: FAMILY MEDICINE CLINIC | Facility: HOSPITAL | Age: 66
End: 2021-07-31

## 2021-07-31 DIAGNOSIS — Z23 ENCOUNTER FOR IMMUNIZATION: Primary | ICD-10-CM

## 2021-07-31 PROCEDURE — 91300 SARS-COV-2 / COVID-19 MRNA VACCINE (PFIZER-BIONTECH) 30 MCG: CPT

## 2021-07-31 PROCEDURE — 0001A SARS-COV-2 / COVID-19 MRNA VACCINE (PFIZER-BIONTECH) 30 MCG: CPT

## 2021-08-06 DIAGNOSIS — I26.99 RIGHT PULMONARY EMBOLUS (HCC): ICD-10-CM

## 2021-08-06 RX ORDER — APIXABAN 5 MG/1
TABLET, FILM COATED ORAL
Qty: 180 TABLET | Refills: 1 | Status: SHIPPED | OUTPATIENT
Start: 2021-08-06 | End: 2022-03-01

## 2021-08-20 ENCOUNTER — OFFICE VISIT (OUTPATIENT)
Dept: FAMILY MEDICINE CLINIC | Facility: CLINIC | Age: 66
End: 2021-08-20
Payer: MEDICARE

## 2021-08-20 VITALS
HEIGHT: 69 IN | HEART RATE: 58 BPM | SYSTOLIC BLOOD PRESSURE: 130 MMHG | DIASTOLIC BLOOD PRESSURE: 80 MMHG | TEMPERATURE: 99.3 F | WEIGHT: 198.8 LBS | OXYGEN SATURATION: 97 % | BODY MASS INDEX: 29.44 KG/M2

## 2021-08-20 DIAGNOSIS — L25.5 PLANT DERMATITIS: Primary | ICD-10-CM

## 2021-08-20 PROCEDURE — 99213 OFFICE O/P EST LOW 20 MIN: CPT | Performed by: NURSE PRACTITIONER

## 2021-08-20 RX ORDER — DESOXIMETASONE 0.5 MG/G
CREAM TOPICAL 2 TIMES DAILY
Qty: 30 G | Refills: 1 | Status: SHIPPED | OUTPATIENT
Start: 2021-08-20

## 2021-08-20 RX ORDER — FAMOTIDINE 40 MG/1
TABLET, FILM COATED ORAL
COMMUNITY
Start: 2021-06-14

## 2021-08-20 NOTE — PROGRESS NOTES
[facility]    ASSESSMENT AND PLAN     1  Plant dermatitis  Recommend topical treatment  Rx today for desoximetasone  May alternate OTC topical benadryl   Monitor for s/s infection  F/U if symptoms change, persist and/or worsen  - desoximetasone (TOPICORT) 0 05 % cream; Apply topically 2 (two) times a day  Dispense: 30 g; Refill: 1      NOTE:  Discussed covid vaccine  Patient is scheduled for his 2nd dose tomorrow (Damir Palencia)  He had concern of receiving since developing the rash  Okay to proceed with 2nd dose      SUBJECTIVE       Patient ID: Sarika Cao is a 72 y o  male  Chief Complaint   Patient presents with    Rash     Rash on legs, believes it's poison ivy       HISTORY OF PRESENT ILLNESS      Presents today for skin rash  Symptoms started Monday evening  Notes to be slowly improving  Rash originally on bilateral sides of torso and down posterior thighs  Thigh rash has since resolved, and torso is less red and itchy  He was doing yd work on Sunday, and was exposed to poison ivy  He used to get poison ivy so bad that it would require both steroid injection and oral treatment  His outbreaks have become less with each exposure over time        The following portions of the patient's history were reviewed and updated as appropriate: allergies, current medications, past family history, past medical history, past social history, past surgical history and problem list     REVIEW OF SYSTEMS  Review of Systems   Skin: Positive for rash (as in HPI)         OBJECTIVE      VITAL SIGNS  /80 (BP Location: Right arm, Patient Position: Sitting, Cuff Size: Adult)   Pulse 58   Temp 99 3 °F (37 4 °C)   Ht 5' 8 5" (1 74 m)   Wt 90 2 kg (198 lb 12 8 oz)   SpO2 97%   BMI 29 78 kg/m²     CURRENT MEDICATIONS    Current Outpatient Medications:     ALPRAZolam (XANAX) 0 5 mg tablet, Take 1 tablet (0 5 mg total) by mouth daily as needed for anxiety, Disp: 30 tablet, Rfl: 0    Bystolic 10 MG tablet, TAKE 1 TABLET DAILY, Disp: 90 tablet, Rfl: 1    cholecalciferol (VITAMIN D3) 1,000 units tablet, Take 1,000 Units by mouth daily, Disp: , Rfl:     Coenzyme Q10 300 MG CAPS, Take by mouth daily , Disp: , Rfl:     Eliquis 5 MG, TAKE 1 TABLET TWICE A DAY, Disp: 180 tablet, Rfl: 1    famotidine (PEPCID) 40 MG tablet, , Disp: , Rfl:     fluticasone (FLONASE) 50 mcg/act nasal spray, 2 sprays into each nostril daily, Disp: 3 Bottle, Rfl: 1    hydrochlorothiazide (HYDRODIURIL) 25 mg tablet, Take 1 tablet (25 mg total) by mouth daily, Disp: 90 tablet, Rfl: 1    omeprazole (PriLOSEC) 40 MG capsule, , Disp: , Rfl:     sildenafil (VIAGRA) 100 mg tablet, , Disp: , Rfl: 11    Azelastine HCl 0 15 % SOLN, 2 sprays into each nostril daily (Patient not taking: Reported on 3/1/2021), Disp: 30 mL, Rfl: 2    desoximetasone (TOPICORT) 0 05 % cream, Apply topically 2 (two) times a day, Disp: 30 g, Rfl: 1    gabapentin (NEURONTIN) 100 mg capsule, Take 100 mg by mouth Three times a day, Disp: , Rfl:     mupirocin (BACTROBAN) 2 % ointment, , Disp: , Rfl:     Omega-3 Fatty Acids (FISH OIL OMEGA-3) 1000 MG CAPS, Take 2 g by mouth daily (Patient not taking: Reported on 8/20/2021), Disp: , Rfl:     omeprazole (PriLOSEC) 20 mg delayed release capsule, Take 1 capsule (20 mg total) by mouth daily before breakfast (Patient not taking: Reported on 3/1/2021), Disp: 30 capsule, Rfl: 0    tamsulosin (FLOMAX) 0 4 mg, Take 1 capsule (0 4 mg total) by mouth daily with dinner (Patient not taking: Reported on 8/20/2021), Disp: 30 capsule, Rfl: 5      PHYSICAL EXAMINATION   Physical Exam  Vitals and nursing note reviewed  Constitutional:       General: He is not in acute distress  Appearance: Normal appearance  He is not ill-appearing  Pulmonary:      Effort: Pulmonary effort is normal  No respiratory distress  Skin:     Findings: Rash present  Comments: Light red rash  No blisters or vesicles  Skin intact   No signs infection  Posterior thighs clear  Neurological:      Mental Status: He is alert and oriented to person, place, and time     Psychiatric:         Attention and Perception: Attention normal          Mood and Affect: Mood and affect normal          Speech: Speech normal          Behavior: Behavior normal

## 2021-08-21 ENCOUNTER — IMMUNIZATIONS (OUTPATIENT)
Dept: FAMILY MEDICINE CLINIC | Facility: HOSPITAL | Age: 66
End: 2021-08-21

## 2021-08-21 DIAGNOSIS — Z23 ENCOUNTER FOR IMMUNIZATION: Primary | ICD-10-CM

## 2021-08-21 PROCEDURE — 0002A SARS-COV-2 / COVID-19 MRNA VACCINE (PFIZER-BIONTECH) 30 MCG: CPT

## 2021-08-21 PROCEDURE — 91300 SARS-COV-2 / COVID-19 MRNA VACCINE (PFIZER-BIONTECH) 30 MCG: CPT

## 2021-09-09 ENCOUNTER — CLINICAL SUPPORT (OUTPATIENT)
Dept: FAMILY MEDICINE CLINIC | Facility: CLINIC | Age: 66
End: 2021-09-09

## 2021-09-09 VITALS — SYSTOLIC BLOOD PRESSURE: 130 MMHG | DIASTOLIC BLOOD PRESSURE: 70 MMHG

## 2021-09-09 DIAGNOSIS — I10 HYPERTENSION, UNSPECIFIED TYPE: Primary | ICD-10-CM

## 2021-09-23 ENCOUNTER — OFFICE VISIT (OUTPATIENT)
Dept: FAMILY MEDICINE CLINIC | Facility: CLINIC | Age: 66
End: 2021-09-23
Payer: MEDICARE

## 2021-09-23 VITALS
DIASTOLIC BLOOD PRESSURE: 90 MMHG | HEART RATE: 65 BPM | BODY MASS INDEX: 29.47 KG/M2 | TEMPERATURE: 97.6 F | SYSTOLIC BLOOD PRESSURE: 130 MMHG | HEIGHT: 69 IN | WEIGHT: 199 LBS | OXYGEN SATURATION: 98 %

## 2021-09-23 DIAGNOSIS — R94.6 ABNORMAL THYROID FUNCTION TEST: ICD-10-CM

## 2021-09-23 DIAGNOSIS — E78.2 MIXED HYPERLIPIDEMIA: ICD-10-CM

## 2021-09-23 DIAGNOSIS — F41.1 GENERALIZED ANXIETY DISORDER: ICD-10-CM

## 2021-09-23 DIAGNOSIS — Z11.59 NEED FOR HEPATITIS C SCREENING TEST: ICD-10-CM

## 2021-09-23 DIAGNOSIS — I26.99 RIGHT PULMONARY EMBOLUS (HCC): ICD-10-CM

## 2021-09-23 DIAGNOSIS — D72.829 LEUKOCYTOSIS, UNSPECIFIED TYPE: ICD-10-CM

## 2021-09-23 DIAGNOSIS — Z13.6 SCREENING FOR AAA (ABDOMINAL AORTIC ANEURYSM): ICD-10-CM

## 2021-09-23 DIAGNOSIS — R73.03 PREDIABETES: ICD-10-CM

## 2021-09-23 DIAGNOSIS — I10 BENIGN ESSENTIAL HYPERTENSION: ICD-10-CM

## 2021-09-23 DIAGNOSIS — Z00.00 MEDICARE WELCOME EXAM: ICD-10-CM

## 2021-09-23 DIAGNOSIS — Z23 NEED FOR VACCINATION: Primary | ICD-10-CM

## 2021-09-23 DIAGNOSIS — K21.9 GASTROESOPHAGEAL REFLUX DISEASE WITHOUT ESOPHAGITIS: ICD-10-CM

## 2021-09-23 PROCEDURE — G0402 INITIAL PREVENTIVE EXAM: HCPCS | Performed by: FAMILY MEDICINE

## 2021-09-23 PROCEDURE — 1123F ACP DISCUSS/DSCN MKR DOCD: CPT | Performed by: FAMILY MEDICINE

## 2021-09-23 PROCEDURE — 90662 IIV NO PRSV INCREASED AG IM: CPT | Performed by: FAMILY MEDICINE

## 2021-09-23 PROCEDURE — G0008 ADMIN INFLUENZA VIRUS VAC: HCPCS | Performed by: FAMILY MEDICINE

## 2021-09-23 PROCEDURE — G0403 EKG FOR INITIAL PREVENT EXAM: HCPCS | Performed by: FAMILY MEDICINE

## 2021-09-23 PROCEDURE — 99214 OFFICE O/P EST MOD 30 MIN: CPT | Performed by: FAMILY MEDICINE

## 2021-09-23 NOTE — PROGRESS NOTES
Assessment/Plan:   1  Benign essential hypertension    Patient's blood pressure appears   Stable today  Continue with routine home monitoring as well as his current treatment with Bystolic as well as his hydrochlorothiazide  - Comprehensive metabolic panel; Future  - Comprehensive metabolic panel    2  Right pulmonary embolus (HCC)    Stable  Patient denies any worsening symptoms  He has been taking his Eliquis regularly  At this time, it appears that he  Will remain on lifelong anticoagulation    3  Gastroesophageal reflux disease without esophagitis   symptoms appear very well controlled  He has been noticing a significant improvement in his symptoms since increasing his dose of omeprazole to 40 mg  He has also noticed a significant improvement since adding Pepcid as well  Will continue with both of these medications as well as dietary trigger avoidance  4  Generalized anxiety disorder    Stable  Patient denies any recent exacerbations  Will continue with his p r n  use of alprazolam   Follow up with patient in 6 months  BMI Counseling: Body mass index is 29 82 kg/m²  The BMI is above normal  Nutrition recommendations include decreasing portion sizes, encouraging healthy choices of fruits and vegetables, decreasing fast food intake, consuming healthier snacks and limiting drinks that contain sugar  Exercise recommendations include moderate physical activity 150 minutes/week and exercising 3-5 times per week  No pharmacotherapy was ordered  Patient referred to PCP  Rationale for BMI follow-up plan is due to patient being overweight or obese  Depression Screening and Follow-up Plan:   Patient was screened for depression during today's encounter  They screened negative with a PHQ-2 score of 0            Diagnoses and all orders for this visit:    Need for vaccination  -     influenza vaccine, high-dose, PF 0 7 mL (FLUZONE HIGH-DOSE)    Benign essential hypertension  -     Comprehensive metabolic panel; Future  -     Comprehensive metabolic panel    Right pulmonary embolus (HCC)    Gastroesophageal reflux disease without esophagitis    Generalized anxiety disorder    Mixed hyperlipidemia  -     Lipid Panel with Direct LDL reflex; Future  -     Lipid Panel with Direct LDL reflex    Prediabetes  -     Hemoglobin A1C W/Refl To Glycomark(R); Future  -     Hemoglobin A1C W/Refl To Glycomark(R)    Abnormal thyroid function test  -     TSH, 3rd generation with Free T4 reflex; Future  -     TSH, 3rd generation with Free T4 reflex    Need for hepatitis C screening test  -     Hepatitis C RNA, quantitative, PCR; Future  -     Hepatitis C RNA, quantitative, PCR    Leukocytosis, unspecified type  -     CBC; Future  -     CBC    Screening for AAA (abdominal aortic aneurysm)  -     US abdominal aorta screening aaa; Future          Subjective:       Chief Complaint   Patient presents with    Follow-up    Medicare Wellness Visit      Patient ID: Bonnie Monday is a 72 y o  male   Presents today for follow-up on his chronic conditions  He has hypertension, right pulmonary embolus, GERD, generalized anxiety, dyslipidemia as well as pre diabetes  He denies adverse reactions with medications  He does follow up with his specialists including his hematologist regularly  HPI    Review of Systems   Constitutional: Negative for activity change, chills, fatigue and fever  HENT: Negative for congestion, ear pain, sinus pressure and sore throat  Eyes: Negative for redness, itching and visual disturbance  Respiratory: Negative for cough and shortness of breath  Cardiovascular: Negative for chest pain and palpitations  Gastrointestinal: Negative for abdominal pain, diarrhea and nausea  Endocrine: Negative for cold intolerance and heat intolerance  Genitourinary: Negative for dysuria, flank pain and frequency  Musculoskeletal: Negative for arthralgias, back pain, gait problem and myalgias     Skin: Negative for color change  Allergic/Immunologic: Negative for environmental allergies  Neurological: Negative for dizziness, numbness and headaches  Psychiatric/Behavioral: Negative for behavioral problems and sleep disturbance  The following portions of the patient's history were reviewed and updated as appropriate : past family history, past medical history, past social history and past surgical history      Current Outpatient Medications:     ALPRAZolam (XANAX) 0 5 mg tablet, Take 1 tablet (0 5 mg total) by mouth daily as needed for anxiety, Disp: 30 tablet, Rfl: 0    Bystolic 10 MG tablet, TAKE 1 TABLET DAILY, Disp: 90 tablet, Rfl: 1    cholecalciferol (VITAMIN D3) 1,000 units tablet, Take 1,000 Units by mouth daily, Disp: , Rfl:     Coenzyme Q10 300 MG CAPS, Take by mouth daily , Disp: , Rfl:     desoximetasone (TOPICORT) 0 05 % cream, Apply topically 2 (two) times a day, Disp: 30 g, Rfl: 1    Eliquis 5 MG, TAKE 1 TABLET TWICE A DAY, Disp: 180 tablet, Rfl: 1    famotidine (PEPCID) 40 MG tablet, , Disp: , Rfl:     fluticasone (FLONASE) 50 mcg/act nasal spray, 2 sprays into each nostril daily, Disp: 3 Bottle, Rfl: 1    hydrochlorothiazide (HYDRODIURIL) 25 mg tablet, Take 1 tablet (25 mg total) by mouth daily, Disp: 90 tablet, Rfl: 1    omeprazole (PriLOSEC) 40 MG capsule, , Disp: , Rfl:     sildenafil (VIAGRA) 100 mg tablet, , Disp: , Rfl: 11    Azelastine HCl 0 15 % SOLN, 2 sprays into each nostril daily (Patient not taking: Reported on 3/1/2021), Disp: 30 mL, Rfl: 2    gabapentin (NEURONTIN) 100 mg capsule, Take 100 mg by mouth Three times a day, Disp: , Rfl:     tamsulosin (FLOMAX) 0 4 mg, Take 1 capsule (0 4 mg total) by mouth daily with dinner (Patient not taking: Reported on 8/20/2021), Disp: 30 capsule, Rfl: 5         Objective:         Vitals:    09/23/21 1150   BP: 130/90   BP Location: Left arm   Patient Position: Sitting   Cuff Size: Standard   Pulse: 65   Temp: 97 6 °F (36 4 °C)   TempSrc: Temporal   SpO2: 98%   Weight: 90 3 kg (199 lb)   Height: 5' 8 5" (1 74 m)     Physical Exam  Vitals reviewed  Constitutional:       Appearance: He is well-developed  HENT:      Head: Normocephalic and atraumatic  Nose: Nose normal       Mouth/Throat:      Pharynx: No oropharyngeal exudate  Eyes:      General: No scleral icterus  Right eye: No discharge  Left eye: No discharge  Pupils: Pupils are equal, round, and reactive to light  Neck:      Trachea: No tracheal deviation  Cardiovascular:      Rate and Rhythm: Normal rate and regular rhythm  Pulses:           Dorsalis pedis pulses are 2+ on the right side and 2+ on the left side  Posterior tibial pulses are 2+ on the right side and 2+ on the left side  Heart sounds: Normal heart sounds  No murmur heard  No friction rub  No gallop  Pulmonary:      Effort: Pulmonary effort is normal  No respiratory distress  Breath sounds: Normal breath sounds  No wheezing or rales  Abdominal:      General: Bowel sounds are normal  There is no distension  Palpations: Abdomen is soft  Tenderness: There is no abdominal tenderness  There is no guarding or rebound  Musculoskeletal:         General: Normal range of motion  Cervical back: Normal range of motion and neck supple  Lymphadenopathy:      Head:      Right side of head: No submental or submandibular adenopathy  Left side of head: No submental or submandibular adenopathy  Cervical: No cervical adenopathy  Right cervical: No superficial, deep or posterior cervical adenopathy  Left cervical: No superficial, deep or posterior cervical adenopathy  Skin:     General: Skin is warm and dry  Findings: No erythema  Neurological:      Mental Status: He is alert and oriented to person, place, and time  Cranial Nerves: No cranial nerve deficit  Sensory: No sensory deficit     Psychiatric:         Mood and Affect: Mood is not anxious or depressed  Speech: Speech normal          Behavior: Behavior normal          Thought Content:  Thought content normal          Judgment: Judgment normal

## 2021-09-23 NOTE — PROGRESS NOTES
Assessment and Plan:     Problem List Items Addressed This Visit     None      Visit Diagnoses     Need for vaccination    -  Primary    Relevant Orders    influenza vaccine, high-dose, PF 0 7 mL (FLUZONE HIGH-DOSE)        BMI Counseling: Body mass index is 29 82 kg/m²  The BMI is above normal  Nutrition recommendations include decreasing portion sizes, encouraging healthy choices of fruits and vegetables, decreasing fast food intake, consuming healthier snacks and limiting drinks that contain sugar  Exercise recommendations include moderate physical activity 150 minutes/week and exercising 3-5 times per week  No pharmacotherapy was ordered  Patient referred to PCP  Rationale for BMI follow-up plan is due to patient being overweight or obese  Depression Screening and Follow-up Plan:   Patient was screened for depression during today's encounter  They screened negative with a PHQ-2 score of 0  Preventive health issues were discussed with patient, and age appropriate screening tests were ordered as noted in patient's After Visit Summary  Personalized health advice and appropriate referrals for health education or preventive services given if needed, as noted in patient's After Visit Summary  History of Present Illness:     Patient presents for Welcome to Medicare visit  Patient Care Team:  Magaly Oviedo DO as PCP - General     Review of Systems:     Review of Systems   Constitutional: Negative for activity change, chills, fatigue and fever  HENT: Negative for congestion, ear pain, sinus pressure and sore throat  Eyes: Negative for redness, itching and visual disturbance  Respiratory: Negative for cough and shortness of breath  Cardiovascular: Negative for chest pain and palpitations  Gastrointestinal: Negative for abdominal pain, diarrhea and nausea  Endocrine: Negative for cold intolerance and heat intolerance  Genitourinary: Negative for dysuria, flank pain and frequency  Musculoskeletal: Negative for arthralgias, back pain, gait problem and myalgias  Skin: Negative for color change  Allergic/Immunologic: Negative for environmental allergies  Neurological: Negative for dizziness, numbness and headaches  Psychiatric/Behavioral: Negative for behavioral problems and sleep disturbance        Problem List:     Patient Active Problem List   Diagnosis    Benign essential hypertension    Hyperlipidemia    Prediabetes    Vitamin D deficiency    Chronic rhinitis    Esophageal reflux    Benign prostatic hyperplasia    Generalized anxiety disorder    Pyelonephritis    SIRS (systemic inflammatory response syndrome) (HCC)    Hypokalemia    IBS (irritable bowel syndrome)    Right pulmonary embolus (HCC)    Chest pain    Polyp of sigmoid colon    Screening for colon cancer    Irritable bowel syndrome with diarrhea    Gastroesophageal reflux disease    Right calf pain    Thrombocytopenia, unspecified ([facility] [address] )      Past Medical and Surgical History:     Past Medical History:   Diagnosis Date    Abdominal pain, right lower quadrant     Abnormal blood chemistry     Acid reflux     Cancer (HCC)     skin    Colon polyp     Contact dermatitis due to poison ivy     Depression screen     Flu     High blood pressure     History of acute conjunctivitis     right eye    History of allergic urticaria     History of chronic rhinitis     History of disease of skin and subcutaneous tissue     History of dysphagia     History of hemorrhoids     History of IBS     History of URI (upper respiratory infection)     Hyperlipidemia     Open wound of left lower leg     Other acute sinusitis     Other disturbances of oral epithelium, including tongue     Positive depression screening     Pulmonary emboli ([facility] [address] ) 2019    pt is on eliquis    Screening for prostate cancer     Skin rash     Sleep apnea     snores but has not had the study    Special screening examination for neoplasm of prostate     Tingling      Past Surgical History:   Procedure Laterality Date    COLONOSCOPY      COLONOSCOPY W/ POLYPECTOMY      Via Colostomy    RECTAL SURGERY      TONSILLECTOMY      WISDOM TOOTH EXTRACTION        Family History:     Family History   Problem Relation Age of Onset    Breast cancer Mother     Lung cancer Family     Lung cancer Father     Substance Abuse Neg Hx         Mother and Father    Mental illness Neg Hx         Mother and Father      Social History:     Social History     Socioeconomic History    Marital status:      Spouse name: None    Number of children: None    Years of education: None    Highest education level: None   Occupational History    None   Tobacco Use    Smoking status: Former Smoker     Packs/day: 0 50     Years: 3 00     Pack years: 1 50     Quit date: 1982     Years since quittin 2    Smokeless tobacco: Never Used   Vaping Use    Vaping Use: Never used   Substance and Sexual Activity    Alcohol use: Not Currently     Comment: Social    Drug use: No    Sexual activity: None   Other Topics Concern    None   Social History Narrative    [facility]els [facility] at [facility]     Social Determinants of Health     Financial Resource Strain:     Difficulty of Paying Living Expenses:    Food Insecurity:     Worried About Running Out of Food in the Last Year:     Ran Out of Food in the Last Year:    Transportation Needs:     Lack of Transportation (Medical):      Lack of Transportation (Non-Medical):    Physical Activity:     Days of Exercise per Week:     Minutes of Exercise per Session:    Stress:     Feeling of Stress :    Social Connections:     Frequency of Communication with Friends and Family:     Frequency of Social Gatherings with Friends and Family:     Attends [employer] Services:     Active Member of Clubs or Organizations:     Attends Club or Organization Meetings:     Marital Status:    Intimate Partner Violence:     Fear of Current or Ex-Partner:     Emotionally Abused:     Physically Abused:     Sexually Abused:       Medications and Allergies:     Current Outpatient Medications   Medication Sig Dispense Refill    ALPRAZolam (XANAX) 0 5 mg tablet Take 1 tablet (0 5 mg total) by mouth daily as needed for anxiety 30 tablet 0    Bystolic 10 MG tablet TAKE 1 TABLET DAILY 90 tablet 1    cholecalciferol (VITAMIN D3) 1,000 units tablet Take 1,000 Units by mouth daily      Coenzyme Q10 300 MG CAPS Take by mouth daily       desoximetasone (TOPICORT) 0 05 % cream Apply topically 2 (two) times a day 30 g 1    Eliquis 5 MG TAKE 1 TABLET TWICE A  tablet 1    famotidine (PEPCID) 40 MG tablet       fluticasone (FLONASE) 50 mcg/act nasal spray 2 sprays into each nostril daily 3 Bottle 1    hydrochlorothiazide (HYDRODIURIL) 25 mg tablet Take 1 tablet (25 mg total) by mouth daily 90 tablet 1    omeprazole (PriLOSEC) 40 MG capsule       sildenafil (VIAGRA) 100 mg tablet   11    Azelastine HCl 0 15 % SOLN 2 sprays into each nostril daily (Patient not taking: Reported on 3/1/2021) 30 mL 2    gabapentin (NEURONTIN) 100 mg capsule Take 100 mg by mouth Three times a day      mupirocin (BACTROBAN) 2 % ointment  (Patient not taking: Reported on 8/20/2021)      Omega-3 Fatty Acids (FISH OIL OMEGA-3) 1000 MG CAPS Take 2 g by mouth daily (Patient not taking: Reported on 8/20/2021)      omeprazole (PriLOSEC) 20 mg delayed release capsule Take 1 capsule (20 mg total) by mouth daily before breakfast (Patient not taking: Reported on 3/1/2021) 30 capsule 0    tamsulosin (FLOMAX) 0 4 mg Take 1 capsule (0 4 mg total) by mouth daily with dinner (Patient not taking: Reported on 8/20/2021) 30 capsule 5     No current facility-administered medications for this visit       Allergies   Allergen Reactions    Lisinopril Throat Swelling, Edema and Angioedema    Amlodipine Palpitations    Diltiazem Rash     Hot rash on feet      Immunizations: Immunization History   Administered Date(s) Administered    Influenza, recombinant, quadrivalent,injectable, preservative free 12/16/2019, 10/13/2020    SARS-CoV-2 / COVID-19 mRNA IM (Pfizer-BioNTech) 07/31/2021, 08/21/2021    Td (adult), adsorbed 10/02/2014      Health Maintenance:         Topic Date Due    Hepatitis C Screening  Never done    HIV Screening  Never done    Colorectal Cancer Screening  09/27/2029         Topic Date Due    DTaP,Tdap,and Td Vaccines (1 - Tdap) 12/12/1976    Pneumococcal Vaccine: 65+ Years (1 of 1 - PPSV23) Never done    Influenza Vaccine (1) 09/01/2021      Medicare Screening Tests and Risk Assessments:     Juana Godfrey is here for his Welcome to Medicare visit  Health Risk Assessment:   Patient rates overall health as very good  Patient feels that their physical health rating is same  Patient is very satisfied with their life  Eyesight was rated as same  Hearing was rated as same  Patient feels that their emotional and mental health rating is much better  Patients states they are never, rarely angry  Patient states they are often unusually tired/fatigued  Pain experienced in the last 7 days has been none  Patient states that he has experienced no weight loss or gain in last 6 months  Depression Screening:   PHQ-2 Score: 0      Fall Risk Screening: In the past year, patient has experienced: no history of falling in past year      Home Safety:  Patient does not have trouble with stairs inside or outside of their home  Patient has working smoke alarms and has working carbon monoxide detector  Home safety hazards include: none  Nutrition:   Current diet is Regular  Medications:   Patient is currently taking over-the-counter supplements  OTC medications include: see medication list  Patient is able to manage medications       Activities of Daily Living (ADLs)/Instrumental Activities of Daily Living (IADLs):   Walk and transfer into and out of bed and chair?: Yes  Dress and groom yourself?: Yes    Bathe or shower yourself?: Yes    Feed yourself? Yes  Do your laundry/housekeeping?: Yes  Manage your money, pay your bills and track your expenses?: Yes  Make your own meals?: Yes    Do your own shopping?: Yes    Previous Hospitalizations:   Any hospitalizations or ED visits within the last 12 months?: No      Advance Care Planning:   Living will: No    Durable POA for healthcare: No    Advanced directive: No    Advanced directive counseling given: Yes    Five wishes given: Yes    Patient declined ACP directive: No    End of Life Decisions reviewed with patient: Yes    Provider agrees with end of life decisions: Yes      Cognitive Screening:   Provider or family/friend/caregiver concerned regarding cognition?: No    PREVENTIVE SCREENINGS      Cardiovascular Screening:    General: Screening Not Indicated, History Lipid Disorder, Risks and Benefits Discussed and Screening Current    Due for: Lipid Panel      Diabetes Screening:     General: Screening Current and Risks and Benefits Discussed    Due for: Blood Glucose      Colorectal Cancer Screening:     General: Screening Current      Prostate Cancer Screening:    General: Screening Current and Risks and Benefits Discussed      Osteoporosis Screening:    General: Risks and Benefits Discussed and Screening Not Indicated      Abdominal Aortic Aneurysm (AAA) Screening:    Risk factors include: age between 73-67 yo and tobacco use        General: Risks and Benefits Discussed    Due for: Screening AAA Ultrasound      Lung Cancer Screening:     General: Screening Not Indicated and Risks and Benefits Discussed      Hepatitis C Screening:    General: Risks and Benefits Discussed    Hep C Screening Accepted: Yes      Screening, Brief Intervention, and Referral to Treatment (SBIRT)    Screening  Typical number of drinks in a day: 0  Typical number of drinks in a week: 1  Interpretation: Low risk drinking behavior      AUDIT-C Screenin) How often did you have a drink containing alcohol in the past year? monthly or less  2) How many drinks did you have on a typical day when you were drinking in the past year? 1 to 2  3) How often did you have 6 or more drinks on one occasion in the past year? never    AUDIT-C Score: 1  Interpretation: Score 0-3 (male): Negative screen for alcohol misuse    Single Item Drug Screening:  How often have you used an illegal drug (including marijuana) or a prescription medication for non-medical reasons in the past year? never    Single Item Drug Screen Score: 0  Interpretation: Negative screen for possible drug use disorder    No exam data present     Physical Exam:     /90 (BP Location: Left arm, Patient Position: Sitting, Cuff Size: Standard)   Pulse 65   Temp 97 6 °F (36 4 °C) (Temporal)   Ht 5' 8 5" (1 74 m)   Wt 90 3 kg (199 lb)   SpO2 98%   BMI 29 82 kg/m²     Physical Exam  Vitals reviewed  Constitutional:       General: He is not in acute distress  Appearance: He is well-developed  He is not diaphoretic  HENT:      Head: Normocephalic and atraumatic  No right periorbital erythema or left periorbital erythema  Right Ear: Tympanic membrane normal  No decreased hearing noted  Left Ear: Tympanic membrane normal  No decreased hearing noted  Nose: Nose normal       Right Sinus: No maxillary sinus tenderness or frontal sinus tenderness  Left Sinus: No maxillary sinus tenderness or frontal sinus tenderness  Mouth/Throat:      Lips: Pink  Mouth: Mucous membranes are moist       Pharynx: No oropharyngeal exudate  Tonsils: No tonsillar exudate or tonsillar abscesses  Eyes:      General: Lids are normal       Extraocular Movements: Extraocular movements intact  Conjunctiva/sclera: Conjunctivae normal       Pupils: Pupils are equal, round, and reactive to light  Neck:      Thyroid: No thyroid mass        Trachea: Trachea normal    Cardiovascular: Rate and Rhythm: Normal rate and regular rhythm  Pulses: Normal pulses  Heart sounds: Normal heart sounds  No murmur heard  No friction rub  No gallop  Pulmonary:      Effort: Pulmonary effort is normal  No respiratory distress  Breath sounds: Normal breath sounds  No wheezing or rales  Abdominal:      General: Bowel sounds are normal  There is no distension  Palpations: Abdomen is soft  There is no mass  Tenderness: There is no abdominal tenderness  There is no guarding  Musculoskeletal:         General: Normal range of motion  Cervical back: Normal range of motion and neck supple  Skin:     General: Skin is warm and dry  Coloration: Skin is not pale  Findings: No erythema or rash  Neurological:      Mental Status: He is alert and oriented to person, place, and time  Cranial Nerves: No cranial nerve deficit  Coordination: Coordination normal    Psychiatric:         Attention and Perception: Attention and perception normal          Mood and Affect: Mood and affect normal          Speech: Speech normal          Behavior: Behavior normal          Thought Content:  Thought content normal          Cognition and Memory: Cognition and memory normal          Judgment: Judgment normal           Ihab Abdelaal, DO

## 2021-09-23 NOTE — PATIENT INSTRUCTIONS

## 2021-10-11 ENCOUNTER — HOSPITAL ENCOUNTER (OUTPATIENT)
Dept: RADIOLOGY | Facility: HOSPITAL | Age: 66
Discharge: HOME | End: 2021-10-11
Payer: MEDICARE

## 2021-10-11 DIAGNOSIS — Z13.6 SCREENING FOR AAA (ABDOMINAL AORTIC ANEURYSM): ICD-10-CM

## 2021-10-11 PROCEDURE — 76706 US ABDL AORTA SCREEN AAA: CPT

## 2021-11-09 DIAGNOSIS — J31.0 CHRONIC RHINITIS: ICD-10-CM

## 2021-11-09 RX ORDER — FLUTICASONE PROPIONATE 50 MCG
SPRAY, SUSPENSION (ML) NASAL
Qty: 48 G | Refills: 1 | Status: SHIPPED | OUTPATIENT
Start: 2021-11-09

## 2021-11-10 DIAGNOSIS — I10 BENIGN ESSENTIAL HYPERTENSION: ICD-10-CM

## 2021-11-10 RX ORDER — HYDROCHLOROTHIAZIDE 25 MG/1
TABLET ORAL
Qty: 90 TABLET | Refills: 0 | Status: SHIPPED | OUTPATIENT
Start: 2021-11-10 | End: 2022-02-19 | Stop reason: SDUPTHER

## 2021-12-13 DIAGNOSIS — I10 BENIGN ESSENTIAL HYPERTENSION: ICD-10-CM

## 2021-12-13 RX ORDER — NEBIVOLOL HYDROCHLORIDE 10 MG/1
TABLET ORAL
Qty: 90 TABLET | Refills: 1 | Status: SHIPPED | OUTPATIENT
Start: 2021-12-13 | End: 2022-08-08 | Stop reason: SDUPTHER

## 2022-01-20 LAB
ALBUMIN SERPL-MCNC: 4.1 G/DL (ref 3.6–5.1)
ALBUMIN/GLOB SERPL: 1.8 (CALC) (ref 1–2.5)
ALP SERPL-CCNC: 62 U/L (ref 35–144)
ALT SERPL-CCNC: 16 U/L (ref 9–46)
AST SERPL-CCNC: 16 U/L (ref 10–35)
BILIRUB SERPL-MCNC: 1.1 MG/DL (ref 0.2–1.2)
BUN SERPL-MCNC: 14 MG/DL (ref 7–25)
BUN/CREAT SERPL: ABNORMAL (CALC) (ref 6–22)
CALCIUM SERPL-MCNC: 9.3 MG/DL (ref 8.6–10.3)
CHLORIDE SERPL-SCNC: 103 MMOL/L (ref 98–110)
CHOLEST SERPL-MCNC: 217 MG/DL
CHOLEST/HDLC SERPL: 4.2 (CALC)
CO2 SERPL-SCNC: 30 MMOL/L (ref 20–32)
CREAT SERPL-MCNC: 1.05 MG/DL (ref 0.7–1.25)
ERYTHROCYTE [DISTWIDTH] IN BLOOD BY AUTOMATED COUNT: 12.5 % (ref 11–15)
GLOBULIN SER CALC-MCNC: 2.3 G/DL (CALC) (ref 1.9–3.7)
GLUCOSE SERPL-MCNC: 111 MG/DL (ref 65–99)
HBA1C MFR BLD: 5.6 % OF TOTAL HGB
HCT VFR BLD AUTO: 42.3 % (ref 38.5–50)
HCV RNA SERPL NAA+PROBE-ACNC: NORMAL IU/ML
HCV RNA SERPL NAA+PROBE-LOG IU: NORMAL LOG IU/ML
HDLC SERPL-MCNC: 52 MG/DL
HGB BLD-MCNC: 14.7 G/DL (ref 13.2–17.1)
LDLC SERPL CALC-MCNC: 144 MG/DL (CALC)
MCH RBC QN AUTO: 29.5 PG (ref 27–33)
MCHC RBC AUTO-ENTMCNC: 34.8 G/DL (ref 32–36)
MCV RBC AUTO: 84.9 FL (ref 80–100)
NONHDLC SERPL-MCNC: 165 MG/DL (CALC)
PLATELET # BLD AUTO: 303 THOUSAND/UL (ref 140–400)
PMV BLD REES-ECKER: 10.7 FL (ref 7.5–12.5)
POTASSIUM SERPL-SCNC: 3.9 MMOL/L (ref 3.5–5.3)
PROT SERPL-MCNC: 6.4 G/DL (ref 6.1–8.1)
RBC # BLD AUTO: 4.98 MILLION/UL (ref 4.2–5.8)
SL AMB EGFR AFRICAN AMERICAN: 85 ML/MIN/1.73M2
SL AMB EGFR NON AFRICAN AMERICAN: 74 ML/MIN/1.73M2
SODIUM SERPL-SCNC: 141 MMOL/L (ref 135–146)
TRIGL SERPL-MCNC: 99 MG/DL
TSH SERPL-ACNC: 3.4 MIU/L (ref 0.4–4.5)
WBC # BLD AUTO: 8.6 THOUSAND/UL (ref 3.8–10.8)

## 2022-02-19 DIAGNOSIS — I10 BENIGN ESSENTIAL HYPERTENSION: ICD-10-CM

## 2022-02-19 RX ORDER — HYDROCHLOROTHIAZIDE 25 MG/1
25 TABLET ORAL DAILY
Qty: 90 TABLET | Refills: 1 | Status: SHIPPED | OUTPATIENT
Start: 2022-02-19

## 2022-02-21 ENCOUNTER — IMMUNIZATIONS (OUTPATIENT)
Dept: FAMILY MEDICINE CLINIC | Facility: HOSPITAL | Age: 67
End: 2022-02-21

## 2022-02-21 PROCEDURE — 0051A COVID-19 PFIZER VACC TRIS-SUCROSE GRAY CAP 0.3 ML: CPT

## 2022-02-21 PROCEDURE — 91305 COVID-19 PFIZER VACC TRIS-SUCROSE GRAY CAP 0.3 ML: CPT

## 2022-02-28 DIAGNOSIS — I26.99 RIGHT PULMONARY EMBOLUS (HCC): ICD-10-CM

## 2022-03-01 RX ORDER — APIXABAN 5 MG/1
TABLET, FILM COATED ORAL
Qty: 180 TABLET | Refills: 1 | Status: SHIPPED | OUTPATIENT
Start: 2022-03-01

## 2022-03-23 ENCOUNTER — OFFICE VISIT (OUTPATIENT)
Dept: FAMILY MEDICINE CLINIC | Facility: CLINIC | Age: 67
End: 2022-03-23
Payer: MEDICARE

## 2022-03-23 VITALS
DIASTOLIC BLOOD PRESSURE: 86 MMHG | BODY MASS INDEX: 29.77 KG/M2 | TEMPERATURE: 98.6 F | RESPIRATION RATE: 18 BRPM | HEART RATE: 79 BPM | HEIGHT: 69 IN | OXYGEN SATURATION: 98 % | SYSTOLIC BLOOD PRESSURE: 126 MMHG | WEIGHT: 201 LBS

## 2022-03-23 DIAGNOSIS — I26.99 RIGHT PULMONARY EMBOLUS (HCC): Primary | ICD-10-CM

## 2022-03-23 DIAGNOSIS — K21.9 GASTROESOPHAGEAL REFLUX DISEASE WITHOUT ESOPHAGITIS: ICD-10-CM

## 2022-03-23 DIAGNOSIS — D69.6 THROMBOCYTOPENIA, UNSPECIFIED (HCC): ICD-10-CM

## 2022-03-23 DIAGNOSIS — F41.1 GENERALIZED ANXIETY DISORDER: ICD-10-CM

## 2022-03-23 DIAGNOSIS — R94.6 ABNORMAL THYROID FUNCTION TEST: ICD-10-CM

## 2022-03-23 DIAGNOSIS — D72.829 LEUKOCYTOSIS, UNSPECIFIED TYPE: ICD-10-CM

## 2022-03-23 DIAGNOSIS — I10 BENIGN ESSENTIAL HYPERTENSION: ICD-10-CM

## 2022-03-23 DIAGNOSIS — R73.03 PREDIABETES: ICD-10-CM

## 2022-03-23 DIAGNOSIS — E78.2 MIXED HYPERLIPIDEMIA: ICD-10-CM

## 2022-03-23 DIAGNOSIS — J31.0 CHRONIC RHINITIS: ICD-10-CM

## 2022-03-23 PROCEDURE — 99214 OFFICE O/P EST MOD 30 MIN: CPT | Performed by: FAMILY MEDICINE

## 2022-03-23 RX ORDER — FLUOROURACIL 50 MG/G
CREAM TOPICAL
COMMUNITY
Start: 2022-03-22

## 2022-03-23 NOTE — PATIENT INSTRUCTIONS
Cholesterol and Your Health   AMBULATORY CARE:   Cholesterol  is a waxy, fat-like substance  Your body uses cholesterol to make hormones and new cells, and to protect nerves  Cholesterol is made by your body  It also comes from certain foods you eat, such as meat and dairy products  Your healthcare provider can help you set goals for your cholesterol levels  He or she can help you create a plan to meet your goals  Cholesterol level goals: Your cholesterol level goals depend on your risk for heart disease, your age, and your other health conditions  The following are general guidelines:  · Total cholesterol  includes low-density lipoprotein (LDL), high-density lipoprotein (HDL), and triglyceride levels  The total cholesterol level should be lower than 200 mg/dL and is best at about 150 mg/dL  · LDL cholesterol  is called bad cholesterol  because it forms plaque in your arteries  As plaque builds up, your arteries become narrow, and less blood flows through  When plaque decreases blood flow to your heart, you may have chest pain  If plaque completely blocks an artery that brings blood to your heart, you may have a heart attack  Plaque can break off and form blood clots  Blood clots may block arteries in your brain and cause a stroke  The level should be less than 130 mg/dL and is best at about 100 mg/dL  · HDL cholesterol  is called good cholesterol  because it helps remove LDL cholesterol from your arteries  It does this by attaching to LDL cholesterol and carrying it to your liver  Your liver breaks down LDL cholesterol so your body can get rid of it  High levels of HDL cholesterol can help prevent a heart attack and stroke  Low levels of HDL cholesterol can increase your risk for heart disease, heart attack, and stroke  The level should be 60 mg/dL or higher  · Triglycerides  are a type of fat that store energy from foods you eat  High levels of triglycerides also cause plaque buildup   This can increase your risk for a heart attack or stroke  If your triglyceride level is high, your LDL cholesterol level may also be high  The level should be less than 150 mg/dL  Any of the following can increase your risk for high cholesterol:   · Smoking cigarettes    · Being overweight or obese, or not getting enough exercise    · Drinking large amounts of alcohol    · A medical condition such as hypertension (high blood pressure) or diabetes    · Certain genes passed from your parents to you    · Age older than 65 years    What you need to know about having your cholesterol levels checked: Adults 21to 39years of age should have their cholesterol levels checked every 4 to 6 years  Adults 45 years or older should have their cholesterol checked every 1 to 2 years  You may need your cholesterol checked more often, or at a younger age, if you have risk factors for heart disease  You may also need to have your cholesterol checked more often if you have other health conditions, such as diabetes  Blood tests are used to check cholesterol levels  Blood tests measure your levels of triglycerides, LDL cholesterol, and HDL cholesterol  How healthy fats affect your cholesterol levels:  Healthy fats, also called unsaturated fats, help lower LDL cholesterol and triglyceride levels  Healthy fats include the following:  · Monounsaturated fats  are found in foods such as olive oil, canola oil, avocado, nuts, and olives  · Polyunsaturated fats,  such as omega 3 fats, are found in fish, such as salmon, trout, and tuna  They can also be found in plant foods such as flaxseed, walnuts, and soybeans  How unhealthy fats affect your cholesterol levels:  Unhealthy fats increase LDL cholesterol and triglyceride levels  They are found in foods high in cholesterol, saturated fat, and trans fat:  · Cholesterol  is found in eggs, dairy, and meat  · Saturated fat  is found in butter, cheese, ice cream, whole milk, and coconut oil  Saturated fat is also found in meat, such as sausage, hot dogs, and bologna  · Trans fat  is found in liquid oils and is used in fried and baked foods  Foods that contain trans fats include chips, crackers, muffins, sweet rolls, microwave popcorn, and cookies  Treatment  for high cholesterol will also decrease your risk of heart disease, heart attack, and stroke  Treatment may include any of the following:  · Lifestyle changes  may include food, exercise, weight loss, and quitting smoking  You may also need to decrease the amount of alcohol you drink  Your healthcare provider will want you to start with lifestyle changes  Other treatment may be added if lifestyle changes are not enough  Your healthcare provider may recommend you work with a team to manage hyperlipidemia  The team may include medical experts such as a dietitian, an exercise or physical therapist, and a behavior therapist  Your family members may be included in helping you create lifestyle changes  · Medicines  may be given to lower your LDL cholesterol, triglyceride levels, or total cholesterol level  You may need medicines to lower your cholesterol if any of the following is true:    ? You have a history of stroke, TIA, unstable angina, or a heart attack  ? Your LDL cholesterol level is 190 mg/dL or higher  ? You are age 36 to 76 years, have diabetes or heart disease risk factors, and your LDL cholesterol is 70 mg/dL or higher  · Supplements  include fish oil, red yeast rice, and garlic  Fish oil may help lower your triglyceride and LDL cholesterol levels  It may also increase your HDL cholesterol level  Red yeast rice may help decrease your total cholesterol level and LDL cholesterol level  Garlic may help lower your total cholesterol level  Do not take any supplements without talking to your healthcare provider  Food changes you can make to lower your cholesterol levels:  A dietitian can help you create a healthy eating plan  He or she can show you how to read food labels and choose foods low in saturated fat, trans fats, and cholesterol  · Decrease the total amount of fat you eat  Choose lean meats, fat-free or 1% fat milk, and low-fat dairy products, such as yogurt and cheese  Try to limit or avoid red meats  Limit or do not eat fried foods or baked goods, such as cookies  · Replace unhealthy fats with healthy fats  Cook foods in olive oil or canola oil  Choose soft margarines that are low in saturated fat and trans fat  Seeds, nuts, and avocados are other examples of healthy fats  · Eat foods with omega-3 fats  Examples include salmon, tuna, mackerel, walnuts, and flaxseed  Eat fish 2 times per week  Pregnant women should not eat fish that have high levels of mercury, such as shark, swordfish, and gaby mackerel  · Increase the amount of high-fiber foods you eat  High-fiber foods can help lower your LDL cholesterol  Aim to get between 20 and 30 grams of fiber each day  Fruits and vegetables are high in fiber  Eat at least 5 servings each day  Other high-fiber foods are whole-grain or whole-wheat breads, pastas, or cereals, and brown rice  Eat 3 ounces of whole-grain foods each day  Increase fiber slowly  You may have abdominal discomfort, bloating, and gas if you add fiber to your diet too quickly  · Eat healthy protein foods  Examples include low-fat dairy products, skinless chicken and turkey, fish, and nuts  · Limit foods and drinks that are high in sugar  Your dietitian or healthcare provider can help you create daily limits for high-sugar foods and drinks  The limit may be lower if you have diabetes or another health condition  Limits can also help you lose weight if needed  Lifestyle changes you can make to lower your cholesterol levels:   · Maintain a healthy weight  Ask your healthcare provider what a healthy weight is for you   Ask him or her to help you create a weight loss plan if needed  Weight loss can decrease your total cholesterol and triglyceride levels  Weight loss may also help keep your blood pressure at a healthy level  · Be physically active throughout the day  Physical activity, such as exercise, can help lower your total cholesterol level and maintain a healthy weight  Physical activity can also help increase your HDL cholesterol level  Work with your healthcare provider to create an program that is right for you  Get at least 30 to 40 minutes of moderate physical activity most days of the week  Examples of exercise include brisk walking, swimming, or biking  Also include strength training at least 2 times each week  Your healthcare providers can help you create a physical activity plan  · Do not smoke  Nicotine and other chemicals in cigarettes and cigars can raise your cholesterol levels  Ask your healthcare provider for information if you currently smoke and need help to quit  E-cigarettes or smokeless tobacco still contain nicotine  Talk to your healthcare provider before you use these products  · Limit or do not drink alcohol  Alcohol can increase your triglyceride levels  Ask your healthcare provider before you drink alcohol  Ask how much is okay for you to drink in 24 hours or 1 week  Follow up with your doctor as directed:  Write down your questions so you remember to ask them during your visits  © Copyright [employer] 2022 Information is for End User's use only and may not be sold, redistributed or otherwise used for commercial purposes  All illustrations and images included in CareNotes® are the copyrighted property of [employer]  or [employer]ur Lujan   The above information is an  only  It is not intended as medical advice for individual conditions or treatments  Talk to your doctor, nurse or pharmacist before following any medical regimen to see if it is safe and effective for you

## 2022-03-23 NOTE — PROGRESS NOTES
Assessment/Plan:   1  Right pulmonary embolus [facility])    Patient appears clinically stable today  Some he is to continue this current treatment Eliquis 5 mg b i d     2  Benign essential hypertension   stable as well  Continue routine home monitoring as well as current treatment with Bystolic as well as hydrochlorothiazide  - Comprehensive metabolic panel; Future  - Comprehensive metabolic panel    3  Mixed hyperlipidemia    Overall stable  Continue  With a strict low-fat/ low-cholesterol diet  - Lipid Panel with Direct LDL reflex; Future  - Lipid Panel with Direct LDL reflex    4  Gastroesophageal reflux disease without esophagitis    Stable  Continue with dietary triggers for his well as his current treatment with omeprazole  5  Generalized anxiety disorder   mood appears stable today  At this time, will continue with his current treatment of alprazolam p r n  [place] PDMP does show days   - TSH, 3rd generation with Free T4 reflex; Future  - TSH, 3rd generation with Free T4 reflex    6  Chronic rhinitis    Symptoms appear secondary to chronic rhinitis  Continue with his current treatment of fluticasone    7  Prediabetes   A1c appears stable  Continue with a strict diet and exercise plan  Recheck his blood work in 6 months  Follow-up  At that time  - Hemoglobin A1C W/Refl To Glycomark(R); Future  - Hemoglobin A1C W/Refl To Glycomark(R)        BMI Counseling: Body mass index is 30 12 kg/m²  The BMI is above normal  Nutrition recommendations include decreasing portion sizes, encouraging healthy choices of fruits and vegetables, decreasing fast food intake, consuming healthier snacks and limiting drinks that contain sugar  Exercise recommendations include moderate physical activity 150 minutes/week and exercising 3-5 times per week  No pharmacotherapy was ordered  Patient referred to PCP  Rationale for BMI follow-up plan is due to patient being overweight or obese             Diagnoses and all orders for this visit:    Right pulmonary embolus (HCC)    Benign essential hypertension    Chronic rhinitis    Gastroesophageal reflux disease without esophagitis    Mixed hyperlipidemia    Generalized anxiety disorder    Prediabetes    Other orders  -     fluorouracil (EFUDEX) 5 % cream          Subjective:       Chief Complaint   Patient presents with    Follow-up     6 month, BW review       Patient ID: Jodi Carrasco is a 77 y o  male PCP for follow-up on his knees  He has previous pulmonary embolism, hypertension, dyslipidemia, GERD, anxiety disorder, chronic rhinitis  He has been taking his medications regularly  He denies adverse reactions with his medications  HPI    Review of Systems   Constitutional: Negative for activity change, chills, fatigue and fever  HENT: Negative for congestion, ear pain, sinus pressure and sore throat  Eyes: Negative for redness, itching and visual disturbance  Respiratory: Negative for cough and shortness of breath  Cardiovascular: Negative for chest pain and palpitations  Gastrointestinal: Negative for abdominal pain, diarrhea and nausea  Endocrine: Negative for cold intolerance and heat intolerance  Genitourinary: Negative for dysuria, flank pain and frequency  Musculoskeletal: Negative for arthralgias, back pain, gait problem and myalgias  Skin: Negative for color change  Allergic/Immunologic: Negative for environmental allergies  Neurological: Negative for dizziness, numbness and headaches  Psychiatric/Behavioral: Negative for behavioral problems and sleep disturbance  The following portions of the patient's history were reviewed and updated as appropriate : past family history, past medical history, past social history and past surgical history      Current Outpatient Medications:     ALPRAZolam (XANAX) 0 5 mg tablet, Take 1 tablet (0 5 mg total) by mouth daily as needed for anxiety, Disp: 30 tablet, Rfl: 0    Bystolic 10 MG tablet, TAKE 1 TABLET DAILY, Disp: 90 tablet, Rfl: 1    cholecalciferol (VITAMIN D3) 1,000 units tablet, Take 1,000 Units by mouth daily, Disp: , Rfl:     Coenzyme Q10 300 MG CAPS, Take by mouth daily , Disp: , Rfl:     desoximetasone (TOPICORT) 0 05 % cream, Apply topically 2 (two) times a day, Disp: 30 g, Rfl: 1    Eliquis 5 MG, TAKE 1 TABLET TWICE A DAY, Disp: 180 tablet, Rfl: 1    famotidine (PEPCID) 40 MG tablet, , Disp: , Rfl:     fluorouracil (EFUDEX) 5 % cream, , Disp: , Rfl:     fluticasone (FLONASE) 50 mcg/act nasal spray, USE 2 SPRAYS IN EACH       NOSTRIL DAILY, Disp: 48 g, Rfl: 1    hydrochlorothiazide (HYDRODIURIL) 25 mg tablet, Take 1 tablet (25 mg total) by mouth daily, Disp: 90 tablet, Rfl: 1    omeprazole (PriLOSEC) 40 MG capsule, , Disp: , Rfl:     sildenafil (VIAGRA) 100 mg tablet, , Disp: , Rfl: 11    Azelastine HCl 0 15 % SOLN, 2 sprays into each nostril daily (Patient not taking: Reported on 3/1/2021), Disp: 30 mL, Rfl: 2    gabapentin (NEURONTIN) 100 mg capsule, Take 100 mg by mouth Three times a day, Disp: , Rfl:     tamsulosin (FLOMAX) 0 4 mg, Take 1 capsule (0 4 mg total) by mouth daily with dinner (Patient not taking: Reported on 8/20/2021), Disp: 30 capsule, Rfl: 5         Objective:         Vitals:    03/23/22 1133   BP: 126/86   BP Location: Left arm   Patient Position: Sitting   Cuff Size: Large   Pulse: 79   Resp: 18   Temp: 98 6 °F (37 °C)   TempSrc: Tympanic   SpO2: 98%   Weight: 91 2 kg (201 lb)   Height: 5' 8 5" (1 74 m)     Physical Exam  Vitals reviewed  Constitutional:       Appearance: He is well-developed  HENT:      Head: Normocephalic and atraumatic  Nose: Nose normal       Mouth/Throat:      Pharynx: No oropharyngeal exudate  Eyes:      General: No scleral icterus  Right eye: No discharge  Left eye: No discharge  Pupils: Pupils are equal, round, and reactive to light  Neck:      Trachea: No tracheal deviation     Cardiovascular:      Rate and Rhythm: Normal rate and regular rhythm  Pulses:           Dorsalis pedis pulses are 2+ on the right side and 2+ on the left side  Posterior tibial pulses are 2+ on the right side and 2+ on the left side  Heart sounds: Normal heart sounds  No murmur heard  No friction rub  No gallop  Pulmonary:      Effort: Pulmonary effort is normal  No respiratory distress  Breath sounds: Normal breath sounds  No wheezing or rales  Abdominal:      General: Bowel sounds are normal  There is no distension  Palpations: Abdomen is soft  Tenderness: There is no abdominal tenderness  There is no guarding or rebound  Musculoskeletal:         General: Normal range of motion  Cervical back: Normal range of motion and neck supple  Lymphadenopathy:      Head:      Right side of head: No submental or submandibular adenopathy  Left side of head: No submental or submandibular adenopathy  Cervical: No cervical adenopathy  Right cervical: No superficial, deep or posterior cervical adenopathy  Left cervical: No superficial, deep or posterior cervical adenopathy  Skin:     General: Skin is warm and dry  Findings: No erythema  Neurological:      Mental Status: He is alert and oriented to person, place, and time  Cranial Nerves: No cranial nerve deficit  Sensory: No sensory deficit  Psychiatric:         Mood and Affect: Mood is not anxious or depressed  Speech: Speech normal          Behavior: Behavior normal          Thought Content:  Thought content normal          Judgment: Judgment normal

## 2022-05-20 ENCOUNTER — OFFICE VISIT (OUTPATIENT)
Dept: FAMILY MEDICINE CLINIC | Facility: CLINIC | Age: 67
End: 2022-05-20
Payer: MEDICARE

## 2022-05-20 VITALS
HEIGHT: 68 IN | BODY MASS INDEX: 30.1 KG/M2 | DIASTOLIC BLOOD PRESSURE: 74 MMHG | WEIGHT: 198.6 LBS | HEART RATE: 72 BPM | OXYGEN SATURATION: 97 % | SYSTOLIC BLOOD PRESSURE: 126 MMHG | TEMPERATURE: 97.5 F

## 2022-05-20 DIAGNOSIS — K13.70 MOUTH LESION: Primary | ICD-10-CM

## 2022-05-20 DIAGNOSIS — N40.1 BENIGN PROSTATIC HYPERPLASIA WITH URINARY FREQUENCY: ICD-10-CM

## 2022-05-20 DIAGNOSIS — R35.0 BENIGN PROSTATIC HYPERPLASIA WITH URINARY FREQUENCY: ICD-10-CM

## 2022-05-20 DIAGNOSIS — F33.9 DEPRESSION, RECURRENT (HCC): ICD-10-CM

## 2022-05-20 PROCEDURE — 99214 OFFICE O/P EST MOD 30 MIN: CPT | Performed by: FAMILY MEDICINE

## 2022-05-20 NOTE — PATIENT INSTRUCTIONS
Depression   AMBULATORY CARE:   Depression  is a medical condition that causes feelings of sadness or hopelessness that do not go away  Depression may cause you to lose interest in things you used to enjoy  These feelings may interfere with your daily life  Common symptoms include the following:   · Appetite changes, or weight gain or loss    · Trouble going to sleep or staying asleep, or sleeping too much    · Fatigue or lack of energy    · Feeling restless, irritable, or withdrawn    · Feeling worthless, hopeless, discouraged, or guilty    · Trouble concentrating, remembering things, doing daily tasks, or making decisions    · Thoughts about hurting or killing yourself    Call your local emergency number (911 in the [address] [place] [address],[address]) if:   · You think about harming yourself or someone else  · You have done something on purpose to hurt yourself  Call your therapist or doctor if:   · Your symptoms do not improve  · You cannot make it to your next appointment  · You have new symptoms  · You have questions or concerns about your condition or care  The following resources are available at any time to help you, if needed:   · [address] [place] [address]: [phone] (3-458-984-TTCN)     · Suicide Hotline: 4-159.366.5966 (4-517-ITELBGD)     · For a list of international numbers: https://save org/find-help/international-resources/    Treatment for depression  may include medicine to relieve depression  Medicine is often used together with therapy  Therapy is a way for you to talk about your feelings and anything that may be causing depression  Therapy can be done alone or in a group  It may also be done with family members or a significant other  Self-care:   · Get regular physical activity  Try to be active for 30 minutes, 3 to 5 days a week  Physical activity can help relieve depression  Work with your healthcare provider to develop a plan that you enjoy   It may help to ask someone to be active with you  · Create a regular sleep schedule  A routine can help you relax before bed  Listen to music, read, or do yoga  Try to go to bed and wake up at the same time every day  Sleep is important for emotional health  · Eat a variety of healthy foods  Healthy foods include fruits, vegetables, whole-grain breads, low-fat dairy products, lean meats, fish, and cooked beans  A healthy meal plan is low in fat, salt, and added sugar  · Do not drink alcohol or use drugs  Alcohol and drugs can make depression worse  Talk to your therapist or doctor if you need help quitting  Follow up with your healthcare provider as directed: Your healthcare provider will monitor your progress at follow-up visits  He or she will also monitor your medicine if you take antidepressants  Your healthcare provider will ask if the medicine is helping  Tell him or her about any side effects or problems you may have with your medicine  The type or amount of medicine may need to be changed  Write down your questions so you remember to ask them during your visits  © Copyright [employer] 2022 Information is for End User's use only and may not be sold, redistributed or otherwise used for commercial purposes  All illustrations and images included in CareNotes® are the copyrighted property of [employer]  or [employer]   The above information is an  only  It is not intended as medical advice for individual conditions or treatments  Talk to your doctor, nurse or pharmacist before following any medical regimen to see if it is safe and effective for you

## 2022-05-20 NOTE — PROGRESS NOTES
Assessment/Plan:   1  Mouth lesion  Reviewed patient's mild sore today  At this time, symptoms appear likely secondary to traumatic irritation from biting his lip  Given this small nodule noted today as well as at patient's request, will refer patient to otolaryngology for further evaluation  If any symptoms should worsen, patient was advised to call or follow up  - Ambulatory Referral to Otolaryngology; Future    2  Depression, recurrent ([facility] [address] )    Depression Screening and Follow-up Plan: Patient's depression screening was positive with a PHQ-2 score of 6  Their PHQ-9 score was 10  Patient advised to follow-up with PCP for further management  Diagnoses and all orders for this visit:    Mouth lesion  -     Ambulatory Referral to Otolaryngology; Future    Depression, recurrent ([facility] [address] )    Benign prostatic hyperplasia with urinary frequency        Subjective:       Chief Complaint   Patient presents with    Mouth Lesions     Inside of L cheek      Patient ID: Sena Bowels is a 77 y o  male  Mouth Lesions   Episode onset: 3 weeks ago  The onset was gradual  The problem has been unchanged  The problem is mild  Nothing relieves the symptoms  Associated symptoms include mouth sores  Pertinent negatives include no fever, no eye itching, no abdominal pain, no diarrhea, no nausea, no congestion, no ear pain, no headaches, no rhinorrhea, no sore throat, no swollen glands, no cough and no eye redness  Review of Systems   Constitutional: Negative for activity change, chills, fatigue and fever  HENT: Positive for mouth sores  Negative for congestion, ear pain, rhinorrhea, sinus pressure and sore throat  Eyes: Negative for redness, itching and visual disturbance  Respiratory: Negative for cough and shortness of breath  Cardiovascular: Negative for chest pain and palpitations  Gastrointestinal: Negative for abdominal pain, diarrhea and nausea     Endocrine: Negative for cold intolerance and heat intolerance  Genitourinary: Negative for dysuria, flank pain and frequency  Musculoskeletal: Negative for arthralgias, back pain, gait problem and myalgias  Skin: Negative for color change  Allergic/Immunologic: Negative for environmental allergies  Neurological: Negative for dizziness, numbness and headaches  Psychiatric/Behavioral: Negative for behavioral problems and sleep disturbance  The following portions of the patient's history were reviewed and updated as appropriate : past family history, past medical history, past social history and past surgical history      Current Outpatient Medications:     ALPRAZolam (XANAX) 0 5 mg tablet, Take 1 tablet (0 5 mg total) by mouth daily as needed for anxiety, Disp: 30 tablet, Rfl: 0    Bystolic 10 MG tablet, TAKE 1 TABLET DAILY, Disp: 90 tablet, Rfl: 1    cholecalciferol (VITAMIN D3) 1,000 units tablet, Take 1,000 Units by mouth daily, Disp: , Rfl:     Coenzyme Q10 300 MG CAPS, Take by mouth daily , Disp: , Rfl:     Eliquis 5 MG, TAKE 1 TABLET TWICE A DAY, Disp: 180 tablet, Rfl: 1    famotidine (PEPCID) 40 MG tablet, , Disp: , Rfl:     fluticasone (FLONASE) 50 mcg/act nasal spray, USE 2 SPRAYS IN EACH       NOSTRIL DAILY, Disp: 48 g, Rfl: 1    hydrochlorothiazide (HYDRODIURIL) 25 mg tablet, Take 1 tablet (25 mg total) by mouth daily, Disp: 90 tablet, Rfl: 1    omeprazole (PriLOSEC) 40 MG capsule, , Disp: , Rfl:     sildenafil (VIAGRA) 100 mg tablet, , Disp: , Rfl: 11    desoximetasone (TOPICORT) 0 05 % cream, Apply topically 2 (two) times a day (Patient not taking: Reported on 5/20/2022), Disp: 30 g, Rfl: 1    fluorouracil (EFUDEX) 5 % cream, , Disp: , Rfl:     gabapentin (NEURONTIN) 100 mg capsule, Take 100 mg by mouth Three times a day (Patient not taking: Reported on 5/20/2022), Disp: , Rfl:     tamsulosin (FLOMAX) 0 4 mg, Take 1 capsule (0 4 mg total) by mouth daily with dinner (Patient not taking: No sig reported), Disp: 30 capsule, Rfl: 5         Objective:         Vitals:    05/20/22 0946   BP: 126/74   BP Location: Left arm   Patient Position: Sitting   Cuff Size: Standard   Pulse: 72   Temp: 97 5 °F (36 4 °C)   TempSrc: Temporal   SpO2: 97%   Weight: 90 1 kg (198 lb 9 6 oz)   Height: 5' 7 75" (1 721 m)     Physical Exam  Vitals reviewed  Constitutional:       Appearance: Normal appearance  He is well-developed  HENT:      Head: Normocephalic and atraumatic  Right Ear: Hearing normal       Left Ear: Hearing normal       Nose: Nose normal  No septal deviation  Mouth/Throat:      Comments: Small right nodule of buccal mucossa  Eyes:      General: Lids are normal       Pupils: Pupils are equal, round, and reactive to light  Neck:      Thyroid: No thyroid mass or thyromegaly  Trachea: Trachea normal    Cardiovascular:      Rate and Rhythm: Normal rate  Pulmonary:      Effort: Pulmonary effort is normal  No respiratory distress  Breath sounds: No decreased breath sounds  Abdominal:      Tenderness: There is no guarding  Musculoskeletal:         General: Normal range of motion  Cervical back: Normal range of motion  Skin:     General: Skin is warm and dry  Neurological:      Mental Status: He is alert and oriented to person, place, and time  Cranial Nerves: No cranial nerve deficit  Sensory: No sensory deficit  Psychiatric:         Speech: Speech normal          Behavior: Behavior normal          Thought Content: Thought content normal          Judgment: Judgment normal            Depression Screening Follow-up Plan: Patient's depression screening was positive with a PHQ-2 score of 6  Their PHQ-9 score was 10  Patient advised to follow-up with PCP for further management

## 2022-05-31 ENCOUNTER — TELEPHONE (OUTPATIENT)
Dept: FAMILY MEDICINE CLINIC | Facility: CLINIC | Age: 67
End: 2022-05-31

## 2022-05-31 DIAGNOSIS — Z12.5 SCREENING FOR PROSTATE CANCER: Primary | ICD-10-CM

## 2022-05-31 DIAGNOSIS — E55.9 VITAMIN D DEFICIENCY: ICD-10-CM

## 2022-05-31 NOTE — TELEPHONE ENCOUNTER
Regarding: FW: Blood Work   Please call patient, blood work was ordered  Thank you  ----- Message -----  From: Israel Nissen, RN  Sent: 5/31/2022   8:29 AM EDT  To: Cristina Coto DO  Subject: FW: Blood Work                                     ----- Message -----  From: Ese Prather  Sent: 5/29/2022   7:09 PM EDT  To: [facility]  Subject: Blood Work                                       I definitely have urinary issues  Urine flow slow to start and frequently urinating  Plus it never seems to completely empty  I did start taking those pills you prescribed for me and it does seem to help on the frequency  I think I would like to know what my vitamin D level is     Thank you, Mitchell Santiago

## 2022-06-13 DIAGNOSIS — R35.0 BENIGN PROSTATIC HYPERPLASIA WITH URINARY FREQUENCY: ICD-10-CM

## 2022-06-13 DIAGNOSIS — N40.1 BENIGN PROSTATIC HYPERPLASIA WITH URINARY FREQUENCY: ICD-10-CM

## 2022-06-13 RX ORDER — TAMSULOSIN HYDROCHLORIDE 0.4 MG/1
CAPSULE ORAL
Qty: 30 CAPSULE | Refills: 0 | Status: SHIPPED | OUTPATIENT
Start: 2022-06-13 | End: 2022-07-20

## 2022-07-20 DIAGNOSIS — R35.0 BENIGN PROSTATIC HYPERPLASIA WITH URINARY FREQUENCY: ICD-10-CM

## 2022-07-20 DIAGNOSIS — N40.1 BENIGN PROSTATIC HYPERPLASIA WITH URINARY FREQUENCY: ICD-10-CM

## 2022-07-20 RX ORDER — TAMSULOSIN HYDROCHLORIDE 0.4 MG/1
CAPSULE ORAL
Qty: 30 CAPSULE | Refills: 5 | Status: SHIPPED | OUTPATIENT
Start: 2022-07-20

## 2022-08-08 DIAGNOSIS — I10 BENIGN ESSENTIAL HYPERTENSION: ICD-10-CM

## 2022-08-08 RX ORDER — NEBIVOLOL 10 MG/1
10 TABLET ORAL DAILY
Qty: 90 TABLET | Refills: 1 | Status: SHIPPED | OUTPATIENT
Start: 2022-08-08

## 2022-08-10 ENCOUNTER — OFFICE VISIT (OUTPATIENT)
Dept: FAMILY MEDICINE CLINIC | Facility: CLINIC | Age: 67
End: 2022-08-10
Payer: MEDICARE

## 2022-08-10 VITALS
WEIGHT: 196 LBS | SYSTOLIC BLOOD PRESSURE: 130 MMHG | HEIGHT: 68 IN | HEART RATE: 92 BPM | TEMPERATURE: 97.6 F | DIASTOLIC BLOOD PRESSURE: 90 MMHG | OXYGEN SATURATION: 97 % | BODY MASS INDEX: 29.7 KG/M2

## 2022-08-10 DIAGNOSIS — N64.4 BREAST PAIN, RIGHT: Primary | ICD-10-CM

## 2022-08-10 PROCEDURE — 99214 OFFICE O/P EST MOD 30 MIN: CPT | Performed by: FAMILY MEDICINE

## 2022-08-10 NOTE — ASSESSMENT & PLAN NOTE
Patient had right breast pain around nipple last week  No noticeable skin changes or palpable lumps  We will obtain breast US for further evaluation due to hx of breast cancer in mother

## 2022-08-10 NOTE — PROGRESS NOTES
Assessment/Plan:    1  Breast pain, right  Assessment & Plan:  Patient had right breast pain around nipple last week  No noticeable skin changes or palpable lumps  We will obtain breast US for further evaluation due to hx of breast cancer in mother  Orders:  -     US breast right limited (diagnostic); Future; Expected date: 08/10/2022      Subjective:      Patient ID: Kathy Biggs is a 77 y o  male  HPI    Patient presenting with right breast pain that started 5 days ago  Pain was around his nipple  He states that it was intermittent with no noticeable triggers  Sometimes it felt like a burning  Other times it was dull  He did not notice any skin changes  No discharge from nipple  Today the pain has improved and is barely noticeable  Patient's mother had breast cancer at age 72  She did not have genetic testing  No other family members with breast cancer       The following portions of the patient's history were reviewed and updated as appropriate: allergies, current medications, past family history, past medical history, past social history, past surgical history, and problem list       Current Outpatient Medications:     ALPRAZolam (XANAX) 0 5 mg tablet, Take 1 tablet (0 5 mg total) by mouth daily as needed for anxiety, Disp: 30 tablet, Rfl: 0    cholecalciferol (VITAMIN D3) 1,000 units tablet, Take 1,000 Units by mouth daily, Disp: , Rfl:     Coenzyme Q10 300 MG CAPS, Take by mouth daily , Disp: , Rfl:     Eliquis 5 MG, TAKE 1 TABLET TWICE A DAY, Disp: 180 tablet, Rfl: 1    famotidine (PEPCID) 40 MG tablet, , Disp: , Rfl:     fluorouracil (EFUDEX) 5 % cream, , Disp: , Rfl:     fluticasone (FLONASE) 50 mcg/act nasal spray, USE 2 SPRAYS IN EACH       NOSTRIL DAILY, Disp: 48 g, Rfl: 1    hydrochlorothiazide (HYDRODIURIL) 25 mg tablet, Take 1 tablet (25 mg total) by mouth daily, Disp: 90 tablet, Rfl: 1    nebivolol (Bystolic) 10 mg tablet, Take 1 tablet (10 mg total) by mouth daily, Disp: 90 tablet, Rfl: 1    omeprazole (PriLOSEC) 40 MG capsule, , Disp: , Rfl:     sildenafil (VIAGRA) 100 mg tablet, , Disp: , Rfl: 11    tamsulosin (FLOMAX) 0 4 mg, Take 1 capsule by mouth with dinner, Disp: 30 capsule, Rfl: 5    desoximetasone (TOPICORT) 0 05 % cream, Apply topically 2 (two) times a day (Patient not taking: No sig reported), Disp: 30 g, Rfl: 1    gabapentin (NEURONTIN) 100 mg capsule, Take 100 mg by mouth Three times a day (Patient not taking: No sig reported), Disp: , Rfl:       Review of Systems   Constitutional: Negative for chills and fever  HENT: Negative for ear pain and sore throat  Eyes: Negative for pain and visual disturbance  Respiratory: Negative for cough and shortness of breath  Cardiovascular: Negative for chest pain and palpitations  Gastrointestinal: Negative for abdominal pain and vomiting  Genitourinary: Negative for dysuria and hematuria         +breast pain   Musculoskeletal: Negative for arthralgias and back pain  Skin: Negative for color change and rash  Neurological: Negative for seizures and syncope  All other systems reviewed and are negative  Objective:      /90 (BP Location: Left arm, Patient Position: Sitting, Cuff Size: Large)   Pulse 92   Temp 97 6 °F (36 4 °C) (Tympanic)   Ht 5' 7 75" (1 721 m)   Wt 88 9 kg (196 lb)   SpO2 97%   BMI 30 02 kg/m²          Physical Exam  Vitals and nursing note reviewed  Constitutional:       General: He is not in acute distress  Appearance: Normal appearance  He is not toxic-appearing  HENT:      Head: Normocephalic and atraumatic  Eyes:      Extraocular Movements: Extraocular movements intact  Cardiovascular:      Rate and Rhythm: Normal rate and regular rhythm  Heart sounds: Normal heart sounds  No murmur heard  Pulmonary:      Effort: Pulmonary effort is normal  No respiratory distress  Breath sounds: Normal breath sounds  No wheezing     Chest:      Chest wall: No mass, deformity or tenderness  Breasts: Breasts are symmetrical       Right: No bleeding, inverted nipple, mass, nipple discharge, skin change, tenderness or axillary adenopathy  Left: No bleeding, inverted nipple, mass, nipple discharge, skin change, tenderness or axillary adenopathy  Musculoskeletal:      Cervical back: Normal range of motion  Lymphadenopathy:      Upper Body:      Right upper body: No axillary or pectoral adenopathy  Left upper body: No axillary or pectoral adenopathy  Skin:     General: Skin is warm  Neurological:      General: No focal deficit present  Mental Status: He is alert and oriented to person, place, and time  Mental status is at baseline  Psychiatric:         Mood and Affect: Mood normal          Behavior: Behavior normal          Thought Content:  Thought content normal          Judgment: Judgment normal

## 2022-08-12 DIAGNOSIS — J31.0 CHRONIC RHINITIS: ICD-10-CM

## 2022-08-12 RX ORDER — FLUTICASONE PROPIONATE 50 MCG
SPRAY, SUSPENSION (ML) NASAL
Qty: 48 G | Refills: 1 | Status: SHIPPED | OUTPATIENT
Start: 2022-08-12

## 2022-08-24 DIAGNOSIS — I10 BENIGN ESSENTIAL HYPERTENSION: ICD-10-CM

## 2022-08-24 RX ORDER — HYDROCHLOROTHIAZIDE 25 MG/1
25 TABLET ORAL DAILY
Qty: 90 TABLET | Refills: 1 | Status: SHIPPED | OUTPATIENT
Start: 2022-08-24

## 2022-09-01 ENCOUNTER — HOSPITAL ENCOUNTER (OUTPATIENT)
Dept: MAMMOGRAPHY | Facility: CLINIC | Age: 67
End: 2022-09-01
Payer: MEDICARE

## 2022-09-01 VITALS — HEIGHT: 68 IN | BODY MASS INDEX: 29.7 KG/M2 | WEIGHT: 196 LBS

## 2022-09-01 DIAGNOSIS — N64.4 BREAST PAIN, RIGHT: ICD-10-CM

## 2022-09-01 PROCEDURE — 77066 DX MAMMO INCL CAD BI: CPT

## 2022-09-01 PROCEDURE — 76642 ULTRASOUND BREAST LIMITED: CPT

## 2022-09-01 PROCEDURE — G0279 TOMOSYNTHESIS, MAMMO: HCPCS

## 2022-09-11 LAB
25(OH)D3 SERPL-MCNC: 49 NG/ML (ref 30–100)
ALBUMIN SERPL-MCNC: 3.9 G/DL (ref 3.6–5.1)
ALBUMIN/GLOB SERPL: 1.8 (CALC) (ref 1–2.5)
ALP SERPL-CCNC: 62 U/L (ref 35–144)
ALT SERPL-CCNC: 11 U/L (ref 9–46)
AST SERPL-CCNC: 15 U/L (ref 10–35)
BILIRUB SERPL-MCNC: 1.4 MG/DL (ref 0.2–1.2)
BUN SERPL-MCNC: 14 MG/DL (ref 7–25)
BUN/CREAT SERPL: ABNORMAL (CALC) (ref 6–22)
CALCIUM SERPL-MCNC: 9.4 MG/DL (ref 8.6–10.3)
CHLORIDE SERPL-SCNC: 102 MMOL/L (ref 98–110)
CHOLEST SERPL-MCNC: 205 MG/DL
CHOLEST/HDLC SERPL: 3.9 (CALC)
CO2 SERPL-SCNC: 30 MMOL/L (ref 20–32)
CREAT SERPL-MCNC: 1 MG/DL (ref 0.7–1.35)
ERYTHROCYTE [DISTWIDTH] IN BLOOD BY AUTOMATED COUNT: 13.2 % (ref 11–15)
GFR/BSA.PRED SERPLBLD CYS-BASED-ARV: 83 ML/MIN/1.73M2
GLOBULIN SER CALC-MCNC: 2.2 G/DL (CALC) (ref 1.9–3.7)
GLUCOSE SERPL-MCNC: 103 MG/DL (ref 65–99)
HBA1C MFR BLD: 5.3 % OF TOTAL HGB
HCT VFR BLD AUTO: 44.8 % (ref 38.5–50)
HDLC SERPL-MCNC: 52 MG/DL
HGB BLD-MCNC: 14.9 G/DL (ref 13.2–17.1)
LDLC SERPL CALC-MCNC: 130 MG/DL (CALC)
MCH RBC QN AUTO: 29.7 PG (ref 27–33)
MCHC RBC AUTO-ENTMCNC: 33.3 G/DL (ref 32–36)
MCV RBC AUTO: 89.4 FL (ref 80–100)
NONHDLC SERPL-MCNC: 153 MG/DL (CALC)
PLATELET # BLD AUTO: 249 THOUSAND/UL (ref 140–400)
PMV BLD REES-ECKER: 10.7 FL (ref 7.5–12.5)
POTASSIUM SERPL-SCNC: 3.7 MMOL/L (ref 3.5–5.3)
PROT SERPL-MCNC: 6.1 G/DL (ref 6.1–8.1)
PSA SERPL-MCNC: 0.98 NG/ML
RBC # BLD AUTO: 5.01 MILLION/UL (ref 4.2–5.8)
SODIUM SERPL-SCNC: 140 MMOL/L (ref 135–146)
TRIGL SERPL-MCNC: 121 MG/DL
TSH SERPL-ACNC: 3.59 MIU/L (ref 0.4–4.5)
WBC # BLD AUTO: 10.7 THOUSAND/UL (ref 3.8–10.8)

## 2022-09-13 DIAGNOSIS — I26.99 RIGHT PULMONARY EMBOLUS (HCC): ICD-10-CM

## 2022-09-13 RX ORDER — APIXABAN 5 MG/1
TABLET, FILM COATED ORAL
Qty: 180 TABLET | Refills: 1 | Status: SHIPPED | OUTPATIENT
Start: 2022-09-13

## 2022-09-28 ENCOUNTER — OFFICE VISIT (OUTPATIENT)
Dept: FAMILY MEDICINE CLINIC | Facility: CLINIC | Age: 67
End: 2022-09-28
Payer: MEDICARE

## 2022-09-28 VITALS
BODY MASS INDEX: 30.46 KG/M2 | WEIGHT: 201 LBS | HEIGHT: 68 IN | RESPIRATION RATE: 18 BRPM | HEART RATE: 72 BPM | OXYGEN SATURATION: 98 % | TEMPERATURE: 98.6 F | DIASTOLIC BLOOD PRESSURE: 80 MMHG | SYSTOLIC BLOOD PRESSURE: 120 MMHG

## 2022-09-28 DIAGNOSIS — N40.1 BENIGN PROSTATIC HYPERPLASIA WITH URINARY FREQUENCY: ICD-10-CM

## 2022-09-28 DIAGNOSIS — R35.0 BENIGN PROSTATIC HYPERPLASIA WITH URINARY FREQUENCY: ICD-10-CM

## 2022-09-28 DIAGNOSIS — F41.1 GENERALIZED ANXIETY DISORDER: ICD-10-CM

## 2022-09-28 DIAGNOSIS — Z23 NEED FOR PROPHYLACTIC VACCINATION AND INOCULATION AGAINST INFLUENZA: Primary | ICD-10-CM

## 2022-09-28 DIAGNOSIS — K21.9 GASTROESOPHAGEAL REFLUX DISEASE WITHOUT ESOPHAGITIS: ICD-10-CM

## 2022-09-28 DIAGNOSIS — H53.9 VISUAL AURA: ICD-10-CM

## 2022-09-28 DIAGNOSIS — I26.99 RIGHT PULMONARY EMBOLUS (HCC): ICD-10-CM

## 2022-09-28 DIAGNOSIS — Z00.00 MEDICARE ANNUAL WELLNESS VISIT, SUBSEQUENT: ICD-10-CM

## 2022-09-28 DIAGNOSIS — I10 BENIGN ESSENTIAL HYPERTENSION: ICD-10-CM

## 2022-09-28 PROCEDURE — 90662 IIV NO PRSV INCREASED AG IM: CPT | Performed by: FAMILY MEDICINE

## 2022-09-28 PROCEDURE — 99214 OFFICE O/P EST MOD 30 MIN: CPT | Performed by: FAMILY MEDICINE

## 2022-09-28 PROCEDURE — G0008 ADMIN INFLUENZA VIRUS VAC: HCPCS | Performed by: FAMILY MEDICINE

## 2022-09-28 PROCEDURE — G0438 PPPS, INITIAL VISIT: HCPCS | Performed by: FAMILY MEDICINE

## 2022-09-28 RX ORDER — KETOCONAZOLE 20 MG/ML
SHAMPOO TOPICAL
COMMUNITY
Start: 2022-09-20

## 2022-09-28 NOTE — PROGRESS NOTES
Assessment/Plan:   1  Benign essential hypertension  Blood pressure appears stable today  Continue with routine home monitoring as well as his current treatment with Bystolic as well as hydrochlorothiazide  2  Right pulmonary embolus [facility])  Patient is clinically stable today  He denies any respiratory symptoms  Will continue with his current treatment of Eliquis  He will remain on this medication lifelong  3  Benign prostatic hyperplasia with urinary frequency  Reviewed patient's symptoms today  At this time, his tamsulosin was largely effective for him however he believes this treatment may likely be causing constipation  He has discontinue this treatment for the past few days and has noticed improvement in his constipation  He will be restarting this next week to see if his constipation returns  If this is the case, will consider alternative treatment with finasteride  4  Gastroesophageal reflux disease without esophagitis  Stable today continue with dietary trigger avoidance as well as current treatment with omeprazole  5  Generalized anxiety disorder  Stable  He denies any recent exacerbations  Will continue with his current home monitoring  He does have alprazolam to use on a very as needed basis  PDMP reviewed  No abnormality seen today  6  Visual Aura  Reviewed patient's symptoms today  At this time, he has been following up with Ophthalmology  His eye exams have been stable  Is unclear as to exact cause of his visual worse  Symptoms may possibly migraine related  At this time, will refer patient to neurology for further evaluation  Follow-up patient 6 months      7  Need for prophylactic vaccination and inoculation against influenza  - influenza vaccine, high-dose, PF 0 7 mL (FLUZONE HIGH-DOSE)               Diagnoses and all orders for this visit:    Need for prophylactic vaccination and inoculation against influenza  -     influenza vaccine, high-dose, PF 0 7 mL (FLUZONE HIGH-DOSE)    Benign essential hypertension    Right pulmonary embolus (HCC)    Benign prostatic hyperplasia with urinary frequency    Gastroesophageal reflux disease without esophagitis    Generalized anxiety disorder    Other orders  -     ketoconazole (NIZORAL) 2 % shampoo          Subjective:       Chief Complaint   Patient presents with    Follow-up     6 month,  review     Medicare Wellness Visit     Subsequent       Patient ID: Hossein Villagomez is a 77 y o  male presents today for a follow-up on his chronic disease  He has hypertension, previous pulmonary embolus, BPH, GERD, visual or 0 persistently as well as generalized anxiety disorder  He has been taking his medications regularly  He denies adverse reactions with medications  He states that he has been in to see his ophthalmologist regularly  He was advised that his ocular exams have been perfectly normal   He was advised that his current persistent visual auras may likely be neurologic and recommended further evaluation with a neurologist     HPI    Review of Systems   Constitutional: Negative for activity change, chills, fatigue and fever  HENT: Negative for congestion, ear pain, sinus pressure and sore throat  Eyes: Negative for redness, itching and visual disturbance  Respiratory: Negative for cough and shortness of breath  Cardiovascular: Negative for chest pain and palpitations  Gastrointestinal: Negative for abdominal pain, diarrhea and nausea  Endocrine: Negative for cold intolerance and heat intolerance  Genitourinary: Negative for dysuria, flank pain and frequency  Musculoskeletal: Negative for arthralgias, back pain, gait problem and myalgias  Skin: Negative for color change  Allergic/Immunologic: Negative for environmental allergies  Neurological: Negative for dizziness, numbness and headaches  Psychiatric/Behavioral: Negative for behavioral problems and sleep disturbance         The following portions of the patient's history were reviewed and updated as appropriate : past family history, past medical history, past social history and past surgical history  Current Outpatient Medications:     ALPRAZolam (XANAX) 0 5 mg tablet, Take 1 tablet (0 5 mg total) by mouth daily as needed for anxiety, Disp: 30 tablet, Rfl: 0    cholecalciferol (VITAMIN D3) 1,000 units tablet, Take 1,000 Units by mouth daily, Disp: , Rfl:     Coenzyme Q10 300 MG CAPS, Take by mouth daily , Disp: , Rfl:     desoximetasone (TOPICORT) 0 05 % cream, Apply topically 2 (two) times a day (Patient not taking: No sig reported), Disp: 30 g, Rfl: 1    Eliquis 5 MG, TAKE 1 TABLET TWICE A DAY, Disp: 180 tablet, Rfl: 1    famotidine (PEPCID) 40 MG tablet, , Disp: , Rfl:     fluorouracil (EFUDEX) 5 % cream, , Disp: , Rfl:     fluticasone (FLONASE) 50 mcg/act nasal spray, USE 2 SPRAYS IN EACH       NOSTRIL DAILY, Disp: 48 g, Rfl: 1    gabapentin (NEURONTIN) 100 mg capsule, Take 100 mg by mouth Three times a day (Patient not taking: No sig reported), Disp: , Rfl:     hydrochlorothiazide (HYDRODIURIL) 25 mg tablet, Take 1 tablet (25 mg total) by mouth daily, Disp: 90 tablet, Rfl: 1    ketoconazole (NIZORAL) 2 % shampoo, , Disp: , Rfl:     nebivolol (Bystolic) 10 mg tablet, Take 1 tablet (10 mg total) by mouth daily, Disp: 90 tablet, Rfl: 1    omeprazole (PriLOSEC) 40 MG capsule, , Disp: , Rfl:     sildenafil (VIAGRA) 100 mg tablet, , Disp: , Rfl: 11    tamsulosin (FLOMAX) 0 4 mg, Take 1 capsule by mouth with dinner, Disp: 30 capsule, Rfl: 5         Objective:         Vitals:    09/28/22 1136   BP: 120/80   Pulse: 72   Resp: 18   Temp: 98 6 °F (37 °C)   TempSrc: Tympanic   SpO2: 98%   Weight: 91 2 kg (201 lb)   Height: 5' 7 75" (1 721 m)     Physical Exam  Vitals reviewed  Constitutional:       Appearance: He is well-developed  HENT:      Head: Normocephalic and atraumatic        Nose: Nose normal       Mouth/Throat:      Pharynx: No oropharyngeal exudate  Eyes:      General: No scleral icterus  Right eye: No discharge  Left eye: No discharge  Pupils: Pupils are equal, round, and reactive to light  Neck:      Trachea: No tracheal deviation  Cardiovascular:      Rate and Rhythm: Normal rate and regular rhythm  Pulses:           Dorsalis pedis pulses are 2+ on the right side and 2+ on the left side  Posterior tibial pulses are 2+ on the right side and 2+ on the left side  Heart sounds: Normal heart sounds  No murmur heard  No friction rub  No gallop  Pulmonary:      Effort: Pulmonary effort is normal  No respiratory distress  Breath sounds: Normal breath sounds  No wheezing or rales  Abdominal:      General: Bowel sounds are normal  There is no distension  Palpations: Abdomen is soft  Tenderness: There is no abdominal tenderness  There is no guarding or rebound  Musculoskeletal:         General: Normal range of motion  Cervical back: Normal range of motion and neck supple  Lymphadenopathy:      Head:      Right side of head: No submental or submandibular adenopathy  Left side of head: No submental or submandibular adenopathy  Cervical: No cervical adenopathy  Right cervical: No superficial, deep or posterior cervical adenopathy  Left cervical: No superficial, deep or posterior cervical adenopathy  Skin:     General: Skin is warm and dry  Findings: No erythema  Neurological:      Mental Status: He is alert and oriented to person, place, and time  Cranial Nerves: No cranial nerve deficit  Sensory: No sensory deficit  Psychiatric:         Mood and Affect: Mood is not anxious or depressed  Speech: Speech normal          Behavior: Behavior normal          Thought Content:  Thought content normal          Judgment: Judgment normal

## 2022-09-28 NOTE — PROGRESS NOTES
Assessment and Plan:     Problem List Items Addressed This Visit        Digestive    Gastroesophageal reflux disease       Cardiovascular and Mediastinum    Benign essential hypertension    Right pulmonary embolus (HCC)       Genitourinary    Benign prostatic hyperplasia       Other    Generalized anxiety disorder      Other Visit Diagnoses     Need for prophylactic vaccination and inoculation against influenza    -  Primary    Relevant Orders    influenza vaccine, high-dose, PF 0 7 mL (FLUZONE HIGH-DOSE) (Completed)           Preventive health issues were discussed with patient, and age appropriate screening tests were ordered as noted in patient's After Visit Summary  Personalized health advice and appropriate referrals for health education or preventive services given if needed, as noted in patient's After Visit Summary  History of Present Illness:     Patient presents for a Medicare Wellness Visit    HPI   Patient Care Team:  Radha Meier DO as PCP - General     Review of Systems:     Review of Systems   Constitutional: Negative for activity change, chills, fatigue and fever  HENT: Negative for congestion, ear pain, sinus pressure and sore throat  Eyes: Negative for redness, itching and visual disturbance  Respiratory: Negative for cough and shortness of breath  Cardiovascular: Negative for chest pain and palpitations  Gastrointestinal: Negative for abdominal pain, diarrhea and nausea  Endocrine: Negative for cold intolerance and heat intolerance  Genitourinary: Negative for dysuria, flank pain and frequency  Musculoskeletal: Negative for arthralgias, back pain, gait problem and myalgias  Skin: Negative for color change  Allergic/Immunologic: Negative for environmental allergies  Neurological: Negative for dizziness, numbness and headaches  Psychiatric/Behavioral: Negative for behavioral problems and sleep disturbance          Problem List:     Patient Active Problem List Diagnosis    Benign essential hypertension    Hyperlipidemia    Prediabetes    Vitamin D deficiency    Chronic rhinitis    Esophageal reflux    Benign prostatic hyperplasia    Generalized anxiety disorder    Pyelonephritis    SIRS (systemic inflammatory response syndrome) (HCC)    Hypokalemia    IBS (irritable bowel syndrome)    Right pulmonary embolus (HCC)    Chest pain    Polyp of sigmoid colon    Screening for colon cancer    Irritable bowel syndrome with diarrhea    Gastroesophageal reflux disease    Right calf pain    Thrombocytopenia, unspecified (HCC)    Depression, recurrent ([address] )    Breast pain, right      Past Medical and Surgical History:     Past Medical History:   Diagnosis Date    Abdominal pain, right lower quadrant     Abnormal blood chemistry     Acid reflux     Cancer (HCC)     skin    Colon polyp     Contact dermatitis due to poison ivy     Depression screen     Flu     High blood pressure     History of acute conjunctivitis     right eye    History of allergic urticaria     History of chronic rhinitis     History of disease of skin and subcutaneous tissue     History of dysphagia     History of hemorrhoids     History of IBS     History of URI (upper respiratory infection)     Hyperlipidemia     Open wound of left lower leg     Other acute sinusitis     Other disturbances of oral epithelium, including tongue     Positive depression screening     Pulmonary emboli ([address] ) 2019    pt is on eliquis    Screening for prostate cancer     Skin rash     Sleep apnea     snores but has not had the study    Special screening examination for neoplasm of prostate     Tingling      Past Surgical History:   Procedure Laterality Date    COLONOSCOPY      COLONOSCOPY W/ POLYPECTOMY      Via Colostomy    RECTAL SURGERY      TONSILLECTOMY      WISDOM TOOTH EXTRACTION        Family History:     Family History   Problem Relation Age of Onset    Breast cancer Mother 77    Brain cancer Mother     Lung cancer Father     No Known Problems Sister     No Known Problems Daughter     No Known Problems Maternal Grandmother     No Known Problems Maternal Grandfather     No Known Problems Paternal Grandmother     No Known Problems Paternal Grandfather     Lung cancer Family     Substance Abuse Neg Hx         Mother and Father    Mental illness Neg Hx         Mother and Father      Social History:     Social History     Socioeconomic History    Marital status:      Spouse name: None    Number of children: None    Years of education: None    Highest education level: None   Occupational History    None   Tobacco Use    Smoking status: Former Smoker     Packs/day: 0 50     Years: 3 00     Pack years: 1 50     Types: Cigarettes     Quit date: 1982     Years since quittin 2    Smokeless tobacco: Never Used   Vaping Use    Vaping Use: Never used   Substance and Sexual Activity    Alcohol use: Not Currently     Comment: Social    Drug use: No    Sexual activity: None   Other Topics Concern    None   Social History Narrative    [facility]     Social Determinants of Health     Financial Resource Strain: Low Risk     Difficulty of Paying Living Expenses: Not hard at all   Food Insecurity: Not on file   Transportation Needs: No Transportation Needs    Lack of Transportation (Medical): No    Lack of Transportation (Non-Medical):  No   Physical Activity: Not on file   Stress: Not on file   Social Connections: Not on file   Intimate Partner Violence: Not on file   Housing Stability: Not on file      Medications and Allergies:     Current Outpatient Medications   Medication Sig Dispense Refill    ALPRAZolam (XANAX) 0 5 mg tablet Take 1 tablet (0 5 mg total) by mouth daily as needed for anxiety 30 tablet 0    cholecalciferol (VITAMIN D3) 1,000 units tablet Take 1,000 Units by mouth daily      Coenzyme Q10 300 MG CAPS Take by mouth daily       desoximetasone (TOPICORT) 0 05 % cream Apply topically 2 (two) times a day (Patient not taking: No sig reported) 30 g 1    Eliquis 5 MG TAKE 1 TABLET TWICE A  tablet 1    famotidine (PEPCID) 40 MG tablet       fluorouracil (EFUDEX) 5 % cream       fluticasone (FLONASE) 50 mcg/act nasal spray USE 2 SPRAYS IN EACH       NOSTRIL DAILY 48 g 1    gabapentin (NEURONTIN) 100 mg capsule Take 100 mg by mouth Three times a day (Patient not taking: No sig reported)      hydrochlorothiazide (HYDRODIURIL) 25 mg tablet Take 1 tablet (25 mg total) by mouth daily 90 tablet 1    ketoconazole (NIZORAL) 2 % shampoo       nebivolol (Bystolic) 10 mg tablet Take 1 tablet (10 mg total) by mouth daily 90 tablet 1    omeprazole (PriLOSEC) 40 MG capsule       sildenafil (VIAGRA) 100 mg tablet   11    tamsulosin (FLOMAX) 0 4 mg Take 1 capsule by mouth with dinner 30 capsule 5     No current facility-administered medications for this visit  Allergies   Allergen Reactions    Lisinopril Throat Swelling, Edema and Angioedema    Amlodipine Palpitations    Diltiazem Rash     Hot rash on feet      Immunizations:     Immunization History   Administered Date(s) Administered    COVID-19 PFIZER VACCINE 0 3 ML IM 07/31/2021, 08/21/2021    COVID-19 Pfizer vac (Jose-sucrose, gray cap) 12 yr+ IM 02/21/2022    Influenza, high dose seasonal 0 7 mL 09/23/2021, 09/28/2022    Influenza, recombinant, quadrivalent,injectable, preservative free 12/16/2019, 10/13/2020    Td (adult), adsorbed 10/02/2014      Health Maintenance:         Topic Date Due    Colorectal Cancer Screening  09/27/2029    Hepatitis C Screening  Completed         Topic Date Due    Pneumococcal Vaccine: 65+ Years (1 - PCV) Never done    COVID-19 Vaccine (4 - Booster for [place] [employer] series) 06/21/2022      Medicare Screening Tests and Risk Assessments:     Myra Jain is here for his Subsequent Wellness visit   Last Medicare Wellness visit information reviewed, patient interviewed, no change since last AWV  Health Risk Assessment:   Patient rates overall health as good  Patient feels that their physical health rating is same  Patient is satisfied with their life  Eyesight was rated as slightly worse  Hearing was rated as same  Patient feels that their emotional and mental health rating is same  Patients states they are never, rarely angry  Patient states they are never, rarely unusually tired/fatigued  Pain experienced in the last 7 days has been none  Patient states that he has experienced no weight loss or gain in last 6 months  Depression Screening:   PHQ-9 Score: 0      Fall Risk Screening: In the past year, patient has experienced: no history of falling in past year      Home Safety:  Patient does not have trouble with stairs inside or outside of their home  Patient has working smoke alarms and has working carbon monoxide detector  Home safety hazards include: none  Nutrition:   Current diet is Regular  Medications:   Patient is currently taking over-the-counter supplements  OTC medications include: see medication list  Patient is able to manage medications  Activities of Daily Living (ADLs)/Instrumental Activities of Daily Living (IADLs):   Walk and transfer into and out of bed and chair?: Yes  Dress and groom yourself?: Yes    Bathe or shower yourself?: Yes    Feed yourself? Yes  Do your laundry/housekeeping?: Yes  Manage your money, pay your bills and track your expenses?: Yes  Make your own meals?: Yes    Do your own shopping?: Yes    Previous Hospitalizations:   Any hospitalizations or ED visits within the last 12 months?: No      Advance Care Planning:   Living will: No    Durable POA for healthcare: No    Advanced directive: No    Advanced directive counseling given: Yes    Five wishes given: Yes    Patient declined ACP directive: No    End of Life Decisions reviewed with patient: Yes    Provider agrees with end of life decisions:  Yes Cognitive Screening:   Provider or family/friend/caregiver concerned regarding cognition?: No    PREVENTIVE SCREENINGS      Cardiovascular Screening:    General: Screening Not Indicated, History Lipid Disorder, Risks and Benefits Discussed and Screening Current      Diabetes Screening:     General: Screening Current and Risks and Benefits Discussed      Colorectal Cancer Screening:     General: Screening Current      Prostate Cancer Screening:    General: Screening Current and Risks and Benefits Discussed      Osteoporosis Screening:    General: Risks and Benefits Discussed and Screening Not Indicated      Abdominal Aortic Aneurysm (AAA) Screening:    Risk factors include: age between 73-69 yo and tobacco use        General: Risks and Benefits Discussed and Screening Current      Lung Cancer Screening:     General: Screening Not Indicated and Risks and Benefits Discussed      Hepatitis C Screening:    General: Screening Current and Risks and Benefits Discussed    Screening, Brief Intervention, and Referral to Treatment (SBIRT)    Screening  Typical number of drinks in a day: 0  Typical number of drinks in a week: 0  Interpretation: Low risk drinking behavior      AUDIT-C Screenin) How often did you have a drink containing alcohol in the past year? never  2) How many drinks did you have on a typical day when you were drinking in the past year? 0  3) How often did you have 6 or more drinks on one occasion in the past year? never    AUDIT-C Score: 0  Interpretation: Score 0-3 (male): Negative screen for alcohol misuse    Single Item Drug Screening:  How often have you used an illegal drug (including marijuana) or a prescription medication for non-medical reasons in the past year? never    Single Item Drug Screen Score: 0  Interpretation: Negative screen for possible drug use disorder    No exam data present     Physical Exam:     /80   Pulse 72   Temp 98 6 °F (37 °C) (Tympanic)   Resp 18   Ht 5' 7 75" (1 721 m)   Wt 91 2 kg (201 lb)   SpO2 98%   BMI 30 79 kg/m²     Physical Exam  Vitals reviewed  Constitutional:       General: He is not in acute distress  Appearance: He is well-developed  He is not diaphoretic  HENT:      Head: Normocephalic and atraumatic  No right periorbital erythema or left periorbital erythema  Right Ear: Tympanic membrane normal  No decreased hearing noted  Left Ear: Tympanic membrane normal  No decreased hearing noted  Nose: Nose normal       Right Sinus: No maxillary sinus tenderness or frontal sinus tenderness  Left Sinus: No maxillary sinus tenderness or frontal sinus tenderness  Mouth/Throat:      Lips: Pink  Mouth: Mucous membranes are moist       Pharynx: No oropharyngeal exudate  Tonsils: No tonsillar exudate or tonsillar abscesses  Eyes:      General: Lids are normal       Extraocular Movements: Extraocular movements intact  Conjunctiva/sclera: Conjunctivae normal       Pupils: Pupils are equal, round, and reactive to light  Neck:      Thyroid: No thyroid mass  Trachea: Trachea normal    Cardiovascular:      Rate and Rhythm: Normal rate and regular rhythm  Pulses: Normal pulses  Heart sounds: Normal heart sounds  No murmur heard  No friction rub  No gallop  Pulmonary:      Effort: Pulmonary effort is normal  No respiratory distress  Breath sounds: Normal breath sounds  No wheezing or rales  Abdominal:      General: Bowel sounds are normal  There is no distension  Palpations: Abdomen is soft  There is no mass  Tenderness: There is no abdominal tenderness  There is no guarding  Musculoskeletal:         General: Normal range of motion  Cervical back: Normal range of motion and neck supple  Skin:     General: Skin is warm and dry  Coloration: Skin is not pale  Findings: No erythema or rash     Neurological:      Mental Status: He is alert and oriented to person, place, and time       Cranial Nerves: No cranial nerve deficit  Coordination: Coordination normal    Psychiatric:         Attention and Perception: Attention and perception normal          Mood and Affect: Mood and affect normal          Speech: Speech normal          Behavior: Behavior normal          Thought Content:  Thought content normal          Cognition and Memory: Cognition and memory normal          Judgment: Judgment normal           Ihab Abdthomas, DO

## 2022-10-10 ENCOUNTER — CLINICAL SUPPORT (OUTPATIENT)
Dept: FAMILY MEDICINE CLINIC | Facility: CLINIC | Age: 67
End: 2022-10-10

## 2022-10-10 ENCOUNTER — TELEPHONE (OUTPATIENT)
Dept: FAMILY MEDICINE CLINIC | Facility: CLINIC | Age: 67
End: 2022-10-10

## 2022-10-10 VITALS — SYSTOLIC BLOOD PRESSURE: 128 MMHG | OXYGEN SATURATION: 98 % | HEART RATE: 64 BPM | DIASTOLIC BLOOD PRESSURE: 86 MMHG

## 2022-10-10 DIAGNOSIS — I10 BENIGN ESSENTIAL HYPERTENSION: Primary | ICD-10-CM

## 2022-10-10 NOTE — TELEPHONE ENCOUNTER
Pt was in for BP check today  Please review visit and visit note  Also pt states he sent you a [employer] message

## 2022-10-10 NOTE — PROGRESS NOTES
Please look over pt's BP pressure today  Pt complaining of Vision being affected and feeling brain fog and feeling like he was going to pass out yesterday  Feels like there is not enough blood going to brain

## 2022-10-12 ENCOUNTER — TELEPHONE (OUTPATIENT)
Dept: NEUROLOGY | Facility: CLINIC | Age: 67
End: 2022-10-12

## 2022-10-12 NOTE — TELEPHONE ENCOUNTER
I called the patient and left a voicemail that I had a cancellation with our resident doctor, Moni Louise, tomorrow at 4 pm at our [facility] office  [facility] back line given

## 2022-10-13 ENCOUNTER — CONSULT (OUTPATIENT)
Dept: NEUROLOGY | Facility: CLINIC | Age: 67
End: 2022-10-13
Payer: MEDICARE

## 2022-10-13 VITALS
WEIGHT: 198 LBS | HEIGHT: 68 IN | HEART RATE: 72 BPM | SYSTOLIC BLOOD PRESSURE: 140 MMHG | BODY MASS INDEX: 30.01 KG/M2 | DIASTOLIC BLOOD PRESSURE: 100 MMHG

## 2022-10-13 DIAGNOSIS — R42 LIGHTHEADEDNESS: Primary | ICD-10-CM

## 2022-10-13 DIAGNOSIS — G60.9 HEREDITARY AND IDIOPATHIC NEUROPATHY, UNSPECIFIED: ICD-10-CM

## 2022-10-13 DIAGNOSIS — H53.9 VISUAL AURA: ICD-10-CM

## 2022-10-13 DIAGNOSIS — R55 PRE-SYNCOPE: ICD-10-CM

## 2022-10-13 DIAGNOSIS — G60.3 IDIOPATHIC PROGRESSIVE NEUROPATHY: ICD-10-CM

## 2022-10-13 DIAGNOSIS — G11.9 CEREBELLAR ATAXIA (HCC): ICD-10-CM

## 2022-10-13 PROCEDURE — 99204 OFFICE O/P NEW MOD 45 MIN: CPT | Performed by: PSYCHIATRY & NEUROLOGY

## 2022-10-13 NOTE — PROGRESS NOTES
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  Acephalgic migraine is a possible explanation of the visual symptoms, but is a diagnosis of exclusion  Ophthalmology did not feel an ocular cause was likely  Brain MRI is required to exclude central cause of new transient visual symptoms and evaluate for cause of symptoms described as lightheadedness, which could represent dysequilibrium and localize to the cerebellum  Sending blood work to evaluate for cause of neuropathy, but alcohol is the most likely cause  Consider diagnostic/therapeutic trial of migraine preventative medication after MRI or if events worsen  He mentions prior palpitations and chest pain  Obtaining Holter and referring to cardiology to evaluate for cardiac cause of lightheadedness

## 2022-10-13 NOTE — PROGRESS NOTES
Patient ID: Fermin Austin is a 77 y o  male  Assessment/Plan:    Dizziness  Patient w/ PMH PE on Eliquis, IBS, HTN, EtOH use who p/w new onset episodes of dizziness described as a lightheadedness or feeling like he is going to pass out  Sounds from description most consistent with presyncope from possible cardiogenic primary etiology  No actual LOC  Lasts only about a minute or two at a time and does not clearly have a trigger like head movement or standing up  Highest suspicion for cardiogenic etiology but cannot r/o central cause in the setting of new onset visual sx during the same time frame    Plan:  • Cardiology referral  • Holter monitor  • Neuropathy workup as below  • MRI brain w/wo    Visual disturbance  Patient describes recurrent, transient visual sx that have been ongoing since August 2022 (same time as onset of dizzy spells)  Often triggered by scrolling through his phone, occur daily, white spot followed by marching X's across visual field  Ophthalmology does not believe ocular etiology  Could be consistent with ocular migraine (particularly given 20 minute duration) but cannot rule out structural cause without further workup    Plan:  • MRI w/wo  • Cardiac workup as above  • Neuropathy labs as below  • Patient will be provided with migraine management information if no other etiology found  • Patient amenable to trialing TCA for migraine prevention, depression management and sleep improvement if no other etiology found      Neuropathy  Patient has length-dependent loss of vibratory sense at the ankles and up through the knees w/ difficulty w/ tandem gait most consistent w/ alcohol-related neuropathy but cannot r/o nutritional deficiency or other primary source w/o further workup    Plan:  • Check labs TSH, B12, folate, SPEP  • F/u MRI as above  • Patient already cutting back on EtOH and counseled on importance of continuing to do so     Diagnoses and all orders for this visit:    Visual aura  - Ambulatory Referral to Neurology       Subjective:    Richelle Vincent is a 77 y o  male w/ PMH PE on Eliquis, IBS, HTN, EtOH use who p/w new onset episodes of dizziness described as a lightheadedness or feeling like he is going to pass out  Sounds from description most consistent with presyncope from possible cardiogenic primary etiology  No actual LOC  Lasts only about a minute or two at a time and does not clearly have a trigger like head movement or standing up  Episodes started in August 2022 and are now happening multiple times a day  In the absence of clear peripheral trigger or dysautonomia, concerning for cardiogenic etiology  Around the same time patient began experiencing recurrent, transient visual sx that have been ongoing since August 2022 (same time as onset of dizzy spells)  Often triggered by scrolling through his phone, occur daily, white spot followed by marching X's across visual field  Episodes last up to 20-30 minutes, and are rarely accompanied by head pain  He does describe intermittent brain fog  He notes that he has seen ophthalmology who does not believe it is an ocular problem  Also endorses tinnitus every night, b/l but without any hearing loss  And then for the last month he has been experiencing tingling sensation which started in his left arm years ago but is now progressing to his legs  Has pain and cramping especially in the left calf for about a month  On exam does have length-dependent vibratory loss  Does note intermittent EtOH use history ("I could drink a whole bottle of bourbon right now if it was here   ") but none for last week and no w/d sx  We did discuss possibility of this playing a role in his neuropathy and he does not intend to resume use  Is sleeping ok and endorses intermittent sleep problems  If no other cause found on imaging or labs, patient is amenable to trialing TCA for migraine management (and depression management)           The following portions of the patient's history were reviewed and updated as appropriate:   He  has a past medical history of Abdominal pain, right lower quadrant, Abnormal blood chemistry, Acid reflux, Cancer ([address] ), Colon polyp, Contact dermatitis due to poison ivy, Depression screen, Flu, High blood pressure, History of acute conjunctivitis, History of allergic urticaria, History of chronic rhinitis, History of disease of skin and subcutaneous tissue, History of dysphagia, History of hemorrhoids, History of IBS, History of URI (upper respiratory infection), Hyperlipidemia, Open wound of left lower leg, Other acute sinusitis, Other disturbances of oral epithelium, including tongue, Positive depression screening, Pulmonary emboli ([address] ) (2019), Screening for prostate cancer, Skin rash, Sleep apnea, Special screening examination for neoplasm of prostate, and Tingling  He   Patient Active Problem List    Diagnosis Date Noted   • Breast pain, right 08/10/2022   • Depression, recurrent ([address] ) 05/20/2022   • Thrombocytopenia, unspecified ([facility] ) 03/19/2021   • Right calf pain 03/01/2021   • Screening for colon cancer 02/25/2019   • Irritable bowel syndrome with diarrhea 02/25/2019   • Gastroesophageal reflux disease 02/25/2019   • Polyp of sigmoid colon 02/22/2019   • Right pulmonary embolus ([address] ) 08/11/2018   • Chest pain 08/11/2018   • Pyelonephritis 06/24/2018   • SIRS (systemic inflammatory response syndrome) ([address] ) 06/24/2018   • Hypokalemia 06/24/2018   • IBS (irritable bowel syndrome) 06/24/2018   • Chronic rhinitis 12/17/2015   • Hyperlipidemia 11/25/2014   • Vitamin D deficiency 11/25/2014   • Prediabetes 05/20/2014   • Generalized anxiety disorder 05/20/2014   • Benign essential hypertension 08/29/2013   • Benign prostatic hyperplasia 12/12/2012   • Esophageal reflux 09/14/2012     He  has a past surgical history that includes Colonoscopy w/ polypectomy; Tonsillectomy; Colonoscopy; Rectal surgery; and [place] tooth extraction    His family history includes Brain cancer in his mother; Breast cancer (age of onset: 77) in his mother; Lung cancer in his family and father; No Known Problems in his daughter, maternal grandfather, maternal grandmother, paternal grandfather, paternal grandmother, and sister  He  reports that he quit smoking about 40 years ago  His smoking use included cigarettes  He has a 1 50 pack-year smoking history  He has never used smokeless tobacco  He reports previous alcohol use  He reports that he does not use drugs  Current Outpatient Medications   Medication Sig Dispense Refill   • ALPRAZolam (XANAX) 0 5 mg tablet Take 1 tablet (0 5 mg total) by mouth daily as needed for anxiety 30 tablet 0   • cholecalciferol (VITAMIN D3) 1,000 units tablet Take 1,000 Units by mouth daily     • Coenzyme Q10 300 MG CAPS Take by mouth daily      • Eliquis 5 MG TAKE 1 TABLET TWICE A  tablet 1   • famotidine (PEPCID) 40 MG tablet      • fluticasone (FLONASE) 50 mcg/act nasal spray USE 2 SPRAYS IN EACH       NOSTRIL DAILY 48 g 1   • hydrochlorothiazide (HYDRODIURIL) 25 mg tablet Take 1 tablet (25 mg total) by mouth daily 90 tablet 1   • ketoconazole (NIZORAL) 2 % shampoo      • nebivolol (Bystolic) 10 mg tablet Take 1 tablet (10 mg total) by mouth daily 90 tablet 1   • omeprazole (PriLOSEC) 40 MG capsule as needed     • sildenafil (VIAGRA) 100 mg tablet as needed  11   • tamsulosin (FLOMAX) 0 4 mg Take 1 capsule by mouth with dinner (Patient taking differently: if needed) 30 capsule 5   • desoximetasone (TOPICORT) 0 05 % cream Apply topically 2 (two) times a day (Patient not taking: No sig reported) 30 g 1   • fluorouracil (EFUDEX) 5 % cream      • gabapentin (NEURONTIN) 100 mg capsule Take 100 mg by mouth Three times a day (Patient not taking: No sig reported)       No current facility-administered medications for this visit       Current Outpatient Medications on File Prior to Visit   Medication Sig   • ALPRAZolam (XANAX) 0 5 mg tablet Take 1 tablet (0 5 mg total) by mouth daily as needed for anxiety   • cholecalciferol (VITAMIN D3) 1,000 units tablet Take 1,000 Units by mouth daily   • Coenzyme Q10 300 MG CAPS Take by mouth daily    • Eliquis 5 MG TAKE 1 TABLET TWICE A DAY   • famotidine (PEPCID) 40 MG tablet    • fluticasone (FLONASE) 50 mcg/act nasal spray USE 2 SPRAYS IN EACH       NOSTRIL DAILY   • hydrochlorothiazide (HYDRODIURIL) 25 mg tablet Take 1 tablet (25 mg total) by mouth daily   • ketoconazole (NIZORAL) 2 % shampoo    • nebivolol (Bystolic) 10 mg tablet Take 1 tablet (10 mg total) by mouth daily   • omeprazole (PriLOSEC) 40 MG capsule as needed   • sildenafil (VIAGRA) 100 mg tablet as needed   • tamsulosin (FLOMAX) 0 4 mg Take 1 capsule by mouth with dinner (Patient taking differently: if needed)   • desoximetasone (TOPICORT) 0 05 % cream Apply topically 2 (two) times a day (Patient not taking: No sig reported)   • fluorouracil (EFUDEX) 5 % cream    • gabapentin (NEURONTIN) 100 mg capsule Take 100 mg by mouth Three times a day (Patient not taking: No sig reported)     No current facility-administered medications on file prior to visit  He is allergic to lisinopril, amlodipine, and diltiazem       Objective:    Blood pressure 140/100, pulse 72, height 5' 7 75" (1 721 m), weight 89 8 kg (198 lb)  Physical Exam   Vitals reviewed  Constitutional:    Not in acute distress  Normal appearance  Not ill-appearing, toxic-appearing or diaphoretic  HENT:   Normocephalic and atraumatic  External ear normal b/l  Nose normal  No congestion or rhinorrhea  Mucous membranes are moist   Oropharynx is clear  No oropharyngeal exudate or posterior oropharyngeal erythema  Eyes:    No scleral icterus  No discharge b/l  Conjunctivae normal    Pulmonary:  Pulmonary effort is normal  No respiratory distress  GI: abdomen not distended  Musculoskeletal: no gross deformities  Skin:   Skin is not pale     Psychiatric:       Mood normal  Affect congruent      Neurological Exam  Mental Status   Alert and oriented to person, place, and time  Attention is normal  Speech is fluent w/ naming and repetition intact and no dysarthria    Cranial Nerves   CN II Visual fields full to confrontation  CN III, IV, VI PERRL, brisk reactivity and consensual response intact b/l  EOMI w/o CN VI or III palsy  No clear nystagmus  CN V, VII Facial sensation and expression full, symmetric  CN VIII Hearing grossly symmetric  CN IX, X Palate symmetric  CN XI trapezius strength symmetric  CN XII no dysarthria, symmetric lingual protrusion     Motor Exam   Muscle bulk and tone grossly normal  Pronator drift absent b/l  Strength: R/L     Deltoid:    5/5    Biceps:    5/5   Triceps:   5/5  Wrist flexion:  5/5   Wrist extension: 5/5  Iliopsoas: 5/5   Quads: 5/5   Hamstrin/5   AT:  5/5   Gastroc: 5/5       Sensory Exam   Light touch intact and symmetric  Temperature decreased BLE below the calf  Vibration decreased at the ankles (R 5 sec, L 3 sec) and at knees (about 6-7 sec b/l)    Gait, Coordination, and Reflexes   FTN normal  No tremor observed  Reflexes: R/L      Brachioradialis: 2+/2+   Biceps: 2+/2+     Pateller: 2+/2+   Plantar: downgoing/downgoing  Gait: casual gait with average stance, stride length, arm swing  Tandem gait impaired      ROS:     Review of Systems   Constitutional: Negative for chills, fatigue and fever  HENT: Positive for tinnitus  Negative for hearing loss, trouble swallowing and voice change  Eyes: Positive for visual disturbance  Negative for photophobia  Respiratory: Negative for shortness of breath  Cardiovascular: Negative for chest pain and palpitations  Gastrointestinal: Negative for blood in stool, constipation, diarrhea, nausea and vomiting  Endocrine: Negative for polyuria  Genitourinary: Negative for dysuria  Neurological: Positive for light-headedness and numbness  Negative for dizziness, tremors, weakness and headaches  Psychiatric/Behavioral: Positive for sleep disturbance

## 2022-10-17 ENCOUNTER — HOSPITAL ENCOUNTER (OUTPATIENT)
Dept: NON INVASIVE DIAGNOSTICS | Facility: HOSPITAL | Age: 67
Discharge: HOME | End: 2022-10-17
Payer: MEDICARE

## 2022-10-17 DIAGNOSIS — G11.9 CEREBELLAR ATAXIA (HCC): ICD-10-CM

## 2022-10-17 DIAGNOSIS — R42 LIGHTHEADEDNESS: ICD-10-CM

## 2022-10-17 DIAGNOSIS — H53.9 VISUAL AURA: ICD-10-CM

## 2022-10-17 DIAGNOSIS — R55 PRE-SYNCOPE: ICD-10-CM

## 2022-10-17 PROCEDURE — 93226 XTRNL ECG REC<48 HR SCAN A/R: CPT

## 2022-10-17 PROCEDURE — 93225 XTRNL ECG REC<48 HRS REC: CPT

## 2022-10-18 ENCOUNTER — TELEPHONE (OUTPATIENT)
Dept: OTHER | Facility: HOSPITAL | Age: 67
End: 2022-10-18

## 2022-10-18 DIAGNOSIS — G43.909 MIGRAINE: Primary | ICD-10-CM

## 2022-10-18 LAB
ALBUMIN SERPL ELPH-MCNC: 3.8 G/DL (ref 3.8–4.8)
ALPHA1 GLOB SERPL ELPH-MCNC: 0.3 G/DL (ref 0.2–0.3)
ALPHA2 GLOB SERPL ELPH-MCNC: 0.6 G/DL (ref 0.5–0.9)
BETA1 GLOB SERPL ELPH-MCNC: 0.4 G/DL (ref 0.4–0.6)
BETA2 GLOB SERPL ELPH-MCNC: 0.3 G/DL (ref 0.2–0.5)
BUN SERPL-MCNC: 14 MG/DL (ref 7–25)
BUN/CREAT SERPL: ABNORMAL (CALC) (ref 6–22)
CALCIUM SERPL-MCNC: 9.6 MG/DL (ref 8.6–10.3)
CHLORIDE SERPL-SCNC: 102 MMOL/L (ref 98–110)
CO2 SERPL-SCNC: 33 MMOL/L (ref 20–32)
CREAT SERPL-MCNC: 1.24 MG/DL (ref 0.7–1.35)
FOLATE SERPL-MCNC: 5.8 NG/ML
GAMMA GLOB SERPL ELPH-MCNC: 0.9 G/DL (ref 0.8–1.7)
GFR/BSA.PRED SERPLBLD CYS-BASED-ARV: 64 ML/MIN/1.73M2
GLUCOSE SERPL-MCNC: 100 MG/DL (ref 65–99)
POTASSIUM SERPL-SCNC: 3.9 MMOL/L (ref 3.5–5.3)
PROT PATTERN SERPL ELPH-IMP: NORMAL
PROT SERPL-MCNC: 6.4 G/DL (ref 6.1–8.1)
SODIUM SERPL-SCNC: 141 MMOL/L (ref 135–146)
T4 FREE SERPL-MCNC: 1.1 NG/DL (ref 0.8–1.8)
TSH SERPL-ACNC: 4.9 MIU/L (ref 0.4–4.5)
VIT B12 SERPL-MCNC: 262 PG/ML (ref 200–1100)

## 2022-10-18 NOTE — TELEPHONE ENCOUNTER
Called patient to let him know about his lab results - TSH high but T4 normal, ok to just watch for now, subclinical hypothyroidism, and B12 a little low around 262 - can try supplementing w/ OTC 1000 mcg daily for now   Patient to call office for Q/C

## 2022-10-21 DIAGNOSIS — G43.909 MIGRAINE: ICD-10-CM

## 2022-10-21 RX ORDER — AMITRIPTYLINE HYDROCHLORIDE 25 MG/1
TABLET, FILM COATED ORAL
COMMUNITY
Start: 2022-10-20 | End: 2022-10-21 | Stop reason: ALTCHOICE

## 2022-10-21 RX ORDER — AMITRIPTYLINE HYDROCHLORIDE 10 MG/1
10 TABLET, FILM COATED ORAL
Qty: 20 TABLET | Refills: 2 | Status: SHIPPED | OUTPATIENT
Start: 2022-10-21 | End: 2022-10-21

## 2022-10-21 RX ORDER — AMITRIPTYLINE HYDROCHLORIDE 10 MG/1
10 TABLET, FILM COATED ORAL
Qty: 30 TABLET | Refills: 3 | Status: SHIPPED | OUTPATIENT
Start: 2022-10-21

## 2022-10-21 RX ORDER — AMITRIPTYLINE HYDROCHLORIDE 25 MG/1
25 TABLET, FILM COATED ORAL
COMMUNITY
Start: 2022-10-20 | End: 2022-10-21 | Stop reason: ALTCHOICE

## 2022-10-21 NOTE — TELEPHONE ENCOUNTER
· Received TT from dr boles-  · Thank you! Called and addressed his concerns could you please let him know that his script should be at his pharmacy now? Thank you!   9:15 AM  20 days left

## 2022-10-21 NOTE — TELEPHONE ENCOUNTER
Hello ,     Pt is asking for a call back at [phone], He stated he received a voicemail from [address] [place] [address] [employer] but was unable to connect with the phone number she left him to call back  He asked if you can please call him back as he is still having concerning symptoms and had to go to the emergency room on 10/19/22  He has some medical questions      Thank you,     Erum Roxann

## 2022-10-21 NOTE — TELEPHONE ENCOUNTER
Pt left vm-  Yeah, hi, my name is gaurav francis, i'm returning a call from Dr Anny boles, and I really is kind of urgent, need her to return my call  She said in her message that I should just call and leave a message if I need to speak to her personally  And I really do my phone number is [phone]  Thank you      TT sent to dr Traci Velasco

## 2022-10-21 NOTE — TELEPHONE ENCOUNTER
Called patient today to address some of his concerns  He expressed dissatisfaction with his care so far both at [address] [place] [address] but was amenable to trying a low dose amitriptyline to help with possible migraine and sleep and anxiety  We discussed trying 10 gm qhs instead of 25 mg qhs and this was sent into the pharmacy  Will reach out to [facility] about getting his images imported as he is extremely concerned about a 3 mm aneurysm that was reported in one of his scans  Will also reach out about Holter monitor findings to check for any arrhythmia  Recommended conservative measures and trialing the low dose amitriptyline  Answered all of his questions to his satisfaction   Will follow up

## 2022-10-24 ENCOUNTER — TELEPHONE (OUTPATIENT)
Dept: NEUROLOGY | Facility: CLINIC | Age: 67
End: 2022-10-24

## 2022-10-24 ENCOUNTER — TELEPHONE (OUTPATIENT)
Dept: FAMILY MEDICINE CLINIC | Facility: CLINIC | Age: 67
End: 2022-10-24

## 2022-10-24 NOTE — TELEPHONE ENCOUNTER
Pt left VM requesting CB  Called pt back and spoke with him  Pt was recently in [address] [place] [address] ED and had MRA done of head and neck, which showed a 3 mm left supraclinoid ICA aneurysm  Pt was given script for CTA neck that he wishes to have done with [facility], as he is displeased with [facility] care  Informed pt that he only needs to call central scheduling  As long as he has a script he can have it done where he pleases  Provided # for CS  Pt would like to know if Dr Richie Brody has had a chance to see the MRA's from [address] [place] [address] ED done 10/20? Also Dr Darnell Parham: pt cancelled his MRI brain w/w o  for 11/9

## 2022-10-24 NOTE — TELEPHONE ENCOUNTER
Pt is trying to get a CT with and w/o contrast scan done  He needs a referral for that put in  He is very frustrated with [facility] they are not being helpful about getting imaging done  Pt had MRI done at [facility] and showed an aneurysm  Pt is very concerned and would like to get CT done ASAP

## 2022-10-24 NOTE — TELEPHONE ENCOUNTER
Spoke with patient  He stated that he has an appt for a CT scan scheduled for 10/28/22 and will be following up with Neurology for the results

## 2022-10-24 NOTE — TELEPHONE ENCOUNTER
Attempted call to patient  No answer  Message left for return call  No CT/CTA order found  Need to verify what he is looking for

## 2022-10-25 PROCEDURE — 93227 XTRNL ECG REC<48 HR R&I: CPT | Performed by: INTERNAL MEDICINE

## 2022-10-27 ENCOUNTER — TELEPHONE (OUTPATIENT)
Dept: FAMILY MEDICINE CLINIC | Facility: CLINIC | Age: 67
End: 2022-10-27

## 2022-10-27 NOTE — TELEPHONE ENCOUNTER
Spoke to pt to notify that I did reach out to Neurology office so they can call him in regards to questions about Amitriptyline

## 2022-10-28 ENCOUNTER — HOSPITAL ENCOUNTER (OUTPATIENT)
Dept: CT IMAGING | Facility: HOSPITAL | Age: 67
End: 2022-10-28
Payer: MEDICARE

## 2022-10-28 ENCOUNTER — TELEPHONE (OUTPATIENT)
Dept: OTHER | Facility: HOSPITAL | Age: 67
End: 2022-10-28

## 2022-10-28 DIAGNOSIS — I72.9 ANEURYSM (HCC): Primary | ICD-10-CM

## 2022-10-28 DIAGNOSIS — I72.9 ANEURYSM (HCC): ICD-10-CM

## 2022-10-28 DIAGNOSIS — R42 DIZZINESS: ICD-10-CM

## 2022-10-28 DIAGNOSIS — G43.109 OCULAR MIGRAINE: ICD-10-CM

## 2022-10-28 DIAGNOSIS — H53.9 VISUAL DISTURBANCE: ICD-10-CM

## 2022-10-28 PROCEDURE — G1004 CDSM NDSC: HCPCS

## 2022-10-28 PROCEDURE — 70496 CT ANGIOGRAPHY HEAD: CPT

## 2022-10-28 RX ADMIN — IOHEXOL 85 ML: 350 INJECTION, SOLUTION INTRAVENOUS at 08:10

## 2022-10-31 RX ORDER — CYANOCOBALAMIN 1000 UG/ML
1000 INJECTION, SOLUTION INTRAMUSCULAR; SUBCUTANEOUS ONCE
Qty: 1 ML | Refills: 3 | Status: SHIPPED | OUTPATIENT
Start: 2022-10-31 | End: 2022-11-07 | Stop reason: SDUPTHER

## 2022-10-31 NOTE — TELEPHONE ENCOUNTER
Spoke to patient who remains dissatisfied with his care in the [facility] area  He does think that he is having some improvement with the B12 oral supplements and would like to try the monthly injections to speed the process which is reasonable  Has had CTA confirming 3 mm ICA aneurysm and will refer to neurosurgery for evaluation and monitoring    Would like external referral to [facility] neurology Dr Brooks Galaviz for second opinion which I am placing today as well   Will try to have CTA results forwarded/faxed to their office   - Fax: [phone]

## 2022-11-02 ENCOUNTER — TELEPHONE (OUTPATIENT)
Dept: NEUROLOGY | Facility: CLINIC | Age: 67
End: 2022-11-02

## 2022-11-02 NOTE — TELEPHONE ENCOUNTER
Patient called inquiring about his neuro surgery appointment as he has not heard from scheduling for that yet  Advised patient that referral goes through a triage process and then forwarded to a physician for review and after review he will receive a call for proper scheduling  Also advised patient if he does not hear anything by end of the week, he could call back and we would try to assist him further

## 2022-11-04 ENCOUNTER — TELEPHONE (OUTPATIENT)
Dept: NEUROLOGY | Facility: CLINIC | Age: 67
End: 2022-11-04

## 2022-11-04 NOTE — TELEPHONE ENCOUNTER
Victorina Tesfaye from [facility] received a referral from Dr Mervat Hernandez  She needs supporting documents such recent chart notes and labs   Please fax them to: [phone]

## 2022-11-07 DIAGNOSIS — E53.8 B12 DEFICIENCY: Primary | ICD-10-CM

## 2022-11-07 RX ORDER — CYANOCOBALAMIN 1000 UG/ML
1000 INJECTION, SOLUTION INTRAMUSCULAR; SUBCUTANEOUS ONCE
Qty: 1 ML | Refills: 3 | Status: SHIPPED | OUTPATIENT
Start: 2022-11-07 | End: 2022-11-07 | Stop reason: SDUPTHER

## 2022-11-07 RX ORDER — CYANOCOBALAMIN 1000 UG/ML
1000 INJECTION, SOLUTION INTRAMUSCULAR; SUBCUTANEOUS
Status: COMPLETED | OUTPATIENT
Start: 2022-11-07 | End: 2023-01-09

## 2022-11-08 NOTE — TELEPHONE ENCOUNTER
Patient called asking when and where can he go for his B 12 injection and I advised I will send a message to Dr Reinier Cagle and the nurses team to get back to him at [phone]

## 2022-11-09 ENCOUNTER — CLINICAL SUPPORT (OUTPATIENT)
Dept: NEUROLOGY | Facility: CLINIC | Age: 67
End: 2022-11-09

## 2022-11-09 DIAGNOSIS — E53.8 B12 DEFICIENCY: Primary | ICD-10-CM

## 2022-11-09 RX ADMIN — CYANOCOBALAMIN 1000 MCG: 1000 INJECTION, SOLUTION INTRAMUSCULAR; SUBCUTANEOUS at 10:56

## 2022-11-09 NOTE — PROGRESS NOTES
Pt came in today for a vitamin B-12 injection that was given on the left deltoid per pt request  Pt tolerated injection well with no issues

## 2022-12-02 ENCOUNTER — TELEPHONE (OUTPATIENT)
Dept: OTHER | Facility: HOSPITAL | Age: 67
End: 2022-12-02

## 2022-12-02 NOTE — TELEPHONE ENCOUNTER
Called patient to discuss his evolving headaches and other concerns  Per discussion, patient has been on half a tablet of the amitriptyline now for about 3 days and feels that he is noticing an improvement not only in the visual symptoms but also in his sleep  He does feel there is possibly some improvement in his headaches as he has not had to take as much aleve for them and today he felt well enough to leave the house and do some activities  We discussed that it might take a week or two before he notices full effect of amitriptyline but that if he's already noting improvement, this is likely a good sign that things will continue to improve  Advised of the importance of weaning off of the aleve and to be wary of GI upset and rebound/medication overuse headaches while on it  Also advised of importance of trying to return to daily routine as soon as he feels able  Will hold off on abortive medications for now given his sensitivity to side effects and ongoing improvement  Has f/u with me in March at which time we should recheck his B12 levels  Patient expressed understanding, agreement and gratitude and again reports that although not back to normal yet, he is feeling like things are moving in the right direction

## 2022-12-04 ENCOUNTER — TELEPHONE (OUTPATIENT)
Dept: OTHER | Facility: OTHER | Age: 67
End: 2022-12-04

## 2022-12-04 NOTE — TELEPHONE ENCOUNTER
LOV 3/20/19   Pt called, and wanted to relay message, " several years back, I was hospitalized due to uncontrolled fever  Ever since then, I've been keeping an eye on my temperature and I've noticed it has been rising and I'm not sure why   My recent temp is 99 7, at 1237

## 2022-12-07 ENCOUNTER — TELEPHONE (OUTPATIENT)
Dept: INFECTIOUS DISEASES | Facility: CLINIC | Age: 67
End: 2022-12-07

## 2022-12-07 ENCOUNTER — TELEPHONE (OUTPATIENT)
Dept: FAMILY MEDICINE CLINIC | Facility: CLINIC | Age: 67
End: 2022-12-07

## 2022-12-07 NOTE — TELEPHONE ENCOUNTER
Pt called left msg on referral line asking for orders to see Dr Kenna Mg @ [address] [place] [address] Dr graciela Harris  He saw him back in 2018 for recurring fevers pt feels he is going through same thing as before  Pt reached out to their ofc but needs order from us to set up appt

## 2022-12-07 NOTE — TELEPHONE ENCOUNTER
Patient calls office to make an appointment for recurrent fevers  He states he was seen by Dr Freeman in 2018     I informed him we will need a current referral put into epic  To reach out to his PCP  Patients states understanding and gives thanks

## 2022-12-08 NOTE — TELEPHONE ENCOUNTER
Spoke to patient, he is experiencing an uncontrollable fever, highest was 100 7  He stated he has pain that comes and goes in his L ear and head and has no energy to do anything  He mentioned he feels like he did when he was in the hospital (06/2018)

## 2022-12-09 ENCOUNTER — CLINICAL SUPPORT (OUTPATIENT)
Dept: NEUROLOGY | Facility: CLINIC | Age: 67
End: 2022-12-09

## 2022-12-09 ENCOUNTER — PATIENT MESSAGE (OUTPATIENT)
Dept: FAMILY MEDICINE CLINIC | Facility: CLINIC | Age: 67
End: 2022-12-09

## 2022-12-09 ENCOUNTER — TELEPHONE (OUTPATIENT)
Dept: FAMILY MEDICINE CLINIC | Facility: CLINIC | Age: 67
End: 2022-12-09

## 2022-12-09 DIAGNOSIS — E53.8 VITAMIN B12 DEFICIENCY: ICD-10-CM

## 2022-12-09 RX ADMIN — CYANOCOBALAMIN 1000 MCG: 1000 INJECTION, SOLUTION INTRAMUSCULAR; SUBCUTANEOUS at 11:23

## 2022-12-09 NOTE — TELEPHONE ENCOUNTER
Pt made an appt earlier for 12/12    Justin Abdalla DO  to [facility]      4:39 PM  Please call this patient, he needs an evaluation   if there are no appointments available, please advise him to go to the urgent care  Artice Gladis you

## 2022-12-09 NOTE — PROGRESS NOTES
Patient is here today for his vitamin B-12 injection which he received in his right deltoid without any complications

## 2022-12-12 DIAGNOSIS — U07.1 COVID-19: Primary | ICD-10-CM

## 2022-12-12 RX ORDER — PREDNISONE 10 MG/1
TABLET ORAL
Qty: 21 TABLET | Refills: 0 | Status: SHIPPED | OUTPATIENT
Start: 2022-12-12 | End: 2023-01-04

## 2022-12-19 ENCOUNTER — TELEMEDICINE (OUTPATIENT)
Dept: FAMILY MEDICINE CLINIC | Facility: CLINIC | Age: 67
End: 2022-12-19

## 2022-12-19 DIAGNOSIS — R50.9 FEVER, UNSPECIFIED FEVER CAUSE: ICD-10-CM

## 2022-12-19 DIAGNOSIS — U07.1 COVID-19: Primary | ICD-10-CM

## 2022-12-19 NOTE — PROGRESS NOTES
COVID-19 Outpatient Progress Note    Assessment/Plan:    Problem List Items Addressed This Visit    None  Visit Diagnoses     COVID-19    -  Primary         Disposition:     Isolation and masking guidelines reviewed  Symptoms mild - feeling relatively well today  Will not prescribe Paxlovid today: just at the end of the window or beyond at this point - uncertain as to exact day symptoms started  Thursday for sure but may have been earlier  He will continue to treat conservatively  Supportive care reviewed: rest, hydrate, good nutrition, immunity building vitamins  F/U guidance given  ER precautions for acute change    I have spent 12 minutes directly with the patient  Greater than 50% of this time was spent in counseling/coordination of care regarding: instructions for management and patient and family education  Encounter provider: GABRIELLA Welch     Provider located at: [address] [facility] [place] [address]  [address] [facility]   [address] [facility] [address]  [phone]     Recent Visits  No visits were found meeting these conditions  Showing recent visits within past 7 days and meeting all other requirements  Today's Visits  Date Type Provider Dept   12/19/22 Telephone Emilie Faust [facility]   12/19/22 Telemedicine GABRIELLA Welch [facility]   Showing today's visits and meeting all other requirements  Future Appointments  No visits were found meeting these conditions  Showing future appointments within next 150 days and meeting all other requirements     This virtual check-in was done via [address] and patient was informed that this is a secure, HIPAA-compliant platform  He agrees to proceed  Patient agrees to participate in a virtual check in via telephone or video visit instead of presenting to the office to address urgent/immediate medical needs  Patient is aware this is a billable service   He acknowledged consent and understanding of privacy and security of the video platform  The patient has agreed to participate and understands they can discontinue the visit at any time  After connecting through [facility], the patient was identified by name and date of birth  Hermann Aguila was informed that this was a telemedicine visit and that the exam was being conducted confidentially over secure lines  My office door was closed  No one else was in the room  Hermann Aguila acknowledged consent and understanding of privacy and security of the telemedicine visit  I informed the patient that I have reviewed his record in Epic and presented the opportunity for him to ask any questions regarding the visit today  The patient agreed to participate  Verification of patient location:  Patient is located in the following state in which I hold an active license: [place]    Subjective:   Hermann Aguila is a 79 y o  male who is concerned about COVID-19  Patient's symptoms include fatigue, rhinorrhea and cough (periodic)  Patient denies fever, shortness of breath, chest tightness, abdominal pain, nausea, vomiting and diarrhea (episode yesterday - since resolved today  )       - Date of symptom onset: 12/17/2022      COVID-19 vaccination status: Fully vaccinated with booster    Exposure:   Contact with a person who is under investigation (PUI) for or who is positive for COVID-19 within the last 14 days?: Yes    Hospitalized recently for fever and/or lower respiratory symptoms?: No      Currently a healthcare worker that is involved in direct patient care?: No      Works in a special setting where the risk of COVID-19 transmission may be high? (this may include long-term care, correctional and longterm facilities; homeless shelters; assisted-living facilities and group homes ): No      Resident in a special setting where the risk of COVID-19 transmission may be high? (this may include long-term care, correctional and longterm facilities; homeless shelters; assisted-living facilities and group homes ): No      Wife was positive last week  Pt symptoms started Thursday - started with a cough - that is very infrequent  Mild "runny nose", otherwise symptoms mild    No results found for: [place], SARSCORONAVI, CORONAVIRUSR, SARSCOVAG, [facility]    Review of Systems   Constitutional: Positive for fatigue  Negative for fever  HENT: Positive for rhinorrhea  Respiratory: Positive for cough (periodic)  Negative for chest tightness and shortness of breath  Gastrointestinal: Negative for abdominal pain, diarrhea (episode yesterday - since resolved today ), nausea and vomiting  Current Outpatient Medications on File Prior to Visit   Medication Sig   • ALPRAZolam (XANAX) 0 5 mg tablet Take 1 tablet (0 5 mg total) by mouth daily as needed for anxiety   • cholecalciferol (VITAMIN D3) 1,000 units tablet Take 1,000 Units by mouth daily   • Coenzyme Q10 300 MG CAPS Take by mouth daily    • desoximetasone (TOPICORT) 0 05 % cream Apply topically 2 (two) times a day (Patient not taking: No sig reported)   • Eliquis 5 MG TAKE 1 TABLET TWICE A DAY   • famotidine (PEPCID) 40 MG tablet    • fluorouracil (EFUDEX) 5 % cream    • fluticasone (FLONASE) 50 mcg/act nasal spray USE 2 SPRAYS IN EACH       NOSTRIL DAILY   • gabapentin (NEURONTIN) 100 mg capsule Take 100 mg by mouth Three times a day (Patient not taking: No sig reported)   • hydrochlorothiazide (HYDRODIURIL) 25 mg tablet Take 1 tablet (25 mg total) by mouth daily   • ketoconazole (NIZORAL) 2 % shampoo    • nebivolol (Bystolic) 10 mg tablet Take 1 tablet (10 mg total) by mouth daily   • omeprazole (PriLOSEC) 40 MG capsule as needed   • predniSONE 10 mg tablet Take 6 tablets By mouth  In the morning Qd  Decrease by  By 1 tablet daily until completed     • sildenafil (VIAGRA) 100 mg tablet as needed   • tamsulosin (FLOMAX) 0 4 mg Take 1 capsule by mouth with dinner (Patient taking differently: if needed)       Objective: There were no vitals taken for this visit  Physical Exam  Constitutional:       General: He is not in acute distress  Appearance: Normal appearance  He is well-developed  He is not ill-appearing  Pulmonary:      Effort: Pulmonary effort is normal  No respiratory distress  Neurological:      Mental Status: He is alert and oriented to person, place, and time  Psychiatric:         Attention and Perception: Attention normal          Mood and Affect: Mood normal          Behavior: Behavior normal          Thought Content:  Thought content normal          Judgment: Judgment normal        GABRIELLA Flores

## 2022-12-20 ENCOUNTER — TELEPHONE (OUTPATIENT)
Dept: OTHER | Facility: HOSPITAL | Age: 67
End: 2022-12-20

## 2022-12-20 ENCOUNTER — TELEPHONE (OUTPATIENT)
Dept: FAMILY MEDICINE CLINIC | Facility: CLINIC | Age: 67
End: 2022-12-20

## 2022-12-20 ENCOUNTER — TELEPHONE (OUTPATIENT)
Dept: NEUROLOGY | Facility: CLINIC | Age: 67
End: 2022-12-20

## 2022-12-20 NOTE — TELEPHONE ENCOUNTER
----- Message from Travis Turner DO sent at 12/16/2022  5:30 PM EST -----  Regarding: MAIA: Karishma  Contact: [phone]  Please call patient, if he is symptomatically improved he does not necessarily need a follow-up appointment  Thank you  ----- Message -----  From: Magda Woodward MA  Sent: 12/16/2022  10:01 AM EST  To: Travis Turner DO  Subject: MAIA: Covid                                          ----- Message -----  From: Vanessa Leggett  Sent: 12/15/2022   6:17 PM EST  To: [facility]  Subject: Covid                                            I believe I could now do a tele health appointment if you would still want to do that? Thanks, Krishna Chappell

## 2022-12-20 NOTE — TELEPHONE ENCOUNTER
Called patient and discussed his symptoms  He has active COVID infection which may be contributing to his symptoms  Given that he was stable on the lower dose of amitriptyline it is reasonable to stay at half tab at night  Patient amenable to this action plan  Endorsing fatigue  Discussed symptoms of COVID including N/V, GI upset, fatigue, headache  He will try lower dose of medication and monitor his other symptoms   All questions and concerns answered

## 2022-12-20 NOTE — TELEPHONE ENCOUNTER
Spoke with patient, let him know he should go tot the ER for further evaluation  He stated he does not know what to do anymore  I told him if he does go to get back to us to let us know what is going on

## 2022-12-20 NOTE — TELEPHONE ENCOUNTER
Received  transcription:    Yeah, hi, this is [employer] again  I keep missing your call  I believe it was a Ardia Mink that called  I did call earlier today to try to speak with Dr Brady Seaman  My phone number is [phone]  Thank you

## 2022-12-20 NOTE — TELEPHONE ENCOUNTER
Spoke to patient  Patient reporting symptoms he feels associated with amitriptyline  Currently taking 1 tablet (25mg) daily  Patient reporting event last night in which he was woken by a sudden scream (this was in his head and not an actual/audible scream)  Since then, having awful pain behind eye  Pain comes and goes  Seems to improve as day goes on, but at night when trying to sleep almost unbearable  Amitriptyline taken at bedtime  Patient would like to speak to you directly if possible         Callback: [phone]

## 2022-12-20 NOTE — TELEPHONE ENCOUNTER
Received  transcription:    Yeah, hi, this is [employer]  I'm returning a call from, I believe Juanito Smith  When she called, I was on the line with [facility] and I couldn't get off fast enough to get her call  So please return my call at [phone]  Thank you

## 2022-12-20 NOTE — TELEPHONE ENCOUNTER
Patient called and asked if Dr Tamica Murray can call him to discuss his current condition and his reaction to the medication he was put on      # [phone]

## 2022-12-30 ENCOUNTER — TELEPHONE (OUTPATIENT)
Dept: INFECTIOUS DISEASES | Facility: CLINIC | Age: 67
End: 2022-12-30

## 2022-12-30 NOTE — TELEPHONE ENCOUNTER
Called pt in regards to his recurrent fevers  lmom per Dr Freeman that he should have w/u with pcp for recently having Covid and if no other source and fevers persist then his pcp should refer him to our office  Mohs Case Number:

## 2023-01-04 ENCOUNTER — OFFICE VISIT (OUTPATIENT)
Dept: FAMILY MEDICINE CLINIC | Facility: CLINIC | Age: 68
End: 2023-01-04

## 2023-01-04 VITALS
SYSTOLIC BLOOD PRESSURE: 128 MMHG | HEIGHT: 68 IN | HEART RATE: 96 BPM | RESPIRATION RATE: 18 BRPM | DIASTOLIC BLOOD PRESSURE: 80 MMHG | OXYGEN SATURATION: 94 % | BODY MASS INDEX: 28.98 KG/M2 | WEIGHT: 191.2 LBS

## 2023-01-04 DIAGNOSIS — M50.10 CERVICAL DISC DISORDER WITH RADICULOPATHY: Primary | ICD-10-CM

## 2023-01-04 DIAGNOSIS — I26.99 RIGHT PULMONARY EMBOLUS (HCC): ICD-10-CM

## 2023-01-04 RX ORDER — PREDNISONE 10 MG/1
TABLET ORAL
Qty: 33 TABLET | Refills: 0 | Status: SHIPPED | OUTPATIENT
Start: 2023-01-04

## 2023-01-04 RX ORDER — LANOLIN ALCOHOL/MO/W.PET/CERES
1 CREAM (GRAM) TOPICAL DAILY
COMMUNITY
Start: 2022-10-21

## 2023-01-04 RX ORDER — AMITRIPTYLINE HYDROCHLORIDE 25 MG/1
25 TABLET, FILM COATED ORAL
COMMUNITY

## 2023-01-04 RX ORDER — TOPIRAMATE 25 MG/1
TABLET ORAL
COMMUNITY
Start: 2022-12-22

## 2023-01-04 NOTE — PROGRESS NOTES
Assessment/Plan:   1  Cervical disc disorder with radiculopathy  Reviewed patient's symptoms today  At this time, reviewed his previous x-ray imaging  We will check MRI of her cervical spine  He was advised that he may highly benefit from seeing physical therapy  Referral placed today  At this time, we will start treatment with a prednisone taper  Continue with his current treatment with neurology  Follow-up if any symptoms worsen  - Ambulatory Referral to Physical Therapy; Future  - MRI cervical spine wo contrast; Future  - predniSONE 10 mg tablet; Take 60mg daily for 3 days, On day day 4 decrease dose by 1 tablet until completed  Dispense: 33 tablet; Refill: 0    2  Right pulmonary embolus (HCC)  Patient's Eliquis was  discontinued secondary to his cerebral aneurysm  He currently has been asymptomatic  We will continue with routine monitoring  There are no diagnoses linked to this encounter  Subjective:       Chief Complaint   Patient presents with   • Pain     Head and back of neck       Patient ID: Wing Shrestha is a 79 y o  male presented with CC of pain over his head as well as his back  At this pain for the past few months  Symptoms started gradually  He denies any trauma to this area  He has been following up with neurology regularly for his cerebral aneurysms  He is seeing neurology as well for his persistent headaches  He has been having neck pain with radiation down his right arm  HPI    Review of Systems   Constitutional: Negative for activity change, chills, fatigue and fever  HENT: Negative for congestion, ear pain, sinus pressure and sore throat  Eyes: Negative for redness, itching and visual disturbance  Respiratory: Negative for cough and shortness of breath  Cardiovascular: Negative for chest pain and palpitations  Gastrointestinal: Negative for abdominal pain, diarrhea and nausea  Endocrine: Negative for cold intolerance and heat intolerance  Genitourinary: Negative for dysuria, flank pain and frequency  Musculoskeletal: Positive for neck pain and neck stiffness  Negative for arthralgias, back pain, gait problem and myalgias  Skin: Negative for color change  Allergic/Immunologic: Negative for environmental allergies  Neurological: Negative for dizziness, numbness and headaches  Psychiatric/Behavioral: Negative for behavioral problems and sleep disturbance  The following portions of the patient's history were reviewed and updated as appropriate : past family history, past medical history, past social history and past surgical history      Current Outpatient Medications:   •  ALPRAZolam (XANAX) 0 5 mg tablet, Take 1 tablet (0 5 mg total) by mouth daily as needed for anxiety, Disp: 30 tablet, Rfl: 0  •  amitriptyline (ELAVIL) 25 mg tablet, Take 25 mg by mouth daily at bedtime Every other day only taking half pill, Disp: , Rfl:   •  cholecalciferol (VITAMIN D3) 1,000 units tablet, Take 1,000 Units by mouth daily, Disp: , Rfl:   •  Coenzyme Q10 300 MG CAPS, Take by mouth daily , Disp: , Rfl:   •  famotidine (PEPCID) 40 MG tablet, , Disp: , Rfl:   •  fluticasone (FLONASE) 50 mcg/act nasal spray, USE 2 SPRAYS IN EACH       NOSTRIL DAILY, Disp: 48 g, Rfl: 1  •  hydrochlorothiazide (HYDRODIURIL) 25 mg tablet, Take 1 tablet (25 mg total) by mouth daily, Disp: 90 tablet, Rfl: 1  •  ketoconazole (NIZORAL) 2 % shampoo, , Disp: , Rfl:   •  nebivolol (Bystolic) 10 mg tablet, Take 1 tablet (10 mg total) by mouth daily, Disp: 90 tablet, Rfl: 1  •  omeprazole (PriLOSEC) 40 MG capsule, as needed, Disp: , Rfl:   •  sildenafil (VIAGRA) 100 mg tablet, as needed, Disp: , Rfl: 11  •  tamsulosin (FLOMAX) 0 4 mg, Take 1 capsule by mouth with dinner (Patient taking differently: if needed), Disp: 30 capsule, Rfl: 5  •  vitamin B-12 (VITAMIN B-12) 1,000 mcg tablet, Take 1 tablet by mouth daily, Disp: , Rfl:   •  predniSONE 10 mg tablet, Take 6 tablets By mouth  In the morning Qd  Decrease by  By 1 tablet daily until completed  (Patient not taking: Reported on 1/4/2023), Disp: 21 tablet, Rfl: 0  •  topiramate (TOPAMAX) 25 mg tablet, , Disp: , Rfl:     Current Facility-Administered Medications:   •  cyanocobalamin injection 1,000 mcg, 1,000 mcg, Intramuscular, Q30 Days, Neelam Hartman MD, 1,000 mcg at 12/09/22 1123         Objective:         Vitals:    01/04/23 0932   BP: 128/80   Pulse: 96   Resp: 18   SpO2: 94%   Weight: 86 7 kg (191 lb 3 2 oz)   Height: 5' 7 75" (1 721 m)     Physical Exam  Vitals reviewed  Constitutional:       Appearance: He is well-developed  HENT:      Head: Normocephalic and atraumatic  Nose: Nose normal       Mouth/Throat:      Pharynx: No oropharyngeal exudate  Eyes:      General: No scleral icterus  Right eye: No discharge  Left eye: No discharge  Pupils: Pupils are equal, round, and reactive to light  Neck:      Trachea: No tracheal deviation  Cardiovascular:      Rate and Rhythm: Normal rate and regular rhythm  Pulses:           Dorsalis pedis pulses are 2+ on the right side and 2+ on the left side  Posterior tibial pulses are 2+ on the right side and 2+ on the left side  Heart sounds: Normal heart sounds  No murmur heard  No friction rub  No gallop  Pulmonary:      Effort: Pulmonary effort is normal  No respiratory distress  Breath sounds: Normal breath sounds  No wheezing or rales  Abdominal:      General: Bowel sounds are normal  There is no distension  Palpations: Abdomen is soft  Tenderness: There is no abdominal tenderness  There is no guarding or rebound  Musculoskeletal:         General: Normal range of motion  Cervical back: Normal range of motion and neck supple  Lymphadenopathy:      Head:      Right side of head: No submental or submandibular adenopathy  Left side of head: No submental or submandibular adenopathy  Cervical: No cervical adenopathy  Right cervical: No superficial, deep or posterior cervical adenopathy  Left cervical: No superficial, deep or posterior cervical adenopathy  Skin:     General: Skin is warm and dry  Findings: No erythema  Neurological:      Mental Status: He is alert and oriented to person, place, and time  Cranial Nerves: No cranial nerve deficit  Sensory: No sensory deficit  Psychiatric:         Mood and Affect: Mood is not anxious or depressed  Speech: Speech normal          Behavior: Behavior normal          Thought Content:  Thought content normal          Judgment: Judgment normal

## 2023-01-09 ENCOUNTER — CLINICAL SUPPORT (OUTPATIENT)
Dept: NEUROLOGY | Facility: CLINIC | Age: 68
End: 2023-01-09

## 2023-01-09 DIAGNOSIS — E53.8 B12 DEFICIENCY: ICD-10-CM

## 2023-01-09 RX ADMIN — CYANOCOBALAMIN 1000 MCG: 1000 INJECTION, SOLUTION INTRAMUSCULAR; SUBCUTANEOUS at 11:13

## 2023-01-09 NOTE — PROGRESS NOTES
Patient presented today for a Vitamin B12 injection  I injected 1,000 mcg of Vitamin B12 into the patient's LEFT deltoid  Patient tolerated the injection well and will follow up with next injection

## 2023-01-10 ENCOUNTER — OFFICE VISIT (OUTPATIENT)
Dept: FAMILY MEDICINE CLINIC | Facility: CLINIC | Age: 68
End: 2023-01-10

## 2023-01-10 VITALS — WEIGHT: 191 LBS | BODY MASS INDEX: 28.95 KG/M2 | RESPIRATION RATE: 19 BRPM | HEIGHT: 68 IN

## 2023-01-10 DIAGNOSIS — M50.10 CERVICAL DISC DISORDER WITH RADICULOPATHY: ICD-10-CM

## 2023-01-10 DIAGNOSIS — J01.90 ACUTE SINUSITIS, RECURRENCE NOT SPECIFIED, UNSPECIFIED LOCATION: ICD-10-CM

## 2023-01-10 DIAGNOSIS — I67.1 CEREBRAL ANEURYSM, NONRUPTURED: Primary | ICD-10-CM

## 2023-01-10 DIAGNOSIS — G43.119 INTRACTABLE MIGRAINE WITH AURA WITHOUT STATUS MIGRAINOSUS: ICD-10-CM

## 2023-01-10 RX ORDER — AMOXICILLIN AND CLAVULANATE POTASSIUM 875; 125 MG/1; MG/1
1 TABLET, FILM COATED ORAL EVERY 12 HOURS SCHEDULED
Qty: 14 TABLET | Refills: 0 | Status: SHIPPED | OUTPATIENT
Start: 2023-01-10 | End: 2023-01-17

## 2023-01-10 NOTE — PROGRESS NOTES
Assessment/Plan:   1  Cerebral aneurysm, nonruptured  Reviewed patient's symptoms today  At this time, it is unclear if his headaches are due to his cerebral aneurysm  He has been seen multiple times in the ED  He has had multiple negative tests  At this time, he was advised that starting Eliquis on his own can be highly dangerous  Anticoagulation with an aneurysm can cause potential bleeding  He was advised that he must discuss this much further with neurology  We will help him schedule an appointment soon    2  Intractable migraine with aura without status migrainosus  Headaches have been improving  At this time, it is unclear if his improvement is due to his amitriptyline  He states that he is not taking any Topamax yet  Continue with regular follow-up with his headache specialist     3  Acute sinusitis, recurrence not specified, unspecified location  Patient still with persistent problems with sinus pressure and congestion  He did complete his prednisone  Start treatment with Augmentin  - amoxicillin-clavulanate (AUGMENTIN) 875-125 mg per tablet; Take 1 tablet by mouth every 12 (twelve) hours for 7 days  Dispense: 14 tablet; Refill: 0    4  Cervical disc disorder with radiculopathy  Reviewed patient's symptoms  He was advised that cervical degenerative disc disease can cause/trigger headaches  He is scheduled for an MRI this weekend  I have spent 40 minutes with Patient  today in which greater than 50% of this time was spent in counseling/coordination of care regarding Diagnostic results, Prognosis, Risks and benefits of tx options, Intructions for management, Patient and family education, Importance of tx compliance and Risk factor reductions  There are no diagnoses linked to this encounter  Subjective:       Chief Complaint   Patient presents with   • Medication Management      Patient ID: Jimmie Vegas is a 79 y o  male presents today with multiple neurologic concerns  He states that he has been having persistent problems with severe headaches  He has been seen and evaluated by his neurologist   He was placed on multiple medications including amitriptyline as well as most recently Topamax  He states that he has not taken the Topamax yet  He states that he is growing increasingly frustrated that he is not getting answers  He states that he was advised previously by neurology to discontinue his Eliquis due to his cerebral aneurysm  He did discontinue this medication months ago however most recently started this medication 1 week ago on his own  He states that since starting this medication his headaches have subsided  HPI    Review of Systems   Constitutional: Negative for activity change, chills, fatigue and fever  HENT: Negative for congestion, ear pain, sinus pressure and sore throat  Eyes: Negative for redness, itching and visual disturbance  Respiratory: Negative for cough and shortness of breath  Cardiovascular: Negative for chest pain and palpitations  Gastrointestinal: Negative for abdominal pain, diarrhea and nausea  Endocrine: Negative for cold intolerance and heat intolerance  Genitourinary: Negative for dysuria, flank pain and frequency  Musculoskeletal: Negative for arthralgias, back pain, gait problem and myalgias  Skin: Negative for color change  Allergic/Immunologic: Negative for environmental allergies  Neurological: Negative for dizziness, numbness and headaches  Psychiatric/Behavioral: Negative for behavioral problems and sleep disturbance  The following portions of the patient's history were reviewed and updated as appropriate : past family history, past medical history, past social history and past surgical history      Current Outpatient Medications:   •  ALPRAZolam (XANAX) 0 5 mg tablet, Take 1 tablet (0 5 mg total) by mouth daily as needed for anxiety, Disp: 30 tablet, Rfl: 0  •  amitriptyline (ELAVIL) 25 mg tablet, Take 25 mg by mouth daily at bedtime Every other day only taking half pill, Disp: , Rfl:   •  apixaban (Eliquis) 5 mg, Take 5 mg by mouth 2 (two) times a day, Disp: , Rfl:   •  cholecalciferol (VITAMIN D3) 1,000 units tablet, Take 1,000 Units by mouth daily, Disp: , Rfl:   •  Coenzyme Q10 300 MG CAPS, Take by mouth daily , Disp: , Rfl:   •  famotidine (PEPCID) 40 MG tablet, , Disp: , Rfl:   •  fluticasone (FLONASE) 50 mcg/act nasal spray, USE 2 SPRAYS IN EACH       NOSTRIL DAILY, Disp: 48 g, Rfl: 1  •  hydrochlorothiazide (HYDRODIURIL) 25 mg tablet, Take 1 tablet (25 mg total) by mouth daily, Disp: 90 tablet, Rfl: 1  •  ketoconazole (NIZORAL) 2 % shampoo, , Disp: , Rfl:   •  nebivolol (Bystolic) 10 mg tablet, Take 1 tablet (10 mg total) by mouth daily, Disp: 90 tablet, Rfl: 1  •  omeprazole (PriLOSEC) 40 MG capsule, as needed, Disp: , Rfl:   •  sildenafil (VIAGRA) 100 mg tablet, as needed, Disp: , Rfl: 11  •  tamsulosin (FLOMAX) 0 4 mg, Take 1 capsule by mouth with dinner (Patient taking differently: if needed), Disp: 30 capsule, Rfl: 5  •  topiramate (TOPAMAX) 25 mg tablet, , Disp: , Rfl:   •  vitamin B-12 (VITAMIN B-12) 1,000 mcg tablet, Take 1 tablet by mouth daily, Disp: , Rfl:   •  predniSONE 10 mg tablet, Take 60mg daily for 3 days, On day day 4 decrease dose by 1 tablet until completed  (Patient not taking: Reported on 1/10/2023), Disp: 33 tablet, Rfl: 0         Objective:         Vitals:    01/10/23 1131   Resp: 19   Weight: 86 6 kg (191 lb)   Height: 5' 7 75" (1 721 m)     Physical Exam  Vitals reviewed  Constitutional:       Appearance: Normal appearance  He is well-developed  HENT:      Head: Normocephalic and atraumatic  Right Ear: Hearing normal       Left Ear: Hearing normal       Nose: Nose normal  No septal deviation  Eyes:      General: Lids are normal       Pupils: Pupils are equal, round, and reactive to light  Neck:      Thyroid: No thyroid mass or thyromegaly        Trachea: Trachea normal    Cardiovascular:      Rate and Rhythm: Normal rate  Pulmonary:      Effort: Pulmonary effort is normal  No respiratory distress  Breath sounds: No decreased breath sounds  Abdominal:      Tenderness: There is no guarding  Musculoskeletal:         General: Normal range of motion  Cervical back: Normal range of motion  Skin:     General: Skin is warm and dry  Neurological:      Mental Status: He is alert and oriented to person, place, and time  Cranial Nerves: No cranial nerve deficit  Sensory: No sensory deficit  Psychiatric:         Speech: Speech normal          Behavior: Behavior normal          Thought Content:  Thought content normal          Judgment: Judgment normal

## 2023-01-12 ENCOUNTER — EVALUATION (OUTPATIENT)
Dept: OCCUPATIONAL THERAPY | Facility: REHABILITATION | Age: 68
End: 2023-01-12

## 2023-01-12 ENCOUNTER — EVALUATION (OUTPATIENT)
Dept: PHYSICAL THERAPY | Facility: REHABILITATION | Age: 68
End: 2023-01-12

## 2023-01-12 DIAGNOSIS — G43.119 INTRACTABLE MIGRAINE WITH AURA WITHOUT STATUS MIGRAINOSUS: ICD-10-CM

## 2023-01-12 DIAGNOSIS — R42 DIZZINESS: ICD-10-CM

## 2023-01-12 DIAGNOSIS — G62.9 NEUROPATHY: Primary | ICD-10-CM

## 2023-01-12 DIAGNOSIS — R26.81 GAIT INSTABILITY: ICD-10-CM

## 2023-01-12 DIAGNOSIS — H53.9 VISUAL DISTURBANCE: Primary | ICD-10-CM

## 2023-01-12 NOTE — PROGRESS NOTES
OCCUPATIONAL THERAPY INITIAL EVALUATION:      01/12/2023  Lashell Evans  1955  [id number]  David Chino MD   Diagnosis ICD-10-CM Associated Orders   1  Visual disturbance  H53 9 Ambulatory Referral to Occupational Therapy      2  Dizziness  R42 Ambulatory Referral to Occupational Therapy      3  Gait instability  R26 81 Ambulatory Referral to Occupational Therapy            Assessment  Assessment details: See skilled analysis for further details  SKILLED ANALYSIS:  Pt is a 79 y o  male referred to Occupational Therapy s/p Visual disturbance [H53 9]  Pt participated in skilled OT evaluation and following formalized testing as well as clinical observation, Pt presents with the following areas of deficit:  Infrequent migraines and ocular migraines causing scotoma in L eye and occasional complete loss of vision in L eye for brief periods of time  Pt with self-report of significant decreased frequency of migraines and visual disturbances since beginning Amitriptyline  Pt does have decreased bilateral eye teaming with large outward drift evident when assessing convergence, though Pt with history of lazy eye since childhood explaining visual appearances  Pt also has a MRI of cervical region scheduled for this Sunday to further assess DDD contribution to the ongoing HAs  OTR will not be recommending skilled OT services at this time  All Pt questions answered and Pt in agreement to recommendations  GOALS:    Short Term:    Skilled OT services not recommended at this time  Long Term:    Skilled OT services not recommended at this time  Subjective Evaluation    Quality of life: good          SUBJECTIVE: "I just want some answer "    PATIENT GOAL: "Rid my HAs away "      HISTORY OF PRESENT ILLNESS:     Pt is a 79 y o  male who was referred to Occupational Therapy s/p  Visual disturbance [H53 9]    Pt with past medical history significant for HTN, CHRISTINA, BPH, GERD, Right PE, complaint of blurry vision of the left eye with eye pain, chronic daily headache, depression, and 3 mm left supraclinoid internal carotid artery aneurysm  As per reports, Pt with self-report of new onset episodes of dizziness described as a lightheadedness or feeling like he is going to pass out  Sounds from description most consistent with presyncope from possible cardiogenic primary etiology  No actual LOC  Lasts only about a minute or two at a time and does not clearly have a trigger like head movement or standing up  Highest suspicion for cardiogenic etiology but cannot r/o central cause in the setting of new onset visual sx during the same time frame  Pt describes recurrent, transient visual sx that have been ongoing since August 2022 (same time as onset of dizzy spells)  Often triggered by scrolling through his phone, occur daily, white spot followed by marching X's across visual field  Ophthalmology does not believe ocular etiology  Could be consistent with ocular migraine (particularly given 20 minute duration), but cannot rule out structural cause without further workup  Pt has history of Depression-- Pt with self-report of noticing decreased Depression since beginning Amitriptyline  Pt has MRI of cervical region scheduled for Sunday, 01/15/2023 to further assess if DDD disease is causing migraines  Pt with self-report of complete loss of vision in L eye infrequently and when vision returns it looks like "electricity"  Pt with self-report of decreased frequency since beginning Amitriptyline  Pt with self-report of only noticing visual disturbances and floating blind spots when experiencing migraines  Pt is high risk of Retina detachment with ongoing monitoring every year-- Pt with self-report of having laser surgery to prevent Retina Detachment  Pt with self-report of being cleared from Optometry and no ocular deficits evident at this time  Pt also has neuropathy in BLE affecting overall functional ambulation       Pt lives in a one story home with significant other  Pt currently performing all ADL/IADLs at independent status without difficulties reported  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  Pt continues the  role without difficulties reported  PMH:   Past Medical History:   Diagnosis Date   • Abdominal pain, right lower quadrant    • Abnormal blood chemistry    • Acid reflux    • Cancer (HCC)     skin   • Colon polyp    • Contact dermatitis due to poison ivy    • Depression screen    • Flu    • High blood pressure    • History of acute conjunctivitis     right eye   • History of allergic urticaria    • History of chronic rhinitis    • History of disease of skin and subcutaneous tissue    • History of dysphagia    • History of hemorrhoids    • History of IBS    • History of URI (upper respiratory infection)    • Hyperlipidemia    • Open wound of left lower leg    • Other acute sinusitis    • Other disturbances of oral epithelium, including tongue    • Positive depression screening    • Pulmonary emboli ([address] ) 2019    pt is on eliquis   • Screening for prostate cancer    • Skin rash    • Sleep apnea     snores but has not had the study   • Special screening examination for neoplasm of prostate    • Tingling        Past Surgical Hx:   Past Surgical History:   Procedure Laterality Date   • COLONOSCOPY     • COLONOSCOPY W/ POLYPECTOMY      Via Colostomy   • RECTAL SURGERY     • TONSILLECTOMY     • WISDOM TOOTH EXTRACTION            Pain Levels:      Restin    With Activity:  10    Pt with self-report of beginning Amitriptyline approximately one month ago with significant benefits at this time-- Pt with self-report of less frequent migraines at this time  Objective     Functional Assessment        Comments  See impairment section for further details         IMPAIRMENTS SECTION:      Vision Screen History of L Lazy eye    Visual acuity R 20/20, L 20/25 Binocularity, far orthotropia/orthophoria   Binocularity, near   Orthotropia/slight exophoria   Accommodation 3"   Red/Green Glasses 2"   Convergence Significant decreased teaming in L eye with large outward drift present   Herbie string Misalignment   Pursuits Smooth in all planes   Saccades Accurate in all planes without dysmetria present   ROM Full ROM   Visual perceptual midline test Intact           PLANNED THERAPY INTERVENTIONS:  Skilled OT services not recommended at this time         INTERVENTION COMMENTS:  Diagnosis: Visual disturbance [H53 9]  Precautions: sleep apnea, HTN, Depression  FOTO: not applicable   Insurance: Payor: Aj Mobley / Plan: MEDICARE A AND B / Product Type: Medicare A & B Fee for Service /   1 of 10 visits, PN due 02/12/2023

## 2023-01-12 NOTE — PROGRESS NOTES
PT Evaluation     Name: Sotero Khan  Date: 23  : 1955  Referring Provider: Domenic Castillo MD  AUTHORIZATION:   Insurance: Payor: Jennifer Cerrato / Plan: MEDICARE A AND B / Product Type: Medicare A & B Fee for Service /    visits through 23, PN due 23        SUBJECTIVE:  HPI: Sotero Khan is a 79 y o  male referred to outpatient physical therapy for the following diagnosis   Encounter Diagnoses   Name Primary? • Dizziness    • Visual disturbance    • Gait instability           Patient reports starting developing visual disturbance followed by headaches  He has a history of neuropathy with tingling in his feet and hands  He reports sometimes, when his headache started he would get unsteady  Reports no pain in his feet  Does have numbness and pin and needles in his hands and feet  Sometimes when I sit for an extended period of time, his feet go numb  He is not following an exercise program  He has been retired for 3 years as a , [address]  Sangita Hurtado reports that his balance is off, "I can't pass a drunk  test " I was diagnosed with neurophathy on  by chitra Hurtado reports decreases frequency of migraines and visual disturbances since beginning Amitriptyline  Reports no headache or dizziness at this time  MRI scheduled this week to r/o source of headache  Falls Hx: Reports has not had any falls, no tripping  Home Environment:Lives in one story home, game room on first floor  [address] with a walk out basement  Lives with significant other  Patient-Specific Functional Scale   Task is scored 0 (unable to perform activity) to 10 (ability to perform activity independently)  Activity 23    1  Get into an exercise program 0    2     Have better balance 6         Past Medical History:   Diagnosis Date   • Abdominal pain, right lower quadrant    • Abnormal blood chemistry    • Acid reflux    • Cancer (HCC)     skin   • Colon polyp    • Contact dermatitis due to poison ivy    • Depression screen    • Flu    • High blood pressure    • History of acute conjunctivitis     right eye   • History of allergic urticaria    • History of chronic rhinitis    • History of disease of skin and subcutaneous tissue    • History of dysphagia    • History of hemorrhoids    • History of IBS    • History of URI (upper respiratory infection)    • Hyperlipidemia    • Open wound of left lower leg    • Other acute sinusitis    • Other disturbances of oral epithelium, including tongue    • Positive depression screening    • Pulmonary emboli ([facility] [address] ) 2019    pt is on eliquis   • Screening for prostate cancer    • Skin rash    • Sleep apnea     snores but has not had the study   • Special screening examination for neoplasm of prostate    • Tingling        Current Outpatient Medications:   •  ALPRAZolam (XANAX) 0 5 mg tablet, Take 1 tablet (0 5 mg total) by mouth daily as needed for anxiety, Disp: 30 tablet, Rfl: 0  •  amitriptyline (ELAVIL) 25 mg tablet, Take 25 mg by mouth daily at bedtime Every other day only taking half pill, Disp: , Rfl:   •  amoxicillin-clavulanate (AUGMENTIN) 875-125 mg per tablet, Take 1 tablet by mouth every 12 (twelve) hours for 7 days, Disp: 14 tablet, Rfl: 0  •  apixaban (Eliquis) 5 mg, Take 5 mg by mouth 2 (two) times a day, Disp: , Rfl:   •  cholecalciferol (VITAMIN D3) 1,000 units tablet, Take 1,000 Units by mouth daily, Disp: , Rfl:   •  Coenzyme Q10 300 MG CAPS, Take by mouth daily , Disp: , Rfl:   •  famotidine (PEPCID) 40 MG tablet, , Disp: , Rfl:   •  fluticasone (FLONASE) 50 mcg/act nasal spray, USE 2 SPRAYS IN EACH       NOSTRIL DAILY, Disp: 48 g, Rfl: 1  •  hydrochlorothiazide (HYDRODIURIL) 25 mg tablet, Take 1 tablet (25 mg total) by mouth daily, Disp: 90 tablet, Rfl: 1  •  ketoconazole (NIZORAL) 2 % shampoo, , Disp: , Rfl:   •  nebivolol (Bystolic) 10 mg tablet, Take 1 tablet (10 mg total) by mouth daily, Disp: 90 tablet, Rfl: 1  •  omeprazole (PriLOSEC) 40 MG capsule, as needed, Disp: , Rfl:   •  predniSONE 10 mg tablet, Take 60mg daily for 3 days, On day day 4 decrease dose by 1 tablet until completed  (Patient not taking: Reported on 1/10/2023), Disp: 33 tablet, Rfl: 0  •  sildenafil (VIAGRA) 100 mg tablet, as needed, Disp: , Rfl: 11  •  tamsulosin (FLOMAX) 0 4 mg, Take 1 capsule by mouth with dinner (Patient taking differently: if needed), Disp: 30 capsule, Rfl: 5  •  topiramate (TOPAMAX) 25 mg tablet, , Disp: , Rfl:   •  vitamin B-12 (VITAMIN B-12) 1,000 mcg tablet, Take 1 tablet by mouth daily, Disp: , Rfl:   1/12/2023      Objective    Sensation Left Right   Kinesthesia decreased decreased   Light Touch intact intact   2 Point Discrimination decreased decreased       Core Movement Tasks Description of task    Sitting Normal   Sitting to Standing Other: slowly,increased base of sport   Standing Other: increased base of support   Walking Lacks heel strike, Decreased hip extension, Decreased trunk rotation and Decreased arm swing   Step-up Requires use of railing(s)   Reach, grasp, manipulate Normal         Manual Muscle Testing - Hip Left Right   Flexion 4 4-   Extension 4- 4-     Manual Muscle Testing - Knee Left Right   Flexion 4 4   Extension 4 4     Manual Muscle Testing - Ankle Left Right   Doriflexion 3+ 3+   Plantarflexion 4- 4-        Mobility Measures 1/12/23   Assistive device used?  None   Walking speed 1 4 meters/second   6 Minute Walk Test (100 foot circular course) Pre-exercise /84  Pre-exercise heart rate 86    1500feet  Pre-exercise heart rate 86   Single leg stance 3 sec Left  10 sec Right   5 times sit to stand 14 seconds   3 meter timed up and go 8 seconds   Functional Gait Assessment  19/30   Patient-Reported Outcome Measure: Patient Specific Functional Scale (PSFS) 6/20         MCTSIB    Feet Together, Eyes Open 30   Feet Together, Eyes Closed 30 minld sway   Feet Together, Eyes Open Foam 30 1 5 " sway   Feet Together, Eyes Closed Foam  8 sec 3 inch sway     Functional Gait Assessment  2 Gait level surface  2 Change in gait speed  2 Gait with horizontal head turns  2 Gait with vertical head turns  3 Gait and pivot turn  2 Step over obstacle  0 Gait with narrow base of support  2 Gait with eyes closed  2 Ambulating backwards  2 Steps  19/30 Total score      Assessment/Plan  Not completed    Assessment:  Patient is a pleasant 79year old male who was referred to Out patient physical therapy due to gait disturbance  He has a history of PE on Eliquis, IBS, HTN, ETOH  He has had episodes of dizziness and presents to outpatient physical therapy with decreased strength, static/dynamic balance, endurance, and gait deviations including those listed in observation section  Patient will benefit from skilled outpatient physical therapy to address the above impairments in order to improve patient independence and safety in home and the community  STG's:     - Patient improves EC on foam task of MCSTIB by 10 sec for improved static balance within 4 weeks    - Patient is independent with initial home exercise program for improvements at home within 2 weeks    - Patient ambulates for 10 consecutive min with appropriate vital sign response for improved endurance within 4 weeks   - Patient will improve in Single leg stance by 10 sec each for increased LE strength  LTG's:   - Patient improves distance ambulated on 6MWT by 10% within 6 weeks for improved endurance   - Patient decreased time to complete 5  X sit to stand for improved muscle   - Patient improves gait speed to within 10% of age matched norms within 6 weeks    - Patient improves score on FGA by 6 points for improved dynamic balance    Plan:  Patient will benefit from skilled outpatient physical therapy 1-2 x/wk for 6 wk  Therapeutic exercises, Neuromuscular re-education and therapeutic activities

## 2023-01-15 ENCOUNTER — HOSPITAL ENCOUNTER (OUTPATIENT)
Dept: MRI IMAGING | Facility: HOSPITAL | Age: 68
Discharge: HOME | End: 2023-01-15

## 2023-01-15 DIAGNOSIS — M50.10 CERVICAL DISC DISORDER WITH RADICULOPATHY: ICD-10-CM

## 2023-01-18 ENCOUNTER — OFFICE VISIT (OUTPATIENT)
Dept: PHYSICAL THERAPY | Facility: REHABILITATION | Age: 68
End: 2023-01-18

## 2023-01-18 DIAGNOSIS — G43.119 INTRACTABLE MIGRAINE WITH AURA WITHOUT STATUS MIGRAINOSUS: ICD-10-CM

## 2023-01-18 DIAGNOSIS — G62.9 NEUROPATHY: Primary | ICD-10-CM

## 2023-01-18 DIAGNOSIS — R26.81 GAIT INSTABILITY: ICD-10-CM

## 2023-01-18 NOTE — PROGRESS NOTES
Daily Note    Name: Chang Lowe  Date: 23  : 1955  Referring Provider: Cristy Jean Baptiste MD  AUTHORIZATION:   Insurance: Payor: Percy Dang / Plan: MEDICARE A AND B / Product Type: Medicare A & B Fee for Service /   2 of 12 visits through 23, PN due 23        SUBJECTIVE:  HPI: Chang Lowe is a 79 y o  male referred to outpatient physical therapy for the following diagnosis   Encounter Diagnoses   Name Primary? • Neuropathy Yes   • Gait instability    • Intractable migraine with aura without status migrainosus         S  I don't do much activity at home  Patient-Specific Functional Scale   Task is scored 0 (unable to perform activity) to 10 (ability to perform activity independently)  Activity 23    1  Get into an exercise program 0    2  Have better balance 6         Objective  Assessment/Plan    Initiated there exercise to help build up patients tolerance to upright activity  Added balance activities with solostep to facilitate patient engagement indiverse activities with increeased participation  Yadira Conroy is very pleasant and accepts cues and encouragement  He will continue to benefit from skilled PT  Plan:  Patient will benefit from skilled outpatient physical therapy 1-2 x/wk   Therapeutic exercises, Neuromuscular re-education and therapeutic activities  Precautions: Depression, DDD, migraines, cerebral aneurysm          135/82 BP HR 84                                  Neuro Re-Ed FGA  MCTSIB  6MWT  Patient  Education        Foam beams  Foam beam  5 laps forwards  Added 4" hurdles  4 laps    Sideways  4 laps  Rest periods  HR 99 Spo2 97%  Added 4" hurdles  4 lap       Hurdles  10" forwards 5 laps        Sideways  135/91 BP HR 96       Treadmill  True treadmill     5mph 3 min   1 2 mph 3 42 minutes                                                      Ther Ex  Scifit 10 min   Level 1 HR 96 Spo2 98% Ther Activity                           Gait Training                           Modalities                             STG's:     - Patient improves EC on foam task of MCSTIB by 10 sec for improved static balance within 4 weeks    - Patient is independent with initial home exercise program for improvements at home within 2 weeks    - Patient ambulates for 10 consecutive min with appropriate vital sign response for improved endurance within 4 weeks   - Patient will improve in Single leg stance by 10 sec each for increased LE strength  LTG's:   - Patient improves distance ambulated on 6MWT by 10% within 6 weeks for improved endurance   - Patient decreased time to complete 5  X sit to stand for improved muscle   - Patient improves gait speed to within 10% of age matched norms within 6 weeks    - Patient improves score on FGA by 6 points for improved dynamic balance

## 2023-01-19 ENCOUNTER — TELEPHONE (OUTPATIENT)
Dept: NEUROLOGY | Facility: CLINIC | Age: 68
End: 2023-01-19

## 2023-01-19 NOTE — TELEPHONE ENCOUNTER
Pt left message  I was wondering if somebody can give a message for dr Prachi Awan somebody in her team  #[phone]

## 2023-01-20 ENCOUNTER — OFFICE VISIT (OUTPATIENT)
Dept: FAMILY MEDICINE CLINIC | Facility: CLINIC | Age: 68
End: 2023-01-20

## 2023-01-20 ENCOUNTER — OFFICE VISIT (OUTPATIENT)
Dept: PHYSICAL THERAPY | Facility: REHABILITATION | Age: 68
End: 2023-01-20

## 2023-01-20 VITALS
DIASTOLIC BLOOD PRESSURE: 80 MMHG | BODY MASS INDEX: 29.19 KG/M2 | TEMPERATURE: 96.5 F | HEART RATE: 86 BPM | WEIGHT: 192.6 LBS | SYSTOLIC BLOOD PRESSURE: 140 MMHG | OXYGEN SATURATION: 98 % | HEIGHT: 68 IN

## 2023-01-20 DIAGNOSIS — F33.9 DEPRESSION, RECURRENT (HCC): ICD-10-CM

## 2023-01-20 DIAGNOSIS — G43.119 INTRACTABLE MIGRAINE WITH AURA WITHOUT STATUS MIGRAINOSUS: ICD-10-CM

## 2023-01-20 DIAGNOSIS — R26.81 GAIT INSTABILITY: ICD-10-CM

## 2023-01-20 DIAGNOSIS — B02.9 HERPES ZOSTER WITHOUT COMPLICATION: Primary | ICD-10-CM

## 2023-01-20 DIAGNOSIS — G62.9 NEUROPATHY: Primary | ICD-10-CM

## 2023-01-20 DIAGNOSIS — D69.6 THROMBOCYTOPENIA, UNSPECIFIED (HCC): ICD-10-CM

## 2023-01-20 DIAGNOSIS — G11.9 CEREBELLAR ATAXIA (HCC): ICD-10-CM

## 2023-01-20 DIAGNOSIS — L20.9 ATOPIC DERMATITIS OF SCALP: ICD-10-CM

## 2023-01-20 RX ORDER — KETOCONAZOLE 20 MG/ML
1 SHAMPOO TOPICAL 2 TIMES WEEKLY
Qty: 120 ML | Refills: 0 | Status: SHIPPED | OUTPATIENT
Start: 2023-01-23

## 2023-01-20 RX ORDER — VALACYCLOVIR HYDROCHLORIDE 1 G/1
1000 TABLET, FILM COATED ORAL 2 TIMES DAILY
Qty: 14 TABLET | Refills: 0 | Status: SHIPPED | OUTPATIENT
Start: 2023-01-20 | End: 2023-01-27

## 2023-01-20 NOTE — PROGRESS NOTES
Daily Note    Name: Hermann Aguila  Date: 23  : 1955  Referring Provider: Ulices Pardo MD  AUTHORIZATION:   Insurance: Payor: Rodrigo Holt / Plan: MEDICARE A AND B / Product Type: Medicare A & B Fee for Service /   2 of 12 visits through 23, PN due 23        SUBJECTIVE:  HPI: Hermann Aguila is a 79 y o  male referred to outpatient physical therapy for the following diagnosis   Encounter Diagnoses   Name Primary? • Neuropathy Yes   • Gait instability    • Intractable migraine with aura without status migrainosus         S  I don't do much activity at home  Patient-Specific Functional Scale   Task is scored 0 (unable to perform activity) to 10 (ability to perform activity independently)  Activity 23    1  Get into an exercise program 0    2  Have better balance 6         Objective  Assessment/Plan  Added increased repetition and balance challenge today incorporating eyes closed activities  Patient with increased loss of balance during challenge  He will continue to benefit from skilled PT for develop tolerance to activity and safety during ambulation  Plan:  Patient will benefit from skilled outpatient physical therapy 1-2 x/wk   Therapeutic exercises, Neuromuscular re-education and therapeutic activities  Precautions: Depression, DDD, migraines, cerebral aneurysm          135/82 BP HR 84   141/82                                 Neuro Re-Ed FGA  MCTSIB  6MWT  Patient  Education        Foam beams  Foam beam  5 laps forwards  Added 4" hurdles  4 laps    Sideways  4 laps  Rest periods  HR 99 Spo2 97%  Added 4" hurdles  4 lap Foam beam  4 laps forwards  3 laps fwd EC    Rest  8/10 headache for 2 5 min  Resolved /88    Sideways  4 laps EO  4 laps EC      Added 4" hurdles  4 lap forward      Hurdles  10" forwards 5 laps        Sideways  135/91 BP HR 96 10" forwards 5 laps        Sideways      Treadmill  True treadmill     5mph 3 min   1 2 mph 3 42 minutes   biodex treadmill    9mph x 3 min  Held further per pt request                                                   Ther Ex  Scifit 10 min   Level 1 HR 96 Spo2 98% Scifit 10 min   Level 1 HR 96 Spo2 98%                                                                              Ther Activity                           Gait Training                           Modalities                             STG's:     - Patient improves EC on foam task of MCSTIB by 10 sec for improved static balance within 4 weeks    - Patient is independent with initial home exercise program for improvements at home within 2 weeks    - Patient ambulates for 10 consecutive min with appropriate vital sign response for improved endurance within 4 weeks   - Patient will improve in Single leg stance by 10 sec each for increased LE strength  LTG's:   - Patient improves distance ambulated on 6MWT by 10% within 6 weeks for improved endurance   - Patient decreased time to complete 5  X sit to stand for improved muscle   - Patient improves gait speed to within 10% of age matched norms within 6 weeks    - Patient improves score on FGA by 6 points for improved dynamic balance

## 2023-01-20 NOTE — PROGRESS NOTES
Assessment/Plan:   1  Herpes zoster without complication  Reviewed patient symptoms today  At some, is unclear as to exact cause of his scalp rash  Symptoms appear concerning to patient for shingles  He has had shingles in this area in the past   He states that the symptoms feel similar  We will start treatment with valacyclovir  If he notes any visual disturbances, he must call immediately  - valACYclovir (VALTREX) 1,000 mg tablet; Take 1 tablet (1,000 mg total) by mouth 2 (two) times a day for 7 days  Dispense: 14 tablet; Refill: 0    2  Atopic dermatitis of scalp  Stable today  At this time, we will start treatment with ketoconazole  He has had scalp dermatitis in the past   - ketoconazole (NIZORAL) 2 % shampoo; Apply 1 application topically 2 (two) times a week  Dispense: 120 mL; Refill: 0             There are no diagnoses linked to this encounter  Subjective:       Chief Complaint   Patient presents with   • Herpes Zoster     On top of head      Patient ID: Junior Aiken is a 79 y o  male  Rash  This is a new problem  The current episode started yesterday  The problem is unchanged  The affected locations include the scalp and head  The problem is mild  The rash is characterized by pain  He was exposed to nothing  Pertinent negatives include no congestion, cough, diarrhea, fatigue, fever, shortness of breath or sore throat  Past treatments include nothing  Review of Systems   Constitutional: Negative for activity change, chills, fatigue and fever  HENT: Negative for congestion, ear pain, sinus pressure and sore throat  Eyes: Negative for redness, itching and visual disturbance  Respiratory: Negative for cough and shortness of breath  Cardiovascular: Negative for chest pain and palpitations  Gastrointestinal: Negative for abdominal pain, diarrhea and nausea  Endocrine: Negative for cold intolerance and heat intolerance     Genitourinary: Negative for dysuria, flank pain and frequency  Musculoskeletal: Negative for arthralgias, back pain, gait problem and myalgias  Skin: Positive for rash  Negative for color change  Allergic/Immunologic: Negative for environmental allergies  Neurological: Negative for dizziness, numbness and headaches  Psychiatric/Behavioral: Negative for behavioral problems and sleep disturbance  The following portions of the patient's history were reviewed and updated as appropriate : past family history, past medical history, past social history and past surgical history      Current Outpatient Medications:   •  ALPRAZolam (XANAX) 0 5 mg tablet, Take 1 tablet (0 5 mg total) by mouth daily as needed for anxiety, Disp: 30 tablet, Rfl: 0  •  amitriptyline (ELAVIL) 25 mg tablet, Take 25 mg by mouth daily at bedtime Every other day only taking half pill, Disp: , Rfl:   •  cholecalciferol (VITAMIN D3) 1,000 units tablet, Take 1,000 Units by mouth daily, Disp: , Rfl:   •  Coenzyme Q10 300 MG CAPS, Take by mouth daily , Disp: , Rfl:   •  famotidine (PEPCID) 40 MG tablet, , Disp: , Rfl:   •  fluticasone (FLONASE) 50 mcg/act nasal spray, USE 2 SPRAYS IN EACH       NOSTRIL DAILY, Disp: 48 g, Rfl: 1  •  hydrochlorothiazide (HYDRODIURIL) 25 mg tablet, Take 1 tablet (25 mg total) by mouth daily, Disp: 90 tablet, Rfl: 1  •  ketoconazole (NIZORAL) 2 % shampoo, , Disp: , Rfl:   •  nebivolol (Bystolic) 10 mg tablet, Take 1 tablet (10 mg total) by mouth daily, Disp: 90 tablet, Rfl: 1  •  omeprazole (PriLOSEC) 40 MG capsule, as needed, Disp: , Rfl:   •  sildenafil (VIAGRA) 100 mg tablet, as needed, Disp: , Rfl: 11  •  tamsulosin (FLOMAX) 0 4 mg, Take 1 capsule by mouth with dinner (Patient taking differently: if needed), Disp: 30 capsule, Rfl: 5  •  topiramate (TOPAMAX) 25 mg tablet, , Disp: , Rfl:   •  vitamin B-12 (VITAMIN B-12) 1,000 mcg tablet, Take 1 tablet by mouth daily, Disp: , Rfl:   •  apixaban (ELIQUIS) 5 mg, Take 5 mg by mouth 2 (two) times a day (Patient not taking: Reported on 1/20/2023), Disp: , Rfl:   •  predniSONE 10 mg tablet, Take 60mg daily for 3 days, On day day 4 decrease dose by 1 tablet until completed  (Patient not taking: Reported on 1/10/2023), Disp: 33 tablet, Rfl: 0         Objective:         Vitals:    01/20/23 1001   BP: 140/80   Pulse: 86   Temp: (!) 96 5 °F (35 8 °C)   TempSrc: Tympanic   SpO2: 98%   Weight: 87 4 kg (192 lb 9 6 oz)   Height: 5' 7 75" (1 721 m)     Physical Exam  Vitals reviewed  Constitutional:       Appearance: He is well-developed  HENT:      Head: Normocephalic and atraumatic  Nose: Nose normal       Mouth/Throat:      Pharynx: No oropharyngeal exudate  Eyes:      General: No scleral icterus  Right eye: No discharge  Left eye: No discharge  Pupils: Pupils are equal, round, and reactive to light  Neck:      Trachea: No tracheal deviation  Cardiovascular:      Rate and Rhythm: Normal rate and regular rhythm  Pulses:           Dorsalis pedis pulses are 2+ on the right side and 2+ on the left side  Posterior tibial pulses are 2+ on the right side and 2+ on the left side  Heart sounds: Normal heart sounds  No murmur heard  No friction rub  No gallop  Pulmonary:      Effort: Pulmonary effort is normal  No respiratory distress  Breath sounds: Normal breath sounds  No wheezing or rales  Abdominal:      General: Bowel sounds are normal  There is no distension  Palpations: Abdomen is soft  Tenderness: There is no abdominal tenderness  There is no guarding or rebound  Musculoskeletal:         General: Normal range of motion  Cervical back: Normal range of motion and neck supple  Lymphadenopathy:      Head:      Right side of head: No submental or submandibular adenopathy  Left side of head: No submental or submandibular adenopathy  Cervical: No cervical adenopathy  Right cervical: No superficial, deep or posterior cervical adenopathy       Left cervical: No superficial, deep or posterior cervical adenopathy  Skin:     General: Skin is warm and dry  Findings: No erythema  Neurological:      Mental Status: He is alert and oriented to person, place, and time  Cranial Nerves: No cranial nerve deficit  Sensory: No sensory deficit  Psychiatric:         Mood and Affect: Mood is not anxious or depressed  Speech: Speech normal          Behavior: Behavior normal          Thought Content:  Thought content normal          Judgment: Judgment normal

## 2023-01-25 ENCOUNTER — OFFICE VISIT (OUTPATIENT)
Dept: PHYSICAL THERAPY | Facility: REHABILITATION | Age: 68
End: 2023-01-25

## 2023-01-25 DIAGNOSIS — G62.9 NEUROPATHY: Primary | ICD-10-CM

## 2023-01-25 DIAGNOSIS — R26.81 GAIT INSTABILITY: ICD-10-CM

## 2023-01-25 DIAGNOSIS — G43.119 INTRACTABLE MIGRAINE WITH AURA WITHOUT STATUS MIGRAINOSUS: ICD-10-CM

## 2023-01-25 NOTE — PROGRESS NOTES
Daily Note    Name: Lucero Leon  Date: 23  : 1955  Referring Provider: Dallas Restrepo MD  AUTHORIZATION:   Insurance: Payor: Yoseph Ji / Plan: MEDICARE A AND B / Product Type: Medicare A & B Fee for Service /   2 of 12 visits through 23, PN due 23        SUBJECTIVE:  HPI: Lucero Leon is a 79 y o  male referred to outpatient physical therapy for the following diagnosis   Encounter Diagnoses   Name Primary? • Neuropathy Yes   • Gait instability    • Intractable migraine with aura without status migrainosus         S  Reports had a pounding headache last night, his BP was 157/100 with HR >90  Today 010 headache    Patient-Specific Functional Scale   Task is scored 0 (unable to perform activity) to 10 (ability to perform activity independently)  Activity 23    1  Get into an exercise program 0    2  Have better balance 6         Objective   Precautions: Depression, DDD, migraines, cerebral aneurysm          135/82 BP HR 84   141/82   126/78                                  Neuro Re-Ed FGA  MCTSIB  6MWT  Patient  Education        Foam beams  Foam beam  5 laps forwards  Added 4" hurdles  4 laps    Sideways  4 laps  Rest periods  HR 99 Spo2 97%  Added 4" hurdles  4 lap Foam beam  4 laps forwards  3 laps fwd EC    Rest  8/10 headache for 2 5 min  Resolved /88    Sideways  4 laps EO  4 laps EC    Added 4" hurdles  4 lap forward      Hurdles  10" forwards 5 laps        Sideways  135/91 BP HR 96 10" forwards 5 laps        Sideways      Treadmill  True treadmill     5mph 3 min   1 2 mph 3 42 minutes   biodex treadmill    9mph x 3 min  Held further per pt request biodex treadmill  Using lack theraband  1 1 mph 11 min     sidestepping     5mph  2 min    Downhill ambu  1 1     Blaze pods     Random   4 pods  1 min each  14, 17, 18, 22                                          Ther Ex  Scifit 10 min   Level 1 HR 96 Spo2 98% Scifit 10 min   Level 1 HR 96 Spo2 98% Scifit 12 min level 1   SPO2 96%                                  Assessment/Plan   Patient was very engaged today  Decreased number of resting periods taken today  Improved endurance and tolerance to increased balance challenge activity  He will continue to benefit from skilled PT for develop tolerance to activity and safety during ambulation  Plan:  Patient will benefit from skilled outpatient physical therapy 1-2 x/wk   Therapeutic exercises, Neuromuscular re-education and therapeutic activities               STG's:     - Patient improves EC on foam task of MCSTIB by 10 sec for improved static balance within 4 weeks    - Patient is independent with initial home exercise program for improvements at home within 2 weeks    - Patient ambulates for 10 consecutive min with appropriate vital sign response for improved endurance within 4 weeks   - Patient will improve in Single leg stance by 10 sec each for increased LE strength  LTG's:   - Patient improves distance ambulated on 6MWT by 10% within 6 weeks for improved endurance   - Patient decreased time to complete 5  X sit to stand for improved muscle   - Patient improves gait speed to within 10% of age matched norms within 6 weeks    - Patient improves score on FGA by 6 points for improved dynamic balance

## 2023-01-26 DIAGNOSIS — M50.10 CERVICAL DISC DISORDER WITH RADICULOPATHY: Primary | ICD-10-CM

## 2023-01-27 ENCOUNTER — OFFICE VISIT (OUTPATIENT)
Dept: PHYSICAL THERAPY | Facility: REHABILITATION | Age: 68
End: 2023-01-27

## 2023-01-27 DIAGNOSIS — R26.81 GAIT INSTABILITY: ICD-10-CM

## 2023-01-27 DIAGNOSIS — G62.9 NEUROPATHY: Primary | ICD-10-CM

## 2023-01-27 DIAGNOSIS — G43.119 INTRACTABLE MIGRAINE WITH AURA WITHOUT STATUS MIGRAINOSUS: ICD-10-CM

## 2023-01-27 NOTE — PROGRESS NOTES
Daily Note    Name: Vishnu Martinez  Date: 23  : 1955  Referring Provider: Marine Singh MD  AUTHORIZATION:   Insurance: Payor: Palomo Mac / Plan: MEDICARE A AND B / Product Type: Medicare A & B Fee for Service /   5 of 12 visits through 23, PN due 23        SUBJECTIVE:  HPI: Vihsnu Martinez is a 79 y o  male referred to outpatient physical therapy for the following diagnosis   Encounter Diagnoses   Name Primary? • Neuropathy Yes   • Gait instability    • Intractable migraine with aura without status migrainosus         S  I actually went to get a haircut at [address] [place] [address] today  Patient-Specific Functional Scale   Task is scored 0 (unable to perform activity) to 10 (ability to perform activity independently)  Activity 23    1  Get into an exercise program 0    2  Have better balance 6         Objective   Precautions: Depression, DDD, migraines, cerebral aneurysm          135/82 BP HR 84   141/82   126/78                                  Neuro Re-Ed FGA  MCTSIB  6MWT  Patient  Education    100'x 2   HT  HN  (HEP)  30 x 8  EC  Backward  Sideways (4) (HEP)    Foam beams  Foam beam  5 laps forwards  Added 4" hurdles  4 laps    Sideways  4 laps  Rest periods  HR 99 Spo2 97%  Added 4" hurdles  4 lap Foam beam  4 laps forwards  3 laps fwd EC    Rest  8/10 headache for 2 5 min  Resolved /88    Sideways  4 laps EO  4 laps EC    Added 4" hurdles  4 lap forward      Foam beam  4 laps forwards  3 laps fwd EC  *LOB with ec    147/88 BP HR 86 with exertion        Hurdles  10" forwards 5 laps        Sideways  135/91 BP HR 96 10" forwards 5 laps        Sideways      Treadmill  True treadmill     5mph 3 min   1 2 mph 3 42 minutes   biodex treadmill    9mph x 3 min  Held further per pt request biodex treadmill  Using lack theraband  1 1 mph 11 min     sidestepping     5mph  2 min    Downhill ambu  1 1 Biodex treadmill  1 2 mph  20 min          Blaze pods    Random   4 pods  1 min each  14, 17, 18, 22                                           Ther Ex  Scifit 10 min   Level 1 HR 96 Spo2 98% Scifit 10 min   Level 1 HR 96 Spo2 98% Scifit 12 min level 1   SPO2 96% Scifit 10 min level2                                   Assessment/Plan   Patient  With improved endurance and tolerance to activity  He is making great progress and demonstrate engagement in his rehab process  Added activity to HEP including outdoor ambulation and balance activities  Will continue to follow    Plan:  Patient will benefit from skilled outpatient physical therapy 1-2 x/wk   Therapeutic exercises, Neuromuscular re-education and therapeutic activities               STG's:     - Patient improves EC on foam task of MCSTIB by 10 sec for improved static balance within 4 weeks    - Patient is independent with initial home exercise program for improvements at home within 2 weeks    - Patient ambulates for 10 consecutive min with appropriate vital sign response for improved endurance within 4 weeks   - Patient will improve in Single leg stance by 10 sec each for increased LE strength  LTG's:   - Patient improves distance ambulated on 6MWT by 10% within 6 weeks for improved endurance   - Patient decreased time to complete 5  X sit to stand for improved muscle   - Patient improves gait speed to within 10% of age matched norms within 6 weeks    - Patient improves score on FGA by 6 points for improved dynamic balance

## 2023-01-30 ENCOUNTER — OFFICE VISIT (OUTPATIENT)
Dept: PHYSICAL THERAPY | Facility: REHABILITATION | Age: 68
End: 2023-01-30

## 2023-01-30 DIAGNOSIS — G62.9 NEUROPATHY: Primary | ICD-10-CM

## 2023-01-30 DIAGNOSIS — R26.81 GAIT INSTABILITY: ICD-10-CM

## 2023-01-30 DIAGNOSIS — G43.119 INTRACTABLE MIGRAINE WITH AURA WITHOUT STATUS MIGRAINOSUS: ICD-10-CM

## 2023-01-30 NOTE — PROGRESS NOTES
Daily Note    Name: Olive Vicente  Date: 23  : 1955  Referring Provider: Angelique Melton MD  AUTHORIZATION:   Insurance: Payor: Madison Galindo / Plan: MEDICARE A AND B / Product Type: Medicare A & B Fee for Service /   5 of 12 visits through 23, PN due 23        SUBJECTIVE:  HPI: Olive Vicente is a 79 y o  male referred to outpatient physical therapy for the following diagnosis   Encounter Diagnoses   Name Primary? • Neuropathy Yes   • Gait instability    • Intractable migraine with aura without status migrainosus         S  I was on the phone 4 hours with [employer] with no resolution of the problem  I had an 8/10 headache  Patient-Specific Functional Scale   Task is scored 0 (unable to perform activity) to 10 (ability to perform activity independently)  Activity 23    1  Get into an exercise program 0    2  Have better balance 6         Objective   Precautions: Depression, DDD, migraines, cerebral aneurysm          135/82 BP HR 84   141/82   126/78     146/88                               Neuro Re-Ed FGA  MCTSIB  6MWT  Patient  Education    100'x 2   HT  HN  (HEP)  30 x 8  EC  Backward  Sideways (4) (HEP) 100'  HT  HN  Sidestepping    30' x 6 each  Backwards  Eyes closed  Tandem walking    Headache 3/10 post activity  Foam beams  Foam beam  5 laps forwards  Added 4" hurdles  4 laps    Sideways  4 laps  Rest periods  HR 99 Spo2 97%  Added 4" hurdles  4 lap Foam beam  4 laps forwards  3 laps fwd EC    Rest  8/10 headache for 2 5 min  Resolved /88    Sideways  4 laps EO  4 laps EC    Added 4" hurdles  4 lap forward      Foam beam  4 laps forwards  3 laps fwd EC  *LOB with ec    147/88 BP HR 86 with exertion        Hurdles  10" forwards 5 laps        Sideways  135/91 BP HR 96 10" forwards 5 laps        Sideways      Treadmill  True treadmill     5mph 3 min   1 2 mph 3 42 minutes   biodex treadmill    9mph x 3 min  Held further per pt request biodex treadmill  Using lack theraband  1 1 mph 11 min     sidestepping     5mph  2 min    Downhill ambu  1 1 Biodex treadmill  1 2 mph  20 min       Biodex treadmill   1 1 mph 12 min   Blaze pods    Random   4 pods  1 min each  14, 17, 18, 22                                           Ther Ex  Scifit 10 min   Level 1 HR 96 Spo2 98% Scifit 10 min   Level 1 HR 96 Spo2 98% Scifit 12 min level 1   SPO2 96% Scifit 10 min level2   Scifit 15 min no UE  90 HR Spo2 98% decreased pain 5/10                                Assessment/Plan   Patient arrived with considerable headache s/p 4 hour telephone conversation with [employer]  Educated in self calming and was able to self manage pain down to 5/10  At end of session, patient reported 3/10 pain  Encouraged in continuing anti anxiety management as needed  He will continue to benefit from skilled PT intervention  Plan:  Patient will benefit from skilled outpatient physical therapy 1-2 x/wk   Therapeutic exercises, Neuromuscular re-education and therapeutic activities               STG's:     - Patient improves EC on foam task of MCSTIB by 10 sec for improved static balance within 4 weeks    - Patient is independent with initial home exercise program for improvements at home within 2 weeks    - Patient ambulates for 10 consecutive min with appropriate vital sign response for improved endurance within 4 weeks   - Patient will improve in Single leg stance by 10 sec each for increased LE strength  LTG's:   - Patient improves distance ambulated on 6MWT by 10% within 6 weeks for improved endurance   - Patient decreased time to complete 5  X sit to stand for improved muscle   - Patient improves gait speed to within 10% of age matched norms within 6 weeks    - Patient improves score on FGA by 6 points for improved dynamic balance

## 2023-02-02 ENCOUNTER — APPOINTMENT (OUTPATIENT)
Dept: PHYSICAL THERAPY | Facility: REHABILITATION | Age: 68
End: 2023-02-02

## 2023-02-02 DIAGNOSIS — I10 BENIGN ESSENTIAL HYPERTENSION: ICD-10-CM

## 2023-02-02 RX ORDER — NEBIVOLOL 10 MG/1
TABLET ORAL
Qty: 90 TABLET | Refills: 1 | Status: SHIPPED | OUTPATIENT
Start: 2023-02-02

## 2023-02-07 ENCOUNTER — OFFICE VISIT (OUTPATIENT)
Dept: PHYSICAL THERAPY | Facility: REHABILITATION | Age: 68
End: 2023-02-07

## 2023-02-07 DIAGNOSIS — G43.119 INTRACTABLE MIGRAINE WITH AURA WITHOUT STATUS MIGRAINOSUS: ICD-10-CM

## 2023-02-07 DIAGNOSIS — G62.9 NEUROPATHY: Primary | ICD-10-CM

## 2023-02-07 DIAGNOSIS — R26.81 GAIT INSTABILITY: ICD-10-CM

## 2023-02-07 LAB — VIT B12 SERPL-MCNC: 632 PG/ML (ref 200–1100)

## 2023-02-07 NOTE — PROGRESS NOTES
Daily Note        Name: Junior Aiken  Date: 23  : 1955  Referring Provider: Lety Rizzo MD  AUTHORIZATION:   Insurance: Payor: Danny Pi / Plan: MEDICARE A AND B / Product Type: Medicare A & B Fee for Service /   5 of 12 visits through 23, PN due 23        SUBJECTIVE:  HPI: Junior Aiken is a 79 y o  male referred to outpatient physical therapy for the following diagnosis   Encounter Diagnoses   Name Primary? • Neuropathy Yes   • Gait instability    • Intractable migraine with aura without status migrainosus         S  I was on the phone 4 hours with [employer] with no resolution of the problem  I had an 8/10 headache  Patient-Specific Functional Scale   Task is scored 0 (unable to perform activity) to 10 (ability to perform activity independently)  Activity 23    1  Get into an exercise program 0    2  Have better balance 6         Objective   Precautions: Depression, DDD, migraines, cerebral aneurysm          135/82 BP HR 84   141/82   126/78     146/88                                   Neuro Re-Ed FGA  MCTSIB  6MWT  Patient  Education    100'x 2   HT  HN  (HEP)  30 x 8  EC  Backward  Sideways (4) (HEP) 100'  HT  HN  Sidestepping    30' x 6 each  Backwards  Eyes closed  Tandem walking    Headache 3/10 post activity  100' HT    30 x 6 HN  30' x 6 EC  30' x 6 Backwards  30' x 6  Sidewards  30' x 6 Tandem walking    Head ache 2/10   Foam beams  Foam beam  5 laps forwards  Added 4" hurdles  4 laps    Sideways  4 laps  Rest periods  HR 99 Spo2 97%  Added 4" hurdles  4 lap Foam beam  4 laps forwards  3 laps fwd EC    Rest  8/10 headache for 2 5 min   Resolved /88    Sideways  4 laps EO  4 laps EC    Added 4" hurdles  4 lap forward      Foam beam  4 laps forwards  3 laps fwd EC  *LOB with ec    147/88 BP HR 86 with exertion         Hurdles  10" forwards 5 laps        Sideways  135/91 BP HR 96 10" forwards 5 laps        Sideways       Treadmill  True treadmill     5mph 3 min   1 2 mph 3 42 minutes   biodex treadmill    9mph x 3 min  Held further per pt request biodex treadmill  Using lack theraband  1 1 mph 11 min     sidestepping     5mph  2 min    Downhill ambu  1 1 Biodex treadmill  1 2 mph  20 min       Biodex treadmill   1 1 mph 12 min Biodex treadmill  15 min 1 2-1 5 mph   Blaze pods    Random   4 pods  1 min each  14, 17, 18, 22     Random 3 pods  45 sec time  20 sec rest between  17, 16,19                                           Ther Ex  Scifit 10 min   Level 1 HR 96 Spo2 98% Scifit 10 min   Level 1 HR 96 Spo2 98% Scifit 12 min level 1   SPO2 96% Scifit 10 min level2   Scifit 15 min no UE  90 HR Spo2 98% decreased pain 5/10 Scifit 15 min level 2   decreased headache 2/10    Sit to stand  11#  10 x 2                                   Assessment/Plan   Patient with improving tolerance to activities  He is demonstrating increased engagement and anticipating new activities and is moving towards mobilization goals  He laine continue to benefit from skilled PT intervention  Plan:  Patient will benefit from skilled outpatient physical therapy 1-2 x/wk   Therapeutic exercises, Neuromuscular re-education and therapeutic activities               STG's:     - Patient improves EC on foam task of MCSTIB by 10 sec for improved static balance within 4 weeks    - Patient is independent with initial home exercise program for improvements at home within 2 weeks    - Patient ambulates for 10 consecutive min with appropriate vital sign response for improved endurance within 4 weeks   - Patient will improve in Single leg stance by 10 sec each for increased LE strength  LTG's:   - Patient improves distance ambulated on 6MWT by 10% within 6 weeks for improved endurance   - Patient decreased time to complete 5  X sit to stand for improved muscle   - Patient improves gait speed to within 10% of age matched norms within 6 weeks    - Patient improves score on FGA by 6 points for improved dynamic balance

## 2023-02-08 ENCOUNTER — CONSULT (OUTPATIENT)
Dept: PAIN MEDICINE | Facility: CLINIC | Age: 68
End: 2023-02-08

## 2023-02-08 ENCOUNTER — TELEPHONE (OUTPATIENT)
Dept: NEUROLOGY | Facility: CLINIC | Age: 68
End: 2023-02-08

## 2023-02-08 VITALS
DIASTOLIC BLOOD PRESSURE: 80 MMHG | OXYGEN SATURATION: 98 % | BODY MASS INDEX: 29.1 KG/M2 | HEIGHT: 68 IN | SYSTOLIC BLOOD PRESSURE: 120 MMHG | HEART RATE: 89 BPM | WEIGHT: 192 LBS

## 2023-02-08 DIAGNOSIS — M50.10 CERVICAL DISC DISORDER WITH RADICULOPATHY: Primary | ICD-10-CM

## 2023-02-08 DIAGNOSIS — J34.89 PAIN OF MAXILLARY SINUS: ICD-10-CM

## 2023-02-08 DIAGNOSIS — M47.812 CERVICAL SPONDYLOSIS: ICD-10-CM

## 2023-02-08 DIAGNOSIS — M54.2 NECK PAIN: ICD-10-CM

## 2023-02-08 NOTE — PROGRESS NOTES
Assessment  1  Cervical disc disorder with radiculopathy    2  Neck pain    3  Cervical spondylosis    4  Pain of maxillary sinus        Plan  Pain consistent with cervical radicular pain in the bilateral C6 dermatomal distribution accompanied by pain >7/10 on the pain scale with inability to participate in IADLs for >6 weeks  Patient has fully participated with PT  Denies any gait instability, saddle anesthesia  Reviewed outside MRI which does show disc protrusions at C5-6 and C6-7  Given that his symptoms are in a dermatomal fashion I offered the patient a C7-T1 cervical interlaminar MIGUELITO at this time  Risks, benefits alternatives epidural steroid injections thoroughly discussed with patient  Patient would like to proceed with scheduling    Patient is currently following with neurology/headache specialist, In regards to his headaches  There may be a slight component of occipital neuralgia on the left, however explained to the patient that this would not explain his left-sided sinus fullness/pain in the nasal and maxillary areas  I would recommend a consultation with ENT for this  We will place a referral     We will follow-up approximately 4 weeks after cervical epidural steroid injection for reassessment  [place] Prescription Drug Monitoring Program report was reviewed and was appropriate     Complete risks and benefits including bleeding, infection, tissue reaction, nerve injury and allergic reaction were discussed  The approach was demonstrated using models and literature was provided  Verbal and written consent was obtained  My impressions and treatment recommendations were discussed in detail with the patient who verbalized understanding and had no further questions  Discharge instructions were provided  I personally saw and examined the patient and I agree with the above discussed plan of care      Orders Placed This Encounter   Procedures   • [place] spine and pain procedure     Standing Status:   Future     Standing Expiration Date:   2/8/2027     Order Specific Question:   Reason for Exam:     Answer:   C7-T1 interlaminar MIGUELITO     Order Specific Question:   Anticoagulant hold needed? Answer:   no   • Ambulatory Referral to Otolaryngology     Standing Status:   Future     Standing Expiration Date:   2/8/2024     Referral Priority:   Routine     Referral Type:   Consult - AMB     Referral Reason:   Specialty Services Required     Requested Specialty:   Otolaryngology     Number of Visits Requested:   1     Expiration Date:   2/8/2024     No orders of the defined types were placed in this encounter  History of Present Illness    Mahi Esquivel is a 79 y o  male presenting for new patient visit at [place] [address] [facility] for exam and evaluation of neck pain and headaches  Symptoms have been present for greater than 1 year  Patient denies any inciting event  Pain is graded as moderate to severe, currently 7 out of 10 on the pain scale  Pain does significantly diminished IADLs  Pain is nearly constant, with no typical pattern throughout the day  Described as burning, numb, sharp, pins-and-needles, pressure-like, throbbing, dull/aching  Patient endorses weakness in the upper extremities  Patient does not use assistance device  Pain is worsened with standing, bending, coughing, sneezing  Improved with lying down, exercise, relaxation  Patient has attempted physical therapy and heat/ice therapy in the past     Patient social history is negative for tobacco, marijuana, alcohol use  I have personally reviewed and/or updated the patient's past medical history, past surgical history, family history, social history, current medications, allergies, and vital signs today  Review of Systems   Constitutional: Negative for chills and fever  HENT: Negative for ear pain and sore throat  Eyes: Positive for visual disturbance  Negative for pain     Respiratory: Negative for cough and shortness of breath  Cardiovascular: Negative for chest pain and palpitations  Gastrointestinal: Negative for abdominal pain and vomiting  Genitourinary: Negative for dysuria and hematuria  Musculoskeletal: Positive for neck pain  Negative for arthralgias and back pain  Skin: Negative for color change and rash  Neurological: Positive for light-headedness, numbness and headaches  Negative for seizures and syncope  All other systems reviewed and are negative        Patient Active Problem List   Diagnosis   • Benign essential hypertension   • Hyperlipidemia   • Prediabetes   • Vitamin D deficiency   • Chronic rhinitis   • Esophageal reflux   • Benign prostatic hyperplasia   • Generalized anxiety disorder   • Pyelonephritis   • SIRS (systemic inflammatory response syndrome) (HCC)   • Hypokalemia   • IBS (irritable bowel syndrome)   • Hypertension   • Pulmonary embolism (HCC)   • Chest pain   • Polyp of sigmoid colon   • Screening for colon cancer   • Irritable bowel syndrome with diarrhea   • Gastroesophageal reflux disease   • Right calf pain   • Thrombocytopenia, unspecified (HCC)   • Depression, recurrent (HCC)   • Breast pain, right   • Cerebral aneurysm, nonruptured   • Intractable migraine with aura without status migrainosus   • Cerebellar ataxia ([facility] [address] )       Past Medical History:   Diagnosis Date   • Abdominal pain, right lower quadrant    • Abnormal blood chemistry    • Acid reflux    • Cancer (HCC)     skin   • Colon polyp    • Contact dermatitis due to poison ivy    • Depression screen    • Flu    • High blood pressure    • History of acute conjunctivitis     right eye   • History of allergic urticaria    • History of chronic rhinitis    • History of disease of skin and subcutaneous tissue    • History of dysphagia    • History of hemorrhoids    • History of IBS    • History of URI (upper respiratory infection)    • Hyperlipidemia    • Open wound of left lower leg    • Other acute sinusitis    • Other disturbances of oral epithelium, including tongue    • Positive depression screening    • Pulmonary emboli ([facility] [address] ) 2019    pt is on eliquis   • Screening for prostate cancer    • Skin rash    • Sleep apnea     snores but has not had the study   • Special screening examination for neoplasm of prostate    • Tingling        Past Surgical History:   Procedure Laterality Date   • COLONOSCOPY     • COLONOSCOPY W/ POLYPECTOMY      Via Colostomy   • RECTAL SURGERY     • TONSILLECTOMY     • WISDOM TOOTH EXTRACTION         Family History   Problem Relation Age of Onset   • Breast cancer Mother 77   • Brain cancer Mother    • Lung cancer Father    • No Known Problems Sister    • No Known Problems Daughter    • No Known Problems Maternal Grandmother    • No Known Problems Maternal Grandfather    • No Known Problems Paternal Grandmother    • No Known Problems Paternal Grandfather    • Lung cancer Family    • Substance Abuse Neg Hx         Mother and Father   • Mental illness Neg Hx         Mother and Father       Social History     Occupational History   • Not on file   Tobacco Use   • Smoking status: Former     Packs/day: 0 50     Years: 3 00     Pack years: 1 50     Types: Cigarettes     Quit date: 1982     Years since quittin 6   • Smokeless tobacco: Never   Vaping Use   • Vaping Use: Never used   Substance and Sexual Activity   • Alcohol use: Not Currently     Comment: Social   • Drug use: No   • Sexual activity: Not on file       Current Outpatient Medications on File Prior to Visit   Medication Sig   • ALPRAZolam (XANAX) 0 5 mg tablet Take 1 tablet (0 5 mg total) by mouth daily as needed for anxiety   • amitriptyline (ELAVIL) 25 mg tablet Take 25 mg by mouth daily at bedtime Every other day only taking half pill   • cholecalciferol (VITAMIN D3) 1,000 units tablet Take 1,000 Units by mouth daily   • Coenzyme Q10 300 MG CAPS Take by mouth daily    • famotidine (PEPCID) 40 MG tablet    • fluticasone (FLONASE) 50 mcg/act nasal spray USE 2 SPRAYS IN EACH       NOSTRIL DAILY   • hydrochlorothiazide (HYDRODIURIL) 25 mg tablet Take 1 tablet (25 mg total) by mouth daily   • ketoconazole (NIZORAL) 2 % shampoo    • ketoconazole (NIZORAL) 2 % shampoo Apply 1 application topically 2 (two) times a week   • nebivolol (BYSTOLIC) 10 mg tablet TAKE 1 TABLET DAILY   • omeprazole (PriLOSEC) 40 MG capsule as needed   • sildenafil (VIAGRA) 100 mg tablet as needed   • tamsulosin (FLOMAX) 0 4 mg Take 1 capsule by mouth with dinner (Patient taking differently: if needed)   • topiramate (TOPAMAX) 25 mg tablet    • vitamin B-12 (VITAMIN B-12) 1,000 mcg tablet Take 1 tablet by mouth daily   • apixaban (ELIQUIS) 5 mg Take 5 mg by mouth 2 (two) times a day (Patient not taking: Reported on 1/20/2023)   • predniSONE 10 mg tablet Take 60mg daily for 3 days, On day day 4 decrease dose by 1 tablet until completed  (Patient not taking: Reported on 1/10/2023)   • valACYclovir (VALTREX) 1,000 mg tablet Take 1 tablet (1,000 mg total) by mouth 2 (two) times a day for 7 days     No current facility-administered medications on file prior to visit  Allergies   Allergen Reactions   • Lisinopril Throat Swelling, Edema and Angioedema   • Amlodipine Palpitations   • Diltiazem Rash     Hot rash on feet       Physical Exam    /80   Pulse 89   Ht 5' 7 5" (1 715 m)   Wt 87 1 kg (192 lb)   SpO2 98%   BMI 29 63 kg/m²     Constitutional: normal, well developed, well nourished, alert, in no distress and non-toxic and no overt pain behavior  Eyes: anicteric  HEENT: grossly intact  Neck: symmetric, trachea midline and no masses   Pulmonary:even and unlabored  Cardiovascular:No edema or pitting edema present  Skin:Normal without rashes or lesions and well hydrated  Psychiatric:Mood and affect appropriate  Neurologic: Motor function is grossly intact  Sensation is equal and intact to light touch bilaterally    Gait is nonantalgic  Musculoskeletal: Tenderness to palpation of bilateral cervical paraspinal muscles  Pain with cervical range of motion in particular flexion, rotation  Imaging  Outside MRI reviewed  C6-7 central to left-sided disc protrusion present, left-sided protrusion also at C5-6

## 2023-02-08 NOTE — TELEPHONE ENCOUNTER
----- Message from Ness Atkinson MD sent at 2/8/2023  1:31 PM EST -----  Please let patient know that his B12 level is improving! Thanks!   Kim Going

## 2023-02-08 NOTE — TELEPHONE ENCOUNTER
Called pt  Received vm  Left detailed msg (per consent in chart) regarding B12 results and recommendations

## 2023-02-09 ENCOUNTER — OFFICE VISIT (OUTPATIENT)
Dept: PHYSICAL THERAPY | Facility: REHABILITATION | Age: 68
End: 2023-02-09

## 2023-02-09 DIAGNOSIS — R26.81 GAIT INSTABILITY: ICD-10-CM

## 2023-02-09 DIAGNOSIS — G62.9 NEUROPATHY: Primary | ICD-10-CM

## 2023-02-09 DIAGNOSIS — G43.119 INTRACTABLE MIGRAINE WITH AURA WITHOUT STATUS MIGRAINOSUS: ICD-10-CM

## 2023-02-09 NOTE — PROGRESS NOTES
Daily Note/Re-Evaluation      Name: Gail Helm  Date: 23  : 1955  Referring Provider: Vadim Candelaria MD  AUTHORIZATION:   Insurance: Payor: Jose Proper / Plan: MEDICARE A AND B / Product Type: Medicare A & B Fee for Service /   8 of 12 visits through 23, PN due 23        SUBJECTIVE:  HPI: Gail Helm is a 79 y o  male referred to outpatient physical therapy for the following diagnosis   Encounter Diagnoses   Name Primary? • Neuropathy Yes   • Intractable migraine with aura without status migrainosus    • Gait instability         S  I was on the phone 4 hours with PPL with no resolution of the problem  I had an 8/10 headache  Patient-Specific Functional Scale   Task is scored 0 (unable to perform activity) to 10 (ability to perform activity independently)  Activity 23   1  Get into an exercise program 0 8   2  Have better balance 6 8        Objective  126/91 BP   HR 94      Mobility Measures 23   Assistive device used?  None    Walking speed 1 4 meters/second    6 Minute Walk Test (100 foot circular course) Pre-exercise /84  Pre-exercise heart rate 86    1500 feet  Pre-exercise heart rate 86 1670'    SPO2 95%   Single leg stance 3 sec Left  10 sec Right   11 15 sec    20 sec   5 times sit to stand 14 seconds 11 65 sec   3 meter timed up and go 8 seconds 6 50 sec   Functional Gait Assessment   25/30   Patient-Reported Outcome Measure: Patient Specific Functional Scale (PSFS)        MCTSIB  23   Feet Together, Eyes Open 30 30   Feet Together, Eyes Closed 30 mild sway 30   Feet Together, Eyes Open Foam 30 1 5 " sway 30   Feet Together, Eyes Closed Foam  8 sec 3 inch sway 30     Functional Gait Assessment  3 Gait level surface  3 Change in gait speed  2 Gait with horizontal head turns  2 Gait with vertical head turns  3 Gait and pivot turn  2 Step over obstacle  2 Gait with narrow base of support  2 Gait with eyes closed  3 Ambulating backwards  3 Steps  25/30 Total score  Precautions: Depression, DDD, migraines, cerebral aneurysm       1/20  141/82 1/24  126/78   1/27 1/30  146/88  2/07 2/09                              Neuro Re-Ed   100'x 2   HT  HN  (HEP)  30 x 8  EC  Backward  Sideways (4) (HEP) 100'  HT  HN  Sidestepping    30' x 6 each  Backwards  Eyes closed  Tandem walking    Headache 3/10 post activity  100' HT    30 x 6 HN  30' x 6 EC  30' x 6 Backwards  30' x 6  Sidewards  30' x 6 Tandem walking    Head ache 2/10 Components of FGA x 2    No headaches    TUG  MCTSIB     Foam beams Foam beam  4 laps forwards  3 laps fwd EC    Rest  8/10 headache for 2 5 min  Resolved /88    Sideways  4 laps EO  4 laps EC    Added 4" hurdles  4 lap forward      Foam beam  4 laps forwards  3 laps fwd EC  *LOB with ec    147/88 BP HR 86 with exertion          Hurdles 10" forwards 5 laps        Sideways        Treadmill biodex treadmill    9mph x 3 min  Held further per pt request biodex treadmill  Using lack theraband  1 1 mph 11 min     sidestepping     5mph  2 min    Downhill ambu  1 1 Biodex treadmill  1 2 mph  20 min       Biodex treadmill   1 1 mph 12 min Biodex treadmill  15 min 1 2-1 5 mph   6 MWT    Biodex treadmill  17 5  min 2 2 mph   Blaze pods  Random   4 pods  1 min each  14, 17, 18, 22     Random 3 pods  45 sec time  20 sec rest between  17, 16,19                                        Ther Ex Scifit 10 min   Level 1 HR 96 Spo2 98% Scifit 12 min level 1   SPO2 96% Scifit 10 min level2   Scifit 15 min no UE  90 HR Spo2 98% decreased pain 5/10 Scifit 15 min level 2   decreased headache 2/10    Sit to stand  11#  10 x 2 Sit to stand 5x    Scifit x 8 minutes level 3                                Assessment/Plan   Sherri Hill was referred to out-patient physical therapy with balance and gait deficits  He is making good progress towards his goals   His functional outcome emeasures demonstrate improved muscle strength, balance and endurance  He will continue to benefit from skilled PT to maximize functional status and balance  Patient with improving tolerance to activities  He is demonstrating increased engagement and anticipating new activities and is moving towards mobilization goals  He laine continue to benefit from skilled PT intervention  STG's:     - Patient improves EC on foam task of MCSTIB by 10 sec for improved static balance within 4 weeks MET    - Patient is independent with initial home exercise program for improvements at home within 2 weeks  MET    - Patient ambulates for 10 consecutive min with appropriate vital sign response for improved endurance within 4 weeks  MET   - Patient will improve in Single leg stance by 10 sec each for increased LE strength MET on Right, ongoing on left    LTG's:   - Patient improves distance ambulated on 6MWT by 10% within 6 weeks for improved endurance  MET   - Patient decreased time to complete 5  X sit to stand for improved muscle MET  - Patient improves gait speed to within 10% of age matched norms within 6 weeks ongoing    - Patient improves score on FGA by 6 points for improved dynamic balance MET    Updated goals:  Improve time walk on 6 MWT 1800'  Improve 5 x sit to stand to 9 sec for improved LE strength  Improved FGA by 4 points for improved dynamic balance    Plan:  Patient will benefit from skilled outpatient physical therapy 1-2 x/wk   Therapeutic exercises, Neuromuscular re-education and therapeutic activities

## 2023-02-13 ENCOUNTER — OFFICE VISIT (OUTPATIENT)
Dept: PHYSICAL THERAPY | Facility: REHABILITATION | Age: 68
End: 2023-02-13

## 2023-02-13 DIAGNOSIS — G62.9 NEUROPATHY: Primary | ICD-10-CM

## 2023-02-13 DIAGNOSIS — G43.119 INTRACTABLE MIGRAINE WITH AURA WITHOUT STATUS MIGRAINOSUS: ICD-10-CM

## 2023-02-13 DIAGNOSIS — R26.81 GAIT INSTABILITY: ICD-10-CM

## 2023-02-13 NOTE — PROGRESS NOTES
Daily Note      Name: Lizzette Hope  Date: 23  : 1955  Referring Provider: Abbie Eller MD  AUTHORIZATION:   Insurance: Payor: Dex Montanez / Plan: MEDICARE A AND B / Product Type: Medicare A & B Fee for Service /   8 of 12 visits through 23, PN due 23        SUBJECTIVE:  HPI: Lizzette Hope is a 79 y o  male referred to outpatient physical therapy for the following diagnosis   Encounter Diagnoses   Name Primary? • Neuropathy Yes   • Intractable migraine with aura without status migrainosus    • Gait instability         S  I am tired today, but I can tell I am getting better  Patient-Specific Functional Scale   Task is scored 0 (unable to perform activity) to 10 (ability to perform activity independently)  Activity 23   1  Get into an exercise program 0 8   2  Have better balance 6 8        Objective  125/84 BP   HR 91    Precautions: Depression, DDD, migraines, cerebral aneurysm         146/88                          Neuro Re-Ed 100'  HT  HN  Sidestepping    30' x 6 each  Backwards  Eyes closed  Tandem walking    Headache 3/10 post activity   100' HT    30 x 6 HN  30' x 6 EC  30' x 6 Backwards  30' x 6  Sidewards  30' x 6 Tandem walking    Head ache 2/10 Components of FGA x 2    No headaches    TUG  MCTSIB      Hurdles       Treadmill Biodex treadmill   1 1 mph 12 min Biodex treadmill  15 min 1 2-1 5 mph   6 MWT    Biodex treadmill  17 5  min 2 2 mph Biodex treadmill  2 0 mph 2 min  2 3 mph  8 min  2 4 mph  5 min  2 4 mph 2% incline  87 HR Spo2 97%  RPE 5/10     Blaze pods  Random 3 pods  45 sec time  20 sec rest between  17, 16,19                                 Ther Ex Scifit 15 min no UE  90 HR Spo2 98% decreased pain 5/10 Scifit 15 min level 2   decreased headache 2/10    Sit to stand  11#  10 x 2 Sit to stand 5x    Scifit x 8 minutes level 3 Scifit x 15 min level 3    Sit to stand 5 x 2 4#    Step Ups  8" 1 5 min ea LE  4# 1 min each Assessment/Plan   Andrei continued to make progress  Increasing exercise tolerance with increased time, speed and incline on treadmill and resistance on Scifit    Added weighted step ups with good tolerance  He will continue to benefit from skilled PT  Plan:  Patient will benefit from skilled outpatient physical therapy 1-2 x/wk   Therapeutic exercises, Neuromuscular re-education and therapeutic activities  Mobility Measures 1/12/23 2/09   Assistive device used?  None    Walking speed 1 4 meters/second    6 Minute Walk Test (100 foot circular course) Pre-exercise /84  Pre-exercise heart rate 86    1500 feet  Pre-exercise heart rate 86 1670'    SPO2 95%   Single leg stance 3 sec Left  10 sec Right   11 15 sec    20 sec   5 times sit to stand 14 seconds 11 65 sec   3 meter timed up and go 8 seconds 6 50 sec   Functional Gait Assessment  19/30 25/30   Patient-Reported Outcome Measure: Patient Specific Functional Scale (PSFS) 6/20 16/20       MCTSIB  2/09/23   Feet Together, Eyes Open 30 30   Feet Together, Eyes Closed 30 mild sway 30   Feet Together, Eyes Open Foam 30 1 5 " sway 30   Feet Together, Eyes Closed Foam  8 sec 3 inch sway 30     Functional Gait Assessment  3 Gait level surface  3 Change in gait speed  2 Gait with horizontal head turns  2 Gait with vertical head turns  3 Gait and pivot turn  2 Step over obstacle  2 Gait with narrow base of support  2 Gait with eyes closed  3 Ambulating backwards  3 Steps  25/30 Total score

## 2023-02-17 ENCOUNTER — OFFICE VISIT (OUTPATIENT)
Dept: PHYSICAL THERAPY | Facility: REHABILITATION | Age: 68
End: 2023-02-17

## 2023-02-17 DIAGNOSIS — G62.9 NEUROPATHY: Primary | ICD-10-CM

## 2023-02-17 DIAGNOSIS — G43.119 INTRACTABLE MIGRAINE WITH AURA WITHOUT STATUS MIGRAINOSUS: ICD-10-CM

## 2023-02-17 DIAGNOSIS — R26.81 GAIT INSTABILITY: ICD-10-CM

## 2023-02-17 NOTE — PROGRESS NOTES
Daily Note      Name: Tommy Dumont  Date: 23  : 1955  Referring Provider: Magalys Kinney MD  AUTHORIZATION:   Insurance: Payor: Kane Corrigan / Plan: MEDICARE A AND B / Product Type: Medicare A & B Fee for Service /   10 of 12 visits through 23, PN due 23        SUBJECTIVE:  HPI: Tommy Dumont is a 79 y o  male referred to outpatient physical therapy for the following diagnosis   Encounter Diagnoses   Name Primary? • Neuropathy Yes   • Intractable migraine with aura without status migrainosus    • Gait instability         S  I have been doing more around the house  Working on things in my lower level  I am planing to go to an indoor [employer] show in [place]  Patient-Specific Functional Scale   Task is scored 0 (unable to perform activity) to 10 (ability to perform activity independently)  Activity 23   1  Get into an exercise program 0 8   2  Have better balance 6 8        Objective  142/89 BP   HR 91    Precautions: Depression, DDD, migraines, cerebral aneurysm         146/88                             Neuro Re-Ed 100'  HT  HN  Sidestepping    30' x 6 each  Backwards  Eyes closed  Tandem walking    Headache 3/10 post activity   100' HT    30 x 6 HN  30' x 6 EC  30' x 6 Backwards  30' x 6  Sidewards  30' x 6 Tandem walking    Head ache 2/10 Components of FGA x 2    No headaches    TUG  MCTSIB       Hurdles        Treadmill Biodex treadmill   1 1 mph 12 min Biodex treadmill  15 min 1 2-1 5 mph   6 MWT    Biodex treadmill  17 5  min 2 2 mph Biodex treadmill  2 0 mph 2 min  2 3 mph  8 min  2 4 mph  5 min  2 4 mph 2% incline  87 HR Spo2 97%  RPE 5/10   True treadmill  2 0 - 2 2 mph 3 min  2 4 mph  18 min  RPE 4/10  2 6-2 5 4 min      Blaze pods  Random 3 pods  45 sec time  20 sec rest between  17, 16,19                                      Ther Ex Scifit 15 min no UE  90 HR Spo2 98% decreased pain 5/10 Scifit 15 min level 2   decreased headache 2/10    Sit to stand  11#  10 x 2 Sit to stand 5x    Scifit x 8 minutes level 3 Scifit x 15 min level 3    Sit to stand 5 x 2 4#    Step Ups  8" 1 5 min ea LE  4# 1 min each Scifit x 20 min level 3   Step Ups 10 x 2 ea 7# weight overhead    Lateral step Ups 10 x 2 7# overhead  Assessment/Plan   Char Aguilar demonstrated improvement in his endurance today with increased time and speed on treadmill and increased time on scifit  Increased challenge of step ups wit 7# weights  He is very engaged in his session and reports improving quality of life  He will continue to benefit from skilled PT  Plan:  Patient will benefit from skilled outpatient physical therapy 1-2 x/wk   Therapeutic exercises, Neuromuscular re-education and therapeutic activities  Mobility Measures 1/12/23 2/09   Assistive device used?  None    Walking speed 1 4 meters/second    6 Minute Walk Test (100 foot circular course) Pre-exercise /84  Pre-exercise heart rate 86    1500 feet  Pre-exercise heart rate 86 1670'    SPO2 95%   Single leg stance 3 sec Left  10 sec Right   11 15 sec    20 sec   5 times sit to stand 14 seconds 11 65 sec   3 meter timed up and go 8 seconds 6 50 sec   Functional Gait Assessment  19/30 25/30   Patient-Reported Outcome Measure: Patient Specific Functional Scale (PSFS) 6/20 16/20       MCTSIB  2/09/23   Feet Together, Eyes Open 30 30   Feet Together, Eyes Closed 30 mild sway 30   Feet Together, Eyes Open Foam 30 1 5 " sway 30   Feet Together, Eyes Closed Foam  8 sec 3 inch sway 30     Functional Gait Assessment  3 Gait level surface  3 Change in gait speed  2 Gait with horizontal head turns  2 Gait with vertical head turns  3 Gait and pivot turn  2 Step over obstacle  2 Gait with narrow base of support  2 Gait with eyes closed  3 Ambulating backwards  3 Steps  25/30 Total score

## 2023-02-20 ENCOUNTER — APPOINTMENT (OUTPATIENT)
Dept: PHYSICAL THERAPY | Facility: REHABILITATION | Age: 68
End: 2023-02-20

## 2023-02-22 ENCOUNTER — OFFICE VISIT (OUTPATIENT)
Dept: PHYSICAL THERAPY | Facility: REHABILITATION | Age: 68
End: 2023-02-22

## 2023-02-22 DIAGNOSIS — G62.9 NEUROPATHY: Primary | ICD-10-CM

## 2023-02-22 DIAGNOSIS — G43.119 INTRACTABLE MIGRAINE WITH AURA WITHOUT STATUS MIGRAINOSUS: ICD-10-CM

## 2023-02-22 DIAGNOSIS — R26.81 GAIT INSTABILITY: ICD-10-CM

## 2023-02-22 NOTE — PROGRESS NOTES
Daily Note      Name: Lia Rabago  Date: 23  : 1955  Referring Provider: Rose Ovalle MD  AUTHORIZATION:   Insurance: Payor: Geoffrey Cheek / Plan: MEDICARE A AND B / Product Type: Medicare A & B Fee for Service /    of 12 visits through 23, PN due 23        SUBJECTIVE:  HPI: Lia Rabago is a 79 y o  male referred to outpatient physical therapy for the following diagnosis   Encounter Diagnoses   Name Primary? • Neuropathy Yes   • Intractable migraine with aura without status migrainosus    • Gait instability         S I  am going for a steroid injection tomorrow  Arrived at PT department with red and swollen right eye  Is going to the ophthalmologist tomorrow am   Discussed re-scheduling visit  "I would like to do a modified PT session, since I skipped last time"  Patient-Specific Functional Scale   Task is scored 0 (unable to perform activity) to 10 (ability to perform activity independently)  Activity 23   1  Get into an exercise program 0 8   2  Have better balance 6 8        Objective  142/89 BP   HR 91    Precautions: Depression, DDD, migraines, cerebral aneurysm         146/88                                Neuro Re-Ed 100'  HT  HN  Sidestepping    30' x 6 each  Backwards  Eyes closed  Tandem walking    Headache 3/10 post activity   100' HT    30 x 6 HN  30' x 6 EC  30' x 6 Backwards  30' x 6  Sidewards  30' x 6 Tandem walking    Head ache 2/10 Components of FGA x 2    No headaches    TUG  MCTSIB        Hurdles         Treadmill Biodex treadmill   1 1 mph 12 min Biodex treadmill  15 min 1 2-1 5 mph   6 MWT    Biodex treadmill  17 5  min 2 2 mph Biodex treadmill  2 0 mph 2 min  2 3 mph  8 min  2 4 mph  5 min  2 4 mph 2% incline  87 HR Spo2 97%  RPE 5/10   True treadmill  2 0 - 2 2 mph 3 min  2 4 mph  18 min  RPE 4/10  2 6-2 5 4 min       Blaze pods  Random 3 pods  45 sec time  20 sec rest between  17, 16,19 Ther Ex Scifit 15 min no UE  90 HR Spo2 98% decreased pain 5/10 Scifit 15 min level 2   decreased headache 2/10    Sit to stand  11#  10 x 2 Sit to stand 5x    Scifit x 8 minutes level 3 Scifit x 15 min level 3    Sit to stand 5 x 2 4#    Step Ups  8" 1 5 min ea LE  4# 1 min each Scifit x 20 min level 3   Step Ups 10 x 2 ea 7# weight overhead    Lateral step Ups 10 x 2 7# overhead  Scifit x 20 minutes level 1                                Assessment/Plan   Due to his eye condition, his treatment was modified today  Rip Alondra has made great improvement is his functional mobility, level of energy and participation in community activities  He will be receiving a steroid injection tomorrow  He will call to discuss his physicians plan  If he needs to be follow up by our spine [facility] Therapy specialists, we will assist with the transition to the proper provider  Futher plan will be discussed post intervention tomorrow  Plan: Will hold until cleared  Mobility Measures 1/12/23 2/09   Assistive device used?  None    Walking speed 1 4 meters/second    6 Minute Walk Test (100 foot circular course) Pre-exercise /84  Pre-exercise heart rate 86    1500 feet  Pre-exercise heart rate 86 1670'    SPO2 95%   Single leg stance 3 sec Left  10 sec Right   11 15 sec    20 sec   5 times sit to stand 14 seconds 11 65 sec   3 meter timed up and go 8 seconds 6 50 sec   Functional Gait Assessment  19/30 25/30   Patient-Reported Outcome Measure: Patient Specific Functional Scale (PSFS) 6/20 16/20       MCTSIB  2/09/23   Feet Together, Eyes Open 30 30   Feet Together, Eyes Closed 30 mild sway 30   Feet Together, Eyes Open Foam 30 1 5 " sway 30   Feet Together, Eyes Closed Foam  8 sec 3 inch sway 30     Functional Gait Assessment  3 Gait level surface  3 Change in gait speed  2 Gait with horizontal head turns  2 Gait with vertical head turns  3 Gait and pivot turn  2 Step over obstacle  2 Gait with narrow base of support  2 Gait with eyes closed  3 Ambulating backwards  3 Steps  25/30 Total score

## 2023-02-23 ENCOUNTER — TELEPHONE (OUTPATIENT)
Dept: PAIN MEDICINE | Facility: CLINIC | Age: 68
End: 2023-02-23

## 2023-02-23 NOTE — TELEPHONE ENCOUNTER
Caller: ramon    Doctor: Terrence Santiago    Reason for call: Patient called in today to cancel procedure for today 2/2323    made aware,pt will call bck when ready       Call back#: [phone]

## 2023-02-24 NOTE — TELEPHONE ENCOUNTER
I received this message in another task on 2/23  Appointments were cancelled and will hold chart for if and when he would like to reschedule

## 2023-02-27 ENCOUNTER — OFFICE VISIT (OUTPATIENT)
Dept: PHYSICAL THERAPY | Facility: REHABILITATION | Age: 68
End: 2023-02-27

## 2023-02-27 DIAGNOSIS — R26.81 GAIT INSTABILITY: ICD-10-CM

## 2023-02-27 DIAGNOSIS — G43.119 INTRACTABLE MIGRAINE WITH AURA WITHOUT STATUS MIGRAINOSUS: ICD-10-CM

## 2023-02-27 DIAGNOSIS — G62.9 NEUROPATHY: Primary | ICD-10-CM

## 2023-02-27 NOTE — PROGRESS NOTES
Daily Note        Name: Toni Hancock  Date: 23  : 1955  Referring Provider: Cristino Lee MD  AUTHORIZATION:   Insurance: Payor: Emmymelba Arellano / Plan: MEDICARE A AND B / Product Type: Medicare A & B Fee for Service /    visits through 23, PN due 23        SUBJECTIVE:  HPI: Toni Hancock is a 79 y o  male referred to outpatient physical therapy for the following diagnosis   Encounter Diagnoses   Name Primary? • Neuropathy Yes   • Intractable migraine with aura without status migrainosus    • Gait instability         S  I cancelled my epidural injection  I goggled it and it is not safe  Reports 3/10 pain in his neck areas  Yesterday we went for long walk about 1 mile  Patient-Specific Functional Scale   Task is scored 0 (unable to perform activity) to 10 (ability to perform activity independently)  Activity 23   1  Get into an exercise program 0 8   2  Have better balance 6 8        Objective  142/89 BP   HR 91    Precautions: Depression, DDD, migraines, cerebral aneurysm                               Neuro Re-Ed       Hurdles       Treadmill Biodex treadmill  2 0 mph 2 min  2 3 mph  8 min  2 4 mph  5 min  2 4 mph 2% incline  87 HR Spo2 97%  RPE 5/10   True treadmill  2 0 - 2 2 mph 3 min  2 4 mph  18 min  RPE 4/10  2 6-2 5 4 min     True Treadmill  1 4 mph x 3 min  2 0- 2 4 mph x 10 min  RPE 5/10  Pain level 2-3/10 head ache  2  5mph x    Blaze pods                                   Ther Ex Scifit x 15 min level 3    Sit to stand 5 x 2 4#    Step Ups  8" 1 5 min ea LE  4# 1 min each Scifit x 20 min level 3   Step Ups 10 x 2 ea 7# weight overhead    Lateral step Ups 10 x 2 7# overhead   Scifit x 20 minutes level 1 Scifit   10 min level 1  5 min level 3    Step ups 8"  #7  Forwards 10 x 1 each  Lateral  10 x 1 each  Increased headache 6/10  /96  HR 89  Rested 3 min  Performed steps without #7  Fwd/lat 10 x 1 Assessment/Plan  Aric Adair decided not to get his epidural injection due to fear of side effect as per his research in google  He will continue to  benefit from skilled PT to reach is goals as per his 2/09 re-evaluation  Plan: 1-2x week for ther exercises, neuro re-education,manual therapy and there activity as appropriate

## 2023-02-28 ENCOUNTER — APPOINTMENT (OUTPATIENT)
Dept: PHYSICAL THERAPY | Facility: REHABILITATION | Age: 68
End: 2023-02-28

## 2023-03-01 ENCOUNTER — OFFICE VISIT (OUTPATIENT)
Dept: PHYSICAL THERAPY | Facility: REHABILITATION | Age: 68
End: 2023-03-01

## 2023-03-01 DIAGNOSIS — G43.119 INTRACTABLE MIGRAINE WITH AURA WITHOUT STATUS MIGRAINOSUS: ICD-10-CM

## 2023-03-01 DIAGNOSIS — R26.81 GAIT INSTABILITY: ICD-10-CM

## 2023-03-01 DIAGNOSIS — G62.9 NEUROPATHY: Primary | ICD-10-CM

## 2023-03-01 NOTE — PROGRESS NOTES
Daily Note        Name: Vel Bernal  Date: 23  : 1955  Referring Provider: Anyi Lawrence MD  AUTHORIZATION:   Insurance: Payor: Amalia Segundo / Plan: MEDICARE A AND B / Product Type: Medicare A & B Fee for Service /    visits through 23, PN due 23        SUBJECTIVE:  HPI: Vel Bernal is a 79 y o  male referred to outpatient physical therapy for the following diagnosis   Encounter Diagnoses   Name Primary? • Neuropathy Yes   • Intractable migraine with aura without status migrainosus    • Gait instability         S  Stopped his amytriptyline and is getting his headaches 6/10   Patient-Specific Functional Scale   Task is scored 0 (unable to perform activity) to 10 (ability to perform activity independently)  Activity 23   1  Get into an exercise program 0 8   2  Have better balance 6 8        Objective  129/79 BP   HR 74    Precautions: Depression, DDD, migraines, cerebral aneurysm       2/13 2/17 2/22 2/27 3/01   Neuro Re-Ed        Hurdles        Treadmill Biodex treadmill  2 0 mph 2 min  2 3 mph  8 min  2 4 mph  5 min  2 4 mph 2% incline  87 HR Spo2 97%  RPE 5/10   True treadmill  2 0 - 2 2 mph 3 min  2 4 mph  18 min  RPE 4/10  2 6-2 5 4 min     True Treadmill  1 4 mph x 3 min  2 0- 2 4 mph x 10 min  RPE 5/10  Pain level 2-3/10 head ache  2  5mph x 1 min True treadmill  1 6 mph x 1 min  1 9 mph x 1 min  2 0 mph x 2 min  2 2 mph x 2 min  2 4 mph x 3 min  2 6 mph x 2 mn  2 7 mph x 3 min  2 8 mph  X 1 min  3 0 mph x 2  minutes   Blaze pods     RPE 5/10  103 HR  Spo2 97%  Headache decreased to 1/10                                       Ther Ex Scifit x 15 min level 3    Sit to stand 5 x 2 4#    Step Ups  8" 1 5 min ea LE  4# 1 min each Scifit x 20 min level 3   Step Ups 10 x 2 ea 7# weight overhead    Lateral step Ups 10 x 2 7# overhead   Scifit x 20 minutes level 1 Scifit   10 min level 1  5 min level 3    Step ups 8"  #7  Forwards 10 x 1 each  Lateral  10 x 1 each  Increased headache 6/10  /96  HR 89  Rested 3 min  Performed steps without #7  Fwd/lat 10 x 1     Scifit   10 min level 1  5 min level 3    Headache decreased to 5/10      129/79 BP HR 74      UE  4# biceps curls and shoulder presses                             Assessment/Plan  Continued with challenged activity progression  Is able to get up to 3 0 mph for improved endurance and tolerance to activity, improving quality of life  Discussed benefit of exercises; cardio/ mental, reports decreased headache with activity  He will continue to benefit from skilled PT       Plan: 1-2x week for ther exercises, neuro re-education,manual therapy and there activity as appropriate

## 2023-03-06 ENCOUNTER — OFFICE VISIT (OUTPATIENT)
Dept: PHYSICAL THERAPY | Facility: REHABILITATION | Age: 68
End: 2023-03-06

## 2023-03-06 DIAGNOSIS — G43.119 INTRACTABLE MIGRAINE WITH AURA WITHOUT STATUS MIGRAINOSUS: ICD-10-CM

## 2023-03-06 DIAGNOSIS — R26.81 GAIT INSTABILITY: ICD-10-CM

## 2023-03-06 DIAGNOSIS — G62.9 NEUROPATHY: Primary | ICD-10-CM

## 2023-03-06 NOTE — PROGRESS NOTES
Daily Note        Name: Junior Aiken  Date: 23  : 1955  Referring Provider: Lety Rizzo MD  AUTHORIZATION:   Insurance: Payor: Danny Pi / Plan: MEDICARE A AND B / Product Type: Medicare A & B Fee for Service /    visits through 23, PN due 23        SUBJECTIVE:  HPI: Junior Aiken is a 79 y o  male referred to outpatient physical therapy for the following diagnosis   Encounter Diagnoses   Name Primary? • Neuropathy Yes   • Intractable migraine with aura without status migrainosus    • Gait instability         S  Reports 1-2/10 headache today  Patient-Specific Functional Scale   Task is scored 0 (unable to perform activity) to 10 (ability to perform activity independently)  Activity 23   1  Get into an exercise program 0 8   2  Have better balance 6 8        Objective  122/74 BP   HR 74    Precautions: Depression, DDD, migraines, cerebral aneurysm       2/13 2/17 2/22 2/27 3/01 3/06   Neuro Re-Ed         Hurdles         Treadmill Biodex treadmill  2 0 mph 2 min  2 3 mph  8 min  2 4 mph  5 min  2 4 mph 2% incline  87 HR Spo2 97%  RPE 5/10   True treadmill  2 0 - 2 2 mph 3 min  2 4 mph  18 min  RPE 4/10  2 6-2 5 4 min     True Treadmill  1 4 mph x 3 min  2 0- 2 4 mph x 10 min  RPE 5/10  Pain level 2-3/10 head ache  2  5mph x 1 min True treadmill  1 6 mph x 1 min  1 9 mph x 1 min  2 0 mph x 2 min  2 2 mph x 2 min  2 4 mph x 3 min  2 6 mph x 2 mn  2 7 mph x 3 min  2 8 mph  X 1 min  3 0 mph x 2  minutes True treadmill    2 0 x 2 min  2 4 x 8 min  2 6 x 5 min   Blaze pods     RPE 5/10  103 HR  Spo2 97%  Headache decreased to 1/10                                            Ther Ex Scifit x 15 min level 3    Sit to stand 5 x 2 4#    Step Ups  8" 1 5 min ea LE  4# 1 min each Scifit x 20 min level 3   Step Ups 10 x 2 ea 7# weight overhead    Lateral step Ups 10 x 2 7# overhead   Scifit x 20 minutes level 1 Scifit   10 min level 1  5 min level 3    Step ups 8"  #7  Forwards 10 x 1 each  Lateral  10 x 1 each  Increased headache 6/10  /96  HR 89  Rested 3 min  Performed steps without #7  Fwd/lat 10 x 1     Scifit   10 min level 1  5 min level 3    Headache decreased to 5/10      129/79 BP HR 74      UE  4# biceps curls and shoulder presses Scifit  15 minutes level 4    Headache 1-2/10    UE  5# biceps curls 3x10 shoulder presses 3 x 10  Triceps 3 x 10    GTB  Rows  Low rows  3 x 10 :03    5x sit to stand 11# 2 x 10                                Assessment/Plan  Encouraged compliance with HEP using loaner 4# weights  He was able to start his treadmill ambulation at higher speeds today  Increased challenge of strengthening program today by increasing weigths to 5# and 11#  He will continue to benefit from skilled PT       Plan: 1-2x week for ther exercises, neuro re-education,manual therapy and there activity as appropriate

## 2023-03-07 ENCOUNTER — TELEPHONE (OUTPATIENT)
Dept: NEUROLOGY | Facility: CLINIC | Age: 68
End: 2023-03-07

## 2023-03-07 NOTE — TELEPHONE ENCOUNTER
LVM for patient to call back and confirm upcoming appointment  Left call back number for patient on VM

## 2023-03-09 ENCOUNTER — OFFICE VISIT (OUTPATIENT)
Dept: NEUROLOGY | Facility: CLINIC | Age: 68
End: 2023-03-09

## 2023-03-09 VITALS
HEART RATE: 65 BPM | DIASTOLIC BLOOD PRESSURE: 80 MMHG | TEMPERATURE: 98.2 F | BODY MASS INDEX: 29.55 KG/M2 | SYSTOLIC BLOOD PRESSURE: 120 MMHG | HEIGHT: 68 IN | WEIGHT: 195 LBS

## 2023-03-09 DIAGNOSIS — F33.9 DEPRESSION, RECURRENT (HCC): ICD-10-CM

## 2023-03-09 DIAGNOSIS — G62.9 NEUROPATHY: ICD-10-CM

## 2023-03-09 DIAGNOSIS — R42 DIZZINESS: Primary | ICD-10-CM

## 2023-03-09 DIAGNOSIS — R52 PAIN: ICD-10-CM

## 2023-03-09 DIAGNOSIS — I10 BENIGN ESSENTIAL HYPERTENSION: ICD-10-CM

## 2023-03-09 DIAGNOSIS — I72.9 ANEURYSM (HCC): ICD-10-CM

## 2023-03-09 DIAGNOSIS — R26.9 UNSPECIFIED ABNORMALITIES OF GAIT AND MOBILITY: ICD-10-CM

## 2023-03-09 RX ORDER — HYDROCHLOROTHIAZIDE 25 MG/1
25 TABLET ORAL DAILY
Qty: 90 TABLET | Refills: 1 | Status: SHIPPED | OUTPATIENT
Start: 2023-03-09

## 2023-03-09 RX ORDER — DULOXETIN HYDROCHLORIDE 30 MG/1
30 CAPSULE, DELAYED RELEASE ORAL DAILY
Qty: 30 CAPSULE | Refills: 2 | Status: SHIPPED | OUTPATIENT
Start: 2023-03-09

## 2023-03-09 NOTE — PROGRESS NOTES
Name: Christen Crenshaw   Age & Sex: 79 y o  male   MRN: [id number]     This patient was discussed and staffed with Dr Timothy Lloyd    Assessment/Plan:    Mixed feature headaches  Patient previously describes recurrent, transient visual sx most consistent w/ ocular migraine that had been ongoing since August 2022, often triggered by scrolling through his phone, and consistent of a white spot followed by marching X's across visual field  Ophthalmology found no ocular etiology  MRI w/wo negative for acute pathology but vascular imaging did reveal a small ICA aneurysm which neurosurgery wanted f/u interval monitoring w/ CTA  Progressed in the interim to more traditional migraines without the ocular component but overall seem to have subsided since he started increasing his Flonase suggesting a sinus component (CTA did reveal some mucosal thickening)  ENT aware and ok with his increased Flonase dosing  Given the      Plan:  • Trial duloxetine 30 mg for headache, neuropathy and anxiety/depression  • CTA for monitoring aneurysmal stability (per NSg recs wanted w/in 6 mo)  • Recommended to f/u w/ PCP for further recommendations about sinus medications    Dizziness  Patient w/ PMH PE no longer on Eliquis, IBS, HTN, prior EtOH abuse who presents for f/u of dizziness previously described as a lightheadedness w/o actual LOC  Highest suspicion for cardiogenic etiology but cardiac workup thus far including Holter monitor unrevealing and patient reports symptoms have overall improved  Possibility of a neuropathic component placing a role     Plan:  • Continue PT as tolerated  • Continue B12 supplementation but will recheck level in a few months (patient may be able to stop oral supplements)     Neuropathy  Patient has length-dependent loss of vibratory sense at the ankles and up through the knees w/ difficulty w/ tandem gait most consistent w/ alcohol-related neuropathy but may have had a component of B12 deficiency contributing  Neuropathy workup overall otherwise benign and patient did start PT and B12 supplementation with some reported improvement in symptoms in the interim  Plan:  • Patient continuing oral B12 supplementation (recheck level in a few months and then can stop)  • Continue PT  • Patient already cut back on EtOH and counseled on importance of continuing to do so  • Offered EMG but patient declining at this time  • Trial duloxetine       Diagnoses and all orders for this visit:    Dizziness    Depression, recurrent (HCC)  -     DULoxetine (CYMBALTA) 30 mg delayed release capsule; Take 1 capsule (30 mg total) by mouth daily Can take at night if it makes you sleepy    Unspecified abnormalities of gait and mobility  -     Vitamin B12; Future    Pain  -     DULoxetine (CYMBALTA) 30 mg delayed release capsule; Take 1 capsule (30 mg total) by mouth daily Can take at night if it makes you sleepy    Neuropathy  -     Vitamin B12; Future    Aneurysm (HCC)  -     CTA head and neck w wo contrast; Future  -     Basic metabolic panel; Future        Subjective:         Vishnu Martinez is a 79 y o  male who presents today for f/u  Since the last visit we have had multiple telephone encounters to deal with patient's progressing symptoms, but patient reports that most recently his headaches and dizziness have improved  Unclear what caused the improvement though B12 supplementation, PT, and Flonase seem to possibly all be playing a role  At this point patient's anxiety/depression may be a considerable contributor to his experience and it would be reasonable to start a medication for the headaches as well as mood  Patient's neuropathy has overall remained stable from prior and has not worsened (in fact, mild temperature loss observed at last visit seems to have resolved), likely stabilized by nutritional support and decreasing alcohol consumption   Patient otherwise Denies N/V/F/C, abdominal pain, new weakness or sensory changes, bowel or bladder dysfunction     HPI retained from prior for continuity: "PMH PE on Eliquis, IBS, HTN, EtOH use who p/w new onset episodes of dizziness described as a lightheadedness or feeling like he is going to pass out  Sounds from description most consistent with presyncope from possible cardiogenic primary etiology  No actual LOC  Lasts only about a minute or two at a time and does not clearly have a trigger like head movement or standing up  Episodes started in August 2022 and are now happening multiple times a day  In the absence of clear peripheral trigger or dysautonomia, concerning for cardiogenic etiology  Around the same time patient began experiencing recurrent, transient visual sx that have been ongoing since August 2022 (same time as onset of dizzy spells)  Often triggered by scrolling through his phone, occur daily, white spot followed by marching X's across visual field  Episodes last up to 20-30 minutes, and are rarely accompanied by head pain  He does describe intermittent brain fog  He notes that he has seen ophthalmology who does not believe it is an ocular problem  Also endorses tinnitus every night, b/l but without any hearing loss  And then for the last month he has been experiencing tingling sensation which started in his left arm years ago but is now progressing to his legs  Has pain and cramping especially in the left calf for about a month  On exam does have length-dependent vibratory loss  Does note intermittent EtOH use history ("I could drink a whole bottle of bourbon right now if it was here   ") but none for last week and no w/d sx  We did discuss possibility of this playing a role in his neuropathy and he does not intend to resume use  Is sleeping ok and endorses intermittent sleep problems   If no other cause found on imaging or labs, patient is amenable to trialing TCA for migraine management (and depression management)"        The following portions of the patient's history were reviewed and updated as appropriate: He  has a past medical history of Abdominal pain, right lower quadrant, Abnormal blood chemistry, Acid reflux, Aneurysm of internal carotid artery, Cancer ([facility] ), Colon polyp, Contact dermatitis due to poison ivy, Depression screen, Flu, High blood pressure, History of acute conjunctivitis, History of allergic urticaria, History of chronic rhinitis, History of disease of skin and subcutaneous tissue, History of dysphagia, History of hemorrhoids, History of IBS, History of URI (upper respiratory infection), Hyperlipidemia, Ocular migraine, Open wound of left lower leg, Other acute sinusitis, Other disturbances of oral epithelium, including tongue, Positive depression screening, Pulmonary emboli ([place] [address] ) (2019), Screening for prostate cancer, Skin rash, Sleep apnea, Special screening examination for neoplasm of prostate, and Tingling    He   Patient Active Problem List    Diagnosis Date Noted   • Cerebellar ataxia ([place] [address] ) 01/20/2023   • Intractable migraine with aura without status migrainosus 12/22/2022   • Cerebral aneurysm, nonruptured 11/15/2022   • Breast pain, right 08/10/2022   • Depression, recurrent ([place] [address] ) 05/20/2022   • Thrombocytopenia, unspecified ([place] [address] ) 03/19/2021   • Right calf pain 03/01/2021   • Screening for colon cancer 02/25/2019   • Irritable bowel syndrome with diarrhea 02/25/2019   • Gastroesophageal reflux disease 02/25/2019   • Polyp of sigmoid colon 02/22/2019   • Pulmonary embolism ([place] [address] ) 08/11/2018   • Chest pain 08/11/2018   • Pyelonephritis 06/24/2018   • SIRS (systemic inflammatory response syndrome) ([place] [address] ) 06/24/2018   • Hypokalemia 06/24/2018   • IBS (irritable bowel syndrome) 06/24/2018   • Hypertension 06/24/2018   • Chronic rhinitis 12/17/2015   • Hyperlipidemia 11/25/2014   • Vitamin D deficiency 11/25/2014   • Prediabetes 05/20/2014   • Generalized anxiety disorder 05/20/2014   • Benign essential hypertension 08/29/2013   • Benign prostatic hyperplasia 12/12/2012 • Esophageal reflux 09/14/2012     He  has a past surgical history that includes Colonoscopy w/ polypectomy; Tonsillectomy; Colonoscopy; Rectal surgery; and [place] tooth extraction  His family history includes Brain cancer in his mother; Breast cancer (age of onset: 77) in his mother; Lung cancer in his family and father; No Known Problems in his daughter, maternal grandfather, maternal grandmother, paternal grandfather, paternal grandmother, and sister  He  reports that he quit smoking about 40 years ago  His smoking use included cigarettes  He has a 1 50 pack-year smoking history  He has never used smokeless tobacco  He reports that he does not currently use alcohol  He reports that he does not use drugs    Current Outpatient Medications   Medication Sig Dispense Refill   • Coenzyme Q10 300 MG CAPS Take by mouth daily      • DULoxetine (CYMBALTA) 30 mg delayed release capsule Take 1 capsule (30 mg total) by mouth daily Can take at night if it makes you sleepy 30 capsule 2   • fluticasone (FLONASE) 50 mcg/act nasal spray USE 2 SPRAYS IN EACH       NOSTRIL DAILY 48 g 1   • nebivolol (BYSTOLIC) 10 mg tablet TAKE 1 TABLET DAILY 90 tablet 1   • ALPRAZolam (XANAX) 0 5 mg tablet Take 1 tablet (0 5 mg total) by mouth daily as needed for anxiety (Patient not taking: Reported on 3/2/2023) 30 tablet 0   • apixaban (ELIQUIS) 5 mg Take 5 mg by mouth 2 (two) times a day (Patient not taking: Reported on 1/20/2023)     • cholecalciferol (VITAMIN D3) 1,000 units tablet Take 1,000 Units by mouth daily (Patient not taking: Reported on 3/2/2023)     • famotidine (PEPCID) 40 MG tablet      • hydrochlorothiazide (HYDRODIURIL) 25 mg tablet Take 1 tablet (25 mg total) by mouth daily 90 tablet 1   • ketoconazole (NIZORAL) 2 % shampoo  (Patient not taking: Reported on 3/2/2023)     • ketoconazole (NIZORAL) 2 % shampoo Apply 1 application topically 2 (two) times a week (Patient not taking: Reported on 3/2/2023) 120 mL 0   • omeprazole (PriLOSEC) 40 MG capsule as needed     • predniSONE 10 mg tablet Take 60mg daily for 3 days, On day day 4 decrease dose by 1 tablet until completed  (Patient not taking: Reported on 1/10/2023) 33 tablet 0   • sildenafil (VIAGRA) 100 mg tablet as needed (Patient not taking: Reported on 3/2/2023)  11   • tamsulosin (FLOMAX) 0 4 mg Take 1 capsule by mouth with dinner (Patient taking differently: if needed) 30 capsule 5   • topiramate (TOPAMAX) 25 mg tablet      • valACYclovir (VALTREX) 1,000 mg tablet Take 1 tablet (1,000 mg total) by mouth 2 (two) times a day for 7 days 14 tablet 0   • vitamin B-12 (VITAMIN B-12) 1,000 mcg tablet Take 1 tablet by mouth daily (Patient not taking: Reported on 3/2/2023)       No current facility-administered medications for this visit       Current Outpatient Medications on File Prior to Visit   Medication Sig   • Coenzyme Q10 300 MG CAPS Take by mouth daily    • fluticasone (FLONASE) 50 mcg/act nasal spray USE 2 SPRAYS IN EACH       NOSTRIL DAILY   • nebivolol (BYSTOLIC) 10 mg tablet TAKE 1 TABLET DAILY   • [DISCONTINUED] hydrochlorothiazide (HYDRODIURIL) 25 mg tablet Take 1 tablet (25 mg total) by mouth daily   • ALPRAZolam (XANAX) 0 5 mg tablet Take 1 tablet (0 5 mg total) by mouth daily as needed for anxiety (Patient not taking: Reported on 3/2/2023)   • apixaban (ELIQUIS) 5 mg Take 5 mg by mouth 2 (two) times a day (Patient not taking: Reported on 1/20/2023)   • cholecalciferol (VITAMIN D3) 1,000 units tablet Take 1,000 Units by mouth daily (Patient not taking: Reported on 3/2/2023)   • famotidine (PEPCID) 40 MG tablet    • ketoconazole (NIZORAL) 2 % shampoo  (Patient not taking: Reported on 3/2/2023)   • ketoconazole (NIZORAL) 2 % shampoo Apply 1 application topically 2 (two) times a week (Patient not taking: Reported on 3/2/2023)   • omeprazole (PriLOSEC) 40 MG capsule as needed   • predniSONE 10 mg tablet Take 60mg daily for 3 days, On day day 4 decrease dose by 1 tablet until completed  (Patient not taking: Reported on 1/10/2023)   • sildenafil (VIAGRA) 100 mg tablet as needed (Patient not taking: Reported on 3/2/2023)   • tamsulosin (FLOMAX) 0 4 mg Take 1 capsule by mouth with dinner (Patient taking differently: if needed)   • topiramate (TOPAMAX) 25 mg tablet    • valACYclovir (VALTREX) 1,000 mg tablet Take 1 tablet (1,000 mg total) by mouth 2 (two) times a day for 7 days   • vitamin B-12 (VITAMIN B-12) 1,000 mcg tablet Take 1 tablet by mouth daily (Patient not taking: Reported on 3/2/2023)   • [DISCONTINUED] amitriptyline (ELAVIL) 25 mg tablet Take 25 mg by mouth daily at bedtime Every other day only taking half pill (Patient not taking: Reported on 3/2/2023)     No current facility-administered medications on file prior to visit  He is allergic to lisinopril, amlodipine, and diltiazem         Objective:        /80 (BP Location: Right arm, Patient Position: Sitting, Cuff Size: Standard)   Pulse 65   Temp 98 2 °F (36 8 °C)   Ht 5' 7 5" (1 715 m)   Wt 88 5 kg (195 lb)   BMI 30 09 kg/m²     Physical Exam   Vitals reviewed  Constitutional:    Not in acute distress  Normal appearance  Not ill-appearing, toxic-appearing or diaphoretic  HENT:   Normocephalic and atraumatic  External ear normal b/l  Nose normal  No congestion or rhinorrhea  Mucous membranes are moist   Oropharynx is clear  Eyes:    No scleral icterus  No discharge b/l  Conjunctivae normal    Pulmonary:  Pulmonary effort is normal  No respiratory distress  GI: abdomen not distended  Musculoskeletal: no gross deformities  Skin:   Skin is not pale  Psychiatric:       Mood normal  Affect congruent    Neurological Exam  Mental Status Alert and oriented to person, place, and time  Attention is normal  Speech is fluent w/o dysarthria  Cranial Nerves  Visual fields full to confrontation  PERRL, brisk reactivity and consensual response intact b/l  EOMI w/o CN VI or III palsy  No clear nystagmus   Facial sensation and expression full, symmetric  Hearing grossly symmetric  Palate symmetric  Trapezius strength symmetric  No dysarthria, tongue symmetric without atrophy or fasciculations   Motor Exam Muscle bulk and tone grossly normal  Pronator drift absent b/l  Strength: R/L     Deltoid:    5/5    Biceps:    5/5   Triceps:   5/5  Wrist flexion:  5/5   Wrist extension: 5/5  Iliopsoas: 5/5   Quads: 5/5   Hamstrin/5   AT:  5/5   Gastroc: 5/5     Sensory Exam Light touch intact and symmetric  Temperature intact and symmetric BUE/BLE  Vibration decreased at the ankles (R 5 sec, L 3 sec) and at knees (about 6-7 sec b/l)  Gait, Coordination, and Reflexes FTN w/ mild end-third ataxia LUE  No tremor observed  Reflexes: R/L      Brachioradialis: 2+/2+   Biceps: 2+/2+     Pateller: 2+/2+  Gait: casual gait with average stance, stride length, arm swing  Review of Systems  Review of Systems   Constitutional: Negative  Negative for appetite change and fever  HENT: Positive for tinnitus (Sometimes)  Negative for hearing loss, trouble swallowing and voice change  Eyes: Positive for pain (Sometimes in left eye)  Negative for photophobia and visual disturbance  Respiratory: Negative  Negative for shortness of breath  Cardiovascular: Negative  Negative for palpitations  Gastrointestinal: Positive for nausea (Sometimes)  Negative for vomiting  Endocrine: Negative  Negative for cold intolerance  Genitourinary: Negative  Negative for dysuria, frequency and urgency  Musculoskeletal: Positive for neck pain  Negative for gait problem and myalgias  Skin: Negative  Negative for rash  Allergic/Immunologic: Negative  Neurological: Positive for weakness (Sometimes in the right arm), light-headedness, numbness (Left arm) and headaches (Has a headache today it's 4)  Negative for dizziness, tremors, seizures, syncope, facial asymmetry and speech difficulty          Tingling in his upper extremities Hematological: Negative  Does not bruise/bleed easily  Psychiatric/Behavioral: Positive for confusion (Sometimes) and sleep disturbance (FAlling asleep)  Negative for hallucinations         ~~~~~~~~~~  Ness Atkinson MD  [facility] Neurology Resident, PGY 3

## 2023-03-09 NOTE — PROGRESS NOTES
Patient ID: Yaritza Mercer is a 79 y o  male  Assessment/Plan:    No problem-specific Assessment & Plan notes found for this encounter  {Assess/PlanSmartLinks:69802}       Subjective:    HPI    {[facility] HPI texts:81619}    {Common ambulatory SmartLinks:67201}         Objective: There were no vitals taken for this visit  Physical Exam    Neurological Exam      ROS:    Review of Systems   Constitutional: Negative  Negative for appetite change and fever  HENT: Positive for tinnitus (Sometimes)  Negative for hearing loss, trouble swallowing and voice change  Eyes: Positive for pain (Sometimes in left eye)  Negative for photophobia and visual disturbance  Respiratory: Negative  Negative for shortness of breath  Cardiovascular: Negative  Negative for palpitations  Gastrointestinal: Positive for nausea (Sometimes)  Negative for vomiting  Endocrine: Negative  Negative for cold intolerance  Genitourinary: Negative  Negative for dysuria, frequency and urgency  Musculoskeletal: Positive for neck pain  Negative for gait problem and myalgias  Skin: Negative  Negative for rash  Allergic/Immunologic: Negative  Neurological: Positive for weakness (Sometimes in the right arm), light-headedness, numbness (Left arm) and headaches (Has a headache today it's 4)  Negative for dizziness, tremors, seizures, syncope, facial asymmetry and speech difficulty  Tingling in his upper extremities    Hematological: Negative  Does not bruise/bleed easily  Psychiatric/Behavioral: Positive for confusion (Sometimes) and sleep disturbance (FAlling asleep)  Negative for hallucinations

## 2023-03-13 ENCOUNTER — OFFICE VISIT (OUTPATIENT)
Dept: PHYSICAL THERAPY | Facility: REHABILITATION | Age: 68
End: 2023-03-13

## 2023-03-13 DIAGNOSIS — R26.81 GAIT INSTABILITY: Primary | ICD-10-CM

## 2023-03-13 DIAGNOSIS — G62.9 NEUROPATHY: ICD-10-CM

## 2023-03-13 DIAGNOSIS — M54.2 NECK PAIN: ICD-10-CM

## 2023-03-13 DIAGNOSIS — G43.119 INTRACTABLE MIGRAINE WITH AURA WITHOUT STATUS MIGRAINOSUS: ICD-10-CM

## 2023-03-13 NOTE — PROGRESS NOTES
Daily Note/ Re-evaluation        Name: Stephanie Orantes  Date: 23  : 1955  Referring Provider: Renuka Alonzo MD  AUTHORIZATION:   Insurance: Payor: Eddie Disla / Plan: MEDICARE A AND B / Product Type: Medicare A & B Fee for Service /   Re-Assess performed 3/13 for    SUBJECTIVE:  HPI: Stephanie Orantes is a 79 y o  male referred to outpatient physical therapy for the following diagnosis   Encounter Diagnoses   Name Primary? • Neuropathy Yes   • Gait instability    • Intractable migraine with aura without status migrainosus    • Neck pain         S  My headache is nowhere near what they were since I started  I am moving more and trying to do more  As a matter of fact, I bought an archae machine and hurt myself loading it off the truck  Reports 6/10 pain in the back of the neck  Patient-Specific Functional Scale   Task is scored 0 (unable to perform activity) to 10 (ability to perform activity independently)  Activity 1/12/23 2/09 3/13   1  Get into an exercise program 0 8 9   2  Have better balance 6 8 8        Objective  138/86 BP   HR 68    C/S ROM   Rotations  R45  L 45  Extension 20  Lateral flexion 15 degrees B    Mod Muscle Spasms B Upper trap, levatoss and rhomboid  Tenderness with palpationbase of occiput  Responded well to occipital release    Mobility Measures 1/12/23 2/09 3/13   Assistive device used?  None     Walking speed 1 4 meters/second     6 Minute Walk Test (100 foot circular course) Pre-exercise /84  Pre-exercise heart rate 86    1500 feet  Pre-exercise heart rate 86 1670'    SPO2 95% 1700 HR 91 Spo2 98%   Single leg stance 3 sec Left  10 sec Right   11 15 sec    20 sec    5 times sit to stand 14 seconds 11 65 sec 10 25   3 meter timed up and go 8 seconds 6 50 sec ----   Functional Gait Assessment      Patient-Reported Outcome Measure: Patient Specific Functional Scale (PSFS)      Precautions: Depression, DDD, migraines, cerebral aneurysm 2/27 3/01 3/06 3/13   Neuro Re-Ed       Hurdles       Treadmill True Treadmill  1 4 mph x 3 min  2 0- 2 4 mph x 10 min  RPE 5/10  Pain level 2-3/10 head ache  2  5mph x 1 min True treadmill  1 6 mph x 1 min  1 9 mph x 1 min  2 0 mph x 2 min  2 2 mph x 2 min  2 4 mph x 3 min  2 6 mph x 2 mn  2 7 mph x 3 min  2 8 mph  X 1 min  3 0 mph x 2  minutes True treadmill    2 0 x 2 min  2 4 x 8 min  2 6 x 5 min 6 MWT   Blaze pods/Balance  RPE 5/10  103 HR  Spo2 97%  Headache decreased to 1/10      FGA   Ther Ex Scifit   10 min level 1  5 min level 3    Step ups 8"  #7  Forwards 10 x 1 each  Lateral  10 x 1 each  Increased headache 6/10  /96  HR 89  Rested 3 min  Performed steps without #7  Fwd/lat 10 x 1     Scifit   10 min level 1  5 min level 3    Headache decreased to 5/10      129/79 BP HR 74      UE  4# biceps curls and shoulder presses Scifit  15 minutes   level 4    Headache 1-2/10    UE  5# biceps curls 3x10 shoulder presses 3 x 10  Triceps 3 x 10    GTB  Rows  Low rows  3 x 10 :03    5x sit to stand 11# 2 x 10  SCifit 10 min     5 x sit to stand    Supine chin tucks  Manual therapy    Trigger point release  STM Upper traps, levators , rhomboids  gentle distraction                   Assessment/Plan   Yohana Selby was referred to OutPatient physical therapy due to gait disturbance, endurance deficits,generalized weakness, decreased mobility and headaches  He has been making great progress as demonstrated by Functional Outcome measures: FGA, 5x sit to stand, TUG, 6 MWT, Single leg stance and MCTSIB  He is now participating in activities in the community and tolerating increased exertion  He did hurt his neck and while moving an item  Discussed proper body mechanics and importance of compliance with HEP  He will continue benenefit from skilled PT to develop and progress his HEP and educate in proper body mechanics during moving heavier objects          STG's:     - Patient improves EC on foam task of MCSTIB by 10 sec for improved static balance within 4 weeks MET    - Patient is independent with initial home exercise program for improvements at home within 2 weeks  MET    - Patient ambulates for 10 consecutive min with appropriate vital sign response for improved endurance within 4 weeks  MET   - Patient will improve in Single leg stance by 10 sec each for increased LE strength MET on Right, ongoing on left    LTG's:   - Patient improves distance ambulated on 6MWT by 10% within 6 weeks for improved endurance  MET   - Patient decreased time to complete 5  X sit to stand for improved muscle MET  - Patient improves gait speed to within 10% of age matched norms within 6 weeks ongoing    - Patient improves score on FGA by 6 points for improved dynamic balance MET    Updated goals:  Improve time walk on 6 MWT 1800' ongoing  Improve 5 x sit to stand to 9 sec for improved LE strength Ongoing  Improved FGA by 4 points for improved dynamic balance  Ongoing  Decreased neck pain to 1-2/10  Resolve c/s muscle spasms  Patient will be able to lift up to 30 lbs with proper body mechanics     Plan: Skilled Physical Therapy 1-2x week for 4 weeks for ther exercises, neuro re-education,manual therapy and there activity as appropriate

## 2023-03-15 ENCOUNTER — OFFICE VISIT (OUTPATIENT)
Dept: PHYSICAL THERAPY | Facility: REHABILITATION | Age: 68
End: 2023-03-15

## 2023-03-15 DIAGNOSIS — M54.2 NECK PAIN: ICD-10-CM

## 2023-03-15 DIAGNOSIS — G43.119 INTRACTABLE MIGRAINE WITH AURA WITHOUT STATUS MIGRAINOSUS: ICD-10-CM

## 2023-03-15 DIAGNOSIS — G62.9 NEUROPATHY: Primary | ICD-10-CM

## 2023-03-15 DIAGNOSIS — R26.81 GAIT INSTABILITY: ICD-10-CM

## 2023-03-15 NOTE — PROGRESS NOTES
Daily Note/ Re-evaluation        Name: Kathy Duran  Date: 03/15/23  : 1955  Referring Provider: Michael Velasquez MD  AUTHORIZATION:   Insurance: Payor: Brenda Rodriguezrika / Plan: MEDICARE A AND B / Product Type: Medicare A & B Fee for Service /   Re-Assess performed 3/13 for    SUBJECTIVE:  HPI: Kathy Duran is a 79 y o  male referred to outpatient physical therapy for the following diagnosis   Encounter Diagnoses   Name Primary? • Neuropathy Yes   • Gait instability    • Intractable migraine with aura without status migrainosus    • Neck pain         S  Reports 9/10 neck pain from lifting that heavy object on   I wants able to sleep last night     Patient-Specific Functional Scale   Task is scored 0 (unable to perform activity) to 10 (ability to perform activity independently)  Activity 1/12/23 2/09 3/13   1  Get into an exercise program 0 8 9   2  Have better balance 6 8 8        Objective  138/86 BP   HR 68    Mod Muscle Spasms B Upper trap, levatoss and rhomboid  Tenderness with palpationbase of occiput  Responded well to occipital release        Precautions: Depression, DDD, migraines, cerebral aneurysm       2/27 3/01 3/06 3/13 3/15   Neuro Re-Ed        Hurdles        Treadmill True Treadmill  1 4 mph x 3 min  2 0- 2 4 mph x 10 min  RPE 5/10  Pain level 2-3/10 head ache  2  5mph x 1 min True treadmill  1 6 mph x 1 min  1 9 mph x 1 min  2 0 mph x 2 min  2 2 mph x 2 min  2 4 mph x 3 min  2 6 mph x 2 mn  2 7 mph x 3 min  2 8 mph  X 1 min  3 0 mph x 2  minutes True treadmill    2 0 x 2 min  2 4 x 8 min  2 6 x 5 min 6 MWT Treadmill ambulation  2 0 x 2 min  2 4 x 3 min  2 6 x 8 min  2 9 x 6 min     Blaze pods/Balance  RPE 5/10  103 HR  Spo2 97%  Headache decreased to 1/10      FGA    Ther Ex Scifit   10 min level 1  5 min level 3    Step ups 8"  #7  Forwards 10 x 1 each  Lateral  10 x 1 each  Increased headache 6/10  /96  HR 89  Rested 3 min  Performed steps without #7  Fwd/lat 10 x 1     Scifit   10 min level 1  5 min level 3    Headache decreased to 5/10      129/79 BP HR 74      UE  4# biceps curls and shoulder presses Scifit  15 minutes   level 4    Headache 1-2/10    UE  5# biceps curls 3x10 shoulder presses 3 x 10  Triceps 3 x 10    GTB  Rows  Low rows  3 x 10 :03    5x sit to stand 11# 2 x 10  SCifit 10 min     5 x sit to stand    Supine chin tucks  HEP developed, demonstrated, performed     Chin tucks 5 x :10   c/s Lateral flex 5 x :10 each   c/s rotation 5 x :10 each   Scapular add  5 x :10   Manual therapy    Trigger point release  STM Upper traps, levators , rhomboids  gentle distraction STM Upper trap, levators, rhomboids  Trigger point release  Occipital release  Gentle distraction  End range PROM   HEP     • Seated Scapular Retraction - 3 x daily - 7 x weekly - 5 reps - 10 hold  • Seated Cervical Retraction - 1 x daily - 7 x weekly - 5 reps - 10 hold  • Standing Cervical Retraction with Sidebending - 1 x daily - 7 x weekly - 5 reps - 10 hold  • Seated Cervical Rotation AROM - 3 x daily - 7 x weekly - 5 reps - 10 hold             Access Code: [email or web address]  URL: [email or web address]  Date: 03/15/2023  Prepared by: Jessica Haasort    Exercises  • Seated Scapular Retraction - 3 x daily - 7 x weekly - 5 reps - 10 hold  • Seated Cervical Retraction - 1 x daily - 7 x weekly - 5 reps - 10 hold  • Standing Cervical Retraction with Sidebending - 1 x daily - 7 x weekly - 5 reps - 10 hold  • Seated Cervical Rotation AROM - 3 x daily - 7 x weekly - 5 reps - 10 hold        Assessment/Plan   Jewell Tenorio presented with considerable pain today post weekend move  He has had altered sleep due to pain  Responded well to manual therapy with decreasing pain symptoms to 3/10 today  HEP was developed and hard copy/texted copy were issued for compliance with HEP and management of symptoms  Reviewed proper body mechanics to decrease risk of re-injury and compromised mobility   He will continue to benefit from skilled PT intervention  Plan: Skilled Physical Therapy 1-2x week for 4 weeks for ther exercises, neuro re-education,manual therapy and there activity as appropriate            STG's:     - Patient improves EC on foam task of MCSTIB by 10 sec for improved static balance within 4 weeks MET    - Patient is independent with initial home exercise program for improvements at home within 2 weeks  MET    - Patient ambulates for 10 consecutive min with appropriate vital sign response for improved endurance within 4 weeks  MET   - Patient will improve in Single leg stance by 10 sec each for increased LE strength MET on Right, ongoing on left    LTG's:   - Patient improves distance ambulated on 6MWT by 10% within 6 weeks for improved endurance  MET   - Patient decreased time to complete 5  X sit to stand for improved muscle MET  - Patient improves gait speed to within 10% of age matched norms within 6 weeks ongoing    - Patient improves score on FGA by 6 points for improved dynamic balance MET    Updated goals:  Improve time walk on 6 MWT 1800' ongoing  Improve 5 x sit to stand to 9 sec for improved LE strength Ongoing  Improved FGA by 4 points for improved dynamic balance  Ongoing  Decreased neck pain to 1-2/10  Resolve c/s muscle spasms  Patient will be able to lift up to 30 lbs with proper body mechanics

## 2023-03-20 ENCOUNTER — OFFICE VISIT (OUTPATIENT)
Dept: PHYSICAL THERAPY | Facility: REHABILITATION | Age: 68
End: 2023-03-20

## 2023-03-20 DIAGNOSIS — G43.119 INTRACTABLE MIGRAINE WITH AURA WITHOUT STATUS MIGRAINOSUS: ICD-10-CM

## 2023-03-20 DIAGNOSIS — G62.9 NEUROPATHY: Primary | ICD-10-CM

## 2023-03-20 DIAGNOSIS — M54.2 NECK PAIN: ICD-10-CM

## 2023-03-20 DIAGNOSIS — R26.81 GAIT INSTABILITY: ICD-10-CM

## 2023-03-20 NOTE — PROGRESS NOTES
Daily Note      Name: Chivo Moura  Date: 23  : 1955  Referring Provider: Warren Ramirez MD  AUTHORIZATION:   Insurance: Payor: Guy Litter / Plan: MEDICARE A AND B / Product Type: Medicare A & B Fee for Service /       SUBJECTIVERichardine Chisholm Reports 5/10 neck pain from lifting the heavy object on 2 Sundays ago  He describes it as aching pain  Did not reports issues or increased pain during sesison  Patient-Specific Functional Scale   Task is scored 0 (unable to perform activity) to 10 (ability to perform activity independently)  Activity 1/12/23 2/09 3/13   1  Get into an exercise program 0 8 9   2  Have better balance 6 8 8        Objective  Vitals post scifit: 149/74, HR: 78 bpm RUE    Precautions: Depression, DDD, migraines, cerebral aneurysm     2/27 3/01 3/06 3/13 3/15 3/20   Neuro Re-Ed         Hurdles         Treadmill True Treadmill  1 4 mph x 3 min  2 0- 2 4 mph x 10 min  RPE 5/10  Pain level 2-3/10 head ache  2  5mph x 1 min True treadmill  1 6 mph x 1 min  1 9 mph x 1 min  2 0 mph x 2 min  2 2 mph x 2 min  2 4 mph x 3 min  2 6 mph x 2 mn  2 7 mph x 3 min  2 8 mph  X 1 min  3 0 mph x 2  minutes True treadmill    2 0 x 2 min  2 4 x 8 min  2 6 x 5 min 6 MWT Treadmill ambulation  2 0 x 2 min  2 4 x 3 min  2 6 x 8 min  2 9 x 6 min   Treadmill ambulation   2 2 x 2 min   2 5 x 1 5 min  2 6 x 3 min  2 8 x  1 5 min (RPE 5/10)  2 9 x 2 min  2 6 x 1 5 min (RPE 6/10)  2 4 x 1 min    Blaze pods/Balance  RPE 5/10  103 HR  Spo2 97%  Headache decreased to 1/10      FGA     Ther Ex Scifit   10 min level 1  5 min level 3    Step ups 8"  #7  Forwards 10 x 1 each  Lateral  10 x 1 each  Increased headache 6/10  /96  HR 89  Rested 3 min  Performed steps without #7  Fwd/lat 10 x 1     Scifit   10 min level 1  5 min level 3    Headache decreased to 5/10      129/79 BP HR 74      UE  4# biceps curls and shoulder presses Scifit  15 minutes   level 4    Headache 1-2/10    UE  5# biceps curls 3x10 shoulder presses 3 x 10  Triceps 3 x 10    GTB  Rows  Low rows  3 x 10 :03    5x sit to stand 11# 2 x 10  SCifit 10 min     5 x sit to stand    Supine chin tucks  HEP developed, demonstrated, performed     Chin tucks 5 x :10   c/s Lateral flex 5 x :10 each   c/s rotation 5 x :10 each   Scapular add  5 x :10 Scifit L 1 5   10 min     Sit to stands blue chair (focus on eccentric)  1x10 arms over chest  2x10 7lb ball       Chin tucks against wall with towel   5 second hold, 2 x 8 reps    GTB Rows 2x10 :3s          Manual therapy    Trigger point release  STM Upper traps, levators , rhomboids  gentle distraction STM Upper trap, levators, rhomboids  Trigger point release  Occipital release  Gentle distraction  End range PROM STM upper trap, levators, rhomboids  Occipital release & gentle distraction   PROM    HEP     • Seated Scapular Retraction - 3 x daily - 7 x weekly - 5 reps - 10 hold  • Seated Cervical Retraction - 1 x daily - 7 x weekly - 5 reps - 10 hold  • Standing Cervical Retraction with Sidebending - 1 x daily - 7 x weekly - 5 reps - 10 hold  • Seated Cervical Rotation AROM - 3 x daily - 7 x weekly - 5 reps - 10 hold   Reviewed recently added exercises and Jayne lCemons feels these are helping the movement in his neck  Access Code: [email or web address]  URL: [email or web address]  Date: 03/15/2023  Prepared by: Elaine Conteh    Exercises  • Seated Scapular Retraction - 3 x daily - 7 x weekly - 5 reps - 10 hold  • Seated Cervical Retraction - 1 x daily - 7 x weekly - 5 reps - 10 hold  • Standing Cervical Retraction with Sidebending - 1 x daily - 7 x weekly - 5 reps - 10 hold  • Seated Cervical Rotation AROM - 3 x daily - 7 x weekly - 5 reps - 10 hold    Assessment/Plan   Bradley responded to today's treatment well and was able to tolerate re-introduction of therapeutic exercises without significantly exacerbating pain  Pt required verbal and tactile cues to prevent shoulder shrugging during there ex   Jayne Clemons was able to maintain conversation throughout treadmill ambulation, further challenging dual-tasking and aerobic endurance  Responded well to continued manual therapy to address cervical pain and ROM  He will continue to benefit from skilled PT intervention  Plan: Continued skilled Physical Therapy 1-2x week for ther exercises, neuro re-education, manual therapy and there activity as appropriate

## 2023-03-23 ENCOUNTER — OFFICE VISIT (OUTPATIENT)
Dept: PHYSICAL THERAPY | Facility: REHABILITATION | Age: 68
End: 2023-03-23

## 2023-03-23 DIAGNOSIS — M54.2 NECK PAIN: ICD-10-CM

## 2023-03-23 DIAGNOSIS — G62.9 NEUROPATHY: Primary | ICD-10-CM

## 2023-03-23 DIAGNOSIS — R26.81 GAIT INSTABILITY: ICD-10-CM

## 2023-03-23 DIAGNOSIS — G43.119 INTRACTABLE MIGRAINE WITH AURA WITHOUT STATUS MIGRAINOSUS: ICD-10-CM

## 2023-03-23 NOTE — PROGRESS NOTES
Daily Note        Name: Raghu Jolley  Date: 23  : 1955  Referring Provider: Param Isaacs MD  AUTHORIZATION:   Insurance: Payor: Kirsty Villarreal / Plan: MEDICARE A AND B / Product Type: Medicare A & B Fee for Service /     [id number] 1:1  [id number] Group  SUBJECTIVE:     I am feeling like I am getting stronger  Patient-Specific Functional Scale   Task is scored 0 (unable to perform activity) to 10 (ability to perform activity independently)  Activity 1/12/23 2/09 3/13   1  Get into an exercise program 0 8 9   2  Have better balance 6 8 8        Objective  Vitals post scifit: 125/78 bp   HR: 71    Precautions: Depression, DDD, migraines, cerebral aneurysm     3/13 3/15 3/20 3/23   Neuro Re-Ed       Treadmill 6 MWT Treadmill ambulation  2 0 x 2 min  2 4 x 3 min  2 6 x 8 min  2 9 x 6 min   Treadmill ambulation   2 2 x 2 min   2 5 x 1 5 min  2 6 x 3 min  2 8 x  1 5 min (RPE 5/10)  2 9 x 2 min  2 6 x 1 5 min (RPE 6/10)  2 4 x 1 min  Treadmill ambulation no hands 2# weights    2 0 mph 1 min  2 6 mph  4 5min  2 # weights  2 0 mph  1 mn  2 8 mph  6 min 2% incline  122 Hr Spo2 5/10   RPE 5/10  2 0 mph   1 min   2 9 mph  2 min 2% 2#  Cool down 1 min 2 0-1 4 mph     Blaze pods/Balance FGA      Ther Ex  SCifit 10 min     5 x sit to stand    Supine chin tucks   HEP developed, demonstrated, performed     Chin tucks 5 x :10   c/s Lateral flex 5 x :10 each   c/s rotation 5 x :10 each   Scapular add  5 x :10 Scifit L 1 5   10 min     Sit to stands blue chair (focus on eccentric)  1x10 arms over chest  2x10 7lb ball     Chin tucks against wall with towel   5 second hold, 2 x 8 reps        GTB Rows 2x10 :3s        Scifit L1 5   10 min  L 4 0  5 min    Chin tucks against wall with towel   10 second hold, 6 reps    BTB Rows  3 x 10  Low Rows  3 x 10    6# weights   3 x 10  Biceps curls  Shoulder presses       Manual therapy Trigger point release  STM Upper traps, levators , rhomboids  gentle distraction STM Upper trap, levators, rhomboids  Trigger point release  Occipital release  Gentle distraction  End range PROM STM upper trap, levators, rhomboids  Occipital release & gentle distraction   PROM  n/t   HEP  • Seated Scapular Retraction - 3 x daily - 7 x weekly - 5 reps - 10 hold  • Seated Cervical Retraction - 1 x daily - 7 x weekly - 5 reps - 10 hold  • Standing Cervical Retraction with Sidebending - 1 x daily - 7 x weekly - 5 reps - 10 hold  • Seated Cervical Rotation AROM - 3 x daily - 7 x weekly - 5 reps - 10 hold   Reviewed recently added exercises and Toan Shearer feels these are helping the movement in his neck  Access Code: [email or web address]  URL: [email or web address]  Date: 03/15/2023  Prepared by: Jerzy Juarez    Exercises  • Seated Scapular Retraction - 3 x daily - 7 x weekly - 5 reps - 10 hold  • Seated Cervical Retraction - 1 x daily - 7 x weekly - 5 reps - 10 hold  • Standing Cervical Retraction with Sidebending - 1 x daily - 7 x weekly - 5 reps - 10 hold  • Seated Cervical Rotation AROM - 3 x daily - 7 x weekly - 5 reps - 10 hold    Assessment/Plan   Increased challenge of treadmill ambulation, added 2# weights and incline 2%  Added 6 # weights to UE Toan Shearer was able to tolerate added challenge  Requires resting periods between activities and verbal cues as for proper technique  Toan Shearer is very engaged in his treatment with decreased need of verbal cues for fluidity of activity  He will continue to benefit from skilled PT intervention  Plan: Continued skilled Physical Therapy 1-2x week for ther exercises, neuro re-education, manual therapy and there activity as appropriate

## 2023-03-27 ENCOUNTER — APPOINTMENT (OUTPATIENT)
Dept: PHYSICAL THERAPY | Facility: REHABILITATION | Age: 68
End: 2023-03-27

## 2023-03-28 ENCOUNTER — OFFICE VISIT (OUTPATIENT)
Dept: FAMILY MEDICINE CLINIC | Facility: CLINIC | Age: 68
End: 2023-03-28

## 2023-03-28 VITALS
SYSTOLIC BLOOD PRESSURE: 122 MMHG | TEMPERATURE: 97.7 F | RESPIRATION RATE: 16 BRPM | WEIGHT: 193 LBS | HEIGHT: 67 IN | DIASTOLIC BLOOD PRESSURE: 82 MMHG | OXYGEN SATURATION: 97 % | HEART RATE: 67 BPM | BODY MASS INDEX: 30.29 KG/M2

## 2023-03-28 DIAGNOSIS — G43.119 INTRACTABLE MIGRAINE WITH AURA WITHOUT STATUS MIGRAINOSUS: ICD-10-CM

## 2023-03-28 DIAGNOSIS — J31.0 CHRONIC RHINITIS: ICD-10-CM

## 2023-03-28 DIAGNOSIS — E78.2 MIXED HYPERLIPIDEMIA: ICD-10-CM

## 2023-03-28 DIAGNOSIS — I67.1 CEREBRAL ANEURYSM, NONRUPTURED: ICD-10-CM

## 2023-03-28 DIAGNOSIS — R73.03 PREDIABETES: ICD-10-CM

## 2023-03-28 DIAGNOSIS — I10 BENIGN ESSENTIAL HYPERTENSION: Primary | ICD-10-CM

## 2023-03-28 DIAGNOSIS — R94.6 ABNORMAL THYROID FUNCTION TEST: ICD-10-CM

## 2023-03-28 DIAGNOSIS — G11.9 CEREBELLAR ATAXIA (HCC): ICD-10-CM

## 2023-03-28 DIAGNOSIS — D69.6 THROMBOCYTOPENIA, UNSPECIFIED (HCC): ICD-10-CM

## 2023-03-28 DIAGNOSIS — F33.9 DEPRESSION, RECURRENT (HCC): ICD-10-CM

## 2023-03-28 RX ORDER — AZELASTINE 1 MG/ML
1 SPRAY, METERED NASAL 2 TIMES DAILY
Qty: 1 ML | Refills: 2 | Status: SHIPPED | OUTPATIENT
Start: 2023-03-28

## 2023-03-28 NOTE — PROGRESS NOTES
Assessment/Plan:   1  Benign essential hypertension  Stable today  Continue with routine home monitoring as well as current treatment with Bystolic as well as his hydrochlorothiazide    2  Cerebral aneurysm, nonruptured  Patient following with neurology  At this time, he denies any recent symptoms of severe headaches  Continue with proper blood pressure control  He will be completing a CTA of his brain next month  3  Chronic rhinitis  Patient symptoms appear likely secondary to chronic rhinitis  At this time, he was advised to continue with his fluticasone nasal spray  We will add Astelin nasal spray as well  If his symptoms are persistent, will he is scheduled for a consultation with a new otolaryngologist next month  - azelastine (ASTELIN) 0 1 % nasal spray; 1 spray into each nostril 2 (two) times a day Use in each nostril as directed  Dispense: 1 mL; Refill: 2    4  Eosinophilic esophagitis  Reviewed EGD as well as colonoscopy with patient today  At this time, it appears that he does have eosinophilic esophagitis  At this time, he does take omeprazole intermittently  At this time, it is unclear if he will need treatment of oral budesonide  Continue with regular follow-up with gastroenterology follow-up in 6 months  Diagnoses and all orders for this visit:    Benign essential hypertension    Cerebellar ataxia (HCC)    Depression, recurrent (HCC)    Thrombocytopenia, unspecified (HCC)    Intractable migraine with aura without status migrainosus    Mixed hyperlipidemia    Prediabetes    Abnormal thyroid function test          Subjective:       Chief Complaint   Patient presents with   • Follow-up     6 months      Patient ID: Yasmany Breen is a 79 y o  malePresents today for a follow-up on his chronic conditions  He has hypertension, cerebral aneurysm, chronic rhinitis, as well as GERD  He has been taking his  hypertensive medications regularly    He states that he did discontinue multiple other medications  He does follow with neurology regularly  He is scheduled within the next few weeks for a repeat CTA  He has been having problems with persistent sinus congestion    HPI    Review of Systems   Constitutional: Negative for activity change, chills, fatigue and fever  HENT: Negative for congestion, ear pain, sinus pressure and sore throat  Eyes: Negative for redness, itching and visual disturbance  Respiratory: Negative for cough and shortness of breath  Cardiovascular: Negative for chest pain and palpitations  Gastrointestinal: Negative for abdominal pain, diarrhea and nausea  Endocrine: Negative for cold intolerance and heat intolerance  Genitourinary: Negative for dysuria, flank pain and frequency  Musculoskeletal: Negative for arthralgias, back pain, gait problem and myalgias  Skin: Negative for color change  Allergic/Immunologic: Negative for environmental allergies  Neurological: Negative for dizziness, numbness and headaches  Psychiatric/Behavioral: Negative for behavioral problems and sleep disturbance  The following portions of the patient's history were reviewed and updated as appropriate : past family history, past medical history, past social history and past surgical history      Current Outpatient Medications:   •  DULoxetine (CYMBALTA) 30 mg delayed release capsule, Take 1 capsule (30 mg total) by mouth daily Can take at night if it makes you sleepy, Disp: 30 capsule, Rfl: 2  •  fluticasone (FLONASE) 50 mcg/act nasal spray, USE 2 SPRAYS IN EACH       NOSTRIL DAILY, Disp: 48 g, Rfl: 1  •  hydrochlorothiazide (HYDRODIURIL) 25 mg tablet, Take 1 tablet (25 mg total) by mouth daily, Disp: 90 tablet, Rfl: 1  •  nebivolol (BYSTOLIC) 10 mg tablet, TAKE 1 TABLET DAILY, Disp: 90 tablet, Rfl: 1  •  vitamin B-12 (VITAMIN B-12) 1,000 mcg tablet, Take 1 tablet by mouth daily, Disp: , Rfl:   •  ALPRAZolam (XANAX) 0 5 mg tablet, Take 1 tablet (0 5 mg total) by "mouth daily as needed for anxiety (Patient not taking: Reported on 3/2/2023), Disp: 30 tablet, Rfl: 0  •  apixaban (ELIQUIS) 5 mg, Take 5 mg by mouth 2 (two) times a day (Patient not taking: Reported on 1/20/2023), Disp: , Rfl:   •  cholecalciferol (VITAMIN D3) 1,000 units tablet, Take 1,000 Units by mouth daily (Patient not taking: Reported on 3/2/2023), Disp: , Rfl:   •  Coenzyme Q10 300 MG CAPS, Take by mouth daily , Disp: , Rfl:   •  famotidine (PEPCID) 40 MG tablet, , Disp: , Rfl:   •  ketoconazole (NIZORAL) 2 % shampoo, , Disp: , Rfl:   •  ketoconazole (NIZORAL) 2 % shampoo, Apply 1 application topically 2 (two) times a week (Patient not taking: Reported on 3/2/2023), Disp: 120 mL, Rfl: 0  •  omeprazole (PriLOSEC) 40 MG capsule, as needed, Disp: , Rfl:   •  sildenafil (VIAGRA) 100 mg tablet, as needed (Patient not taking: Reported on 3/2/2023), Disp: , Rfl: 11  •  tamsulosin (FLOMAX) 0 4 mg, Take 1 capsule by mouth with dinner (Patient taking differently: if needed), Disp: 30 capsule, Rfl: 5  •  valACYclovir (VALTREX) 1,000 mg tablet, Take 1 tablet (1,000 mg total) by mouth 2 (two) times a day for 7 days, Disp: 14 tablet, Rfl: 0         Objective:         Vitals:    03/28/23 1145   BP: 122/82   BP Location: Left arm   Patient Position: Sitting   Cuff Size: Adult   Pulse: 67   Resp: 16   Temp: 97 7 °F (36 5 °C)   TempSrc: Temporal   SpO2: 97%   Weight: 87 5 kg (193 lb)   Height: 5' 7 32\" (1 71 m)     Physical Exam  Vitals reviewed  Constitutional:       Appearance: He is well-developed  HENT:      Head: Normocephalic and atraumatic  Nose: Nose normal       Mouth/Throat:      Pharynx: No oropharyngeal exudate  Eyes:      General: No scleral icterus  Right eye: No discharge  Left eye: No discharge  Pupils: Pupils are equal, round, and reactive to light  Neck:      Trachea: No tracheal deviation  Cardiovascular:      Rate and Rhythm: Normal rate and regular rhythm        Pulses:   " Dorsalis pedis pulses are 2+ on the right side and 2+ on the left side  Posterior tibial pulses are 2+ on the right side and 2+ on the left side  Heart sounds: Normal heart sounds  No murmur heard  No friction rub  No gallop  Pulmonary:      Effort: Pulmonary effort is normal  No respiratory distress  Breath sounds: Normal breath sounds  No wheezing or rales  Abdominal:      General: Bowel sounds are normal  There is no distension  Palpations: Abdomen is soft  Tenderness: There is no abdominal tenderness  There is no guarding or rebound  Musculoskeletal:         General: Normal range of motion  Cervical back: Normal range of motion and neck supple  Lymphadenopathy:      Head:      Right side of head: No submental or submandibular adenopathy  Left side of head: No submental or submandibular adenopathy  Cervical: No cervical adenopathy  Right cervical: No superficial, deep or posterior cervical adenopathy  Left cervical: No superficial, deep or posterior cervical adenopathy  Skin:     General: Skin is warm and dry  Findings: No erythema  Neurological:      Mental Status: He is alert and oriented to person, place, and time  Cranial Nerves: No cranial nerve deficit  Sensory: No sensory deficit  Psychiatric:         Mood and Affect: Mood is not anxious or depressed  Speech: Speech normal          Behavior: Behavior normal          Thought Content:  Thought content normal          Judgment: Judgment normal

## 2023-03-30 ENCOUNTER — APPOINTMENT (OUTPATIENT)
Dept: PHYSICAL THERAPY | Facility: REHABILITATION | Age: 68
End: 2023-03-30

## 2023-04-01 ENCOUNTER — APPOINTMENT (OUTPATIENT)
Dept: LAB | Facility: CLINIC | Age: 68
End: 2023-04-01

## 2023-04-01 DIAGNOSIS — R26.9 UNSPECIFIED ABNORMALITIES OF GAIT AND MOBILITY: ICD-10-CM

## 2023-04-01 DIAGNOSIS — G62.9 NEUROPATHY: ICD-10-CM

## 2023-04-01 DIAGNOSIS — R94.6 ABNORMAL THYROID FUNCTION TEST: ICD-10-CM

## 2023-04-01 DIAGNOSIS — E78.2 MIXED HYPERLIPIDEMIA: ICD-10-CM

## 2023-04-01 DIAGNOSIS — I72.9 ANEURYSM (HCC): ICD-10-CM

## 2023-04-01 DIAGNOSIS — D69.6 THROMBOCYTOPENIA, UNSPECIFIED (HCC): ICD-10-CM

## 2023-04-01 DIAGNOSIS — R73.03 PREDIABETES: ICD-10-CM

## 2023-04-01 LAB
ALBUMIN SERPL BCP-MCNC: 3.4 G/DL (ref 3.5–5)
ALP SERPL-CCNC: 56 U/L (ref 46–116)
ALT SERPL W P-5'-P-CCNC: 20 U/L (ref 12–78)
ANION GAP SERPL CALCULATED.3IONS-SCNC: 1 MMOL/L (ref 4–13)
AST SERPL W P-5'-P-CCNC: 23 U/L (ref 5–45)
BILIRUB SERPL-MCNC: 0.79 MG/DL (ref 0.2–1)
BUN SERPL-MCNC: 15 MG/DL (ref 5–25)
CALCIUM ALBUM COR SERPL-MCNC: 9.8 MG/DL (ref 8.3–10.1)
CALCIUM SERPL-MCNC: 9.3 MG/DL (ref 8.3–10.1)
CHLORIDE SERPL-SCNC: 106 MMOL/L (ref 96–108)
CHOLEST SERPL-MCNC: 191 MG/DL
CO2 SERPL-SCNC: 30 MMOL/L (ref 21–32)
CREAT SERPL-MCNC: 1.53 MG/DL (ref 0.6–1.3)
ERYTHROCYTE [DISTWIDTH] IN BLOOD BY AUTOMATED COUNT: 12.8 % (ref 11.6–15.1)
GFR SERPL CREATININE-BSD FRML MDRD: 46 ML/MIN/1.73SQ M
GLUCOSE P FAST SERPL-MCNC: 92 MG/DL (ref 65–99)
HCT VFR BLD AUTO: 43.2 % (ref 36.5–49.3)
HDLC SERPL-MCNC: 62 MG/DL
HGB BLD-MCNC: 13.8 G/DL (ref 12–17)
LDLC SERPL CALC-MCNC: 113 MG/DL (ref 0–100)
MCH RBC QN AUTO: 29.4 PG (ref 26.8–34.3)
MCHC RBC AUTO-ENTMCNC: 31.9 G/DL (ref 31.4–37.4)
MCV RBC AUTO: 92 FL (ref 82–98)
PLATELET # BLD AUTO: 169 THOUSANDS/UL (ref 149–390)
PMV BLD AUTO: 11.1 FL (ref 8.9–12.7)
POTASSIUM SERPL-SCNC: 4.1 MMOL/L (ref 3.5–5.3)
PROT SERPL-MCNC: 6.3 G/DL (ref 6.4–8.4)
RBC # BLD AUTO: 4.7 MILLION/UL (ref 3.88–5.62)
SODIUM SERPL-SCNC: 137 MMOL/L (ref 135–147)
TRIGL SERPL-MCNC: 81 MG/DL
TSH SERPL DL<=0.05 MIU/L-ACNC: 4.15 UIU/ML (ref 0.45–4.5)
VIT B12 SERPL-MCNC: 740 PG/ML (ref 100–900)
WBC # BLD AUTO: 8.13 THOUSAND/UL (ref 4.31–10.16)

## 2023-04-02 LAB
EST. AVERAGE GLUCOSE BLD GHB EST-MCNC: 108 MG/DL
HBA1C MFR BLD: 5.4 %

## 2023-04-03 DIAGNOSIS — R79.89 ELEVATED SERUM CREATININE: Primary | ICD-10-CM

## 2023-04-06 ENCOUNTER — HOSPITAL ENCOUNTER (OUTPATIENT)
Dept: CT IMAGING | Facility: HOSPITAL | Age: 68
Discharge: HOME | End: 2023-04-06

## 2023-04-06 DIAGNOSIS — I72.9 ANEURYSM (HCC): ICD-10-CM

## 2023-04-06 RX ADMIN — IOHEXOL 85 ML: 350 INJECTION, SOLUTION INTRAVENOUS at 12:20

## 2023-04-27 ENCOUNTER — TELEPHONE (OUTPATIENT)
Dept: NEUROLOGY | Facility: CLINIC | Age: 68
End: 2023-04-27

## 2023-05-04 ENCOUNTER — TELEPHONE (OUTPATIENT)
Dept: FAMILY MEDICINE CLINIC | Facility: CLINIC | Age: 68
End: 2023-05-04

## 2023-05-04 NOTE — TELEPHONE ENCOUNTER
Pt is asking if Dr Francia Horowitz wants him to do PSA & Lipid Panel along with the Comprehensive metabolic panel      Please advise [phone]

## 2023-05-12 ENCOUNTER — OFFICE VISIT (OUTPATIENT)
Dept: FAMILY MEDICINE CLINIC | Facility: CLINIC | Age: 68
End: 2023-05-12

## 2023-05-12 VITALS
BODY MASS INDEX: 30.23 KG/M2 | WEIGHT: 192.6 LBS | DIASTOLIC BLOOD PRESSURE: 86 MMHG | HEART RATE: 84 BPM | OXYGEN SATURATION: 98 % | SYSTOLIC BLOOD PRESSURE: 128 MMHG | HEIGHT: 67 IN | TEMPERATURE: 98.5 F

## 2023-05-12 DIAGNOSIS — R07.9 CHEST PAIN, UNSPECIFIED TYPE: Primary | ICD-10-CM

## 2023-05-12 DIAGNOSIS — F33.9 DEPRESSION, RECURRENT (HCC): ICD-10-CM

## 2023-05-12 DIAGNOSIS — I67.1 CEREBRAL ANEURYSM, NONRUPTURED: ICD-10-CM

## 2023-05-12 DIAGNOSIS — I10 BENIGN ESSENTIAL HYPERTENSION: ICD-10-CM

## 2023-05-12 DIAGNOSIS — R52 PAIN: ICD-10-CM

## 2023-05-12 DIAGNOSIS — N18.30 STAGE 3 CHRONIC KIDNEY DISEASE, UNSPECIFIED WHETHER STAGE 3A OR 3B CKD (HCC): ICD-10-CM

## 2023-05-12 DIAGNOSIS — K21.9 GASTROESOPHAGEAL REFLUX DISEASE WITHOUT ESOPHAGITIS: ICD-10-CM

## 2023-05-12 RX ORDER — ASPIRIN 81 MG/1
81 TABLET ORAL DAILY
COMMUNITY

## 2023-05-12 RX ORDER — DULOXETIN HYDROCHLORIDE 30 MG/1
30 CAPSULE, DELAYED RELEASE ORAL DAILY
Qty: 90 CAPSULE | Refills: 1 | Status: SHIPPED | OUTPATIENT
Start: 2023-05-12

## 2023-05-12 RX ORDER — DIPHENOXYLATE HYDROCHLORIDE AND ATROPINE SULFATE 2.5; .025 MG/1; MG/1
1 TABLET ORAL DAILY
COMMUNITY

## 2023-05-12 NOTE — PROGRESS NOTES
Assessment/Plan:   1  Chest pain, unspecified type  Patient appears clinically and hemodynamically stable today  Reviewed EKG report however EKG images were unavailable  He did have a evaluation with cardiology nurse practitioner  At this time, he states that he has not had any further symptoms  Reviewed all of his testing from his ED visit  At this time, patient does have orders for urine stress test as well as a long-term event monitor  At this time, he was advised that a stress test may be the most beneficial test to further assess if there is any signs of ischemia  At this time, we will continue with his current treatment of aspirin, nebivolol as well as hydrochlorothiazide    2  Depression, recurrent ([facility] )  Reviewed patient's symptoms  He does have anxiety as well as depression  At this time, he was advised that Cymbalta may be very beneficial for him to help minimize his symptoms  This prescription was sent to his pharmacy today  - DULoxetine (CYMBALTA) 30 mg delayed release capsule; Take 1 capsule (30 mg total) by mouth daily Can take at night if it makes you sleepy  Dispense: 90 capsule; Refill: 1    3  Benign essential hypertension  Stable  Continue with his nebivolol as well as hydrochlorothiazide  4  Cerebral aneurysm, nonruptured  Patient denies any recent headaches  At this time, continue with his regular follow-up with neurology  5  Gastroesophageal reflux disease without esophagitis  Stable  He did have an increase in dose of his omeprazole  Continue with dietary trigger avoidance  Follow-up in 6 months  Diagnoses and all orders for this visit:    Depression, recurrent ([facility] )    Pain    Other orders  -     multivitamin (THERAGRAN) TABS; Take 1 tablet by mouth daily  -     aspirin (ECOTRIN LOW STRENGTH) 81 mg EC tablet; Take 81 mg by mouth daily          Subjective:       Chief Complaint   Patient presents with   • Follow-up     Follow up to discuss lab work     • Depression     Pt states he's been feeling a little more down and stressed  Patient ID: Leslie Collazo is a 79 y o  male presents today for a follow-up on his chronic use  He has hypertension, cerebral aneurysm, chronic headaches, chronic sinusitis, GERD as well as depression  Is been taking his medications regularly  He denies adverse reactions to medications  He was recently in the ED on May 7  He developed chest discomfort  He was in the ER and did have a extensive evaluation with blood work EKG as well as chest x-ray  All of his testing appeared overall stable  Patient was seen yesterday by cardiology  At that time, he was reassured that his symptoms are stable  He did have a repeat EKG which appeared normal   Recommendations were made for patient to complete a Holter test as well as a stress test     HPI    Review of Systems   Constitutional: Negative for activity change, chills, fatigue and fever  HENT: Negative for congestion, ear pain, sinus pressure and sore throat  Eyes: Negative for redness, itching and visual disturbance  Respiratory: Negative for cough and shortness of breath  Cardiovascular: Negative for chest pain and palpitations  Gastrointestinal: Negative for abdominal pain, diarrhea and nausea  Endocrine: Negative for cold intolerance and heat intolerance  Genitourinary: Negative for dysuria, flank pain and frequency  Musculoskeletal: Negative for arthralgias, back pain, gait problem and myalgias  Skin: Negative for color change  Allergic/Immunologic: Negative for environmental allergies  Neurological: Negative for dizziness, numbness and headaches  Psychiatric/Behavioral: Negative for behavioral problems and sleep disturbance  The following portions of the patient's history were reviewed and updated as appropriate : past family history, past medical history, past social history and past surgical history      Current Outpatient Medications:   • "aspirin (ECOTRIN LOW STRENGTH) 81 mg EC tablet, Take 81 mg by mouth daily, Disp: , Rfl:   •  azelastine (ASTELIN) 0 1 % nasal spray, 1 spray into each nostril 2 (two) times a day Use in each nostril as directed, Disp: 1 mL, Rfl: 2  •  Coenzyme Q10 300 MG CAPS, Take by mouth daily , Disp: , Rfl:   •  DULoxetine (CYMBALTA) 30 mg delayed release capsule, Take 1 capsule (30 mg total) by mouth daily Can take at night if it makes you sleepy, Disp: 30 capsule, Rfl: 2  •  famotidine (PEPCID) 40 MG tablet, , Disp: , Rfl:   •  fluticasone (FLONASE) 50 mcg/act nasal spray, USE 2 SPRAYS IN EACH       NOSTRIL DAILY, Disp: 48 g, Rfl: 1  •  hydrochlorothiazide (HYDRODIURIL) 25 mg tablet, Take 1 tablet (25 mg total) by mouth daily, Disp: 90 tablet, Rfl: 1  •  multivitamin (THERAGRAN) TABS, Take 1 tablet by mouth daily, Disp: , Rfl:   •  nebivolol (BYSTOLIC) 10 mg tablet, TAKE 1 TABLET DAILY, Disp: 90 tablet, Rfl: 1  •  omeprazole (PriLOSEC) 40 MG capsule, as needed, Disp: , Rfl:   •  vitamin B-12 (VITAMIN B-12) 1,000 mcg tablet, Take 1 tablet by mouth daily, Disp: , Rfl:   •  ALPRAZolam (XANAX) 0 5 mg tablet, Take 1 tablet (0 5 mg total) by mouth daily as needed for anxiety (Patient not taking: Reported on 3/2/2023), Disp: 30 tablet, Rfl: 0  •  apixaban (ELIQUIS) 5 mg, Take 5 mg by mouth 2 (two) times a day (Patient not taking: Reported on 1/20/2023), Disp: , Rfl:   •  cholecalciferol (VITAMIN D3) 1,000 units tablet, Take 1,000 Units by mouth daily (Patient not taking: Reported on 3/2/2023), Disp: , Rfl:   •  ketoconazole (NIZORAL) 2 % shampoo, Apply 1 application topically 2 (two) times a week (Patient not taking: Reported on 3/2/2023), Disp: 120 mL, Rfl: 0         Objective:         Vitals:    05/12/23 1115   BP: 128/86   BP Location: Left arm   Patient Position: Sitting   Cuff Size: Adult   Pulse: 84   Temp: 98 5 °F (36 9 °C)   SpO2: 98%   Weight: 87 4 kg (192 lb 9 6 oz)   Height: 5' 7 32\" (1 71 m)     Physical Exam  Vitals " reviewed  Constitutional:       Appearance: He is well-developed  HENT:      Head: Normocephalic and atraumatic  Nose: Nose normal       Mouth/Throat:      Pharynx: No oropharyngeal exudate  Eyes:      General: No scleral icterus  Right eye: No discharge  Left eye: No discharge  Pupils: Pupils are equal, round, and reactive to light  Neck:      Trachea: No tracheal deviation  Cardiovascular:      Rate and Rhythm: Normal rate and regular rhythm  Pulses:           Dorsalis pedis pulses are 2+ on the right side and 2+ on the left side  Posterior tibial pulses are 2+ on the right side and 2+ on the left side  Heart sounds: Normal heart sounds  No murmur heard  No friction rub  No gallop  Pulmonary:      Effort: Pulmonary effort is normal  No respiratory distress  Breath sounds: Normal breath sounds  No wheezing or rales  Abdominal:      General: Bowel sounds are normal  There is no distension  Palpations: Abdomen is soft  Tenderness: There is no abdominal tenderness  There is no guarding or rebound  Musculoskeletal:         General: Normal range of motion  Cervical back: Normal range of motion and neck supple  Lymphadenopathy:      Head:      Right side of head: No submental or submandibular adenopathy  Left side of head: No submental or submandibular adenopathy  Cervical: No cervical adenopathy  Right cervical: No superficial, deep or posterior cervical adenopathy  Left cervical: No superficial, deep or posterior cervical adenopathy  Skin:     General: Skin is warm and dry  Findings: No erythema  Neurological:      Mental Status: He is alert and oriented to person, place, and time  Cranial Nerves: No cranial nerve deficit  Sensory: No sensory deficit  Psychiatric:         Mood and Affect: Mood is not anxious or depressed           Speech: Speech normal          Behavior: Behavior normal  Thought Content:  Thought content normal          Judgment: Judgment normal

## 2023-05-18 ENCOUNTER — TELEPHONE (OUTPATIENT)
Dept: NEUROLOGY | Facility: CLINIC | Age: 68
End: 2023-05-18

## 2023-05-22 NOTE — TELEPHONE ENCOUNTER
"MD Shon Jones RN 3 hours ago (7:55 AM) -    He does not need to be retested at this time, we will place a new order the next time he comes for a visit but he is no longer vitamin B12 deficient  He can continue to take his over-the-counter supplements if he wants to but no longer needs to     Thanks! N\"    Left message for return call to office         "

## 2023-05-22 NOTE — TELEPHONE ENCOUNTER
jose manuel Ross I was returning a call from I believe it was anderson, i believe my name's gaurav francis, [phone]  Thank you   ----------------------------------------------------  Pt made aware of below  States that he feels Completely fatigued again  and this was how he was feeling when he was dx with b12 deficiency  States that he was tested for vit b12 right after his b12 injection so he thinks that his level was good because of that      Please advise if you would like him to repeat test verna to symptoms    [phone] to leave detailed message

## 2023-05-23 NOTE — TELEPHONE ENCOUNTER
"Logan Moreno MD-    He does not need to be retested at this time - although he was tested right after his shot, levels do not get depleted in that quickly and he can continue taking his oral supplements  We will test at his next visit     Thank you! N\"    Called patient, made him aware of above  He verbalized understanding and denies further questions at this time  Patient is not scheduled for a follow up at this time  When would you like patient to follow up?   "

## 2023-06-23 ENCOUNTER — OFFICE VISIT (OUTPATIENT)
Dept: FAMILY MEDICINE CLINIC | Facility: CLINIC | Age: 68
End: 2023-06-23
Payer: MEDICARE

## 2023-06-23 ENCOUNTER — APPOINTMENT (OUTPATIENT)
Dept: LAB | Facility: CLINIC | Age: 68
End: 2023-06-23
Payer: MEDICARE

## 2023-06-23 VITALS
SYSTOLIC BLOOD PRESSURE: 130 MMHG | BODY MASS INDEX: 30.67 KG/M2 | WEIGHT: 195.4 LBS | TEMPERATURE: 97.6 F | OXYGEN SATURATION: 99 % | HEART RATE: 80 BPM | HEIGHT: 67 IN | DIASTOLIC BLOOD PRESSURE: 86 MMHG

## 2023-06-23 DIAGNOSIS — Z12.5 ENCOUNTER FOR SCREENING FOR MALIGNANT NEOPLASM OF PROSTATE: ICD-10-CM

## 2023-06-23 DIAGNOSIS — R35.0 URINARY FREQUENCY: ICD-10-CM

## 2023-06-23 DIAGNOSIS — M54.9 COSTOVERTEBRAL ANGLE PAIN: ICD-10-CM

## 2023-06-23 DIAGNOSIS — M54.9 COSTOVERTEBRAL ANGLE PAIN: Primary | ICD-10-CM

## 2023-06-23 LAB
ALBUMIN SERPL BCP-MCNC: 3.7 G/DL (ref 3.5–5)
ALP SERPL-CCNC: 78 U/L (ref 46–116)
ALT SERPL W P-5'-P-CCNC: 27 U/L (ref 12–78)
ANION GAP SERPL CALCULATED.3IONS-SCNC: 2 MMOL/L
AST SERPL W P-5'-P-CCNC: 23 U/L (ref 5–45)
BASOPHILS # BLD AUTO: 0.09 THOUSANDS/ÂΜL (ref 0–0.1)
BASOPHILS NFR BLD AUTO: 1 % (ref 0–1)
BILIRUB SERPL-MCNC: 0.89 MG/DL (ref 0.2–1)
BUN SERPL-MCNC: 15 MG/DL (ref 5–25)
CALCIUM SERPL-MCNC: 9.7 MG/DL (ref 8.3–10.1)
CHLORIDE SERPL-SCNC: 105 MMOL/L (ref 96–108)
CO2 SERPL-SCNC: 32 MMOL/L (ref 21–32)
CREAT SERPL-MCNC: 1.32 MG/DL (ref 0.6–1.3)
EOSINOPHIL # BLD AUTO: 0.46 THOUSAND/ÂΜL (ref 0–0.61)
EOSINOPHIL NFR BLD AUTO: 3 % (ref 0–6)
ERYTHROCYTE [DISTWIDTH] IN BLOOD BY AUTOMATED COUNT: 13.6 % (ref 11.6–15.1)
GFR SERPL CREATININE-BSD FRML MDRD: 55 ML/MIN/1.73SQ M
GLUCOSE P FAST SERPL-MCNC: 118 MG/DL (ref 65–99)
HCT VFR BLD AUTO: 44.7 % (ref 36.5–49.3)
HGB BLD-MCNC: 14.5 G/DL (ref 12–17)
IMM GRANULOCYTES # BLD AUTO: 0.08 THOUSAND/UL (ref 0–0.2)
IMM GRANULOCYTES NFR BLD AUTO: 1 % (ref 0–2)
LYMPHOCYTES # BLD AUTO: 1.66 THOUSANDS/ÂΜL (ref 0.6–4.47)
LYMPHOCYTES NFR BLD AUTO: 11 % (ref 14–44)
MCH RBC QN AUTO: 29.4 PG (ref 26.8–34.3)
MCHC RBC AUTO-ENTMCNC: 32.4 G/DL (ref 31.4–37.4)
MCV RBC AUTO: 91 FL (ref 82–98)
MONOCYTES # BLD AUTO: 1.24 THOUSAND/ÂΜL (ref 0.17–1.22)
MONOCYTES NFR BLD AUTO: 8 % (ref 4–12)
NEUTROPHILS # BLD AUTO: 12.08 THOUSANDS/ÂΜL (ref 1.85–7.62)
NEUTS SEG NFR BLD AUTO: 76 % (ref 43–75)
NRBC BLD AUTO-RTO: 0 /100 WBCS
PLATELET # BLD AUTO: 268 THOUSANDS/UL (ref 149–390)
PMV BLD AUTO: 11.1 FL (ref 8.9–12.7)
POTASSIUM SERPL-SCNC: 4.1 MMOL/L (ref 3.5–5.3)
PROT SERPL-MCNC: 7.6 G/DL (ref 6.4–8.4)
PSA SERPL-MCNC: 1.06 NG/ML (ref 0–4)
RBC # BLD AUTO: 4.93 MILLION/UL (ref 3.88–5.62)
SL AMB  POCT GLUCOSE, UA: NEGATIVE
SL AMB LEUKOCYTE ESTERASE,UA: NEGATIVE
SL AMB POCT BILIRUBIN,UA: NEGATIVE
SL AMB POCT BLOOD,UA: ABNORMAL
SL AMB POCT CLARITY,UA: CLEAR
SL AMB POCT COLOR,UA: YELLOW
SL AMB POCT KETONES,UA: NEGATIVE
SL AMB POCT NITRITE,UA: NEGATIVE
SL AMB POCT PH,UA: 5.5
SL AMB POCT SPECIFIC GRAVITY,UA: 1.01
SL AMB POCT URINE PROTEIN: NEGATIVE
SL AMB POCT UROBILINOGEN: 0.2
SODIUM SERPL-SCNC: 139 MMOL/L (ref 135–147)
WBC # BLD AUTO: 15.61 THOUSAND/UL (ref 4.31–10.16)

## 2023-06-23 PROCEDURE — 85025 COMPLETE CBC W/AUTO DIFF WBC: CPT

## 2023-06-23 PROCEDURE — 99214 OFFICE O/P EST MOD 30 MIN: CPT | Performed by: FAMILY MEDICINE

## 2023-06-23 PROCEDURE — 81003 URINALYSIS AUTO W/O SCOPE: CPT | Performed by: FAMILY MEDICINE

## 2023-06-23 PROCEDURE — 36415 COLL VENOUS BLD VENIPUNCTURE: CPT

## 2023-06-23 PROCEDURE — 80053 COMPREHEN METABOLIC PANEL: CPT

## 2023-06-23 PROCEDURE — G0103 PSA SCREENING: HCPCS

## 2023-06-23 NOTE — PROGRESS NOTES
Assessment/Plan:   1  Costovertebral angle pain/Urinary frequency/  Unclear as to exact cause of patient's symptoms today  His urinalysis was generally stable with trace hematuria  At this time, we will check CBC, CMP and PSA level  Also check ultrasound of his kidney and bladder  If any symptoms should worsen, he is advised to call or follow-up  - POCT urine dip auto non-scope  - US kidney and bladder; Future  - CBC and differential; Future  - Comprehensive metabolic panel; Future  - PSA, Total Screen; Future           Diagnoses and all orders for this visit:    UTI symptoms  -     POCT urine dip auto non-scope          Subjective:       Chief Complaint   Patient presents with   • Urinary Frequency     Pt thinks he may have protein in his urine  Also states lower back pain on the right side  Patient ID: Ryne Adams is a 79 y o  male  Urinary Frequency   This is a new problem  Episode onset: 14 days ago  The problem has been unchanged  The pain is mild  There has been no fever  Associated symptoms include chills, flank pain, frequency and urgency  Pertinent negatives include no hematuria, nausea or vomiting  He has tried increased fluids for the symptoms  The treatment provided mild relief  Review of Systems   Constitutional: Positive for chills  Negative for activity change, fatigue and fever  HENT: Negative for congestion, ear pain, sinus pressure and sore throat  Eyes: Negative for redness, itching and visual disturbance  Respiratory: Negative for cough and shortness of breath  Cardiovascular: Negative for chest pain and palpitations  Gastrointestinal: Negative for abdominal pain, diarrhea, nausea and vomiting  Endocrine: Negative for cold intolerance and heat intolerance  Genitourinary: Positive for flank pain, frequency and urgency  Negative for dysuria and hematuria  Musculoskeletal: Negative for arthralgias, back pain, gait problem and myalgias     Skin: Negative for color change  Allergic/Immunologic: Negative for environmental allergies  Neurological: Negative for dizziness, numbness and headaches  Psychiatric/Behavioral: Negative for behavioral problems and sleep disturbance  The following portions of the patient's history were reviewed and updated as appropriate : past family history, past medical history, past social history and past surgical history      Current Outpatient Medications:   •  ALPRAZolam (XANAX) 0 5 mg tablet, Take 1 tablet (0 5 mg total) by mouth daily as needed for anxiety, Disp: 30 tablet, Rfl: 0  •  aspirin (ECOTRIN LOW STRENGTH) 81 mg EC tablet, Take 81 mg by mouth daily, Disp: , Rfl:   •  Coenzyme Q10 300 MG CAPS, Take by mouth daily , Disp: , Rfl:   •  fluticasone (FLONASE) 50 mcg/act nasal spray, USE 2 SPRAYS IN EACH       NOSTRIL DAILY, Disp: 48 g, Rfl: 1  •  hydrochlorothiazide (HYDRODIURIL) 25 mg tablet, Take 1 tablet (25 mg total) by mouth daily, Disp: 90 tablet, Rfl: 1  •  multivitamin (THERAGRAN) TABS, Take 1 tablet by mouth daily, Disp: , Rfl:   •  nebivolol (BYSTOLIC) 10 mg tablet, TAKE 1 TABLET DAILY, Disp: 90 tablet, Rfl: 1  •  vitamin B-12 (VITAMIN B-12) 1,000 mcg tablet, Take 1 tablet by mouth daily, Disp: , Rfl:   •  apixaban (ELIQUIS) 5 mg, Take 5 mg by mouth 2 (two) times a day (Patient not taking: Reported on 1/20/2023), Disp: , Rfl:   •  azelastine (ASTELIN) 0 1 % nasal spray, 1 spray into each nostril 2 (two) times a day Use in each nostril as directed (Patient not taking: Reported on 6/23/2023), Disp: 1 mL, Rfl: 2  •  cholecalciferol (VITAMIN D3) 1,000 units tablet, Take 1,000 Units by mouth daily (Patient not taking: Reported on 3/2/2023), Disp: , Rfl:   •  DULoxetine (CYMBALTA) 30 mg delayed release capsule, Take 1 capsule (30 mg total) by mouth daily Can take at night if it makes you sleepy (Patient not taking: Reported on 6/23/2023), Disp: 90 capsule, Rfl: 1  •  famotidine (PEPCID) 40 MG tablet, , Disp: , Rfl:   • "ketoconazole (NIZORAL) 2 % shampoo, Apply 1 application topically 2 (two) times a week (Patient not taking: Reported on 3/2/2023), Disp: 120 mL, Rfl: 0  •  omeprazole (PriLOSEC) 40 MG capsule, as needed, Disp: , Rfl:          Objective:         Vitals:    06/23/23 0952   BP: 130/86   BP Location: Left arm   Patient Position: Sitting   Cuff Size: Adult   Pulse: 80   Temp: 97 6 °F (36 4 °C)   SpO2: 99%   Weight: 88 6 kg (195 lb 6 4 oz)   Height: 5' 7 32\" (1 71 m)     Physical Exam  Vitals reviewed  Constitutional:       Appearance: He is well-developed  HENT:      Head: Normocephalic and atraumatic  Nose: Nose normal       Mouth/Throat:      Pharynx: No oropharyngeal exudate  Eyes:      General: No scleral icterus  Right eye: No discharge  Left eye: No discharge  Pupils: Pupils are equal, round, and reactive to light  Neck:      Trachea: No tracheal deviation  Cardiovascular:      Rate and Rhythm: Normal rate and regular rhythm  Pulses:           Dorsalis pedis pulses are 2+ on the right side and 2+ on the left side  Posterior tibial pulses are 2+ on the right side and 2+ on the left side  Heart sounds: Normal heart sounds  No murmur heard  No friction rub  No gallop  Pulmonary:      Effort: Pulmonary effort is normal  No respiratory distress  Breath sounds: Normal breath sounds  No wheezing or rales  Abdominal:      General: Bowel sounds are normal  There is no distension  Palpations: Abdomen is soft  Tenderness: There is no abdominal tenderness  There is no guarding or rebound  Musculoskeletal:         General: Normal range of motion  Cervical back: Normal range of motion and neck supple  Lymphadenopathy:      Head:      Right side of head: No submental or submandibular adenopathy  Left side of head: No submental or submandibular adenopathy  Cervical: No cervical adenopathy        Right cervical: No superficial, deep or " posterior cervical adenopathy  Left cervical: No superficial, deep or posterior cervical adenopathy  Skin:     General: Skin is warm and dry  Findings: No erythema  Neurological:      Mental Status: He is alert and oriented to person, place, and time  Cranial Nerves: No cranial nerve deficit  Sensory: No sensory deficit  Psychiatric:         Mood and Affect: Mood is not anxious or depressed  Speech: Speech normal          Behavior: Behavior normal          Thought Content:  Thought content normal          Judgment: Judgment normal

## 2023-06-24 ENCOUNTER — HOSPITAL ENCOUNTER (OUTPATIENT)
Dept: ULTRASOUND IMAGING | Facility: HOSPITAL | Age: 68
Discharge: HOME | End: 2023-06-24
Payer: MEDICARE

## 2023-06-24 DIAGNOSIS — R35.0 URINARY FREQUENCY: ICD-10-CM

## 2023-06-24 DIAGNOSIS — M54.9 COSTOVERTEBRAL ANGLE PAIN: ICD-10-CM

## 2023-06-24 PROCEDURE — 76775 US EXAM ABDO BACK WALL LIM: CPT

## 2023-06-25 DIAGNOSIS — N30.01 ACUTE CYSTITIS WITH HEMATURIA: Primary | ICD-10-CM

## 2023-06-25 RX ORDER — CIPROFLOXACIN 500 MG/1
500 TABLET, FILM COATED ORAL EVERY 12 HOURS SCHEDULED
Qty: 10 TABLET | Refills: 0 | Status: SHIPPED | OUTPATIENT
Start: 2023-06-25 | End: 2023-06-30

## 2023-06-26 ENCOUNTER — CLINICAL SUPPORT (OUTPATIENT)
Dept: FAMILY MEDICINE CLINIC | Facility: CLINIC | Age: 68
End: 2023-06-26

## 2023-06-26 DIAGNOSIS — N30.01 ACUTE CYSTITIS WITH HEMATURIA: Primary | ICD-10-CM

## 2023-06-26 PROCEDURE — 87086 URINE CULTURE/COLONY COUNT: CPT | Performed by: FAMILY MEDICINE

## 2023-06-28 LAB — BACTERIA UR CULT: NORMAL

## 2023-07-06 ENCOUNTER — OFFICE VISIT (OUTPATIENT)
Dept: FAMILY MEDICINE CLINIC | Facility: CLINIC | Age: 68
End: 2023-07-06
Payer: MEDICARE

## 2023-07-06 ENCOUNTER — APPOINTMENT (OUTPATIENT)
Dept: LAB | Facility: CLINIC | Age: 68
End: 2023-07-06
Payer: MEDICARE

## 2023-07-06 VITALS
WEIGHT: 194.4 LBS | HEART RATE: 80 BPM | TEMPERATURE: 98.6 F | SYSTOLIC BLOOD PRESSURE: 128 MMHG | DIASTOLIC BLOOD PRESSURE: 84 MMHG | HEIGHT: 67 IN | OXYGEN SATURATION: 97 % | BODY MASS INDEX: 30.51 KG/M2

## 2023-07-06 DIAGNOSIS — R53.82 CHRONIC FATIGUE: ICD-10-CM

## 2023-07-06 DIAGNOSIS — G43.119 INTRACTABLE MIGRAINE WITH AURA WITHOUT STATUS MIGRAINOSUS: ICD-10-CM

## 2023-07-06 DIAGNOSIS — G11.9 CEREBELLAR ATAXIA (HCC): Primary | ICD-10-CM

## 2023-07-06 DIAGNOSIS — G11.9 CEREBELLAR ATAXIA (HCC): ICD-10-CM

## 2023-07-06 DIAGNOSIS — I47.1 SVT (SUPRAVENTRICULAR TACHYCARDIA) (HCC): ICD-10-CM

## 2023-07-06 PROBLEM — I47.10 SVT (SUPRAVENTRICULAR TACHYCARDIA): Status: ACTIVE | Noted: 2023-07-06

## 2023-07-06 LAB
B BURGDOR IGG+IGM SER-ACNC: 0.4 AI
CRP SERPL QL: 3.2 MG/L

## 2023-07-06 PROCEDURE — 86663 EPSTEIN-BARR ANTIBODY: CPT

## 2023-07-06 PROCEDURE — 86665 EPSTEIN-BARR CAPSID VCA: CPT

## 2023-07-06 PROCEDURE — 99214 OFFICE O/P EST MOD 30 MIN: CPT | Performed by: FAMILY MEDICINE

## 2023-07-06 PROCEDURE — 86664 EPSTEIN-BARR NUCLEAR ANTIGEN: CPT

## 2023-07-06 PROCEDURE — 86618 LYME DISEASE ANTIBODY: CPT

## 2023-07-06 PROCEDURE — 36415 COLL VENOUS BLD VENIPUNCTURE: CPT

## 2023-07-06 PROCEDURE — 86140 C-REACTIVE PROTEIN: CPT

## 2023-07-06 PROCEDURE — 86747 PARVOVIRUS ANTIBODY: CPT

## 2023-07-06 NOTE — PROGRESS NOTES
Assessment/Plan:   1. Cerebellar ataxia (HCC)/Intractable migraine with aura without status migrainosus  Reviewed patient's symptoms today. This time, is unclear as to exact cause of his most recent symptoms. His recent testing appeared to show bladder wall thickening. He did complete his antibiotics however he did not receive any relief. At this time, we will check for secondary causes of his symptoms. We will check Lyme titer, EBV panel, parvovirus as well as C-reactive protein. - Lyme Total Antibody Profile with reflex to WB; Future  - EBV acute panel; Future  - Parvovirus B19 antibody, IgG and IgM; Future  - C-reactive protein; Future             There are no diagnoses linked to this encounter. Subjective:       Chief Complaint   Patient presents with   • Follow-up     Pt thinks he has MS. Pt states doctors haven't given him any answers but still has on going symptoms for about a year. Patient ID: Yolanda Duran is a 79 y.o. male presents today for a follow-up over his most recent symptoms. He states that he still has been having persistent fatigue. He feels that he runs out of energy very quickly. He states that he has been having groin concerns that he has specific neurologic conditions present. He still has been having persistent headaches. He has problems with his balance frequently. He is concerned that he may possibly have multiple sclerosis. He would like to have this further evaluated. HPI    Review of Systems   Constitutional: Positive for fatigue. Negative for activity change, chills and fever. HENT: Negative for congestion, ear pain, sinus pressure and sore throat. Eyes: Negative for redness, itching and visual disturbance. Respiratory: Negative for cough and shortness of breath. Cardiovascular: Negative for chest pain and palpitations. Gastrointestinal: Negative for abdominal pain, diarrhea and nausea.    Endocrine: Negative for cold intolerance and heat intolerance. Genitourinary: Negative for dysuria, flank pain and frequency. Musculoskeletal: Positive for gait problem. Negative for arthralgias, back pain and myalgias. Skin: Negative for color change. Allergic/Immunologic: Negative for environmental allergies. Neurological: Positive for headaches. Negative for dizziness and numbness. Psychiatric/Behavioral: Negative for behavioral problems and sleep disturbance. The following portions of the patient's history were reviewed and updated as appropriate : past family history, past medical history, past social history and past surgical history.     Current Outpatient Medications:   •  ALPRAZolam (XANAX) 0.5 mg tablet, Take 1 tablet (0.5 mg total) by mouth daily as needed for anxiety, Disp: 30 tablet, Rfl: 0  •  aspirin (ECOTRIN LOW STRENGTH) 81 mg EC tablet, Take 81 mg by mouth daily, Disp: , Rfl:   •  Coenzyme Q10 300 MG CAPS, Take by mouth daily , Disp: , Rfl:   •  fluticasone (FLONASE) 50 mcg/act nasal spray, USE 2 SPRAYS IN EACH       NOSTRIL DAILY, Disp: 48 g, Rfl: 1  •  hydrochlorothiazide (HYDRODIURIL) 25 mg tablet, Take 1 tablet (25 mg total) by mouth daily, Disp: 90 tablet, Rfl: 1  •  ketoconazole (NIZORAL) 2 % shampoo, Apply 1 application topically 2 (two) times a week, Disp: 120 mL, Rfl: 0  •  multivitamin (THERAGRAN) TABS, Take 1 tablet by mouth daily, Disp: , Rfl:   •  nebivolol (BYSTOLIC) 10 mg tablet, TAKE 1 TABLET DAILY, Disp: 90 tablet, Rfl: 1  •  vitamin B-12 (VITAMIN B-12) 1,000 mcg tablet, Take 1 tablet by mouth daily, Disp: , Rfl:   •  apixaban (ELIQUIS) 5 mg, Take 5 mg by mouth 2 (two) times a day (Patient not taking: Reported on 1/20/2023), Disp: , Rfl:   •  azelastine (ASTELIN) 0.1 % nasal spray, 1 spray into each nostril 2 (two) times a day Use in each nostril as directed (Patient not taking: Reported on 7/6/2023), Disp: 1 mL, Rfl: 2  •  cholecalciferol (VITAMIN D3) 1,000 units tablet, Take 1,000 Units by mouth daily (Patient not taking: Reported on 3/2/2023), Disp: , Rfl:   •  DULoxetine (CYMBALTA) 30 mg delayed release capsule, Take 1 capsule (30 mg total) by mouth daily Can take at night if it makes you sleepy (Patient not taking: Reported on 6/23/2023), Disp: 90 capsule, Rfl: 1  •  famotidine (PEPCID) 40 MG tablet, , Disp: , Rfl:   •  omeprazole (PriLOSEC) 40 MG capsule, as needed, Disp: , Rfl:          Objective:         Vitals:    07/06/23 1026   BP: 128/84   BP Location: Left arm   Patient Position: Sitting   Cuff Size: Adult   Pulse: 80   Temp: 98.6 °F (37 °C)   SpO2: 97%   Weight: 88.2 kg (194 lb 6.4 oz)   Height: 5' 7.32" (1.71 m)     Physical Exam  Vitals reviewed. Constitutional:       Appearance: He is well-developed. HENT:      Head: Normocephalic and atraumatic. Nose: Nose normal.      Mouth/Throat:      Pharynx: No oropharyngeal exudate. Eyes:      General: No scleral icterus. Right eye: No discharge. Left eye: No discharge. Pupils: Pupils are equal, round, and reactive to light. Neck:      Trachea: No tracheal deviation. Cardiovascular:      Rate and Rhythm: Normal rate and regular rhythm. Pulses:           Dorsalis pedis pulses are 2+ on the right side and 2+ on the left side. Posterior tibial pulses are 2+ on the right side and 2+ on the left side. Heart sounds: Normal heart sounds. No murmur heard. No friction rub. No gallop. Pulmonary:      Effort: Pulmonary effort is normal. No respiratory distress. Breath sounds: Normal breath sounds. No wheezing or rales. Abdominal:      General: Bowel sounds are normal. There is no distension. Palpations: Abdomen is soft. Tenderness: There is no abdominal tenderness. There is no guarding or rebound. Musculoskeletal:         General: Normal range of motion. Cervical back: Normal range of motion and neck supple.    Lymphadenopathy:      Head:      Right side of head: No submental or submandibular adenopathy. Left side of head: No submental or submandibular adenopathy. Cervical: No cervical adenopathy. Right cervical: No superficial, deep or posterior cervical adenopathy. Left cervical: No superficial, deep or posterior cervical adenopathy. Skin:     General: Skin is warm and dry. Findings: No erythema. Neurological:      Mental Status: He is alert and oriented to person, place, and time. Cranial Nerves: No cranial nerve deficit. Sensory: No sensory deficit. Psychiatric:         Mood and Affect: Mood is not anxious or depressed. Speech: Speech normal.         Behavior: Behavior normal.         Thought Content:  Thought content normal.         Judgment: Judgment normal.

## 2023-07-07 LAB
EBV NA IGG SER IA-ACNC: >600 U/ML (ref 0–17.9)
EBV VCA IGG SER IA-ACNC: >600 U/ML (ref 0–17.9)
EBV VCA IGM SER IA-ACNC: <36 U/ML (ref 0–35.9)
INTERPRETATION: ABNORMAL

## 2023-07-08 LAB
B19V IGG SER IA-ACNC: 5.1 INDEX (ref 0–0.8)
B19V IGM SER IA-ACNC: 0.1 INDEX (ref 0–0.8)

## 2023-07-11 ENCOUNTER — TELEPHONE (OUTPATIENT)
Dept: INFECTIOUS DISEASES | Facility: CLINIC | Age: 68
End: 2023-07-11

## 2023-07-11 DIAGNOSIS — R53.82 CHRONIC FATIGUE: Primary | ICD-10-CM

## 2023-07-11 DIAGNOSIS — R50.9 FEVER, UNSPECIFIED FEVER CAUSE: ICD-10-CM

## 2023-07-11 NOTE — TELEPHONE ENCOUNTER
Patient calls the office today regarding appointment. Patient would like to schedule an appointment to be seen by Dr. Johanna Disla. Patient last saw Dr. Johanna Disla in 2018. Informed patient we would need a referral for patient to be seen. Informed patient once referral is received it will be reviewed.  Patient states he will reach out to PCP for referral.

## 2023-07-12 NOTE — TELEPHONE ENCOUNTER
Patient calls the office today regarding referral.     Referral was received and sent for review. Informed patient someone would get back to him once it is reviewed. Patient verbalizes understanding at this time.

## 2023-07-17 ENCOUNTER — OFFICE VISIT (OUTPATIENT)
Dept: FAMILY MEDICINE CLINIC | Facility: CLINIC | Age: 68
End: 2023-07-17
Payer: MEDICARE

## 2023-07-17 VITALS
OXYGEN SATURATION: 98 % | TEMPERATURE: 98.1 F | BODY MASS INDEX: 30.54 KG/M2 | WEIGHT: 194.6 LBS | HEART RATE: 73 BPM | HEIGHT: 67 IN | SYSTOLIC BLOOD PRESSURE: 136 MMHG | DIASTOLIC BLOOD PRESSURE: 80 MMHG

## 2023-07-17 DIAGNOSIS — G43.119 INTRACTABLE MIGRAINE WITH AURA WITHOUT STATUS MIGRAINOSUS: ICD-10-CM

## 2023-07-17 DIAGNOSIS — R35.0 URINARY FREQUENCY: ICD-10-CM

## 2023-07-17 DIAGNOSIS — R51.9 TEMPORAL PAIN: ICD-10-CM

## 2023-07-17 DIAGNOSIS — R53.82 CHRONIC FATIGUE: Primary | ICD-10-CM

## 2023-07-17 DIAGNOSIS — H53.9 VISUAL CHANGES: ICD-10-CM

## 2023-07-17 PROCEDURE — 99214 OFFICE O/P EST MOD 30 MIN: CPT | Performed by: FAMILY MEDICINE

## 2023-07-17 RX ORDER — PREDNISONE 10 MG/1
TABLET ORAL
Qty: 21 TABLET | Refills: 0 | Status: SHIPPED | OUTPATIENT
Start: 2023-07-17

## 2023-07-17 NOTE — PROGRESS NOTES
Assessment/Plan:   1. Intractable migraine with aura without status migrainosus/Temporal pain/Visual changes  Reviewed patient's symptoms today. At some, it is unclear as to exact cause of his persistent headaches. Reviewed his previous evaluation by neurology. He was advised today that symptoms may be ophthalmologic versus persistence of his cerebral aneurysm. Given his symptoms, will start treatment for his headaches with a prednisone taper. He was advised that he would highly benefit from seeing ophthalmology. Referral placed today. Continue with regular follow-up with neurology as well. - Ambulatory Referral to Ophthalmology; Future  - predniSONE 10 mg tablet; Take 6 tablets By mouth  In the morning Qd. Decrease by  By 1 tablet daily until completed. Dispense: 21 tablet; Refill: 0    2. Urinary frequency  Patient with persisting urinary symptoms. His urinalysis and ultrasound was reviewed with him. At this time, continue with routine monitoring. Maintain proper hydration. Symptoms may be secondary to BPH. He does have a prescription for Flomax at home. He may benefit from starting this to see if he has improvement in his symptoms. There are no diagnoses linked to this encounter. Subjective:       Chief Complaint   Patient presents with   • symptoms     Pt would like to discuss symptoms that he has been having for the past couple months that still has not been resolved. Patient ID: Marcelo Gagnon is a 79 y.o. male presents today to for a follow-up to discuss his persisting symptoms. He states that he has been having persistent problems with his intractable migraine headaches, he has been having pain over his scalp. In addition to the symptoms he has been having visual changes as well as chronic fatigue. He states that he does have concerns that he may have problems such as giant cell arteritis. HPI    Review of Systems   Constitutional: Positive for fatigue. Negative for activity change, chills and fever. HENT: Negative for congestion, ear pain, sinus pressure and sore throat. Eyes: Positive for visual disturbance. Negative for redness and itching. Respiratory: Negative for cough and shortness of breath. Cardiovascular: Negative for chest pain and palpitations. Gastrointestinal: Negative for abdominal pain, diarrhea and nausea. Endocrine: Negative for cold intolerance and heat intolerance. Genitourinary: Positive for frequency. Negative for dysuria and flank pain. Musculoskeletal: Negative for arthralgias, back pain, gait problem and myalgias. Skin: Negative for color change. Allergic/Immunologic: Negative for environmental allergies. Neurological: Positive for headaches. Negative for dizziness and numbness. Psychiatric/Behavioral: Negative for behavioral problems and sleep disturbance. The following portions of the patient's history were reviewed and updated as appropriate : past family history, past medical history, past social history and past surgical history.     Current Outpatient Medications:   •  ALPRAZolam (XANAX) 0.5 mg tablet, Take 1 tablet (0.5 mg total) by mouth daily as needed for anxiety, Disp: 30 tablet, Rfl: 0  •  aspirin (ECOTRIN LOW STRENGTH) 81 mg EC tablet, Take 81 mg by mouth daily, Disp: , Rfl:   •  Coenzyme Q10 300 MG CAPS, Take by mouth daily , Disp: , Rfl:   •  fluticasone (FLONASE) 50 mcg/act nasal spray, USE 2 SPRAYS IN EACH       NOSTRIL DAILY, Disp: 48 g, Rfl: 1  •  hydrochlorothiazide (HYDRODIURIL) 25 mg tablet, Take 1 tablet (25 mg total) by mouth daily, Disp: 90 tablet, Rfl: 1  •  ketoconazole (NIZORAL) 2 % shampoo, Apply 1 application topically 2 (two) times a week, Disp: 120 mL, Rfl: 0  •  multivitamin (THERAGRAN) TABS, Take 1 tablet by mouth daily, Disp: , Rfl:   •  nebivolol (BYSTOLIC) 10 mg tablet, TAKE 1 TABLET DAILY, Disp: 90 tablet, Rfl: 1  •  vitamin B-12 (VITAMIN B-12) 1,000 mcg tablet, Take 1 tablet by mouth daily, Disp: , Rfl:   •  apixaban (ELIQUIS) 5 mg, Take 5 mg by mouth 2 (two) times a day (Patient not taking: Reported on 1/20/2023), Disp: , Rfl:   •  azelastine (ASTELIN) 0.1 % nasal spray, 1 spray into each nostril 2 (two) times a day Use in each nostril as directed (Patient not taking: Reported on 7/6/2023), Disp: 1 mL, Rfl: 2  •  cholecalciferol (VITAMIN D3) 1,000 units tablet, Take 1,000 Units by mouth daily (Patient not taking: Reported on 3/2/2023), Disp: , Rfl:   •  DULoxetine (CYMBALTA) 30 mg delayed release capsule, Take 1 capsule (30 mg total) by mouth daily Can take at night if it makes you sleepy (Patient not taking: Reported on 7/17/2023), Disp: 90 capsule, Rfl: 1  •  famotidine (PEPCID) 40 MG tablet, , Disp: , Rfl:   •  omeprazole (PriLOSEC) 40 MG capsule, as needed, Disp: , Rfl:          Objective:         Vitals:    07/17/23 0939   BP: 136/80   BP Location: Left arm   Patient Position: Sitting   Cuff Size: Adult   Pulse: 73   Temp: 98.1 °F (36.7 °C)   SpO2: 98%   Weight: 88.3 kg (194 lb 9.6 oz)   Height: 5' 7.32" (1.71 m)     Physical Exam  Vitals reviewed. Constitutional:       Appearance: He is well-developed. HENT:      Head: Normocephalic and atraumatic. Nose: Nose normal.      Mouth/Throat:      Pharynx: No oropharyngeal exudate. Eyes:      General: No scleral icterus. Right eye: No discharge. Left eye: No discharge. Pupils: Pupils are equal, round, and reactive to light. Neck:      Trachea: No tracheal deviation. Cardiovascular:      Rate and Rhythm: Normal rate and regular rhythm. Pulses:           Dorsalis pedis pulses are 2+ on the right side and 2+ on the left side. Posterior tibial pulses are 2+ on the right side and 2+ on the left side. Heart sounds: Normal heart sounds. No murmur heard. No friction rub. No gallop. Pulmonary:      Effort: Pulmonary effort is normal. No respiratory distress.       Breath sounds: Normal breath sounds. No wheezing or rales. Abdominal:      General: Bowel sounds are normal. There is no distension. Palpations: Abdomen is soft. Tenderness: There is no abdominal tenderness. There is no guarding or rebound. Musculoskeletal:         General: Normal range of motion. Cervical back: Normal range of motion and neck supple. Lymphadenopathy:      Head:      Right side of head: No submental or submandibular adenopathy. Left side of head: No submental or submandibular adenopathy. Cervical: No cervical adenopathy. Right cervical: No superficial, deep or posterior cervical adenopathy. Left cervical: No superficial, deep or posterior cervical adenopathy. Skin:     General: Skin is warm and dry. Findings: No erythema. Neurological:      Mental Status: He is alert and oriented to person, place, and time. Cranial Nerves: No cranial nerve deficit. Sensory: No sensory deficit. Psychiatric:         Mood and Affect: Mood is not anxious or depressed. Speech: Speech normal.         Behavior: Behavior normal.         Thought Content:  Thought content normal.         Judgment: Judgment normal.

## 2023-07-19 ENCOUNTER — TELEPHONE (OUTPATIENT)
Dept: FAMILY MEDICINE CLINIC | Facility: CLINIC | Age: 68
End: 2023-07-19

## 2023-07-19 NOTE — TELEPHONE ENCOUNTER
Patient will be stopping by office to  copy of referral to Ophthalmology. Advised him the front staff can print for him.

## 2023-07-25 ENCOUNTER — TELEPHONE (OUTPATIENT)
Dept: OTHER | Facility: OTHER | Age: 68
End: 2023-07-25

## 2023-07-25 ENCOUNTER — TELEPHONE (OUTPATIENT)
Dept: FAMILY MEDICINE CLINIC | Facility: CLINIC | Age: 68
End: 2023-07-25

## 2023-07-25 DIAGNOSIS — R51.9 TEMPORAL PAIN: ICD-10-CM

## 2023-07-25 DIAGNOSIS — H53.9 VISUAL CHANGES: ICD-10-CM

## 2023-07-25 DIAGNOSIS — R53.82 CHRONIC FATIGUE: ICD-10-CM

## 2023-07-25 DIAGNOSIS — G43.119 INTRACTABLE MIGRAINE WITH AURA WITHOUT STATUS MIGRAINOSUS: Primary | ICD-10-CM

## 2023-07-25 NOTE — TELEPHONE ENCOUNTER
Spoke with patient. He stated that he needs the referral because rheumatology handles vasculitis and he believes that is what he has. His Gasto doctor said he sees nothing but inflammation.

## 2023-07-25 NOTE — TELEPHONE ENCOUNTER
Patient stopped by requesting a referral to Rheumatology be put in his chart. He is going to see Dr. Jimmy Ash from  tomorrow. This referral can be faxed to [phone] when its completed.      Could you please put a referral in his chart in Dr. Alexandra Reynolds absence

## 2023-07-28 ENCOUNTER — APPOINTMENT (OUTPATIENT)
Dept: LAB | Facility: CLINIC | Age: 68
End: 2023-07-28
Payer: MEDICARE

## 2023-07-28 DIAGNOSIS — H53.9 VISUAL AURA: ICD-10-CM

## 2023-07-28 DIAGNOSIS — G60.3 IDIOPATHIC PROGRESSIVE NEUROPATHY: ICD-10-CM

## 2023-07-28 DIAGNOSIS — G60.9 HEREDITARY AND IDIOPATHIC NEUROPATHY, UNSPECIFIED: ICD-10-CM

## 2023-07-28 DIAGNOSIS — G11.9 CEREBELLAR ATAXIA (HCC): ICD-10-CM

## 2023-07-28 DIAGNOSIS — D72.9 NEUTROPHILIA: ICD-10-CM

## 2023-07-28 DIAGNOSIS — R55 PRE-SYNCOPE: ICD-10-CM

## 2023-07-28 DIAGNOSIS — R42 LIGHTHEADEDNESS: ICD-10-CM

## 2023-07-28 LAB
BASOPHILS # BLD AUTO: 0.06 THOUSANDS/ÂΜL (ref 0–0.1)
BASOPHILS NFR BLD AUTO: 1 % (ref 0–1)
EOSINOPHIL # BLD AUTO: 0.5 THOUSAND/ÂΜL (ref 0–0.61)
EOSINOPHIL NFR BLD AUTO: 4 % (ref 0–6)
ERYTHROCYTE [DISTWIDTH] IN BLOOD BY AUTOMATED COUNT: 13.1 % (ref 11.6–15.1)
FOLATE SERPL-MCNC: >22.3 NG/ML
HCT VFR BLD AUTO: 41.5 % (ref 36.5–49.3)
HGB BLD-MCNC: 13.5 G/DL (ref 12–17)
IMM GRANULOCYTES # BLD AUTO: 0.06 THOUSAND/UL (ref 0–0.2)
IMM GRANULOCYTES NFR BLD AUTO: 1 % (ref 0–2)
LYMPHOCYTES # BLD AUTO: 1.68 THOUSANDS/ÂΜL (ref 0.6–4.47)
LYMPHOCYTES NFR BLD AUTO: 14 % (ref 14–44)
MCH RBC QN AUTO: 29.2 PG (ref 26.8–34.3)
MCHC RBC AUTO-ENTMCNC: 32.5 G/DL (ref 31.4–37.4)
MCV RBC AUTO: 90 FL (ref 82–98)
MONOCYTES # BLD AUTO: 0.81 THOUSAND/ÂΜL (ref 0.17–1.22)
MONOCYTES NFR BLD AUTO: 7 % (ref 4–12)
NEUTROPHILS # BLD AUTO: 9.2 THOUSANDS/ÂΜL (ref 1.85–7.62)
NEUTS SEG NFR BLD AUTO: 73 % (ref 43–75)
NRBC BLD AUTO-RTO: 0 /100 WBCS
PLATELET # BLD AUTO: 206 THOUSANDS/UL (ref 149–390)
PMV BLD AUTO: 11.5 FL (ref 8.9–12.7)
RBC # BLD AUTO: 4.62 MILLION/UL (ref 3.88–5.62)
TSH SERPL DL<=0.05 MIU/L-ACNC: 3.49 UIU/ML (ref 0.45–4.5)
WBC # BLD AUTO: 12.31 THOUSAND/UL (ref 4.31–10.16)

## 2023-07-28 PROCEDURE — 84165 PROTEIN E-PHORESIS SERUM: CPT

## 2023-07-28 PROCEDURE — 85025 COMPLETE CBC W/AUTO DIFF WBC: CPT

## 2023-07-28 PROCEDURE — 84443 ASSAY THYROID STIM HORMONE: CPT

## 2023-07-28 PROCEDURE — 82746 ASSAY OF FOLIC ACID SERUM: CPT

## 2023-07-28 PROCEDURE — 36415 COLL VENOUS BLD VENIPUNCTURE: CPT

## 2023-07-31 ENCOUNTER — TELEPHONE (OUTPATIENT)
Dept: INFECTIOUS DISEASES | Facility: CLINIC | Age: 68
End: 2023-07-31

## 2023-07-31 NOTE — TELEPHONE ENCOUNTER
Contacted patient to schedule follow with Dr. Victorino Arenas. Patient states he is not having fevers. He reports issues since last year however. He wants to r/o infectious disease. He states it started with vision issues/occular migraines. He has been seeing a retina specialist in [place]. He ruled out an eye issue. He has seeked out a neurologist who did not find anything at this time. The migraines have since gotten far worse. He has seen gastro as well, and has had work up done by various specialists including CT, MRI etc. Provided follow up with Dr. Victorino Arenas 8/16.

## 2023-08-01 LAB
ALBUMIN SERPL ELPH-MCNC: 3.83 G/DL (ref 3.2–5.1)
ALBUMIN SERPL ELPH-MCNC: 61.7 % (ref 48–70)
ALPHA1 GLOB SERPL ELPH-MCNC: 0.29 G/DL (ref 0.15–0.47)
ALPHA1 GLOB SERPL ELPH-MCNC: 4.6 % (ref 1.8–7)
ALPHA2 GLOB SERPL ELPH-MCNC: 0.61 G/DL (ref 0.42–1.04)
ALPHA2 GLOB SERPL ELPH-MCNC: 9.9 % (ref 5.9–14.9)
BETA GLOB ABNORMAL SERPL ELPH-MCNC: 0.37 G/DL (ref 0.31–0.57)
BETA1 GLOB SERPL ELPH-MCNC: 6 % (ref 4.7–7.7)
BETA2 GLOB SERPL ELPH-MCNC: 4.9 % (ref 3.1–7.9)
BETA2+GAMMA GLOB SERPL ELPH-MCNC: 0.3 G/DL (ref 0.2–0.58)
GAMMA GLOB ABNORMAL SERPL ELPH-MCNC: 0.8 G/DL (ref 0.4–1.66)
GAMMA GLOB SERPL ELPH-MCNC: 12.9 % (ref 6.9–22.3)
IGG/ALB SER: 1.61 {RATIO} (ref 1.1–1.8)
PROT PATTERN SERPL ELPH-IMP: ABNORMAL
PROT SERPL-MCNC: 6.2 G/DL (ref 6.4–8.2)

## 2023-08-01 PROCEDURE — 84165 PROTEIN E-PHORESIS SERUM: CPT | Performed by: STUDENT IN AN ORGANIZED HEALTH CARE EDUCATION/TRAINING PROGRAM

## 2023-08-03 DIAGNOSIS — R35.0 URINARY FREQUENCY: Primary | ICD-10-CM

## 2023-08-04 RX ORDER — TAMSULOSIN HYDROCHLORIDE 0.4 MG/1
0.4 CAPSULE ORAL
Qty: 90 CAPSULE | Refills: 0 | Status: SHIPPED | OUTPATIENT
Start: 2023-08-04

## 2023-08-15 ENCOUNTER — OFFICE VISIT (OUTPATIENT)
Dept: URGENT CARE | Facility: CLINIC | Age: 68
End: 2023-08-15
Payer: MEDICARE

## 2023-08-15 VITALS
HEART RATE: 73 BPM | HEIGHT: 67 IN | BODY MASS INDEX: 30.45 KG/M2 | RESPIRATION RATE: 16 BRPM | WEIGHT: 194 LBS | SYSTOLIC BLOOD PRESSURE: 136 MMHG | TEMPERATURE: 98 F | DIASTOLIC BLOOD PRESSURE: 84 MMHG | OXYGEN SATURATION: 98 %

## 2023-08-15 DIAGNOSIS — K12.0 APHTHOUS STOMATITIS: Primary | ICD-10-CM

## 2023-08-15 PROCEDURE — G0463 HOSPITAL OUTPT CLINIC VISIT: HCPCS | Performed by: PHYSICIAN ASSISTANT

## 2023-08-15 PROCEDURE — 99213 OFFICE O/P EST LOW 20 MIN: CPT | Performed by: PHYSICIAN ASSISTANT

## 2023-08-15 RX ORDER — TRIAMCINOLONE ACETONIDE 0.1 %
1 PASTE (GRAM) DENTAL 2 TIMES DAILY
Qty: 5 G | Refills: 0 | Status: SHIPPED | OUTPATIENT
Start: 2023-08-15

## 2023-08-15 NOTE — PROGRESS NOTES
[facility]    NAME: Marcelo Gagnon is a 79 y.o. male  : 1955    MRN: [id number]  DATE: August 15, 2023  TIME: 1:53 PM    Assessment and Plan   Aphthous stomatitis [K12.0]  1. Aphthous stomatitis  triamcinolone (KENALOG) 0.1 % oral topical paste          Patient Instructions     Patient Instructions   Apply medicated paste to lesion as instructed. You may want to follow-up with your dental provider for further evaluation. Chief Complaint     Chief Complaint   Patient presents with   • Oral Pain     Patient with a sore on the left side of his tongue since Saturday       History of Present Illness   Marcelo Gagnon presents to the clinic c/o  59-year-old male comes in with tender ulcerative lesion lateral aspect of tongue, left side that started over the weekend. Hurts to move his mouth. Increased pain if teeth rub against spot. Denies any new medications, oral products, unusual foods. Denies any prior history of similar problems. Review of Systems   Review of Systems   Constitutional: Positive for appetite change. Negative for activity change, chills, fatigue and fever. HENT: Positive for mouth sores. Negative for congestion, postnasal drip, rhinorrhea, sore throat and trouble swallowing. Hematological: Negative for adenopathy.        Current Medications     Long-Term Medications   Medication Sig Dispense Refill   • ALPRAZolam (XANAX) 0.5 mg tablet Take 1 tablet (0.5 mg total) by mouth daily as needed for anxiety 30 tablet 0   • fluticasone (FLONASE) 50 mcg/act nasal spray USE 2 SPRAYS IN EACH       NOSTRIL DAILY 48 g 1   • hydrochlorothiazide (HYDRODIURIL) 25 mg tablet Take 1 tablet (25 mg total) by mouth daily 90 tablet 1   • ketoconazole (NIZORAL) 2 % shampoo Apply 1 application topically 2 (two) times a week 120 mL 0   • multivitamin (THERAGRAN) TABS Take 1 tablet by mouth daily     • nebivolol (BYSTOLIC) 10 mg tablet TAKE 1 TABLET DAILY 90 tablet 1   • omeprazole (PriLOSEC) 40 MG capsule as needed     • tamsulosin (FLOMAX) 0.4 mg Take 1 capsule (0.4 mg total) by mouth daily with dinner 90 capsule 0   • triamcinolone (KENALOG) 0.1 % oral topical paste Apply 1 Application topically 2 (two) times a day 5 g 0   • azelastine (ASTELIN) 0.1 % nasal spray 1 spray into each nostril 2 (two) times a day Use in each nostril as directed (Patient not taking: Reported on 7/6/2023) 1 mL 2   • cholecalciferol (VITAMIN D3) 1,000 units tablet Take 1,000 Units by mouth daily (Patient not taking: Reported on 3/2/2023)     • DULoxetine (CYMBALTA) 30 mg delayed release capsule Take 1 capsule (30 mg total) by mouth daily Can take at night if it makes you sleepy (Patient not taking: Reported on 7/17/2023) 90 capsule 1   • famotidine (PEPCID) 40 MG tablet          Current Allergies     Allergies as of 08/15/2023 - Reviewed 08/15/2023   Allergen Reaction Noted   • Lisinopril Throat Swelling, Edema, and Angioedema 12/20/2013   • Amlodipine Palpitations 12/26/2013   • Diltiazem Rash 01/15/2014          The following portions of the patient's history were reviewed and updated as appropriate: allergies, current medications, past family history, past medical history, past social history, past surgical history and problem list.  Past Medical History:   Diagnosis Date   • Abdominal pain, right lower quadrant    • Abnormal blood chemistry    • Acid reflux    • Aneurysm of internal carotid artery     left sided - incidental finding on MRI   • Cancer (HCC)     skin   • Colon polyp    • Contact dermatitis due to poison ivy    • Depression screen    • Flu    • High blood pressure    • History of acute conjunctivitis     right eye   • History of allergic urticaria    • History of chronic rhinitis    • History of disease of skin and subcutaneous tissue    • History of dysphagia    • History of hemorrhoids    • History of IBS    • History of URI (upper respiratory infection)    • Hyperlipidemia    • Ocular migraine • Open wound of left lower leg    • Other acute sinusitis    • Other disturbances of oral epithelium, including tongue    • Positive depression screening    • Pulmonary emboli ([address]) 2019    pt is on eliquis   • Screening for prostate cancer    • Skin rash    • Sleep apnea     snores but has not had the study   • Special screening examination for neoplasm of prostate    • Tingling      Past Surgical History:   Procedure Laterality Date   • COLONOSCOPY     • COLONOSCOPY W/ POLYPECTOMY      Via Colostomy   • RECTAL SURGERY     • TONSILLECTOMY     • WISDOM TOOTH EXTRACTION       Family History   Problem Relation Age of Onset   • Breast cancer Mother 77   • Brain cancer Mother    • Lung cancer Father    • No Known Problems Sister    • No Known Problems Daughter    • No Known Problems Maternal Grandmother    • No Known Problems Maternal Grandfather    • No Known Problems Paternal Grandmother    • No Known Problems Paternal Grandfather    • Lung cancer Family    • Substance Abuse Neg Hx         Mother and Father   • Mental illness Neg Hx         Mother and Father       Objective   /84   Pulse 73   Temp 98 °F (36.7 °C) (Tympanic)   Resp 16   Ht 5' 7.32" (1.71 m)   Wt 88 kg (194 lb)   SpO2 98%   BMI 30.10 kg/m²   No LMP for male patient. Physical Exam     Physical Exam  Vitals and nursing note reviewed. Constitutional:       General: He is not in acute distress. Appearance: Normal appearance. He is well-developed. He is not ill-appearing, toxic-appearing or diaphoretic. Comments: No trismus or conversational dyspnea   HENT:      Head: Normocephalic and atraumatic. Mouth/Throat:      Comments: Large shallow ulcerated lesion left lateral aspect of tongue with mild surrounding redness of border. Eyes:      General: No scleral icterus. Extraocular Movements: Extraocular movements intact.       Conjunctiva/sclera: Conjunctivae normal.      Pupils: Pupils are equal, round, and reactive to light. Cardiovascular:      Rate and Rhythm: Normal rate and regular rhythm. Pulmonary:      Effort: Pulmonary effort is normal.   Musculoskeletal:      Cervical back: Normal range of motion and neck supple. No rigidity or tenderness. Lymphadenopathy:      Cervical: No cervical adenopathy. Skin:     General: Skin is warm and dry. Neurological:      Mental Status: He is alert and oriented to person, place, and time.

## 2023-08-15 NOTE — PATIENT INSTRUCTIONS
Apply medicated paste to lesion as instructed. You may want to follow-up with your dental provider for further evaluation.

## 2023-08-16 ENCOUNTER — CONSULT (OUTPATIENT)
Dept: INFECTIOUS DISEASES | Facility: CLINIC | Age: 68
End: 2023-08-16
Payer: MEDICARE

## 2023-08-16 VITALS
WEIGHT: 192 LBS | RESPIRATION RATE: 17 BRPM | HEART RATE: 69 BPM | SYSTOLIC BLOOD PRESSURE: 128 MMHG | OXYGEN SATURATION: 95 % | BODY MASS INDEX: 29.79 KG/M2 | DIASTOLIC BLOOD PRESSURE: 82 MMHG | TEMPERATURE: 97.2 F

## 2023-08-16 DIAGNOSIS — R53.82 CHRONIC FATIGUE: ICD-10-CM

## 2023-08-16 DIAGNOSIS — D72.829 LEUKOCYTOSIS, UNSPECIFIED TYPE: Primary | ICD-10-CM

## 2023-08-16 PROCEDURE — 99203 OFFICE O/P NEW LOW 30 MIN: CPT | Performed by: INTERNAL MEDICINE

## 2023-08-16 NOTE — PROGRESS NOTES
Consultation - Infectious Disease   Windy Morataya 79 y.o. male MRN: [id number]  Unit/Bed#:  Encounter: [id number]      IMPRESSION & RECOMMENDATIONS:   Impression/Recommendations: This is a 79 y.o. male, with multiple medical problems, self referred for chronic multitude of subjective symptom, with some abnormal blood work. 1.  Low-level leukocytosis. Patient was recently started on steroid for his headache. His WBCs have been historically normal, now mildly elevated since starting steroid. This is most likely demargination effect from steroid. Patient has no fever. There is no obvious active infection. No further work-up is necessary. No empiric treatment is necessary. 2.  Abnormal EBV serology, IgG, with negative IgM. This is indicative of old infection. This is a very common finding in the overall population. No treatment or further work-up necessary. 3.  Abnormal parvovirus serology, IgG, with negative IgM. Is indicative of old infection. Treatment no further work-up necessary. 4.  Chronic headache and visual changes. Patient had ophthalmology evaluation that was apparently normal.  He is being followed by neurology. Has had multiple normal head CT and MRI. There is no active infection regarding these symptoms. Neurology ophthalmology follow-up. 5.  Malaise and fatigue/generalized weakness. Multiple CBCs have been benign except for leukocytosis secondary to steroid use. No obvious active infection. PCP follow-up. Patient records reviewed in detail. Discussed with patient in detail regarding the above plan. Follow-up with PCP as scheduled. Follow-up with me as needed. Thank you for this consultation. HISTORY OF PRESENT ILLNESS:  Reason for Consult: Leukocytosis. Abnormal serologies.     HPI: Windy Morataya is a 79 y.o. male, with multiple medical problems as outlined below, self-referred to get off complaints, including headache, throat pain and visual changes, with generalized fatigue and malaise. Has had extensive work-up PCP. He was seen by ophthalmology with apparently normal exam.  He has had multiple genes, all benign. Patient had serologies for parvovirus and EBV done recently, both positive. In addition, he recently had leukocytosis. Outpatient back in 2018 for concern of PICC related infection, when he presented with fever and cytopenia. Patient was treated with a course of doxycycline, with resolution of fever and cytopenia. Present, patient states that he is chest "does not feel well."  Stable chronic headache and visual changes. No abdominal pain. No respiratory symptoms. No fever or chills. No joint swelling. No rash. REVIEW OF SYSTEMS:  A complete system-based review of systems was done. Except for what is noted in HPI above, ROS is otherwise negative.     PAST MEDICAL HISTORY:  Past Medical History:   Diagnosis Date   • Abdominal pain, right lower quadrant    • Abnormal blood chemistry    • Acid reflux    • Aneurysm of internal carotid artery     left sided - incidental finding on MRI   • Cancer (HCC)     skin   • Colon polyp    • Contact dermatitis due to poison ivy    • Depression screen    • Flu    • High blood pressure    • History of acute conjunctivitis     right eye   • History of allergic urticaria    • History of chronic rhinitis    • History of disease of skin and subcutaneous tissue    • History of dysphagia    • History of hemorrhoids    • History of IBS    • History of URI (upper respiratory infection)    • Hyperlipidemia    • Ocular migraine    • Open wound of left lower leg    • Other acute sinusitis    • Other disturbances of oral epithelium, including tongue    • Positive depression screening    • Pulmonary emboli ([address]) 2019    pt is on eliquis   • Screening for prostate cancer    • Skin rash    • Sleep apnea     snores but has not had the study   • Special screening examination for neoplasm of prostate    • Tingling Past Surgical History:   Procedure Laterality Date   • COLONOSCOPY     • COLONOSCOPY W/ POLYPECTOMY      Via Colostomy   • RECTAL SURGERY     • TONSILLECTOMY     • WISDOM TOOTH EXTRACTION       Problem list reviewed. FAMILY HISTORY:  Non-contributory    SOCIAL HISTORY:  Social History     Substance and Sexual Activity   Alcohol Use Not Currently    Comment: Social     Social History     Substance and Sexual Activity   Drug Use No     Social History     Tobacco Use   Smoking Status Former   • Packs/day: 0.50   • Years: 3.00   • Total pack years: 1.50   • Types: Cigarettes   • Start date: 65   • Quit date: 1982   • Years since quittin.1   Smokeless Tobacco Former       ALLERGIES:  Allergies   Allergen Reactions   • Lisinopril Throat Swelling, Edema and Angioedema   • Amlodipine Palpitations   • Diltiazem Rash     Hot rash on feet       MEDICATIONS:  All current active medications have been reviewed. Patient is currently not on any antibiotic. PHYSICAL EXAM:  Vitals:  Temperature: (!) 97.2 °F (36.2 °C)     Physical Exam:  General:  Well-nourished, well-developed, in no acute distress. Awake, alert and oriented x 3. Eyes:  Conjunctive clear with no hemorrhages or effusions  Oropharynx:  No ulcers, no lesions, pharynx benign, no tonsillitis  Neck:  Supple, no lymphadenopathy, no mass, nontender  Lungs:  Expansion symmetric, no rales, no wheezing, no accessory muscle use  Cardiac:  Regular rate and rhythm, normal S1, normal S2, no murmurs  Abdomen:  Soft, nondistended, non-tender, no HSM  Extremities:  No edema, no erythema, nontender. No ulcers  Skin:  No rashes, no ulcers  Neurological:  Moves all four extremities spontaneously, sensation grossly intact    LABS, IMAGING, & OTHER STUDIES:  Lab Results:  I have personally reviewed pertinent labs. Imaging Studies:   I have personally reviewed pertinent imaging study reports and images in PACS.     EKG, Pathology, and Other Studies:   I have personally reviewed pertinent reports.

## 2023-08-23 ENCOUNTER — TELEPHONE (OUTPATIENT)
Dept: FAMILY MEDICINE CLINIC | Facility: CLINIC | Age: 68
End: 2023-08-23

## 2023-08-23 NOTE — TELEPHONE ENCOUNTER
Spoke with patient to let him know what advice you put in his chart. He said he cannot find his BP cuff . He stated he just doesn't know anymore and he doesn't care anymore. He just kept repeating that he doesn't know what he is going to do anymore. I asked him about the Cymbalta and he said he doesn't know if he is going to start taking it. He said he never took any and he still has pills from last time.

## 2023-08-25 ENCOUNTER — APPOINTMENT (OUTPATIENT)
Dept: LAB | Facility: CLINIC | Age: 68
End: 2023-08-25
Payer: MEDICARE

## 2023-08-25 DIAGNOSIS — I26.99 PULMONARY INFARCTION (HCC): ICD-10-CM

## 2023-08-25 DIAGNOSIS — M25.541 BILATERAL FINGER ARTHRALGIA: ICD-10-CM

## 2023-08-25 DIAGNOSIS — M25.542 BILATERAL FINGER ARTHRALGIA: ICD-10-CM

## 2023-08-25 DIAGNOSIS — I26.99 PE (PULMONARY THROMBOEMBOLISM) (HCC): ICD-10-CM

## 2023-08-25 LAB
C3 SERPL-MCNC: 132 MG/DL (ref 87–200)
C4 SERPL-MCNC: 24 MG/DL (ref 19–52)
CRP SERPL QL: 3.3 MG/L
ERYTHROCYTE [SEDIMENTATION RATE] IN BLOOD: 14 MM/HOUR (ref 0–19)
IGA SERPL-MCNC: 107 MG/DL (ref 66–433)
IGG SERPL-MCNC: 756 MG/DL (ref 635–1741)
IGM SERPL-MCNC: 204 MG/DL (ref 45–281)

## 2023-08-25 PROCEDURE — 86147 CARDIOLIPIN ANTIBODY EA IG: CPT

## 2023-08-25 PROCEDURE — 86039 ANTINUCLEAR ANTIBODIES (ANA): CPT

## 2023-08-25 PROCEDURE — 86235 NUCLEAR ANTIGEN ANTIBODY: CPT

## 2023-08-25 PROCEDURE — 85732 THROMBOPLASTIN TIME PARTIAL: CPT

## 2023-08-25 PROCEDURE — 86021 WBC ANTIBODY IDENTIFICATION: CPT

## 2023-08-25 PROCEDURE — 85652 RBC SED RATE AUTOMATED: CPT

## 2023-08-25 PROCEDURE — 82164 ANGIOTENSIN I ENZYME TEST: CPT

## 2023-08-25 PROCEDURE — 86140 C-REACTIVE PROTEIN: CPT

## 2023-08-25 PROCEDURE — 86225 DNA ANTIBODY NATIVE: CPT

## 2023-08-25 PROCEDURE — 86146 BETA-2 GLYCOPROTEIN ANTIBODY: CPT

## 2023-08-25 PROCEDURE — 86160 COMPLEMENT ANTIGEN: CPT

## 2023-08-25 PROCEDURE — 85598 HEXAGNAL PHOSPH PLTLT NEUTRL: CPT

## 2023-08-25 PROCEDURE — 82784 ASSAY IGA/IGD/IGG/IGM EACH: CPT

## 2023-08-25 PROCEDURE — 86038 ANTINUCLEAR ANTIBODIES: CPT

## 2023-08-25 PROCEDURE — 86200 CCP ANTIBODY: CPT

## 2023-08-25 PROCEDURE — 36415 COLL VENOUS BLD VENIPUNCTURE: CPT

## 2023-08-25 PROCEDURE — 85613 RUSSELL VIPER VENOM DILUTED: CPT

## 2023-08-25 PROCEDURE — 86430 RHEUMATOID FACTOR TEST QUAL: CPT

## 2023-08-26 LAB — ANA SER QL IA: POSITIVE

## 2023-08-27 LAB — RHEUMATOID FACT SER QL LA: NEGATIVE

## 2023-08-28 LAB
ACE SERPL-CCNC: 28 U/L (ref 14–82)
ANA HOMOGEN SER QL IF: NORMAL
ANA HOMOGEN TITR SER: NORMAL {TITER}
DSDNA AB SER-ACNC: <4 IU/ML
ENA RNP AB SER IA-ACNC: NEGATIVE
ENA SM AB SER IA-ACNC: NEGATIVE
ENA SM+RNP IGG SER-ACNC: NEGATIVE
ENA SS-A AB SER IA-ACNC: NEGATIVE
ENA SS-B AB SER IA-ACNC: NEGATIVE

## 2023-08-29 LAB — HISTONE IGG SER IA-ACNC: 0.4 UNITS (ref 0–0.9)

## 2023-08-30 LAB
APTT HEX PL PPP: 37 SEC (ref 0–11)
APTT-LA IMM 4:1 NP PPP: 42 SEC (ref 0–40.5)
DRVVT IMM 1:2 NP PPP: 55.5 SEC (ref 0–40.4)
DRVVT SCREEN TO CONFIRM RATIO: 1.8 RATIO (ref 0.8–1.2)
LA PPP-IMP: ABNORMAL
SCREEN APTT: 48.9 SEC (ref 0–43.5)
SCREEN DRVVT: 70.1 SEC (ref 0–47)

## 2023-09-01 LAB — CCP AB SER IA-ACNC: 0.4

## 2023-09-05 ENCOUNTER — NEW PATIENT COMPREHENSIVE (OUTPATIENT)
Dept: URBAN - METROPOLITAN AREA CLINIC 6 | Facility: CLINIC | Age: 68
End: 2023-09-05

## 2023-09-05 DIAGNOSIS — H35.413: ICD-10-CM

## 2023-09-05 DIAGNOSIS — H25.813: ICD-10-CM

## 2023-09-05 PROCEDURE — 92004 COMPRE OPH EXAM NEW PT 1/>: CPT

## 2023-09-05 ASSESSMENT — VISUAL ACUITY
OD_CC: 20/30
OS_CC: 20/30
OU_CC: J1+

## 2023-09-05 ASSESSMENT — TONOMETRY
OD_IOP_MMHG: 14
OS_IOP_MMHG: 17

## 2023-09-06 LAB — MISCELLANEOUS LAB TEST RESULT: NORMAL

## 2023-09-09 LAB
B2 GLYCOPROT1 IGA SERPL IA-ACNC: 0.5
B2 GLYCOPROT1 IGG SERPL IA-ACNC: <0.8
B2 GLYCOPROT1 IGM SERPL IA-ACNC: 2.6
CARDIOLIPIN IGA SER IA-ACNC: 2
CARDIOLIPIN IGG SER IA-ACNC: 2.2
CARDIOLIPIN IGM SER IA-ACNC: 46

## 2023-09-12 ENCOUNTER — APPOINTMENT (OUTPATIENT)
Dept: LAB | Facility: CLINIC | Age: 68
End: 2023-09-12
Payer: MEDICARE

## 2023-09-18 DIAGNOSIS — I10 BENIGN ESSENTIAL HYPERTENSION: ICD-10-CM

## 2023-09-18 RX ORDER — HYDROCHLOROTHIAZIDE 25 MG/1
TABLET ORAL
Qty: 90 TABLET | Refills: 0 | Status: SHIPPED | OUTPATIENT
Start: 2023-09-18

## 2023-09-26 ENCOUNTER — TELEPHONE (OUTPATIENT)
Dept: FAMILY MEDICINE CLINIC | Facility: CLINIC | Age: 68
End: 2023-09-26

## 2023-09-26 NOTE — TELEPHONE ENCOUNTER
Spoke with patient. He is currently in the hospital ([facility]) because he passed out and completely lost consciousness. He also is experiencing mild chest pain at times.

## 2023-09-29 DIAGNOSIS — I10 BENIGN ESSENTIAL HYPERTENSION: ICD-10-CM

## 2023-09-29 RX ORDER — NEBIVOLOL 10 MG/1
TABLET ORAL
Qty: 90 TABLET | Refills: 1 | Status: SHIPPED | OUTPATIENT
Start: 2023-09-29

## 2023-10-09 ENCOUNTER — RA CDI HCC (OUTPATIENT)
Dept: OTHER | Facility: HOSPITAL | Age: 68
End: 2023-10-09

## 2023-10-13 ENCOUNTER — TELEPHONE (OUTPATIENT)
Dept: CARDIAC SURGERY | Facility: CLINIC | Age: 68
End: 2023-10-13

## 2023-10-13 NOTE — TELEPHONE ENCOUNTER
Pt wants to continue care with his established cardiologist he saw this year. More convenient in location.

## 2023-10-13 NOTE — TELEPHONE ENCOUNTER
Left v/m to call to schedule a cardiology consult if he is interested.  Saw he was seen with [facility] on June 20 th.

## 2023-10-16 ENCOUNTER — OFFICE VISIT (OUTPATIENT)
Dept: FAMILY MEDICINE CLINIC | Facility: CLINIC | Age: 68
End: 2023-10-16
Payer: MEDICARE

## 2023-10-16 VITALS
WEIGHT: 202.2 LBS | TEMPERATURE: 97.9 F | HEIGHT: 67 IN | HEART RATE: 68 BPM | BODY MASS INDEX: 31.74 KG/M2 | SYSTOLIC BLOOD PRESSURE: 124 MMHG | OXYGEN SATURATION: 99 % | DIASTOLIC BLOOD PRESSURE: 74 MMHG

## 2023-10-16 DIAGNOSIS — G43.119 INTRACTABLE MIGRAINE WITH AURA WITHOUT STATUS MIGRAINOSUS: Primary | ICD-10-CM

## 2023-10-16 DIAGNOSIS — R41.82 ALTERED MENTAL STATUS, UNSPECIFIED ALTERED MENTAL STATUS TYPE: ICD-10-CM

## 2023-10-16 DIAGNOSIS — R56.9 SEIZURE (HCC): ICD-10-CM

## 2023-10-16 PROBLEM — R51.9 CHRONIC DAILY HEADACHE: Status: ACTIVE | Noted: 2023-09-25

## 2023-10-16 PROBLEM — N18.31 STAGE 3A CHRONIC KIDNEY DISEASE (HCC): Status: ACTIVE | Noted: 2023-05-12

## 2023-10-16 PROBLEM — R76.0 ANTI-CARDIOLIPIN ANTIBODY POSITIVE: Status: ACTIVE | Noted: 2023-09-19

## 2023-10-16 PROBLEM — I47.10 SVT (SUPRAVENTRICULAR TACHYCARDIA): Status: ACTIVE | Noted: 2023-06-20

## 2023-10-16 PROBLEM — F32.A DEPRESSION: Status: ACTIVE | Noted: 2022-05-20

## 2023-10-16 PROBLEM — R76.0 LUPUS ANTICOAGULANT POSITIVE: Status: ACTIVE | Noted: 2023-09-19

## 2023-10-16 PROCEDURE — 99214 OFFICE O/P EST MOD 30 MIN: CPT | Performed by: FAMILY MEDICINE

## 2023-10-16 RX ORDER — DIVALPROEX SODIUM 500 MG/1
1000 TABLET, EXTENDED RELEASE ORAL
Qty: 60 TABLET | Refills: 2 | Status: SHIPPED | OUTPATIENT
Start: 2023-10-16 | End: 2023-11-15

## 2023-10-16 RX ORDER — ATORVASTATIN CALCIUM 40 MG/1
40 TABLET, FILM COATED ORAL DAILY
COMMUNITY
Start: 2023-09-27

## 2023-10-16 RX ORDER — DIVALPROEX SODIUM 500 MG/1
1000 TABLET, EXTENDED RELEASE ORAL
COMMUNITY
Start: 2023-09-27 | End: 2023-10-16 | Stop reason: SDUPTHER

## 2023-10-16 RX ORDER — AMITRIPTYLINE HYDROCHLORIDE 10 MG/1
TABLET, FILM COATED ORAL
COMMUNITY
Start: 2023-08-03

## 2023-10-16 NOTE — PROGRESS NOTES
Assessment/Plan:   1. Altered mental status, unspecified altered mental status type/Intractable migraine with aura without status migrainosus/Seizure ([address])  Reviewed symptoms today. Some, it is unclear to exact cause of his altered mental status. It is unclear if he could have had a potential seizure. At this time, patient was advised to remain on his Depakote until further evaluated by neurology. We will recheck his CMP. Continues well with his current treatment of aspirin as well as atorvastatin. - Comprehensive metabolic panel; Future  - divalproex sodium (DEPAKOTE ER) 500 mg 24 hr tablet; Take 2 tablets (1,000 mg total) by mouth daily at bedtime  Dispense: 60 tablet; Refill: 2               Diagnoses and all orders for this visit:    Intractable migraine with aura without status migrainosus  -     Comprehensive metabolic panel; Future  -     divalproex sodium (DEPAKOTE ER) 500 mg 24 hr tablet; Take 2 tablets (1,000 mg total) by mouth daily at bedtime    Altered mental status, unspecified altered mental status type  -     Comprehensive metabolic panel; Future  -     divalproex sodium (DEPAKOTE ER) 500 mg 24 hr tablet; Take 2 tablets (1,000 mg total) by mouth daily at bedtime    Seizure ([address])  -     Comprehensive metabolic panel; Future  -     divalproex sodium (DEPAKOTE ER) 500 mg 24 hr tablet; Take 2 tablets (1,000 mg total) by mouth daily at bedtime    Other orders  -     amitriptyline (ELAVIL) 10 mg tablet;  (Patient not taking: Reported on 10/16/2023)  -     atorvastatin (LIPITOR) 40 mg tablet; Take 40 mg by mouth daily  -     Discontinue: divalproex sodium (DEPAKOTE ER) 500 mg 24 hr tablet; Take 1,000 mg by mouth  -     hydrocortisone 2.5 % cream          Subjective:       Chief Complaint   Patient presents with    Follow-up     Follow up from hospital 09/25/23 and questions about vaccines      Patient ID: Becca Kerr is a 79 y.o. male presents today for a hospital follow-up.   He was admitted to [facility] on September 25 and subsequent discharged on September 27. He was admitted secondary to altered mental status. It was believed that he did have a potential seizure. He did have an extensive evaluation while inpatient. He was seen and evaluated by psychiatry however he was shortly deemed stable after his admission. He was seen and evaluated by neurology. He did have imaging testing as well as EEG. His EEG did not appear to show any signs of seizure. He was empirically placed on treatment with aspirin as well as atorvastatin. He also was placed empirically on treatment with Depakote. He states that this treatment has been effective at controlling his headaches. HPI    Review of Systems   Constitutional:  Negative for activity change, chills, fatigue and fever. HENT:  Negative for congestion, ear pain, sinus pressure and sore throat. Eyes:  Negative for redness, itching and visual disturbance. Respiratory:  Negative for cough and shortness of breath. Cardiovascular:  Negative for chest pain and palpitations. Gastrointestinal:  Negative for abdominal pain, diarrhea and nausea. Endocrine: Negative for cold intolerance and heat intolerance. Genitourinary:  Negative for dysuria, flank pain and frequency. Musculoskeletal:  Negative for arthralgias, back pain, gait problem and myalgias. Skin:  Negative for color change. Allergic/Immunologic: Negative for environmental allergies. Neurological:  Negative for dizziness, numbness and headaches. Psychiatric/Behavioral:  Negative for behavioral problems and sleep disturbance. The following portions of the patient's history were reviewed and updated as appropriate : past family history, past medical history, past social history and past surgical history.     Current Outpatient Medications:     ALPRAZolam (XANAX) 0.5 mg tablet, Take 1 tablet (0.5 mg total) by mouth daily as needed for anxiety, Disp: 30 tablet, Rfl: 0    aspirin (ECOTRIN LOW STRENGTH) 81 mg EC tablet, Take 81 mg by mouth daily, Disp: , Rfl:     atorvastatin (LIPITOR) 40 mg tablet, Take 40 mg by mouth daily, Disp: , Rfl:     Coenzyme Q10 300 MG CAPS, Take by mouth daily , Disp: , Rfl:     divalproex sodium (DEPAKOTE ER) 500 mg 24 hr tablet, Take 2 tablets (1,000 mg total) by mouth daily at bedtime, Disp: 60 tablet, Rfl: 2    DULoxetine (CYMBALTA) 30 mg delayed release capsule, Take 1 capsule (30 mg total) by mouth daily Can take at night if it makes you sleepy, Disp: 90 capsule, Rfl: 1    hydrochlorothiazide (HYDRODIURIL) 25 mg tablet, take one tablet (25mg total) by mouth daily. , Disp: 90 tablet, Rfl: 0    hydrocortisone 2.5 % cream, , Disp: , Rfl:     ketoconazole (NIZORAL) 2 % shampoo, Apply 1 application topically 2 (two) times a week, Disp: 120 mL, Rfl: 0    multivitamin (THERAGRAN) TABS, Take 1 tablet by mouth daily, Disp: , Rfl:     nebivolol (BYSTOLIC) 10 mg tablet, TAKE 1 TABLET DAILY, Disp: 90 tablet, Rfl: 1    omeprazole (PriLOSEC) 40 MG capsule, as needed, Disp: , Rfl:     tamsulosin (FLOMAX) 0.4 mg, Take 1 capsule (0.4 mg total) by mouth daily with dinner, Disp: 90 capsule, Rfl: 0    triamcinolone (KENALOG) 0.1 % oral topical paste, Apply 1 Application topically 2 (two) times a day, Disp: 5 g, Rfl: 0    amitriptyline (ELAVIL) 10 mg tablet, , Disp: , Rfl:     apixaban (ELIQUIS) 5 mg, Take 5 mg by mouth 2 (two) times a day (Patient not taking: Reported on 1/20/2023), Disp: , Rfl:     azelastine (ASTELIN) 0.1 % nasal spray, 1 spray into each nostril 2 (two) times a day Use in each nostril as directed (Patient not taking: Reported on 7/6/2023), Disp: 1 mL, Rfl: 2    cholecalciferol (VITAMIN D3) 1,000 units tablet, Take 1,000 Units by mouth daily (Patient not taking: Reported on 3/2/2023), Disp: , Rfl:     famotidine (PEPCID) 40 MG tablet, , Disp: , Rfl:     fluticasone (FLONASE) 50 mcg/act nasal spray, USE 2 SPRAYS IN EACH       NOSTRIL DAILY (Patient not taking: Reported on 10/16/2023), Disp: 48 g, Rfl: 1    predniSONE 10 mg tablet, Take 6 tablets By mouth  In the morning Qd. Decrease by  By 1 tablet daily until completed. (Patient not taking: Reported on 10/16/2023), Disp: 21 tablet, Rfl: 0    vitamin B-12 (VITAMIN B-12) 1,000 mcg tablet, Take 1 tablet by mouth daily (Patient not taking: Reported on 10/16/2023), Disp: , Rfl:          Objective:         Vitals:    10/16/23 1120   BP: 124/74   BP Location: Left arm   Patient Position: Sitting   Cuff Size: Large   Pulse: 68   Temp: 97.9 °F (36.6 °C)   TempSrc: Temporal   SpO2: 99%   Weight: 91.7 kg (202 lb 3.2 oz)   Height: 5' 7.32" (1.71 m)     Physical Exam  Vitals reviewed. Constitutional:       Appearance: He is well-developed. HENT:      Head: Normocephalic and atraumatic. Nose: Nose normal.      Mouth/Throat:      Pharynx: No oropharyngeal exudate. Eyes:      General: No scleral icterus. Right eye: No discharge. Left eye: No discharge. Pupils: Pupils are equal, round, and reactive to light. Neck:      Trachea: No tracheal deviation. Cardiovascular:      Rate and Rhythm: Normal rate and regular rhythm. Pulses:           Dorsalis pedis pulses are 2+ on the right side and 2+ on the left side. Posterior tibial pulses are 2+ on the right side and 2+ on the left side. Heart sounds: Normal heart sounds. No murmur heard. No friction rub. No gallop. Pulmonary:      Effort: Pulmonary effort is normal. No respiratory distress. Breath sounds: Normal breath sounds. No wheezing or rales. Abdominal:      General: Bowel sounds are normal. There is no distension. Palpations: Abdomen is soft. Tenderness: There is no abdominal tenderness. There is no guarding or rebound. Musculoskeletal:         General: Normal range of motion. Cervical back: Normal range of motion and neck supple.    Lymphadenopathy:      Head:      Right side of head: No submental or submandibular adenopathy. Left side of head: No submental or submandibular adenopathy. Cervical: No cervical adenopathy. Right cervical: No superficial, deep or posterior cervical adenopathy. Left cervical: No superficial, deep or posterior cervical adenopathy. Skin:     General: Skin is warm and dry. Findings: No erythema. Neurological:      Mental Status: He is alert and oriented to person, place, and time. Cranial Nerves: No cranial nerve deficit. Sensory: No sensory deficit. Psychiatric:         Mood and Affect: Mood is not anxious or depressed. Speech: Speech normal.         Behavior: Behavior normal.         Thought Content:  Thought content normal.         Judgment: Judgment normal.

## 2023-11-15 ENCOUNTER — OFFICE VISIT (OUTPATIENT)
Dept: FAMILY MEDICINE CLINIC | Facility: CLINIC | Age: 68
End: 2023-11-15
Payer: MEDICARE

## 2023-11-15 ENCOUNTER — APPOINTMENT (OUTPATIENT)
Dept: RADIOLOGY | Facility: CLINIC | Age: 68
End: 2023-11-15
Payer: MEDICARE

## 2023-11-15 VITALS
OXYGEN SATURATION: 96 % | BODY MASS INDEX: 30.87 KG/M2 | WEIGHT: 199 LBS | RESPIRATION RATE: 18 BRPM | TEMPERATURE: 97.5 F | HEART RATE: 70 BPM | DIASTOLIC BLOOD PRESSURE: 76 MMHG | SYSTOLIC BLOOD PRESSURE: 108 MMHG

## 2023-11-15 DIAGNOSIS — M25.551 RIGHT HIP PAIN: ICD-10-CM

## 2023-11-15 DIAGNOSIS — M54.50 CHRONIC BILATERAL LOW BACK PAIN WITHOUT SCIATICA: ICD-10-CM

## 2023-11-15 DIAGNOSIS — Z23 ENCOUNTER FOR IMMUNIZATION: Primary | ICD-10-CM

## 2023-11-15 DIAGNOSIS — G89.29 CHRONIC BILATERAL LOW BACK PAIN WITHOUT SCIATICA: ICD-10-CM

## 2023-11-15 PROCEDURE — 90662 IIV NO PRSV INCREASED AG IM: CPT

## 2023-11-15 PROCEDURE — 72110 X-RAY EXAM L-2 SPINE 4/>VWS: CPT

## 2023-11-15 PROCEDURE — 90677 PCV20 VACCINE IM: CPT

## 2023-11-15 PROCEDURE — G0008 ADMIN INFLUENZA VIRUS VAC: HCPCS

## 2023-11-15 PROCEDURE — 73502 X-RAY EXAM HIP UNI 2-3 VIEWS: CPT

## 2023-11-15 PROCEDURE — G0009 ADMIN PNEUMOCOCCAL VACCINE: HCPCS

## 2023-11-15 PROCEDURE — 99214 OFFICE O/P EST MOD 30 MIN: CPT | Performed by: FAMILY MEDICINE

## 2023-11-15 NOTE — PROGRESS NOTES
Assessment/Plan:   1. Right hip pain/Chronic bilateral low back pain without sciatica  Reviewed patient's symptoms today. At some, it is unclear as to exact cause of his hip pain. Symptoms may likely be secondary to a hip pathology versus a lumbar spine pathology. At this time, he was advised to continue with stretching exercises. He may apply heating pad to his low back. We will check x-ray of lumbar spine and hip. Follow-up if any symptoms worsen. - XR spine lumbar minimum 4 views non injury; Future  - XR hip/pelv 2-3 vws right if performed; Future    2. Encounter for immunization  - influenza vaccine, high-dose, PF 0.7 mL (FLUZONE HIGH-DOSE)  - Pneumococcal Conjugate Vaccine 20-valent (Pcv20)             Diagnoses and all orders for this visit:    Encounter for immunization  -     influenza vaccine, high-dose, PF 0.7 mL (FLUZONE HIGH-DOSE)  -     Pneumococcal Conjugate Vaccine 20-valent (Pcv20)          Subjective:       Chief Complaint   Patient presents with    Hip Pain     Right side, started about 3 weeks ago, no apparent injuries to cause      Patient ID: Jaylon Tan is a 79 y.o. male. Hip Pain   The incident occurred more than 1 week ago. The incident occurred at home. There was no injury mechanism. The pain is present in the right hip. The quality of the pain is described as stabbing. The pain is at a severity of 6/10. The pain is mild. The pain has been Intermittent since onset. Pertinent negatives include no muscle weakness, numbness or tingling. He reports no foreign bodies present. The symptoms are aggravated by movement. He has tried nothing for the symptoms. Review of Systems   Constitutional:  Negative for activity change, chills, fatigue and fever. HENT:  Negative for congestion, ear pain, sinus pressure and sore throat. Eyes:  Negative for redness, itching and visual disturbance. Respiratory:  Negative for cough and shortness of breath.     Cardiovascular:  Negative for chest pain and palpitations. Gastrointestinal:  Negative for abdominal pain, diarrhea and nausea. Endocrine: Negative for cold intolerance and heat intolerance. Genitourinary:  Negative for dysuria, flank pain and frequency. Musculoskeletal:  Positive for arthralgias and back pain. Negative for gait problem and myalgias. Skin:  Negative for color change. Allergic/Immunologic: Negative for environmental allergies. Neurological:  Negative for dizziness, tingling, numbness and headaches. Psychiatric/Behavioral:  Negative for behavioral problems and sleep disturbance. The following portions of the patient's history were reviewed and updated as appropriate : past family history, past medical history, past social history and past surgical history. Current Outpatient Medications:     ALPRAZolam (XANAX) 0.5 mg tablet, Take 1 tablet (0.5 mg total) by mouth daily as needed for anxiety, Disp: 30 tablet, Rfl: 0    aspirin (ECOTRIN LOW STRENGTH) 81 mg EC tablet, Take 81 mg by mouth daily, Disp: , Rfl:     atorvastatin (LIPITOR) 40 mg tablet, Take 40 mg by mouth daily, Disp: , Rfl:     Coenzyme Q10 300 MG CAPS, Take by mouth daily , Disp: , Rfl:     divalproex sodium (DEPAKOTE ER) 500 mg 24 hr tablet, Take 2 tablets (1,000 mg total) by mouth daily at bedtime, Disp: 60 tablet, Rfl: 2    DULoxetine (CYMBALTA) 30 mg delayed release capsule, Take 1 capsule (30 mg total) by mouth daily Can take at night if it makes you sleepy, Disp: 90 capsule, Rfl: 1    hydrochlorothiazide (HYDRODIURIL) 25 mg tablet, take one tablet (25mg total) by mouth daily. , Disp: 90 tablet, Rfl: 0    ketoconazole (NIZORAL) 2 % shampoo, Apply 1 application topically 2 (two) times a week, Disp: 120 mL, Rfl: 0    multivitamin (THERAGRAN) TABS, Take 1 tablet by mouth daily, Disp: , Rfl:     nebivolol (BYSTOLIC) 10 mg tablet, TAKE 1 TABLET DAILY, Disp: 90 tablet, Rfl: 1    omeprazole (PriLOSEC) 40 MG capsule, as needed, Disp: , Rfl: tamsulosin (FLOMAX) 0.4 mg, Take 1 capsule (0.4 mg total) by mouth daily with dinner, Disp: 90 capsule, Rfl: 0    amitriptyline (ELAVIL) 10 mg tablet, , Disp: , Rfl:     apixaban (ELIQUIS) 5 mg, Take 5 mg by mouth 2 (two) times a day (Patient not taking: Reported on 1/20/2023), Disp: , Rfl:     azelastine (ASTELIN) 0.1 % nasal spray, 1 spray into each nostril 2 (two) times a day Use in each nostril as directed (Patient not taking: Reported on 7/6/2023), Disp: 1 mL, Rfl: 2    cholecalciferol (VITAMIN D3) 1,000 units tablet, Take 1,000 Units by mouth daily (Patient not taking: Reported on 3/2/2023), Disp: , Rfl:     famotidine (PEPCID) 40 MG tablet, , Disp: , Rfl:     fluticasone (FLONASE) 50 mcg/act nasal spray, USE 2 SPRAYS IN EACH       NOSTRIL DAILY (Patient not taking: Reported on 10/16/2023), Disp: 48 g, Rfl: 1    hydrocortisone 2.5 % cream, , Disp: , Rfl:     predniSONE 10 mg tablet, Take 6 tablets By mouth  In the morning Qd. Decrease by  By 1 tablet daily until completed. (Patient not taking: Reported on 10/16/2023), Disp: 21 tablet, Rfl: 0    triamcinolone (KENALOG) 0.1 % oral topical paste, Apply 1 Application topically 2 (two) times a day (Patient not taking: Reported on 11/15/2023), Disp: 5 g, Rfl: 0    vitamin B-12 (VITAMIN B-12) 1,000 mcg tablet, Take 1 tablet by mouth daily (Patient not taking: Reported on 10/16/2023), Disp: , Rfl:          Objective:         Vitals:    11/15/23 1034   BP: 108/76   BP Location: Right arm   Patient Position: Sitting   Cuff Size: Standard   Pulse: 70   Resp: 18   Temp: 97.5 °F (36.4 °C)   TempSrc: Temporal   SpO2: 96%   Weight: 90.3 kg (199 lb)     Physical Exam  Vitals reviewed. Constitutional:       Appearance: He is well-developed. HENT:      Head: Normocephalic and atraumatic. Nose: Nose normal.      Mouth/Throat:      Pharynx: No oropharyngeal exudate. Eyes:      General: No scleral icterus. Right eye: No discharge.          Left eye: No discharge. Pupils: Pupils are equal, round, and reactive to light. Neck:      Trachea: No tracheal deviation. Cardiovascular:      Rate and Rhythm: Normal rate and regular rhythm. Pulses:           Dorsalis pedis pulses are 2+ on the right side and 2+ on the left side. Posterior tibial pulses are 2+ on the right side and 2+ on the left side. Heart sounds: Normal heart sounds. No murmur heard. No friction rub. No gallop. Pulmonary:      Effort: Pulmonary effort is normal. No respiratory distress. Breath sounds: Normal breath sounds. No wheezing or rales. Abdominal:      General: Bowel sounds are normal. There is no distension. Palpations: Abdomen is soft. Tenderness: There is no abdominal tenderness. There is no guarding or rebound. Musculoskeletal:      Cervical back: Normal range of motion and neck supple. Lumbar back: Spasms, tenderness and bony tenderness present. Decreased range of motion. Lymphadenopathy:      Head:      Right side of head: No submental or submandibular adenopathy. Left side of head: No submental or submandibular adenopathy. Cervical: No cervical adenopathy. Right cervical: No superficial, deep or posterior cervical adenopathy. Left cervical: No superficial, deep or posterior cervical adenopathy. Skin:     General: Skin is warm and dry. Findings: No erythema. Neurological:      Mental Status: He is alert and oriented to person, place, and time. Cranial Nerves: No cranial nerve deficit. Sensory: No sensory deficit. Psychiatric:         Mood and Affect: Mood is not anxious or depressed. Speech: Speech normal.         Behavior: Behavior normal.         Thought Content:  Thought content normal.         Judgment: Judgment normal.

## 2023-12-18 DIAGNOSIS — E78.2 MIXED HYPERLIPIDEMIA: Primary | ICD-10-CM

## 2023-12-18 RX ORDER — ATORVASTATIN CALCIUM 40 MG/1
40 TABLET, FILM COATED ORAL DAILY
Qty: 90 TABLET | Refills: 1 | Status: SHIPPED | OUTPATIENT
Start: 2023-12-18

## 2023-12-28 DIAGNOSIS — I10 BENIGN ESSENTIAL HYPERTENSION: ICD-10-CM

## 2023-12-28 DIAGNOSIS — R35.0 URINARY FREQUENCY: ICD-10-CM

## 2023-12-28 RX ORDER — TAMSULOSIN HYDROCHLORIDE 0.4 MG/1
0.4 CAPSULE ORAL
Qty: 90 CAPSULE | Refills: 0 | Status: SHIPPED | OUTPATIENT
Start: 2023-12-28

## 2023-12-29 RX ORDER — HYDROCHLOROTHIAZIDE 25 MG/1
25 TABLET ORAL DAILY
Qty: 90 TABLET | Refills: 0 | Status: SHIPPED | OUTPATIENT
Start: 2023-12-29

## 2024-01-09 ENCOUNTER — OFFICE VISIT (OUTPATIENT)
Dept: FAMILY MEDICINE CLINIC | Facility: CLINIC | Age: 69
End: 2024-01-09
Payer: MEDICARE

## 2024-01-09 VITALS
OXYGEN SATURATION: 96 % | HEART RATE: 86 BPM | WEIGHT: 205.8 LBS | HEIGHT: 68 IN | SYSTOLIC BLOOD PRESSURE: 102 MMHG | DIASTOLIC BLOOD PRESSURE: 76 MMHG | TEMPERATURE: 97.5 F | BODY MASS INDEX: 31.19 KG/M2

## 2024-01-09 DIAGNOSIS — M79.662 PAIN OF LEFT CALF: Primary | ICD-10-CM

## 2024-01-09 PROCEDURE — 99214 OFFICE O/P EST MOD 30 MIN: CPT | Performed by: FAMILY MEDICINE

## 2024-01-09 RX ORDER — LEVETIRACETAM 500 MG/1
500 TABLET ORAL 2 TIMES DAILY
COMMUNITY
Start: 2023-12-19 | End: 2024-12-18

## 2024-01-09 NOTE — PROGRESS NOTES
Assessment/Plan:   1. Pain of left calf  Reviewed patient's symptoms today.  This time, symptoms have nearly completely resolved.  Reviewed his ultrasound in depth with him.  At this time, there was no signs of DVT.  There was a pulsatile wave form during his evaluation which the radiologist stated could potentially represent right heart dysfunction.  Patient does follow with cardiology regularly.  At this time, he was advised that this does not represent a mass.  Patient was advised that if he does note any findings of a mass to send me a screenshot of this to further review.  At this time, we will continue with his current treatment.        I have spent a total time of 30 minutes on 01/09/24 in caring for this patient including Diagnostic results, Prognosis, Risks and benefits of tx options, Instructions for management, Patient and family education, Importance of tx compliance, Risk factor reductions, and Impressions.     Diagnoses and all orders for this visit:    Pain of left calf    Other orders  -     levETIRAcetam (KEPPRA) 500 mg tablet; Take 500 mg by mouth 2 (two) times a day          Subjective:       Chief Complaint   Patient presents with    Follow-up     On the ER visit      Patient ID: Bradley Evans is a 68 y.o. male presents today for a follow-up from the ED visit.  He was seen in the ED in early December secondary to severe left calf pain.  He was sent from his rheumatologist for evaluation.  Patient states that he was concerned because he read in the report that he did have a mass in his lower extremity.  Patient states that his symptoms have resolved.  He has been taking potassium at home which has helped his pain.    HPI    Review of Systems   Constitutional:  Negative for activity change, chills, fatigue and fever.   HENT:  Negative for congestion, ear pain, sinus pressure and sore throat.    Eyes:  Negative for redness, itching and visual disturbance.   Respiratory:  Negative for cough and  shortness of breath.    Cardiovascular:  Negative for chest pain and palpitations.   Gastrointestinal:  Negative for abdominal pain, diarrhea and nausea.   Endocrine: Negative for cold intolerance and heat intolerance.   Genitourinary:  Negative for dysuria, flank pain and frequency.   Musculoskeletal:  Negative for arthralgias, back pain, gait problem and myalgias.   Skin:  Negative for color change.   Allergic/Immunologic: Negative for environmental allergies.   Neurological:  Negative for dizziness, numbness and headaches.   Psychiatric/Behavioral:  Negative for behavioral problems and sleep disturbance.        The following portions of the patient's history were reviewed and updated as appropriate : past family history, past medical history, past social history and past surgical history.    Current Outpatient Medications:     ALPRAZolam (XANAX) 0.5 mg tablet, Take 1 tablet (0.5 mg total) by mouth daily as needed for anxiety, Disp: 30 tablet, Rfl: 0    aspirin (ECOTRIN LOW STRENGTH) 81 mg EC tablet, Take 81 mg by mouth daily, Disp: , Rfl:     atorvastatin (LIPITOR) 40 mg tablet, Take 1 tablet (40 mg total) by mouth daily, Disp: 90 tablet, Rfl: 1    Coenzyme Q10 300 MG CAPS, Take by mouth daily , Disp: , Rfl:     DULoxetine (CYMBALTA) 30 mg delayed release capsule, Take 1 capsule (30 mg total) by mouth daily Can take at night if it makes you sleepy, Disp: 90 capsule, Rfl: 1    hydrochlorothiazide (HYDRODIURIL) 25 mg tablet, Take 1 tablet (25 mg total) by mouth daily, Disp: 90 tablet, Rfl: 0    hydrocortisone 2.5 % cream, , Disp: , Rfl:     ketoconazole (NIZORAL) 2 % shampoo, Apply 1 application topically 2 (two) times a week, Disp: 120 mL, Rfl: 0    levETIRAcetam (KEPPRA) 500 mg tablet, Take 500 mg by mouth 2 (two) times a day, Disp: , Rfl:     multivitamin (THERAGRAN) TABS, Take 1 tablet by mouth daily, Disp: , Rfl:     nebivolol (BYSTOLIC) 10 mg tablet, TAKE 1 TABLET DAILY, Disp: 90 tablet, Rfl: 1    omeprazole  "(PriLOSEC) 40 MG capsule, as needed, Disp: , Rfl:     tamsulosin (FLOMAX) 0.4 mg, Take 1 capsule (0.4 mg total) by mouth daily with dinner, Disp: 90 capsule, Rfl: 0    vitamin B-12 (VITAMIN B-12) 1,000 mcg tablet, Take 1 tablet by mouth daily, Disp: , Rfl:     amitriptyline (ELAVIL) 10 mg tablet, , Disp: , Rfl:     apixaban (ELIQUIS) 5 mg, Take 5 mg by mouth 2 (two) times a day (Patient not taking: Reported on 1/20/2023), Disp: , Rfl:     azelastine (ASTELIN) 0.1 % nasal spray, 1 spray into each nostril 2 (two) times a day Use in each nostril as directed (Patient not taking: Reported on 7/6/2023), Disp: 1 mL, Rfl: 2    cholecalciferol (VITAMIN D3) 1,000 units tablet, Take 1,000 Units by mouth daily (Patient not taking: Reported on 3/2/2023), Disp: , Rfl:     fluticasone (FLONASE) 50 mcg/act nasal spray, USE 2 SPRAYS IN EACH       NOSTRIL DAILY (Patient not taking: Reported on 10/16/2023), Disp: 48 g, Rfl: 1    predniSONE 10 mg tablet, Take 6 tablets By mouth  In the morning Qd.   Decrease by  By 1 tablet daily until completed. (Patient not taking: Reported on 10/16/2023), Disp: 21 tablet, Rfl: 0    triamcinolone (KENALOG) 0.1 % oral topical paste, Apply 1 Application topically 2 (two) times a day (Patient not taking: Reported on 11/15/2023), Disp: 5 g, Rfl: 0         Objective:         Vitals:    01/09/24 1000   BP: 102/76   BP Location: Left arm   Patient Position: Sitting   Cuff Size: Adult   Pulse: 86   Temp: 97.5 °F (36.4 °C)   SpO2: 96%   Weight: 93.4 kg (205 lb 12.8 oz)   Height: 5' 7.5\" (1.715 m)     Physical Exam  Vitals reviewed.   Constitutional:       Appearance: Normal appearance. He is well-developed.   HENT:      Head: Normocephalic and atraumatic.      Right Ear: Hearing normal.      Left Ear: Hearing normal.      Nose: Nose normal. No septal deviation.   Eyes:      General: Lids are normal.      Pupils: Pupils are equal, round, and reactive to light.   Neck:      Thyroid: No thyroid mass or " thyromegaly.      Trachea: Trachea normal.   Cardiovascular:      Rate and Rhythm: Normal rate.   Pulmonary:      Effort: Pulmonary effort is normal. No respiratory distress.      Breath sounds: No decreased breath sounds.   Abdominal:      Tenderness: There is no guarding.   Musculoskeletal:         General: Normal range of motion.      Cervical back: Normal range of motion.   Skin:     General: Skin is warm and dry.   Neurological:      Mental Status: He is alert and oriented to person, place, and time.      Cranial Nerves: No cranial nerve deficit.      Sensory: No sensory deficit.   Psychiatric:         Speech: Speech normal.         Behavior: Behavior normal.         Thought Content: Thought content normal.         Judgment: Judgment normal.

## 2024-01-12 DIAGNOSIS — R52 PAIN: ICD-10-CM

## 2024-01-12 DIAGNOSIS — F33.9 DEPRESSION, RECURRENT (HCC): ICD-10-CM

## 2024-01-12 RX ORDER — DULOXETIN HYDROCHLORIDE 30 MG/1
30 CAPSULE, DELAYED RELEASE ORAL DAILY
Qty: 90 CAPSULE | Refills: 1 | Status: SHIPPED | OUTPATIENT
Start: 2024-01-12

## 2024-01-12 NOTE — TELEPHONE ENCOUNTER
Patient wants to switch to [facility] from mailorder.    Reason for call:   [x] Refill   [] Prior Auth  [] Other:     Office:   [x] PCP/Provider -   [] Specialty/Provider -     Medication: Cymbalta    Dose/Frequency: 30 mg     Quantity: #90    Pharmacy: [facility]    Does the patient have enough for 3 days?   [] Yes   [x] No - Send as HP to POD

## 2024-02-10 ENCOUNTER — PATIENT MESSAGE (OUTPATIENT)
Dept: FAMILY MEDICINE CLINIC | Facility: CLINIC | Age: 69
End: 2024-02-10

## 2024-02-12 NOTE — PATIENT COMMUNICATION
Pt stated that he did request that [facility] share his results with his PCP. Pt will f/u with [facility] to have the results sent to Dr. Abdalla. Pt also requested that his Neurologist review his results but hasn't heard back from them yet.   Pt also stated that he wanted to take OTC nitric oxide, no one prescribed this for pt in the past, he just wanted Dr. Abdalla's advise on if it would be okay for him to take this OTC medication.

## 2024-02-16 ENCOUNTER — TELEPHONE (OUTPATIENT)
Dept: NEUROLOGY | Facility: CLINIC | Age: 69
End: 2024-02-16

## 2024-02-16 NOTE — TELEPHONE ENCOUNTER
Patient called in to schedule appointment with neurology.    Patient is currently patient with [facility], but looking for new neurologist and second opinion as they are not unable to get the answers the seek.    Patient was triaged, triage was sent for review and is awaiting response to schedule patient accordingly.

## 2024-02-21 PROBLEM — N12 PYELONEPHRITIS: Status: RESOLVED | Noted: 2018-06-24 | Resolved: 2024-02-21

## 2024-02-21 PROBLEM — Z12.11 SCREENING FOR COLON CANCER: Status: RESOLVED | Noted: 2019-02-25 | Resolved: 2024-02-21

## 2024-03-05 ENCOUNTER — TELEPHONE (OUTPATIENT)
Dept: NEUROLOGY | Facility: CLINIC | Age: 69
End: 2024-03-05

## 2024-03-05 NOTE — TELEPHONE ENCOUNTER
Left voicemail for patient to call back to schedule follow up appointment Dr. Louise also to see if patient wanted to continue care with Dr. Louise. Patient is also being [facility]

## 2024-03-05 NOTE — TELEPHONE ENCOUNTER
Patient returned call and stated he has told others that have contacted him he does not want to continue care with Dr. Louise he is seeing a Neurologist in [place]

## 2024-04-02 DIAGNOSIS — R35.0 URINARY FREQUENCY: ICD-10-CM

## 2024-04-03 RX ORDER — TAMSULOSIN HYDROCHLORIDE 0.4 MG/1
0.4 CAPSULE ORAL
Qty: 90 CAPSULE | Refills: 1 | Status: SHIPPED | OUTPATIENT
Start: 2024-04-03

## 2024-04-23 DIAGNOSIS — I10 BENIGN ESSENTIAL HYPERTENSION: ICD-10-CM

## 2024-04-23 RX ORDER — NEBIVOLOL 10 MG/1
10 TABLET ORAL DAILY
Qty: 90 TABLET | Refills: 1 | Status: SHIPPED | OUTPATIENT
Start: 2024-04-23

## 2024-04-23 NOTE — TELEPHONE ENCOUNTER
Reason for call:   [x] Refill   [] Prior Auth  [] Other:     Office:   [x] PCP/Provider -   [] Specialty/Provider -     Medication:   Nebivolol 10mg- take 1 tablet by mouth daily    [facility]    Does the patient have enough for 3 days?   [] Yes   [x] No - Send as HP to POD

## 2024-05-03 DIAGNOSIS — I10 BENIGN ESSENTIAL HYPERTENSION: ICD-10-CM

## 2024-05-04 RX ORDER — HYDROCHLOROTHIAZIDE 25 MG/1
25 TABLET ORAL DAILY
Qty: 90 TABLET | Refills: 1 | Status: SHIPPED | OUTPATIENT
Start: 2024-05-04

## 2024-05-07 DIAGNOSIS — R52 PAIN: ICD-10-CM

## 2024-05-07 DIAGNOSIS — F33.9 DEPRESSION, RECURRENT (HCC): ICD-10-CM

## 2024-05-07 RX ORDER — DULOXETIN HYDROCHLORIDE 30 MG/1
30 CAPSULE, DELAYED RELEASE ORAL DAILY
Qty: 90 CAPSULE | Refills: 1 | Status: SHIPPED | OUTPATIENT
Start: 2024-05-07

## 2024-05-08 ENCOUNTER — NURSE TRIAGE (OUTPATIENT)
Age: 69
End: 2024-05-08

## 2024-05-08 NOTE — TELEPHONE ENCOUNTER
Regarding: calf pain  ----- Message from Scarlett Marti sent at 5/8/2024  9:10 AM EDT -----  Patient concerned about calf pain.

## 2024-05-08 NOTE — TELEPHONE ENCOUNTER
Attempted to triage patient for leg pain. did not answer calls.             Answer Assessment - Initial Assessment Questions  N/A  No answer from patient.    Protocols used: No Contact or Duplicate Contact Call-ADULT-[place]

## 2024-05-09 ENCOUNTER — HOSPITAL ENCOUNTER (OUTPATIENT)
Dept: NON INVASIVE DIAGNOSTICS | Facility: HOSPITAL | Age: 69
Discharge: HOME | End: 2024-05-09
Payer: MEDICARE

## 2024-05-09 ENCOUNTER — OFFICE VISIT (OUTPATIENT)
Dept: FAMILY MEDICINE CLINIC | Facility: CLINIC | Age: 69
End: 2024-05-09
Payer: MEDICARE

## 2024-05-09 VITALS
HEIGHT: 68 IN | HEART RATE: 71 BPM | OXYGEN SATURATION: 95 % | TEMPERATURE: 98 F | BODY MASS INDEX: 32.07 KG/M2 | SYSTOLIC BLOOD PRESSURE: 140 MMHG | WEIGHT: 211.6 LBS | DIASTOLIC BLOOD PRESSURE: 76 MMHG

## 2024-05-09 DIAGNOSIS — M79.604 PAIN AND SWELLING OF RIGHT LOWER EXTREMITY: ICD-10-CM

## 2024-05-09 DIAGNOSIS — I26.99 RIGHT PULMONARY EMBOLUS (HCC): ICD-10-CM

## 2024-05-09 DIAGNOSIS — R56.9 SEIZURE (HCC): ICD-10-CM

## 2024-05-09 DIAGNOSIS — M79.604 PAIN AND SWELLING OF RIGHT LOWER EXTREMITY: Primary | ICD-10-CM

## 2024-05-09 DIAGNOSIS — G11.9 CEREBELLAR ATAXIA (HCC): ICD-10-CM

## 2024-05-09 DIAGNOSIS — N18.31 STAGE 3A CHRONIC KIDNEY DISEASE (HCC): ICD-10-CM

## 2024-05-09 DIAGNOSIS — M79.89 PAIN AND SWELLING OF RIGHT LOWER EXTREMITY: Primary | ICD-10-CM

## 2024-05-09 DIAGNOSIS — F33.9 DEPRESSION, RECURRENT (HCC): ICD-10-CM

## 2024-05-09 DIAGNOSIS — I47.10 SVT (SUPRAVENTRICULAR TACHYCARDIA): ICD-10-CM

## 2024-05-09 DIAGNOSIS — M79.89 PAIN AND SWELLING OF RIGHT LOWER EXTREMITY: ICD-10-CM

## 2024-05-09 DIAGNOSIS — D69.6 THROMBOCYTOPENIA, UNSPECIFIED (HCC): ICD-10-CM

## 2024-05-09 PROCEDURE — 99214 OFFICE O/P EST MOD 30 MIN: CPT | Performed by: FAMILY MEDICINE

## 2024-05-09 PROCEDURE — 93971 EXTREMITY STUDY: CPT | Performed by: SURGERY

## 2024-05-09 PROCEDURE — G2211 COMPLEX E/M VISIT ADD ON: HCPCS | Performed by: FAMILY MEDICINE

## 2024-05-09 PROCEDURE — 93971 EXTREMITY STUDY: CPT

## 2024-05-22 DIAGNOSIS — Z00.6 ENCOUNTER FOR EXAMINATION FOR NORMAL COMPARISON OR CONTROL IN CLINICAL RESEARCH PROGRAM: ICD-10-CM

## 2024-05-23 ENCOUNTER — APPOINTMENT (OUTPATIENT)
Dept: LAB | Facility: CLINIC | Age: 69
End: 2024-05-23

## 2024-05-23 DIAGNOSIS — Z00.6 ENCOUNTER FOR EXAMINATION FOR NORMAL COMPARISON OR CONTROL IN CLINICAL RESEARCH PROGRAM: ICD-10-CM

## 2024-05-23 PROCEDURE — 36415 COLL VENOUS BLD VENIPUNCTURE: CPT

## 2024-06-06 ENCOUNTER — PATIENT MESSAGE (OUTPATIENT)
Dept: FAMILY MEDICINE CLINIC | Facility: CLINIC | Age: 69
End: 2024-06-06

## 2024-06-06 ENCOUNTER — OFFICE VISIT (OUTPATIENT)
Dept: FAMILY MEDICINE CLINIC | Facility: CLINIC | Age: 69
End: 2024-06-06
Payer: MEDICARE

## 2024-06-06 VITALS
HEART RATE: 79 BPM | DIASTOLIC BLOOD PRESSURE: 78 MMHG | WEIGHT: 211 LBS | TEMPERATURE: 99.1 F | SYSTOLIC BLOOD PRESSURE: 140 MMHG | OXYGEN SATURATION: 96 % | BODY MASS INDEX: 32.56 KG/M2

## 2024-06-06 DIAGNOSIS — R79.9 ABNORMAL FINDING OF BLOOD CHEMISTRY, UNSPECIFIED: ICD-10-CM

## 2024-06-06 DIAGNOSIS — Z00.00 MEDICARE ANNUAL WELLNESS VISIT, SUBSEQUENT: Primary | ICD-10-CM

## 2024-06-06 DIAGNOSIS — R35.1 NOCTURIA: ICD-10-CM

## 2024-06-06 DIAGNOSIS — E55.9 VITAMIN D DEFICIENCY: ICD-10-CM

## 2024-06-06 DIAGNOSIS — D69.6 THROMBOCYTOPENIA, UNSPECIFIED (HCC): ICD-10-CM

## 2024-06-06 DIAGNOSIS — R73.03 PREDIABETES: ICD-10-CM

## 2024-06-06 DIAGNOSIS — E78.2 MIXED HYPERLIPIDEMIA: ICD-10-CM

## 2024-06-06 DIAGNOSIS — G43.119 INTRACTABLE MIGRAINE WITH AURA WITHOUT STATUS MIGRAINOSUS: ICD-10-CM

## 2024-06-06 DIAGNOSIS — R53.82 CHRONIC FATIGUE: ICD-10-CM

## 2024-06-06 DIAGNOSIS — R07.9 CHEST PAIN, UNSPECIFIED TYPE: ICD-10-CM

## 2024-06-06 DIAGNOSIS — I10 BENIGN ESSENTIAL HYPERTENSION: ICD-10-CM

## 2024-06-06 DIAGNOSIS — F33.9 DEPRESSION, RECURRENT (HCC): ICD-10-CM

## 2024-06-06 PROCEDURE — 93000 ELECTROCARDIOGRAM COMPLETE: CPT | Performed by: FAMILY MEDICINE

## 2024-06-06 PROCEDURE — G0439 PPPS, SUBSEQ VISIT: HCPCS | Performed by: FAMILY MEDICINE

## 2024-06-06 PROCEDURE — 99214 OFFICE O/P EST MOD 30 MIN: CPT | Performed by: FAMILY MEDICINE

## 2024-06-06 NOTE — PATIENT INSTRUCTIONS
Medicare Preventive Visit Patient Instructions  Thank you for completing your Welcome to Medicare Visit or Medicare Annual Wellness Visit today. Your next wellness visit will be due in one year (6/7/2025).  The screening/preventive services that you may require over the next 5-10 years are detailed below. Some tests may not apply to you based off risk factors and/or age. Screening tests ordered at today's visit but not completed yet may show as past due. Also, please note that scanned in results may not display below.  Preventive Screenings:  Service Recommendations Previous Testing/Comments   Colorectal Cancer Screening  Colonoscopy    Fecal Occult Blood Test (FOBT)/Fecal Immunochemical Test (FIT)  Fecal DNA/Cologuard Test  Flexible Sigmoidoscopy Age: 45-75 years old   Colonoscopy: every 10 years (May be performed more frequently if at higher risk)  OR  FOBT/FIT: every 1 year  OR  Cologuard: every 3 years  OR  Sigmoidoscopy: every 5 years  Screening may be recommended earlier than age 45 if at higher risk for colorectal cancer. Also, an individualized decision between you and your healthcare provider will decide whether screening between the ages of 76-85 would be appropriate. Colonoscopy: 03/22/2023  FOBT/FIT: Not on file  Cologuard: Not on file  Sigmoidoscopy: Not on file    Screening Current     Prostate Cancer Screening Individualized decision between patient and health care provider in men between ages of 55-69   Medicare will cover every 12 months beginning on the day after your 50th birthday PSA: 1.06 ng/mL     Screening Current     Hepatitis C Screening Once for adults born between 1945 and 1965  More frequently in patients at high risk for Hepatitis C Hep C Antibody: 01/19/2022    Screening Current   Diabetes Screening 1-2 times per year if you're at risk for diabetes or have pre-diabetes Fasting glucose: 118 mg/dL (6/23/2023)  A1C: 5.4 % (4/1/2023)  Screening Current   Cholesterol Screening Once every 5  years if you don't have a lipid disorder. May order more often based on risk factors. Lipid panel: 12/02/2023  Screening Not Indicated  History Lipid Disorder      Other Preventive Screenings Covered by Medicare:  Abdominal Aortic Aneurysm (AAA) Screening: covered once if your at risk. You're considered to be at risk if you have a family history of AAA or a male between the age of 65-75 who smoking at least 100 cigarettes in your lifetime.  Lung Cancer Screening: covers low dose CT scan once per year if you meet all of the following conditions: (1) Age 55-77; (2) No signs or symptoms of lung cancer; (3) Current smoker or have quit smoking within the last 15 years; (4) You have a tobacco smoking history of at least 20 pack years (packs per day x number of years you smoked); (5) You get a written order from a healthcare provider.  Glaucoma Screening: covered annually if you're considered high risk: (1) You have diabetes OR (2) Family history of glaucoma OR (3)  aged 50 and older OR (4)  American aged 65 and older  Osteoporosis Screening: covered every 2 years if you meet one of the following conditions: (1) Have a vertebral abnormality; (2) On glucocorticoid therapy for more than 3 months; (3) Have primary hyperparathyroidism; (4) On osteoporosis medications and need to assess response to drug therapy.  HIV Screening: covered annually if you're between the age of 15-65. Also covered annually if you are younger than 15 and older than 65 with risk factors for HIV infection. For pregnant patients, it is covered up to 3 times per pregnancy.    Immunizations:  Immunization Recommendations   Influenza Vaccine Annual influenza vaccination during flu season is recommended for all persons aged >= 6 months who do not have contraindications   Pneumococcal Vaccine   * Pneumococcal conjugate vaccine = PCV13 (Prevnar 13), PCV15 (Vaxneuvance), PCV20 (Prevnar 20)  * Pneumococcal polysaccharide vaccine = PPSV23  (Pneumovax) Adults 19-63 yo with certain risk factors or if 65+ yo  If never received any pneumonia vaccine: recommend Prevnar 20 (PCV20)  Give PCV20 if previously received 1 dose of PCV13 or PPSV23   Hepatitis B Vaccine 3 dose series if at intermediate or high risk (ex: diabetes, end stage renal disease, liver disease)   Respiratory syncytial virus (RSV) Vaccine - COVERED BY MEDICARE PART D  * RSVPreF3 (Arexvy) CDC recommends that adults 60 years of age and older may receive a single dose of RSV vaccine using shared clinical decision-making (SCDM)   Tetanus (Td) Vaccine - COST NOT COVERED BY MEDICARE PART B Following completion of primary series, a booster dose should be given every 10 years to maintain immunity against tetanus. Td may also be given as tetanus wound prophylaxis.   Tdap Vaccine - COST NOT COVERED BY MEDICARE PART B Recommended at least once for all adults. For pregnant patients, recommended with each pregnancy.   Shingles Vaccine (Shingrix) - COST NOT COVERED BY MEDICARE PART B  2 shot series recommended in those 19 years and older who have or will have weakened immune systems or those 50 years and older     Health Maintenance Due:      Topic Date Due   • Colorectal Cancer Screening  03/22/2026   • Hepatitis C Screening  Completed     Immunizations Due:      Topic Date Due   • COVID-19 Vaccine (5 - 2023-24 season) 12/26/2023     Advance Directives   What are advance directives?  Advance directives are legal documents that state your wishes and plans for medical care. These plans are made ahead of time in case you lose your ability to make decisions for yourself. Advance directives can apply to any medical decision, such as the treatments you want, and if you want to donate organs.   What are the types of advance directives?  There are many types of advance directives, and each state has rules about how to use them. You may choose a combination of any of the following:  Living will:  This is a  written record of the treatment you want. You can also choose which treatments you do not want, which to limit, and which to stop at a certain time. This includes surgery, medicine, IV fluid, and tube feedings.   Durable power of  for healthcare (DPAHC):  This is a written record that states who you want to make healthcare choices for you when you are unable to make them for yourself. This person, called a proxy, is usually a family member or a friend. You may choose more than 1 proxy.  Do not resuscitate (DNR) order:  A DNR order is used in case your heart stops beating or you stop breathing. It is a request not to have certain forms of treatment, such as CPR. A DNR order may be included in other types of advance directives.  Medical directive:  This covers the care that you want if you are in a coma, near death, or unable to make decisions for yourself. You can list the treatments you want for each condition. Treatment may include pain medicine, surgery, blood transfusions, dialysis, IV or tube feedings, and a ventilator (breathing machine).  Values history:  This document has questions about your views, beliefs, and how you feel and think about life. This information can help others choose the care that you would choose.  Why are advance directives important?  An advance directive helps you control your care. Although spoken wishes may be used, it is better to have your wishes written down. Spoken wishes can be misunderstood, or not followed. Treatments may be given even if you do not want them. An advance directive may make it easier for your family to make difficult choices about your care.   Fall Prevention    Fall prevention  includes ways to make your home and other areas safer. It also includes ways you can move more carefully to prevent a fall. Health conditions that cause changes in your blood pressure, vision, or muscle strength and coordination may increase your risk for falls. Medicines may  also increase your risk for falls if they make you dizzy, weak, or sleepy.   Fall prevention tips:   Stand or sit up slowly.    Use assistive devices as directed.    Wear shoes that fit well and have soles that .    Wear a personal alarm.    Stay active.    Manage your medical conditions.    Home Safety Tips:  Add items to prevent falls in the bathroom.    Keep paths clear.    Install bright lights in your home.    Keep items you use often on shelves within reach.    Paint or place reflective tape on the edges of your stairs.    Weight Management   Why it is important to manage your weight:  Being overweight increases your risk of health conditions such as heart disease, high blood pressure, type 2 diabetes, and certain types of cancer. It can also increase your risk for osteoarthritis, sleep apnea, and other respiratory problems. Aim for a slow, steady weight loss. Even a small amount of weight loss can lower your risk of health problems.  How to lose weight safely:  A safe and healthy way to lose weight is to eat fewer calories and get regular exercise. You can lose up about 1 pound a week by decreasing the number of calories you eat by 500 calories each day.   Healthy meal plan for weight management:  A healthy meal plan includes a variety of foods, contains fewer calories, and helps you stay healthy. A healthy meal plan includes the following:  Eat whole-grain foods more often.  A healthy meal plan should contain fiber. Fiber is the part of grains, fruits, and vegetables that is not broken down by your body. Whole-grain foods are healthy and provide extra fiber in your diet. Some examples of whole-grain foods are whole-wheat breads and pastas, oatmeal, brown rice, and bulgur.  Eat a variety of vegetables every day.  Include dark, leafy greens such as spinach, kale, nnamdi greens, and mustard greens. Eat yellow and orange vegetables such as carrots, sweet potatoes, and winter squash.   Eat a variety of  fruits every day.  Choose fresh or canned fruit (canned in its own juice or light syrup) instead of juice. Fruit juice has very little or no fiber.  Eat low-fat dairy foods.  Drink fat-free (skim) milk or 1% milk. Eat fat-free yogurt and low-fat cottage cheese. Try low-fat cheeses such as mozzarella and other reduced-fat cheeses.  Choose meat and other protein foods that are low in fat.  Choose beans or other legumes such as split peas or lentils. Choose fish, skinless poultry (chicken or turkey), or lean cuts of red meat (beef or pork). Before you cook meat or poultry, cut off any visible fat.   Use less fat and oil.  Try baking foods instead of frying them. Add less fat, such as margarine, sour cream, regular salad dressing and mayonnaise to foods. Eat fewer high-fat foods. Some examples of high-fat foods include french fries, doughnuts, ice cream, and cakes.  Eat fewer sweets.  Limit foods and drinks that are high in sugar. This includes candy, cookies, regular soda, and sweetened drinks.  Exercise:  Exercise at least 30 minutes per day on most days of the week. Some examples of exercise include walking, biking, dancing, and swimming. You can also fit in more physical activity by taking the stairs instead of the elevator or parking farther away from stores. Ask your healthcare provider about the best exercise plan for you.      © Copyright [employer] 2018 Information is for End User's use only and may not be sold, redistributed or otherwise used for commercial purposes. All illustrations and images included in CareNotes® are the copyrighted property of [employer]M., Inc. or [employer]

## 2024-06-06 NOTE — PROGRESS NOTES
Ambulatory Visit  Name: Bradley Evans      : 1955      MRN: [id number]  Encounter Provider: Justin Abdalla DO  Encounter Date: 2024   Encounter department: [facility]    Assessment & Plan   1. Chest pain, unspecified type  Reviewed patient's symptoms.  It is unclear as to exact cause of his chest discomfort.  His EKG appeared to show normal sinus rhythm, no ST or T wave changes.  Reviewed his previous testing including his echocardiogram as well as his Holter monitor testing.  Will continue with routine monitor.  Follow-up if any symptoms worsen.  - POCT ECG    2. Chronic fatigue  Reviewed patient's symptoms.  It is unclear as to exact cause of his chronic fatigue.  He does have poor sleep which is interrupted by his frequent urination.  Will refer patient to urology for further evaluation.  At this time, will check routine blood work to further assess for any secondary causes of his fatigue.  - Ferritin; Future  - Ferritin               Preventive health issues were discussed with patient, and age appropriate screening tests were ordered as noted in patient's After Visit Summary. Personalized health advice and appropriate referrals for health education or preventive services given if needed, as noted in patient's After Visit Summary.    History of Present Illness     HPI   Patient is a 68-year-old male presents today with multiple complaints.  He has been having persistent problems with fatigue.  He has very little energy to do any activity.  He has low motivation as well.  He is concerned that he has been having persisting headaches however these have been chronic.  He also has been having intermittent chest discomfort.  He has not been taking anything for symptom relief.  Patient Care Team:  Justin Abdalla DO as PCP - General  Lilly Louise MD as Resident (Neurology)  Erik Haque MD (Neurology)    Review of Systems   Constitutional:  Positive for fatigue. Negative for  Pt advised   activity change, chills and fever.   HENT:  Negative for congestion, ear pain, sinus pressure and sore throat.    Eyes:  Negative for redness, itching and visual disturbance.   Respiratory:  Negative for cough and shortness of breath.    Cardiovascular:  Positive for chest pain. Negative for palpitations.   Gastrointestinal:  Negative for abdominal pain, diarrhea and nausea.   Endocrine: Negative for cold intolerance and heat intolerance.   Genitourinary:  Negative for dysuria, flank pain and frequency.   Musculoskeletal:  Negative for arthralgias, back pain, gait problem and myalgias.   Skin:  Negative for color change.   Allergic/Immunologic: Negative for environmental allergies.   Neurological:  Negative for dizziness, numbness and headaches.   Psychiatric/Behavioral:  Negative for behavioral problems and sleep disturbance.      Medical History Reviewed by provider this encounter:       Annual Wellness Visit Questionnaire   Bradley is here for his Subsequent Wellness visit. Last Medicare Wellness visit information reviewed, patient interviewed, no change since last AWV.     Health Risk Assessment:   Patient rates overall health as fair. Patient feels that their physical health rating is much worse. Patient is dissatisfied with their life. Eyesight was rated as same. Hearing was rated as same. Patient feels that their emotional and mental health rating is slightly worse. Patients states they are never, rarely angry. Patient states they are always unusually tired/fatigued. Pain experienced in the last 7 days has been some. Patient states that he has experienced no weight loss or gain in last 6 months.     Depression Screening:   PHQ-9 Score: 12      Fall Risk Screening:   In the past year, patient has experienced: history of falling in past year    Number of falls: 1  Injured during fall?: Yes    Feels unsteady when standing or walking?: No    Worried about falling?: No      Home Safety:  Patient does not have  trouble with stairs inside or outside of their home. Patient has working smoke alarms and has working carbon monoxide detector. Home safety hazards include: none.     Nutrition:   Current diet is Regular.     Medications:   Patient is currently taking over-the-counter supplements. OTC medications include: see medication list. Patient is able to manage medications.     Activities of Daily Living (ADLs)/Instrumental Activities of Daily Living (IADLs):   Walk and transfer into and out of bed and chair?: Yes  Dress and groom yourself?: Yes    Bathe or shower yourself?: Yes    Feed yourself? Yes  Do your laundry/housekeeping?: Yes  Manage your money, pay your bills and track your expenses?: Yes  Make your own meals?: Yes    Do your own shopping?: Yes    Previous Hospitalizations:   Any hospitalizations or ED visits within the last 12 months?: No      Advance Care Planning:   Living will: Yes    Durable POA for healthcare: Yes    Advanced directive: Yes    ACP document given: Yes      Cognitive Screening:   Provider or family/friend/caregiver concerned regarding cognition?: No    PREVENTIVE SCREENINGS      Cardiovascular Screening:    General: Screening Not Indicated and History Lipid Disorder      Diabetes Screening:     General: Screening Current      Colorectal Cancer Screening:     General: Screening Current      Prostate Cancer Screening:    General: Screening Current      Abdominal Aortic Aneurysm (AAA) Screening:    Risk factors include: age between 65-74 yo and tobacco use        Lung Cancer Screening:     General: Screening Not Indicated      Hepatitis C Screening:    General: Screening Current    Screening, Brief Intervention, and Referral to Treatment (SBIRT)    Screening  Typical number of drinks in a day: 0  Typical number of drinks in a week: 0  Interpretation: Low risk drinking behavior.    Single Item Drug Screening:  How often have you used an illegal drug (including marijuana) or a prescription  medication for non-medical reasons in the past year? never    Single Item Drug Screen Score: 0  Interpretation: Negative screen for possible drug use disorder    Social Determinants of Health     Financial Resource Strain: Low Risk  (9/25/2023)    [facility]    Overall Financial Resource Strain (CARDIA)     Difficulty of Paying Living Expenses: Not hard at all   Food Insecurity: No Food Insecurity (6/6/2024)    Hunger Vital Sign     Worried About Running Out of Food in the Last Year: Never true     Ran Out of Food in the Last Year: Never true   Transportation Needs: No Transportation Needs (6/6/2024)    PRAPARE - Transportation     Lack of Transportation (Medical): No     Lack of Transportation (Non-Medical): No   Housing Stability: Low Risk  (6/6/2024)    Housing Stability Vital Sign     Unable to Pay for Housing in the Last Year: No     Number of Times Moved in the Last Year: 1     Homeless in the Last Year: No   Utilities: Not At Risk (6/6/2024)    [facility] Utilities     Threatened with loss of utilities: No     No results found.    Objective     /78 (BP Location: Left arm, Patient Position: Sitting, Cuff Size: Adult)   Pulse 79   Temp 99.1 °F (37.3 °C)   Wt 95.7 kg (211 lb)   SpO2 96%   BMI 32.56 kg/m²     Physical Exam  Vitals reviewed.   Constitutional:       General: He is not in acute distress.     Appearance: Normal appearance. He is well-developed.   HENT:      Head: Normocephalic and atraumatic.      Right Ear: Tympanic membrane, ear canal and external ear normal. There is no impacted cerumen.      Left Ear: Tympanic membrane, ear canal and external ear normal. There is no impacted cerumen.      Nose: Nose normal. No congestion or rhinorrhea.      Mouth/Throat:      Mouth: Mucous membranes are moist.      Pharynx: No oropharyngeal exudate or posterior oropharyngeal erythema.   Eyes:      General: No scleral icterus.        Right eye: No  discharge.         Left eye: No discharge.      Extraocular Movements: Extraocular movements intact.      Conjunctiva/sclera: Conjunctivae normal.      Pupils: Pupils are equal, round, and reactive to light.   Neck:      Trachea: No tracheal deviation.   Cardiovascular:      Rate and Rhythm: Normal rate and regular rhythm.      Pulses: Normal pulses.           Dorsalis pedis pulses are 2+ on the right side and 2+ on the left side.        Posterior tibial pulses are 2+ on the right side and 2+ on the left side.      Heart sounds: Normal heart sounds. No murmur heard.     No friction rub. No gallop.   Pulmonary:      Effort: Pulmonary effort is normal. No respiratory distress.      Breath sounds: Normal breath sounds. No wheezing, rhonchi or rales.   Abdominal:      General: Bowel sounds are normal. There is no distension.      Palpations: Abdomen is soft.      Tenderness: There is no abdominal tenderness. There is no guarding or rebound.   Musculoskeletal:         General: Normal range of motion.      Cervical back: Normal range of motion and neck supple.      Right lower leg: No edema.      Left lower leg: No edema.   Lymphadenopathy:      Head:      Right side of head: No submental or submandibular adenopathy.      Left side of head: No submental or submandibular adenopathy.      Cervical: No cervical adenopathy.      Right cervical: No superficial, deep or posterior cervical adenopathy.     Left cervical: No superficial, deep or posterior cervical adenopathy.   Skin:     General: Skin is warm and dry.      Findings: No erythema.   Neurological:      General: No focal deficit present.      Mental Status: He is alert and oriented to person, place, and time.      Cranial Nerves: No cranial nerve deficit.      Sensory: Sensation is intact. No sensory deficit.      Motor: Motor function is intact.   Psychiatric:         Attention and Perception: Attention and perception normal.         Mood and Affect: Mood is not  anxious or depressed.         Speech: Speech normal.         Behavior: Behavior normal.         Thought Content: Thought content normal.         Judgment: Judgment normal.       Administrative Statements

## 2024-06-17 ENCOUNTER — APPOINTMENT (OUTPATIENT)
Dept: LAB | Facility: CLINIC | Age: 69
End: 2024-06-17
Payer: MEDICARE

## 2024-06-17 LAB
25(OH)D3 SERPL-MCNC: 36.1 NG/ML (ref 30–100)
ALBUMIN SERPL BCP-MCNC: 3.9 G/DL (ref 3.5–5)
ALP SERPL-CCNC: 73 U/L (ref 34–104)
ALT SERPL W P-5'-P-CCNC: 10 U/L (ref 7–52)
ANION GAP SERPL CALCULATED.3IONS-SCNC: 10 MMOL/L (ref 4–13)
AST SERPL W P-5'-P-CCNC: 18 U/L (ref 13–39)
BILIRUB SERPL-MCNC: 1.36 MG/DL (ref 0.2–1)
BUN SERPL-MCNC: 13 MG/DL (ref 5–25)
CALCIUM SERPL-MCNC: 9.2 MG/DL (ref 8.4–10.2)
CHLORIDE SERPL-SCNC: 102 MMOL/L (ref 96–108)
CHOLEST SERPL-MCNC: 115 MG/DL
CO2 SERPL-SCNC: 29 MMOL/L (ref 21–32)
CREAT SERPL-MCNC: 1.2 MG/DL (ref 0.6–1.3)
ERYTHROCYTE [DISTWIDTH] IN BLOOD BY AUTOMATED COUNT: 12.5 % (ref 11.6–15.1)
EST. AVERAGE GLUCOSE BLD GHB EST-MCNC: 123 MG/DL
FERRITIN SERPL-MCNC: 147 NG/ML (ref 24–336)
GFR SERPL CREATININE-BSD FRML MDRD: 61 ML/MIN/1.73SQ M
GLUCOSE P FAST SERPL-MCNC: 107 MG/DL (ref 65–99)
HBA1C MFR BLD: 5.9 %
HCT VFR BLD AUTO: 44.2 % (ref 36.5–49.3)
HDLC SERPL-MCNC: 45 MG/DL
HGB BLD-MCNC: 14.5 G/DL (ref 12–17)
LDLC SERPL CALC-MCNC: 46 MG/DL (ref 0–100)
MCH RBC QN AUTO: 29.2 PG (ref 26.8–34.3)
MCHC RBC AUTO-ENTMCNC: 32.8 G/DL (ref 31.4–37.4)
MCV RBC AUTO: 89 FL (ref 82–98)
PLATELET # BLD AUTO: 153 THOUSANDS/UL (ref 149–390)
PMV BLD AUTO: 11.4 FL (ref 8.9–12.7)
POTASSIUM SERPL-SCNC: 3.8 MMOL/L (ref 3.5–5.3)
PROT SERPL-MCNC: 6.3 G/DL (ref 6.4–8.4)
RBC # BLD AUTO: 4.97 MILLION/UL (ref 3.88–5.62)
SODIUM SERPL-SCNC: 141 MMOL/L (ref 135–147)
TRIGL SERPL-MCNC: 119 MG/DL
TSH SERPL DL<=0.05 MIU/L-ACNC: 3.21 UIU/ML (ref 0.45–4.5)
WBC # BLD AUTO: 10.43 THOUSAND/UL (ref 4.31–10.16)

## 2024-07-22 ENCOUNTER — OFFICE VISIT (OUTPATIENT)
Dept: URGENT CARE | Facility: CLINIC | Age: 69
End: 2024-07-22
Payer: MEDICARE

## 2024-07-22 ENCOUNTER — PATIENT MESSAGE (OUTPATIENT)
Dept: FAMILY MEDICINE CLINIC | Facility: CLINIC | Age: 69
End: 2024-07-22

## 2024-07-22 VITALS
SYSTOLIC BLOOD PRESSURE: 138 MMHG | HEART RATE: 88 BPM | RESPIRATION RATE: 18 BRPM | OXYGEN SATURATION: 98 % | DIASTOLIC BLOOD PRESSURE: 62 MMHG | TEMPERATURE: 98.1 F

## 2024-07-22 DIAGNOSIS — Z23 ENCOUNTER FOR IMMUNIZATION: ICD-10-CM

## 2024-07-22 DIAGNOSIS — T14.8XXA PUNCTURE WOUND: Primary | ICD-10-CM

## 2024-07-22 DIAGNOSIS — S91.331A PUNCTURE WOUND OF RIGHT FOOT, INITIAL ENCOUNTER: ICD-10-CM

## 2024-07-22 PROCEDURE — 90715 TDAP VACCINE 7 YRS/> IM: CPT

## 2024-07-22 PROCEDURE — 99213 OFFICE O/P EST LOW 20 MIN: CPT | Performed by: NURSE PRACTITIONER

## 2024-07-22 PROCEDURE — 90471 IMMUNIZATION ADMIN: CPT | Performed by: NURSE PRACTITIONER

## 2024-07-22 PROCEDURE — G0463 HOSPITAL OUTPT CLINIC VISIT: HCPCS | Performed by: NURSE PRACTITIONER

## 2024-07-22 NOTE — PROGRESS NOTES
[facility]        NAME: Bradley Evans is a 68 y.o. male  : 1955    MRN: [id number]  DATE: 2024  TIME: 2:25 PM    Assessment and Plan   Puncture wound [T14.8XXA]  1. Puncture wound  Tdap Vaccine greater than or equal to 8yo      2. Encounter for immunization  Tdap Vaccine greater than or equal to 8yo      3. Puncture wound of right foot, initial encounter  Tdap Vaccine greater than or equal to 8yo      Td last in  -   Updated today   Discussed epsom salt soaks and triple antibiotic ointment  F/u with pcp      Patient Instructions     Follow up with PCP in 3-5 days.  Proceed to  ER if symptoms worsen.    Chief Complaint     Chief Complaint   Patient presents with    Puncture Wound         History of Present Illness   Bradley Evans presents to the clinic c/o    Pt states stepped on a howard nail this morning - went through his slipper   Unknown last td vaccine.           Review of Systems   Review of Systems   All other systems reviewed and are negative.        Current Medications     Long-Term Medications   Medication Sig Dispense Refill    ALPRAZolam (XANAX) 0.5 mg tablet Take 1 tablet (0.5 mg total) by mouth daily as needed for anxiety 30 tablet 0    atorvastatin (LIPITOR) 40 mg tablet Take 1 tablet (40 mg total) by mouth daily 90 tablet 1    DULoxetine (CYMBALTA) 30 mg delayed release capsule Take 1 capsule (30 mg total) by mouth daily Can take at night if it makes you sleepy 90 capsule 1    hydroCHLOROthiazide 25 mg tablet Take 1 tablet (25 mg total) by mouth daily 90 tablet 1    hydrocortisone 2.5 % cream       ketoconazole (NIZORAL) 2 % shampoo Apply 1 application topically 2 (two) times a week 120 mL 0    multivitamin (THERAGRAN) TABS Take 1 tablet by mouth daily      nebivolol (BYSTOLIC) 10 mg tablet Take 1 tablet (10 mg total) by mouth daily 90 tablet 1    omeprazole (PriLOSEC) 40 MG capsule as needed      tamsulosin (FLOMAX) 0.4 mg Take 1 capsule (0.4 mg total) by mouth  daily with dinner 90 capsule 1    azelastine (ASTELIN) 0.1 % nasal spray 1 spray into each nostril 2 (two) times a day Use in each nostril as directed (Patient not taking: Reported on 7/6/2023) 1 mL 2    cholecalciferol (VITAMIN D3) 1,000 units tablet Take 1,000 Units by mouth daily (Patient not taking: Reported on 3/2/2023)      fluticasone (FLONASE) 50 mcg/act nasal spray USE 2 SPRAYS IN EACH       NOSTRIL DAILY (Patient not taking: Reported on 10/16/2023) 48 g 1    levETIRAcetam (KEPPRA) 500 mg tablet Take 500 mg by mouth 2 (two) times a day (Patient not taking: Reported on 7/22/2024)      triamcinolone (KENALOG) 0.1 % oral topical paste Apply 1 Application topically 2 (two) times a day (Patient not taking: Reported on 11/15/2023) 5 g 0       Current Allergies     Allergies as of 07/22/2024 - Reviewed 07/22/2024   Allergen Reaction Noted    Lisinopril Throat Swelling, Edema, and Angioedema 12/20/2013    Amlodipine Palpitations 12/26/2013    Diltiazem Rash 01/15/2014            The following portions of the patient's history were reviewed and updated as appropriate: allergies, current medications, past family history, past medical history, past social history, past surgical history and problem list.    Objective   /62   Pulse 88   Temp 98.1 °F (36.7 °C) (Tympanic)   Resp 18   SpO2 98%        Physical Exam     Physical Exam  Vitals and nursing note reviewed.   Constitutional:       Appearance: Normal appearance. He is well-developed.   HENT:      Head: Normocephalic and atraumatic.   Eyes:      General: Lids are normal.      Conjunctiva/sclera: Conjunctivae normal.      Pupils: Pupils are equal, round, and reactive to light.   Cardiovascular:      Rate and Rhythm: Normal rate and regular rhythm.      Heart sounds: Normal heart sounds, S1 normal and S2 normal.   Pulmonary:      Effort: Pulmonary effort is normal.      Breath sounds: Normal breath sounds.   Musculoskeletal:        Feet:    Skin:      General: Skin is warm and dry.   Neurological:      Mental Status: He is alert.   Psychiatric:         Speech: Speech normal.         Behavior: Behavior normal.         Thought Content: Thought content normal.         Judgment: Judgment normal.

## 2024-07-22 NOTE — PATIENT INSTRUCTIONS
"Patient Education     Taking care of cuts, scrapes, and puncture wounds   The Basics   Written by the doctors and editors at [facility]   Does my cut need stitches? -- If your cut does not go all the way through the skin, it does not need stitches (figure 1). If your cut is wide, jagged, or does go all the way through the skin, you will most likely need stitches.  If you are not sure if your cut needs stitches, check with your doctor or nurse. Sometimes they will use special staples instead of stitches. Doctors can also use a special type of skin glue to close certain types of cuts.  This article is about caring for minor wounds (like small cuts and scrapes) that do not need to be closed with stitches, staples, or skin glue. If you got stitches, staples, or glue, your doctor or nurse will tell you how to care for yourself.  What if I have a puncture wound? -- A \"puncture wound\" is a type of cut that is made when a sharp object, like a nail, goes through the skin and into the tissue underneath. This type of wound can also be caused by animal or human bites. Puncture wounds are more likely than other minor wounds to get infected.  If you were bitten by an animal or human, see your doctor or nurse. Bite wounds need special care.  How do I take care of a minor wound on my own? -- To take care of your wound, follow these basic first aid guidelines:   Clean the wound - Wash it well with soap and water. If there is dirt, glass, or another object in your cut that you can't get out after you wash it, call your doctor or nurse.   Stop the bleeding - If your wound is bleeding, press a clean cloth or bandage firmly on the area for 20 minutes. You can also help slow the bleeding by holding the wound above the level of your heart, if possible. If the bleeding doesn't stop after 20 minutes, call your doctor or nurse.   Put a thin layer of antibiotic ointment on the wound   Cover the wound with a bandage or gauze. Keep the bandage " clean and dry. Change the bandage 1 to 2 times every day until your wound heals.   Do not swim or soak your wound in water until it has healed. Ask your doctor or nurse if you have any questions.   Each time you change the bandage, look at your skin to check for signs of infection. These include redness that is getting worse or spreading, swelling, or warmth in the area. You might see some thin clear or yellow fluid as the wound heals, which is normal.  Most minor wounds heal on their own within 7 to 10 days. As your wound heals, a scab will form. Do not pick at the scab or scratch the skin around it.  When should I call the doctor or nurse? -- Call the doctor or nurse if you have any signs of an infection. Signs of an infection include:   Fever   Redness, swelling, warmth, or increased pain around the wound   A bad smell coming from the wound   Pus (thick yellow, green, or gray fluid) draining from the wound   Red streaks on the skin around the wound, or red streaks going up your arm or leg  Will I need a tetanus shot? -- Maybe. It depends on how old you are and when your last tetanus shot was. Tetanus is a serious infection that can cause muscle stiffness and spasms, and even lead to death. It is caused by bacteria (germs) that live in the dirt.  Most children get a tetanus vaccine as part of their routine check-ups. Vaccines can prevent certain serious or deadly infections. Many adults also get a tetanus vaccine as part of their routine check-ups. Getting all your vaccines is important, since it's possible to get tetanus even from a small wound.  If your skin is cut or punctured, and especially if the cut is dirty or deep, ask your doctor or nurse if you need a tetanus shot.  All topics are updated as new evidence becomes available and our peer review process is complete.  This topic retrieved from [employer] on: Mar 20, 2024.  Topic 79109 Version 11.0  Release: 32.2.4 - C32.78  © 2024 UpToDate, Inc. and/or its  "affiliates. All rights reserved.  figure 1: Minor cuts and scrapes     Picture A shows a scrape (also called an \"abrasion\"). The scrape doesn't go all the way through the skin, so it does not need stitches. Picture B shows a cut that does go all the way through the skin. This cut is deep, so it needs stitches.  Graphic 89168 Version 4.0  Consumer Information Use and Disclaimer   Disclaimer: This generalized information is a limited summary of diagnosis, treatment, and/or medication information. It is not meant to be comprehensive and should be used as a tool to help the user understand and/or assess potential diagnostic and treatment options. It does NOT include all information about conditions, treatments, medications, side effects, or risks that may apply to a specific patient. It is not intended to be medical advice or a substitute for the medical advice, diagnosis, or treatment of a health care provider based on the health care provider's examination and assessment of a patient's specific and unique circumstances. Patients must speak with a health care provider for complete information about their health, medical questions, and treatment options, including any risks or benefits regarding use of medications. This information does not endorse any treatments or medications as safe, effective, or approved for treating a specific patient. UpToDate, Inc. and its affiliates disclaim any warranty or liability relating to this information or the use thereof.The use of this information is governed by the Terms of Use, available at https://www.woltersGuomaiuwer.com/en/know/clinical-effectiveness-terms. 2024© UpToDate, Inc. and its affiliates and/or licensors. All rights reserved.  Copyright   © 2024 UpToDate, Inc. and/or its affiliates. All rights reserved.    " within normal limits

## 2024-07-24 DIAGNOSIS — T14.8XXA PUNCTURE WOUND: Primary | ICD-10-CM

## 2024-07-24 RX ORDER — SULFAMETHOXAZOLE AND TRIMETHOPRIM 800; 160 MG/1; MG/1
1 TABLET ORAL EVERY 12 HOURS SCHEDULED
Qty: 14 TABLET | Refills: 0 | Status: SHIPPED | OUTPATIENT
Start: 2024-07-24 | End: 2024-07-31

## 2024-07-31 DIAGNOSIS — T14.8XXA PUNCTURE WOUND: Primary | ICD-10-CM

## 2024-08-02 ENCOUNTER — APPOINTMENT (OUTPATIENT)
Dept: RADIOLOGY | Facility: CLINIC | Age: 69
End: 2024-08-02
Payer: MEDICARE

## 2024-08-02 ENCOUNTER — OFFICE VISIT (OUTPATIENT)
Dept: FAMILY MEDICINE CLINIC | Facility: CLINIC | Age: 69
End: 2024-08-02
Payer: MEDICARE

## 2024-08-02 VITALS
SYSTOLIC BLOOD PRESSURE: 130 MMHG | TEMPERATURE: 97.5 F | DIASTOLIC BLOOD PRESSURE: 84 MMHG | HEART RATE: 115 BPM | WEIGHT: 207.6 LBS | HEIGHT: 68 IN | BODY MASS INDEX: 31.46 KG/M2 | OXYGEN SATURATION: 94 %

## 2024-08-02 DIAGNOSIS — J20.8 ACUTE BRONCHITIS DUE TO OTHER SPECIFIED ORGANISMS: ICD-10-CM

## 2024-08-02 DIAGNOSIS — J20.8 ACUTE BRONCHITIS DUE TO OTHER SPECIFIED ORGANISMS: Primary | ICD-10-CM

## 2024-08-02 PROCEDURE — G2211 COMPLEX E/M VISIT ADD ON: HCPCS | Performed by: FAMILY MEDICINE

## 2024-08-02 PROCEDURE — 71046 X-RAY EXAM CHEST 2 VIEWS: CPT

## 2024-08-02 PROCEDURE — 99213 OFFICE O/P EST LOW 20 MIN: CPT | Performed by: FAMILY MEDICINE

## 2024-08-02 RX ORDER — ALBUTEROL SULFATE 90 UG/1
2 AEROSOL, METERED RESPIRATORY (INHALATION) EVERY 6 HOURS PRN
Qty: 18 G | Refills: 0 | Status: SHIPPED | OUTPATIENT
Start: 2024-08-02

## 2024-08-02 RX ORDER — PREDNISONE 10 MG/1
TABLET ORAL
Qty: 15 TABLET | Refills: 0 | Status: SHIPPED | OUTPATIENT
Start: 2024-08-02

## 2024-08-02 RX ORDER — PREDNISOLONE ACETATE 10 MG/ML
SUSPENSION/ DROPS OPHTHALMIC
COMMUNITY
Start: 2024-05-14

## 2024-08-02 RX ORDER — BENZONATATE 100 MG/1
100 CAPSULE ORAL 3 TIMES DAILY PRN
Qty: 20 CAPSULE | Refills: 0 | Status: SHIPPED | OUTPATIENT
Start: 2024-08-02

## 2024-08-02 RX ORDER — AZITHROMYCIN 250 MG/1
TABLET, FILM COATED ORAL
Qty: 6 TABLET | Refills: 0 | Status: SHIPPED | OUTPATIENT
Start: 2024-08-02 | End: 2024-08-07

## 2024-08-02 NOTE — PROGRESS NOTES
"Assessment/Plan:     1. Acute bronchitis due to other specified organisms  Assessment & Plan:  Exam consistent with acute lower respiratory infection on exam; will tx with abx and steroids as ordered given age and co-morbidities; check CXR to r/o PNA; reviewed medication and potential SE; f/u guidance given  Orders:  -     azithromycin (Zithromax) 250 mg tablet; Take 2 tablets (500 mg total) by mouth daily for 1 day, THEN 1 tablet (250 mg total) daily for 4 days.  -     albuterol (Ventolin HFA) 90 mcg/act inhaler; Inhale 2 puffs every 6 (six) hours as needed for wheezing  -     benzonatate (TESSALON PERLES) 100 mg capsule; Take 1 capsule (100 mg total) by mouth 3 (three) times a day as needed for cough  -     XR chest pa & lateral; Future; Expected date: 08/02/2024  -     predniSONE 10 mg tablet; Take 5 tabs PO daily x 1 days; take 4 tabs QD x 1 days; take 3 tabs QD x 1 days; take 2 tabs QD x 1 days; take 1 tab QD x 1 days        Subjective:      Patient ID: Bradley Evans is a 68 y.o. male.    Upper Respiratory Infection  Patient complains of symptoms of a URI. Symptoms include nausea with vomiting, no  fever, post nasal drip, productive cough with  yellow colored sputum, and shortness of breath. Onset of symptoms was 7 days ago, and has been gradually worsening since that time. Treatment to date: none.                        The following portions of the patient's history were reviewed and updated as appropriate: allergies, current medications, past family history, past medical history, past social history, past surgical history, and problem list.    Review of Systems   Constitutional:  Negative for chills and fever.   HENT:  Negative for congestion.    Respiratory:  Positive for cough and wheezing.    Neurological:  Positive for headaches.         Objective:      /84 (BP Location: Left arm, Patient Position: Sitting, Cuff Size: Large)   Pulse (!) 115   Temp 97.5 °F (36.4 °C) (Temporal)   Ht 5' 7.5\" " (1.715 m)   Wt 94.2 kg (207 lb 9.6 oz)   SpO2 94%   BMI 32.03 kg/m²          Physical Exam  Vitals reviewed.   Constitutional:       General: He is not in acute distress.     Appearance: Normal appearance. He is not ill-appearing, toxic-appearing or diaphoretic.   HENT:      Head: Normocephalic and atraumatic.   Eyes:      General: No scleral icterus.        Right eye: No discharge.         Left eye: No discharge.      Conjunctiva/sclera: Conjunctivae normal.   Cardiovascular:      Rate and Rhythm: Normal rate and regular rhythm.      Pulses: Normal pulses.      Heart sounds: Normal heart sounds. No murmur heard.     No gallop.   Pulmonary:      Effort: Pulmonary effort is normal. No respiratory distress.      Breath sounds: No stridor. Examination of the right-lower field reveals wheezing. Examination of the left-lower field reveals wheezing. Wheezing present. No rhonchi or rales.   Musculoskeletal:      Right lower leg: No edema.      Left lower leg: No edema.   Neurological:      General: No focal deficit present.      Mental Status: He is alert and oriented to person, place, and time.   Psychiatric:         Mood and Affect: Mood normal.         Behavior: Behavior normal.         Thought Content: Thought content normal.         Judgment: Judgment normal.

## 2024-08-05 ENCOUNTER — PATIENT MESSAGE (OUTPATIENT)
Dept: FAMILY MEDICINE CLINIC | Facility: CLINIC | Age: 69
End: 2024-08-05

## 2024-08-05 PROBLEM — J20.8 ACUTE BRONCHITIS DUE TO OTHER SPECIFIED ORGANISMS: Status: ACTIVE | Noted: 2024-08-05

## 2024-08-05 NOTE — ASSESSMENT & PLAN NOTE
Exam consistent with acute lower respiratory infection on exam; will tx with abx and steroids as ordered given age and co-morbidities; check CXR to r/o PNA; reviewed medication and potential SE; f/u guidance given

## 2024-08-06 DIAGNOSIS — E78.2 MIXED HYPERLIPIDEMIA: ICD-10-CM

## 2024-08-06 RX ORDER — ATORVASTATIN CALCIUM 40 MG/1
40 TABLET, FILM COATED ORAL DAILY
Qty: 90 TABLET | Refills: 1 | Status: SHIPPED | OUTPATIENT
Start: 2024-08-06

## 2024-08-07 ENCOUNTER — CLINICAL SUPPORT (OUTPATIENT)
Dept: FAMILY MEDICINE CLINIC | Facility: CLINIC | Age: 69
End: 2024-08-07

## 2024-08-07 ENCOUNTER — TELEPHONE (OUTPATIENT)
Dept: FAMILY MEDICINE CLINIC | Facility: CLINIC | Age: 69
End: 2024-08-07

## 2024-08-07 ENCOUNTER — OFFICE VISIT (OUTPATIENT)
Dept: PODIATRY | Facility: CLINIC | Age: 69
End: 2024-08-07
Payer: MEDICARE

## 2024-08-07 ENCOUNTER — APPOINTMENT (OUTPATIENT)
Dept: RADIOLOGY | Facility: CLINIC | Age: 69
End: 2024-08-07
Payer: MEDICARE

## 2024-08-07 VITALS
DIASTOLIC BLOOD PRESSURE: 84 MMHG | DIASTOLIC BLOOD PRESSURE: 92 MMHG | HEART RATE: 90 BPM | HEART RATE: 79 BPM | BODY MASS INDEX: 32.49 KG/M2 | SYSTOLIC BLOOD PRESSURE: 133 MMHG | SYSTOLIC BLOOD PRESSURE: 135 MMHG | WEIGHT: 207 LBS | RESPIRATION RATE: 18 BRPM | HEIGHT: 67 IN

## 2024-08-07 DIAGNOSIS — T14.8XXA PUNCTURE WOUND: ICD-10-CM

## 2024-08-07 DIAGNOSIS — M79.671 RIGHT FOOT PAIN: Primary | ICD-10-CM

## 2024-08-07 DIAGNOSIS — M79.671 RIGHT FOOT PAIN: ICD-10-CM

## 2024-08-07 DIAGNOSIS — I10 BENIGN ESSENTIAL HYPERTENSION: Primary | ICD-10-CM

## 2024-08-07 PROCEDURE — 99204 OFFICE O/P NEW MOD 45 MIN: CPT | Performed by: PODIATRIST

## 2024-08-07 PROCEDURE — 73650 X-RAY EXAM OF HEEL: CPT

## 2024-08-07 RX ORDER — SULFAMETHOXAZOLE AND TRIMETHOPRIM 800; 160 MG/1; MG/1
1 TABLET ORAL EVERY 12 HOURS SCHEDULED
Qty: 14 TABLET | Refills: 0 | Status: SHIPPED | OUTPATIENT
Start: 2024-08-07 | End: 2024-08-14

## 2024-08-07 NOTE — PROGRESS NOTES
"Ambulatory Visit  Name: Bradley Evans      : 1955      MRN: [id number]  Encounter Provider: Ryan Dozier DPM  Encounter Date: 2024   Encounter department: [facility] [place]    Trimmed eschar at right heel.  No drainage or evidence of acute infection.  I personally reviewed x-rays taken today of the right heel.  They are negative for retention of foreign body and are negative for evidence of osteomyelitis.    The patient was placed on Bactrim DS twice daily for 7 more days.  Reappoint 2 to 3 weeks.        Assessment & Plan   1. Right foot pain  -     XR heel / calcaneus 2+ vw right; Future; Expected date: 2024  2. Puncture wound  -     Ambulatory Referral to Podiatry  -     sulfamethoxazole-trimethoprim (BACTRIM DS) 800-160 mg per tablet; Take 1 tablet by mouth every 12 (twelve) hours for 7 days  -     XR heel / calcaneus 2+ vw right; Future; Expected date: 2024      History of Present Illness     Bradley Evans is a 68 y.o. male who presents with concern regarding his right heel.  On  , patient stepped on a howard nail and it penetrated the right heel.  He was wearing slippers at the time of the injury.  He was placed on antibiotics on  and also had tetanus prophylaxis but no x-rays noted.  Patient states that he has completed the antibiotics approximately 1 week ago.  There is pain at the site of the puncture wound but it is mild.    I personally reviewed urgent care note dated  where tetanus prophylaxis was given for the puncture wound.    Review of Systems   Cardiovascular:         History of cerebral aneurysm   Gastrointestinal:         Reflux   Psychiatric/Behavioral: Negative.             Objective     /84   Pulse 90   Resp 18   Ht 5' 7\" (1.702 m)   Wt 93.9 kg (207 lb)   BMI 32.42 kg/m²     Physical Exam  Constitutional:       Appearance: Normal appearance.   Cardiovascular:      Pulses: Normal pulses.   Musculoskeletal:         " General: Tenderness present. No swelling. Normal range of motion.      Comments: Tenderness with direct pressure at the puncture wound central aspect right heel.   Skin:     Findings: Lesion present.      Comments: Small eschar central aspect right heel.   Neurological:      General: No focal deficit present.      Mental Status: He is oriented to person, place, and time.           Administrative Statements

## 2024-08-07 NOTE — TELEPHONE ENCOUNTER
Pts BP today was 133/92, Pulse 79.  Pts BP on his machine was 133/92. Pt is currently taking HCTZ and Bystolic, he is wondering if he can just take one of those medications. Please advise.

## 2024-08-09 ENCOUNTER — HOSPITAL ENCOUNTER (OUTPATIENT)
Dept: NON INVASIVE DIAGNOSTICS | Facility: HOSPITAL | Age: 69
Discharge: HOME | End: 2024-08-09
Payer: MEDICARE

## 2024-08-09 DIAGNOSIS — M79.604 PAIN AND SWELLING OF RIGHT LOWER EXTREMITY: ICD-10-CM

## 2024-08-09 DIAGNOSIS — M79.89 PAIN AND SWELLING OF RIGHT LOWER EXTREMITY: ICD-10-CM

## 2024-08-09 PROCEDURE — 93971 EXTREMITY STUDY: CPT | Performed by: SURGERY

## 2024-08-09 PROCEDURE — 93971 EXTREMITY STUDY: CPT

## 2024-08-11 LAB
APOB+LDLR+PCSK9 GENE MUT ANL BLD/T: NOT DETECTED
BRCA1+BRCA2 DEL+DUP + FULL MUT ANL BLD/T: NOT DETECTED
MLH1+MSH2+MSH6+PMS2 GN DEL+DUP+FUL M: NOT DETECTED

## 2024-08-14 ENCOUNTER — TELEPHONE (OUTPATIENT)
Dept: ADMINISTRATIVE | Facility: OTHER | Age: 69
End: 2024-08-14

## 2024-08-14 DIAGNOSIS — F33.9 DEPRESSION, RECURRENT (HCC): ICD-10-CM

## 2024-08-14 DIAGNOSIS — R52 PAIN: ICD-10-CM

## 2024-08-14 NOTE — TELEPHONE ENCOUNTER
08/14/24 3:18 PM    Patient contacted to bring Advance Directive, POLST, or Living Will document to next scheduled pcp visit.[facility] Department left message.    Thank you.  Marita Sprague MA  [facility]

## 2024-08-15 RX ORDER — DULOXETIN HYDROCHLORIDE 30 MG/1
30 CAPSULE, DELAYED RELEASE ORAL DAILY
Qty: 90 CAPSULE | Refills: 1 | Status: SHIPPED | OUTPATIENT
Start: 2024-08-15

## 2024-08-19 ENCOUNTER — TELEPHONE (OUTPATIENT)
Dept: FAMILY MEDICINE CLINIC | Facility: CLINIC | Age: 69
End: 2024-08-19

## 2024-08-20 ENCOUNTER — OFFICE VISIT (OUTPATIENT)
Dept: FAMILY MEDICINE CLINIC | Facility: CLINIC | Age: 69
End: 2024-08-20
Payer: MEDICARE

## 2024-08-20 VITALS
HEART RATE: 88 BPM | WEIGHT: 210 LBS | OXYGEN SATURATION: 97 % | TEMPERATURE: 97.6 F | BODY MASS INDEX: 32.89 KG/M2 | SYSTOLIC BLOOD PRESSURE: 108 MMHG | DIASTOLIC BLOOD PRESSURE: 78 MMHG

## 2024-08-20 DIAGNOSIS — I10 BENIGN ESSENTIAL HYPERTENSION: ICD-10-CM

## 2024-08-20 PROCEDURE — G2211 COMPLEX E/M VISIT ADD ON: HCPCS | Performed by: FAMILY MEDICINE

## 2024-08-20 PROCEDURE — 99214 OFFICE O/P EST MOD 30 MIN: CPT | Performed by: FAMILY MEDICINE

## 2024-08-20 NOTE — PROGRESS NOTES
Assessment/Plan:   1. Benign essential hypertension  Patient's blood pressure appears very well-controlled today.  At this time, continue with routine home blood pressure monitoring.  Continue with his current treatment of hydrochlorothiazide as well as his Bystolic.  He was advised that this medication is helping his blood pressure as well as his SVT.  He will be following up with cardiology within the next few weeks.  Continue with routine monitoring.            Diagnoses and all orders for this visit:    Benign essential hypertension    Other orders  -     fluticasone 50 mcg/actuation diskus inhaler; Inhale 1 puff 2 (two) times a day Rinse mouth after use.          Subjective:       Chief Complaint   Patient presents with    Blood Pressure Check     Possible reaction with Bystolic        Patient ID: Bradley Evans is a 68 y.o. male presenting with CC of potential adverse reactions to his Bystolic.  He has been having great difficulty with this medication.  He states he does not feel well on this medication.  He believes that this has been causing headaches as well as dizziness for him.  He would like to consider switching off this medication.    HPI    Review of Systems   Constitutional:  Negative for activity change, chills, fatigue and fever.   HENT:  Negative for congestion, ear pain, sinus pressure and sore throat.    Eyes:  Negative for redness, itching and visual disturbance.   Respiratory:  Negative for cough and shortness of breath.    Cardiovascular:  Negative for chest pain and palpitations.   Gastrointestinal:  Negative for abdominal pain, diarrhea and nausea.   Endocrine: Negative for cold intolerance and heat intolerance.   Genitourinary:  Negative for dysuria, flank pain and frequency.   Musculoskeletal:  Negative for arthralgias, back pain, gait problem and myalgias.   Skin:  Negative for color change.   Allergic/Immunologic: Negative for environmental allergies.   Neurological:  Negative for  dizziness, numbness and headaches.   Psychiatric/Behavioral:  Negative for behavioral problems and sleep disturbance.        The following portions of the patient's history were reviewed and updated as appropriate : past family history, past medical history, past social history and past surgical history.    Current Outpatient Medications:     albuterol (Ventolin HFA) 90 mcg/act inhaler, Inhale 2 puffs every 6 (six) hours as needed for wheezing, Disp: 18 g, Rfl: 0    ALPRAZolam (XANAX) 0.5 mg tablet, Take 1 tablet (0.5 mg total) by mouth daily as needed for anxiety, Disp: 30 tablet, Rfl: 0    apixaban (ELIQUIS) 5 mg, Take 5 mg by mouth 2 (two) times a day, Disp: , Rfl:     atorvastatin (LIPITOR) 40 mg tablet, Take 1 tablet (40 mg total) by mouth daily, Disp: 90 tablet, Rfl: 1    Coenzyme Q10 300 MG CAPS, Take by mouth daily , Disp: , Rfl:     DULoxetine (CYMBALTA) 30 mg delayed release capsule, Take 1 capsule (30 mg total) by mouth daily Can take at night if it makes you sleepy, Disp: 90 capsule, Rfl: 1    fluticasone 50 mcg/actuation diskus inhaler, Inhale 1 puff 2 (two) times a day Rinse mouth after use., Disp: , Rfl:     hydroCHLOROthiazide 25 mg tablet, Take 1 tablet (25 mg total) by mouth daily, Disp: 90 tablet, Rfl: 1    hydrocortisone 2.5 % cream, , Disp: , Rfl:     multivitamin (THERAGRAN) TABS, Take 1 tablet by mouth daily, Disp: , Rfl:     nebivolol (BYSTOLIC) 10 mg tablet, Take 1 tablet (10 mg total) by mouth daily, Disp: 90 tablet, Rfl: 1    omeprazole (PriLOSEC) 40 MG capsule, as needed, Disp: , Rfl:     tamsulosin (FLOMAX) 0.4 mg, Take 1 capsule (0.4 mg total) by mouth daily with dinner, Disp: 90 capsule, Rfl: 1    fluticasone (FLONASE) 50 mcg/act nasal spray, USE 2 SPRAYS IN EACH       NOSTRIL DAILY (Patient not taking: Reported on 10/16/2023), Disp: 48 g, Rfl: 1         Objective:         Vitals:    08/20/24 1009   BP: 108/78   BP Location: Right arm   Patient Position: Sitting   Cuff Size: Large    Pulse: 88   Temp: 97.6 °F (36.4 °C)   TempSrc: Temporal   SpO2: 97%   Weight: 95.3 kg (210 lb)     Physical Exam  Vitals reviewed.   Constitutional:       General: He is not in acute distress.     Appearance: Normal appearance. He is well-developed.   HENT:      Head: Normocephalic and atraumatic.      Right Ear: Tympanic membrane, ear canal and external ear normal. There is no impacted cerumen.      Left Ear: Tympanic membrane, ear canal and external ear normal. There is no impacted cerumen.      Nose: Nose normal. No congestion or rhinorrhea.      Mouth/Throat:      Mouth: Mucous membranes are moist.      Pharynx: No oropharyngeal exudate or posterior oropharyngeal erythema.   Eyes:      General: No scleral icterus.        Right eye: No discharge.         Left eye: No discharge.      Extraocular Movements: Extraocular movements intact.      Conjunctiva/sclera: Conjunctivae normal.      Pupils: Pupils are equal, round, and reactive to light.   Neck:      Trachea: No tracheal deviation.   Cardiovascular:      Rate and Rhythm: Normal rate and regular rhythm.      Pulses: Normal pulses.           Dorsalis pedis pulses are 2+ on the right side and 2+ on the left side.        Posterior tibial pulses are 2+ on the right side and 2+ on the left side.      Heart sounds: Normal heart sounds. No murmur heard.     No friction rub. No gallop.   Pulmonary:      Effort: Pulmonary effort is normal. No respiratory distress.      Breath sounds: Normal breath sounds. No wheezing, rhonchi or rales.   Abdominal:      General: Bowel sounds are normal. There is no distension.      Palpations: Abdomen is soft.      Tenderness: There is no abdominal tenderness. There is no guarding or rebound.   Musculoskeletal:         General: Normal range of motion.      Cervical back: Normal range of motion and neck supple.      Right lower leg: No edema.      Left lower leg: No edema.   Lymphadenopathy:      Head:      Right side of head: No  submental or submandibular adenopathy.      Left side of head: No submental or submandibular adenopathy.      Cervical: No cervical adenopathy.      Right cervical: No superficial, deep or posterior cervical adenopathy.     Left cervical: No superficial, deep or posterior cervical adenopathy.   Skin:     General: Skin is warm and dry.      Findings: No erythema.   Neurological:      General: No focal deficit present.      Mental Status: He is alert and oriented to person, place, and time.      Cranial Nerves: No cranial nerve deficit.      Sensory: Sensation is intact. No sensory deficit.      Motor: Motor function is intact.   Psychiatric:         Attention and Perception: Attention and perception normal.         Mood and Affect: Mood is not anxious or depressed.         Speech: Speech normal.         Behavior: Behavior normal.         Thought Content: Thought content normal.         Judgment: Judgment normal.

## 2024-08-22 ENCOUNTER — CONSULT (OUTPATIENT)
Dept: UROLOGY | Facility: CLINIC | Age: 69
End: 2024-08-22
Payer: MEDICARE

## 2024-08-22 VITALS
HEIGHT: 67 IN | WEIGHT: 213 LBS | SYSTOLIC BLOOD PRESSURE: 132 MMHG | OXYGEN SATURATION: 98 % | HEART RATE: 76 BPM | BODY MASS INDEX: 33.43 KG/M2 | DIASTOLIC BLOOD PRESSURE: 88 MMHG

## 2024-08-22 DIAGNOSIS — R35.1 NOCTURIA: Primary | ICD-10-CM

## 2024-08-22 DIAGNOSIS — N40.1 BENIGN PROSTATIC HYPERPLASIA WITH NOCTURIA: ICD-10-CM

## 2024-08-22 DIAGNOSIS — R35.1 BENIGN PROSTATIC HYPERPLASIA WITH NOCTURIA: ICD-10-CM

## 2024-08-22 DIAGNOSIS — Z12.5 SCREENING FOR PROSTATE CANCER: ICD-10-CM

## 2024-08-22 LAB — POST-VOID RESIDUAL VOLUME, ML POC: 60 ML

## 2024-08-22 PROCEDURE — 51798 US URINE CAPACITY MEASURE: CPT | Performed by: UROLOGY

## 2024-08-22 PROCEDURE — 99204 OFFICE O/P NEW MOD 45 MIN: CPT | Performed by: UROLOGY

## 2024-08-22 NOTE — ASSESSMENT & PLAN NOTE
Impression: BPH, routine prostate cancer screening    Plan: Provide the patient with reassurance that digital rectal examination is within normal limits.  In addition he feels that his prostate supplement is working well for him.  I informed him that this is not designed to diagnose or treat prostate enlargement, however, if the patient feels this is working well for him and the cost is reasonable he can certainly continue.  He defers tamsulosin.  I recommend that he return in 1 year with a PVR uroflow and repeat PSA level.  The patient is amenable with this plan.

## 2024-08-22 NOTE — PROGRESS NOTES
8/22/2024    Bradley Evans  1955  [id number]    1. Nocturia  -     Ambulatory Referral to Urology  -     POCT Measure PVR  2. Screening for prostate cancer  -     PSA, Total Screen; Future; Expected date: 08/22/2025  3. Benign prostatic hyperplasia with nocturia  Assessment & Plan:  Impression: BPH, routine prostate cancer screening    Plan: Provide the patient with reassurance that digital rectal examination is within normal limits.  In addition he feels that his prostate supplement is working well for him.  I informed him that this is not designed to diagnose or treat prostate enlargement, however, if the patient feels this is working well for him and the cost is reasonable he can certainly continue.  He defers tamsulosin.  I recommend that he return in 1 year with a PVR uroflow and repeat PSA level.  The patient is amenable with this plan.       History of Present Illness  68 y.o. male with a history of mild lower urinary tract symptoms.  He states that in the past he was trialed on tamsulosin but did not see a difference.  Currently he is taking an over-the-counter supplement for prostate health and feels that this has significantly decreased nocturia down to 1-2 episodes per night.  He states that he is voiding well with an adequate urinary stream and empties to completion.  He denies any family history of prostate cancer.  He is referred for his mild lower urinary tract symptoms as well as for routine prostate cancer screening.  He denies ever seeing gross hematuria.  He states that he quit alcohol more than 1 year ago.  He denies family history of prostate cancer.    Recent PSA level is 1.06 from June 2023.      AUA Symptom Score      Review of Systems   Constitutional: Negative.    HENT: Negative.     Eyes: Negative.    Respiratory: Negative.     Cardiovascular: Negative.    Gastrointestinal: Negative.    Endocrine: Negative.    Genitourinary:         Per HPI   Musculoskeletal: Negative.    Skin:  Negative.    Allergic/Immunologic: Negative.    Neurological: Negative.    Hematological: Negative.    Psychiatric/Behavioral: Negative.         Past Medical History  Past Medical History:   Diagnosis Date   • Abdominal pain, right lower quadrant    • Abnormal blood chemistry    • Acid reflux    • Aneurysm of internal carotid artery     left sided - incidental finding on MRI   • Cancer (HCC)     skin   • Colon polyp    • Contact dermatitis due to poison ivy    • Depression screen    • Flu    • High blood pressure    • History of acute conjunctivitis     right eye   • History of allergic urticaria    • History of chronic rhinitis    • History of disease of skin and subcutaneous tissue    • History of dysphagia    • History of hemorrhoids    • History of IBS    • History of URI (upper respiratory infection)    • Hyperlipidemia    • Ocular migraine    • Open wound of left lower leg    • Other acute sinusitis    • Other disturbances of oral epithelium, including tongue    • Positive depression screening    • Pulmonary emboli (HCC) 2019    pt is on eliquis   • Screening for prostate cancer    • Skin rash    • Sleep apnea     snores but has not had the study   • Special screening examination for neoplasm of prostate    • Tingling        Past Social History  Past Surgical History:   Procedure Laterality Date   • COLONOSCOPY     • COLONOSCOPY W/ POLYPECTOMY      Via Colostomy   • RECTAL SURGERY     • TONSILLECTOMY     • WISDOM TOOTH EXTRACTION         Past Family History  Family History   Problem Relation Age of Onset   • Breast cancer Mother 66   • Brain cancer Mother    • Lung cancer Father    • No Known Problems Sister    • No Known Problems Daughter    • No Known Problems Maternal Grandmother    • No Known Problems Maternal Grandfather    • No Known Problems Paternal Grandmother    • No Known Problems Paternal Grandfather    • Lung cancer Family    • Substance Abuse Neg Hx         Mother and Father   • Mental illness  Neg Hx         Mother and Father       Past Social history  Social History     Socioeconomic History   • Marital status:      Spouse name: Not on file   • Number of children: Not on file   • Years of education: Not on file   • Highest education level: Not on file   Occupational History   • Not on file   Tobacco Use   • Smoking status: Former     Current packs/day: 0.00     Average packs/day: 0.5 packs/day for 7.5 years (3.7 ttl pk-yrs)     Types: Cigarettes     Start date:      Quit date: 1982     Years since quittin.1     Passive exposure: Never   • Smokeless tobacco: Former   Vaping Use   • Vaping status: Never Used   Substance and Sexual Activity   • Alcohol use: Not Currently     Comment: Social   • Drug use: Never   • Sexual activity: Not on file   Other Topics Concern   • Not on file   Social History Narrative    Feels Safe at Home     Social Determinants of Health     Financial Resource Strain: Low Risk  (2023)    [facility], [facility]    Overall Financial Resource Strain (CARDIA)    • Difficulty of Paying Living Expenses: Not hard at all   Food Insecurity: No Food Insecurity (2024)    Hunger Vital Sign    • Worried About Running Out of Food in the Last Year: Never true    • Ran Out of Food in the Last Year: Never true   Transportation Needs: No Transportation Needs (2024)    PRAPARE - Transportation    • Lack of Transportation (Medical): No    • Lack of Transportation (Non-Medical): No   Physical Activity: Not on file   Stress: Not on file   Social Connections: Not on file   Intimate Partner Violence: Not At Risk (2023)    [facility] from [facility], [facility]    Humiliation, Afraid, Rape, and Kick questionnaire    • Fear of Current or Ex-Partner: No    • Emotionally Abused: No    • Physically Abused: No    • Sexually Abused: No   Housing Stability: Low Risk  (2024)    Housing  Stability Vital Sign    • Unable to Pay for Housing in the Last Year: No    • Number of Times Moved in the Last Year: 1    • Homeless in the Last Year: No       Current Medications  Current Outpatient Medications   Medication Sig Dispense Refill   • albuterol (Ventolin HFA) 90 mcg/act inhaler Inhale 2 puffs every 6 (six) hours as needed for wheezing 18 g 0   • ALPRAZolam (XANAX) 0.5 mg tablet Take 1 tablet (0.5 mg total) by mouth daily as needed for anxiety 30 tablet 0   • apixaban (ELIQUIS) 5 mg Take 5 mg by mouth 2 (two) times a day     • atorvastatin (LIPITOR) 40 mg tablet Take 1 tablet (40 mg total) by mouth daily 90 tablet 1   • Coenzyme Q10 300 MG CAPS Take by mouth daily      • DULoxetine (CYMBALTA) 30 mg delayed release capsule Take 1 capsule (30 mg total) by mouth daily Can take at night if it makes you sleepy 90 capsule 1   • fluticasone 50 mcg/actuation diskus inhaler Inhale 1 puff 2 (two) times a day Rinse mouth after use.     • hydrocortisone 2.5 % cream      • multivitamin (THERAGRAN) TABS Take 1 tablet by mouth daily     • nebivolol (BYSTOLIC) 10 mg tablet Take 1 tablet (10 mg total) by mouth daily (Patient taking differently: Take 10 mg by mouth every other day) 90 tablet 1   • omeprazole (PriLOSEC) 40 MG capsule as needed     • patient supplied medication Take 3 each by mouth in the morning     • tamsulosin (FLOMAX) 0.4 mg Take 1 capsule (0.4 mg total) by mouth daily with dinner 90 capsule 1   • fluticasone (FLONASE) 50 mcg/act nasal spray USE 2 SPRAYS IN EACH       NOSTRIL DAILY (Patient not taking: Reported on 10/16/2023) 48 g 1   • hydroCHLOROthiazide 25 mg tablet Take 1 tablet (25 mg total) by mouth daily (Patient not taking: Reported on 8/22/2024) 90 tablet 1     No current facility-administered medications for this visit.       Allergies  Allergies   Allergen Reactions   • Lisinopril Throat Swelling, Edema and Angioedema   • Amlodipine Palpitations   • Diltiazem Rash     Hot rash on feet  "      Past Medical History, Social History, Family History, medications and allergies were reviewed.    Vitals  Vitals:    08/22/24 1046   BP: 132/88   BP Location: Left arm   Patient Position: Sitting   Cuff Size: Adult   Pulse: 76   SpO2: 98%   Weight: 96.6 kg (213 lb)   Height: 5' 7\" (1.702 m)       Physical Exam  On examination he is in no acute distress.  His abdomen is soft nontender nondistended.   examination reveals normal phallus, scrotum and scrotal contents.  Digital rectal examination reveals a benign 40 g gland.  Skin is warm.  Extremities without edema.  Neurologic grossly intact and nonfocal.  Gait normal.  Affect normal.  Results  Lab Results   Component Value Date    PSA 1.06 06/23/2023    PSA 0.98 09/10/2022    PSA 1.1 05/26/2021     Lab Results   Component Value Date    GLUCOSE 101 01/12/2014    CALCIUM 9.2 06/17/2024     09/13/2017    K 3.8 06/17/2024    CO2 29 06/17/2024     06/17/2024    BUN 13 06/17/2024    CREATININE 1.20 06/17/2024     Lab Results   Component Value Date    WBC 10.43 (H) 06/17/2024    HGB 14.5 06/17/2024    HCT 44.2 06/17/2024    MCV 89 06/17/2024     06/17/2024       Office Urine Dip  Recent Results (from the past 1 hour(s))   POCT Measure PVR    Collection Time: 08/22/24 10:54 AM   Result Value Ref Range    POST-VOID RESIDUAL VOLUME, ML POC 60 mL   ]  "

## 2024-09-03 DIAGNOSIS — J31.0 CHRONIC RHINITIS: ICD-10-CM

## 2024-09-04 PROBLEM — J20.8 ACUTE BRONCHITIS DUE TO OTHER SPECIFIED ORGANISMS: Status: RESOLVED | Noted: 2024-08-05 | Resolved: 2024-09-04

## 2024-09-05 RX ORDER — FLUTICASONE PROPIONATE 50 MCG
2 SPRAY, SUSPENSION (ML) NASAL DAILY
Qty: 48 G | Refills: 1 | Status: SHIPPED | OUTPATIENT
Start: 2024-09-05

## 2024-09-17 DIAGNOSIS — K58.9 IRRITABLE BOWEL SYNDROME, UNSPECIFIED TYPE: Primary | ICD-10-CM

## 2024-09-17 RX ORDER — OMEPRAZOLE 40 MG/1
40 CAPSULE, DELAYED RELEASE ORAL AS NEEDED
Qty: 90 CAPSULE | Refills: 0 | Status: SHIPPED | OUTPATIENT
Start: 2024-09-17

## 2024-10-10 ENCOUNTER — IMMUNIZATIONS (OUTPATIENT)
Dept: FAMILY MEDICINE CLINIC | Facility: CLINIC | Age: 69
End: 2024-10-10
Payer: MEDICARE

## 2024-10-10 DIAGNOSIS — Z23 ENCOUNTER FOR IMMUNIZATION: Primary | ICD-10-CM

## 2024-10-10 PROCEDURE — 90662 IIV NO PRSV INCREASED AG IM: CPT

## 2024-10-10 PROCEDURE — G0008 ADMIN INFLUENZA VIRUS VAC: HCPCS

## 2024-10-16 DIAGNOSIS — I10 BENIGN ESSENTIAL HYPERTENSION: ICD-10-CM

## 2024-10-17 RX ORDER — NEBIVOLOL 10 MG/1
10 TABLET ORAL DAILY
Qty: 90 TABLET | Refills: 1 | Status: SHIPPED | OUTPATIENT
Start: 2024-10-17

## 2024-10-24 ENCOUNTER — PATIENT MESSAGE (OUTPATIENT)
Dept: FAMILY MEDICINE CLINIC | Facility: CLINIC | Age: 69
End: 2024-10-24

## 2024-10-28 DIAGNOSIS — I67.1 CEREBRAL ANEURYSM, NONRUPTURED: Primary | ICD-10-CM

## 2024-10-28 DIAGNOSIS — I47.10 SVT (SUPRAVENTRICULAR TACHYCARDIA) (HCC): ICD-10-CM

## 2024-10-30 ENCOUNTER — TELEPHONE (OUTPATIENT)
Dept: ADMINISTRATIVE | Facility: OTHER | Age: 69
End: 2024-10-30

## 2024-10-30 ENCOUNTER — CLINICAL SUPPORT (OUTPATIENT)
Dept: FAMILY MEDICINE CLINIC | Facility: CLINIC | Age: 69
End: 2024-10-30

## 2024-10-30 VITALS — SYSTOLIC BLOOD PRESSURE: 157 MMHG | DIASTOLIC BLOOD PRESSURE: 99 MMHG

## 2024-10-30 DIAGNOSIS — I10 BENIGN ESSENTIAL HYPERTENSION: Primary | ICD-10-CM

## 2024-10-30 NOTE — PROGRESS NOTES
Patient walks into office today requesting to have blood pressure checked.  He brought his home monitors (two of them) along for comparison.  Upon rooming his BP is 164/100 in left arm.  He states he has ongoing headaches, but denies dizziness, chest pain or shortness of breath.  Home monitor on left arm read 157/99.  Other home monitor reads 155/106. Confirmed with patient he is taking Bystolic 10 mg daily.,

## 2024-10-30 NOTE — TELEPHONE ENCOUNTER
10/30/24 12:32 PM    Patient contacted to bring Advance Directive, POLST, or Living Will document to next scheduled pcp visit.[facility] Department left message.    Thank you.  Rachel Owens  [facility]

## 2024-11-05 ENCOUNTER — OFFICE VISIT (OUTPATIENT)
Dept: FAMILY MEDICINE CLINIC | Facility: CLINIC | Age: 69
End: 2024-11-05
Payer: MEDICARE

## 2024-11-05 VITALS
TEMPERATURE: 98 F | HEART RATE: 78 BPM | SYSTOLIC BLOOD PRESSURE: 120 MMHG | HEIGHT: 67 IN | BODY MASS INDEX: 32.58 KG/M2 | WEIGHT: 207.6 LBS | DIASTOLIC BLOOD PRESSURE: 68 MMHG | OXYGEN SATURATION: 97 %

## 2024-11-05 DIAGNOSIS — I10 BENIGN ESSENTIAL HYPERTENSION: Primary | ICD-10-CM

## 2024-11-05 PROCEDURE — G2211 COMPLEX E/M VISIT ADD ON: HCPCS | Performed by: FAMILY MEDICINE

## 2024-11-05 PROCEDURE — 99214 OFFICE O/P EST MOD 30 MIN: CPT | Performed by: FAMILY MEDICINE

## 2024-11-05 RX ORDER — NAPROXEN SODIUM 220 MG/1
220 TABLET, FILM COATED ORAL
COMMUNITY

## 2024-11-05 RX ORDER — FAMOTIDINE 40 MG/1
TABLET, FILM COATED ORAL
COMMUNITY

## 2024-11-05 NOTE — PROGRESS NOTES
Assessment/Plan:   1. Benign essential hypertension  Blood pressure appears stable today.  At this time, we will continue with routine home monitoring.  At this time, we will continue with his current treatment of Bystolic.  Will avoid any further addition of hydrochlorothiazide as this could cause hypotensive symptoms.  If he notes any persisting elevations, consider a low-dose of hydrochlorothiazide.           Diagnoses and all orders for this visit:    Benign essential hypertension    Other orders  -     famotidine (Pepcid) 40 MG tablet; Take by mouth  -     naproxen sodium (ALEVE) 220 MG tablet; Take 220 mg by mouth          Subjective:       Chief Complaint   Patient presents with    Hypertension      Patient ID: Bradley Evans is a 68 y.o. male presents today for a follow-up on his hypertension.  He states that he has been having elevated blood pressure readings for the past few weeks.  Symptoms started gradual.  He states that has been checking his blood pressures routinely.  He has been noticing elevations into the 150s.  He has been taking his Bystolic at nighttime.  He states that he did not realize that he was checking his blood pressure as his medication would be wearing off the next evening.  He denies any chest pain or palpitations.    Hypertension  Pertinent negatives include no chest pain, headaches, palpitations or shortness of breath.       Review of Systems   Constitutional:  Negative for activity change, chills, fatigue and fever.   HENT:  Negative for congestion, ear pain, sinus pressure and sore throat.    Eyes:  Negative for redness, itching and visual disturbance.   Respiratory:  Negative for cough and shortness of breath.    Cardiovascular:  Negative for chest pain and palpitations.   Gastrointestinal:  Negative for abdominal pain, diarrhea and nausea.   Endocrine: Negative for cold intolerance and heat intolerance.   Genitourinary:  Negative for dysuria, flank pain and frequency.    Musculoskeletal:  Negative for arthralgias, back pain, gait problem and myalgias.   Skin:  Negative for color change.   Allergic/Immunologic: Negative for environmental allergies.   Neurological:  Negative for dizziness, numbness and headaches.   Psychiatric/Behavioral:  Negative for behavioral problems and sleep disturbance.        The following portions of the patient's history were reviewed and updated as appropriate : past family history, past medical history, past social history and past surgical history.    Current Outpatient Medications:     albuterol (Ventolin HFA) 90 mcg/act inhaler, Inhale 2 puffs every 6 (six) hours as needed for wheezing, Disp: 18 g, Rfl: 0    ALPRAZolam (XANAX) 0.5 mg tablet, Take 1 tablet (0.5 mg total) by mouth daily as needed for anxiety, Disp: 30 tablet, Rfl: 0    apixaban (ELIQUIS) 5 mg, Take 5 mg by mouth 2 (two) times a day, Disp: , Rfl:     atorvastatin (LIPITOR) 40 mg tablet, Take 1 tablet (40 mg total) by mouth daily, Disp: 90 tablet, Rfl: 1    Coenzyme Q10 300 MG CAPS, Take by mouth daily , Disp: , Rfl:     DULoxetine (CYMBALTA) 30 mg delayed release capsule, Take 1 capsule (30 mg total) by mouth daily Can take at night if it makes you sleepy, Disp: 90 capsule, Rfl: 1    famotidine (Pepcid) 40 MG tablet, Take by mouth, Disp: , Rfl:     fluticasone (FLONASE) 50 mcg/act nasal spray, 2 sprays into each nostril daily, Disp: 48 g, Rfl: 1    fluticasone 50 mcg/actuation diskus inhaler, Inhale 1 puff 2 (two) times a day Rinse mouth after use., Disp: , Rfl:     hydrocortisone 2.5 % cream, , Disp: , Rfl:     multivitamin (THERAGRAN) TABS, Take 1 tablet by mouth daily, Disp: , Rfl:     naproxen sodium (ALEVE) 220 MG tablet, Take 220 mg by mouth, Disp: , Rfl:     nebivolol (BYSTOLIC) 10 mg tablet, TAKE 1 TABLET DAILY, Disp: 90 tablet, Rfl: 1    omeprazole (PriLOSEC) 40 MG capsule, Take 1 capsule (40 mg total) by mouth as needed (as needed), Disp: 90 capsule, Rfl: 0    patient supplied  "medication, Take 3 each by mouth in the morning, Disp: , Rfl:     tamsulosin (FLOMAX) 0.4 mg, Take 1 capsule (0.4 mg total) by mouth daily with dinner, Disp: 90 capsule, Rfl: 1    hydroCHLOROthiazide 25 mg tablet, Take 1 tablet (25 mg total) by mouth daily (Patient not taking: Reported on 8/22/2024), Disp: 90 tablet, Rfl: 1         Objective:         Vitals:    11/05/24 1502   BP: 120/68   BP Location: Left arm   Patient Position: Sitting   Cuff Size: Large   Pulse: 78   Temp: 98 °F (36.7 °C)   TempSrc: Tympanic   SpO2: 97%   Weight: 94.2 kg (207 lb 9.6 oz)   Height: 5' 7\" (1.702 m)     Physical Exam  Vitals reviewed.   Constitutional:       General: He is not in acute distress.     Appearance: Normal appearance. He is well-developed.   HENT:      Head: Normocephalic and atraumatic.      Right Ear: Tympanic membrane, ear canal and external ear normal. There is no impacted cerumen.      Left Ear: Tympanic membrane, ear canal and external ear normal. There is no impacted cerumen.      Nose: Nose normal. No congestion or rhinorrhea.      Mouth/Throat:      Mouth: Mucous membranes are moist.      Pharynx: No oropharyngeal exudate or posterior oropharyngeal erythema.   Eyes:      General: No scleral icterus.        Right eye: No discharge.         Left eye: No discharge.      Extraocular Movements: Extraocular movements intact.      Conjunctiva/sclera: Conjunctivae normal.      Pupils: Pupils are equal, round, and reactive to light.   Neck:      Trachea: No tracheal deviation.   Cardiovascular:      Rate and Rhythm: Normal rate and regular rhythm.      Pulses: Normal pulses.           Dorsalis pedis pulses are 2+ on the right side and 2+ on the left side.        Posterior tibial pulses are 2+ on the right side and 2+ on the left side.      Heart sounds: Normal heart sounds. No murmur heard.     No friction rub. No gallop.   Pulmonary:      Effort: Pulmonary effort is normal. No respiratory distress.      Breath sounds: " Normal breath sounds. No wheezing, rhonchi or rales.   Abdominal:      General: Bowel sounds are normal. There is no distension.      Palpations: Abdomen is soft.      Tenderness: There is no abdominal tenderness. There is no guarding or rebound.   Musculoskeletal:         General: Normal range of motion.      Cervical back: Normal range of motion and neck supple.      Right lower leg: No edema.      Left lower leg: No edema.   Lymphadenopathy:      Head:      Right side of head: No submental or submandibular adenopathy.      Left side of head: No submental or submandibular adenopathy.      Cervical: No cervical adenopathy.      Right cervical: No superficial, deep or posterior cervical adenopathy.     Left cervical: No superficial, deep or posterior cervical adenopathy.   Skin:     General: Skin is warm and dry.      Findings: No erythema.   Neurological:      General: No focal deficit present.      Mental Status: He is alert and oriented to person, place, and time.      Cranial Nerves: No cranial nerve deficit.      Sensory: Sensation is intact. No sensory deficit.      Motor: Motor function is intact.   Psychiatric:         Attention and Perception: Attention and perception normal.         Mood and Affect: Mood is not anxious or depressed.         Speech: Speech normal.         Behavior: Behavior normal.         Thought Content: Thought content normal.         Judgment: Judgment normal.

## 2024-11-21 ENCOUNTER — TRANSITIONAL CARE MANAGEMENT (OUTPATIENT)
Dept: FAMILY MEDICINE CLINIC | Facility: CLINIC | Age: 69
End: 2024-11-21

## 2024-11-21 NOTE — TELEPHONE ENCOUNTER
Patient called in and is aware to continue medication and will start keeping a log of his blood pressure and follow up as needed.   21-Nov-2024 14:07

## 2024-11-22 ENCOUNTER — TELEPHONE (OUTPATIENT)
Age: 69
End: 2024-11-22

## 2024-11-22 NOTE — TELEPHONE ENCOUNTER
Patient was returning Tara call to set up a TCM appt. Called over to the office and didn't get an answer.    Please call Bradley back at  and advise.    Thank you

## 2024-12-17 ENCOUNTER — TELEPHONE (OUTPATIENT)
Dept: FAMILY MEDICINE CLINIC | Facility: CLINIC | Age: 69
End: 2024-12-17

## 2024-12-18 ENCOUNTER — OFFICE VISIT (OUTPATIENT)
Dept: FAMILY MEDICINE CLINIC | Facility: CLINIC | Age: 69
End: 2024-12-18
Payer: MEDICARE

## 2024-12-18 VITALS
TEMPERATURE: 97.9 F | HEART RATE: 87 BPM | BODY MASS INDEX: 31.49 KG/M2 | SYSTOLIC BLOOD PRESSURE: 148 MMHG | WEIGHT: 207.8 LBS | DIASTOLIC BLOOD PRESSURE: 78 MMHG | OXYGEN SATURATION: 96 % | HEIGHT: 68 IN

## 2024-12-18 DIAGNOSIS — J01.90 ACUTE SINUSITIS, RECURRENCE NOT SPECIFIED, UNSPECIFIED LOCATION: Primary | ICD-10-CM

## 2024-12-18 PROCEDURE — G2211 COMPLEX E/M VISIT ADD ON: HCPCS | Performed by: NURSE PRACTITIONER

## 2024-12-18 PROCEDURE — 99213 OFFICE O/P EST LOW 20 MIN: CPT | Performed by: NURSE PRACTITIONER

## 2024-12-18 NOTE — PROGRESS NOTES
"Name: Bradley Evans      : 1955      MRN: [id number]  Encounter Provider: GABRIELLA Severino  Encounter Date: 2024   Encounter department: [facility]  :  Assessment & Plan  Acute sinusitis, recurrence not specified, unspecified location  Abx as ordered - dose/use reviewed; advised on supportive care: rest, hydrate, immune support, probiotic;  f/u guidance given should sx not improve    Orders:    amoxicillin-clavulanate (AUGMENTIN) 875-125 mg per tablet; Take 1 tablet by mouth every 12 (twelve) hours for 7 days           History of Present Illness     Acute visit  URI. Started >1 week ago. Home Covid negative 4 days ago. Started with sore throat - now resolved. Reports currently with cough, chest and sinus congestion, sinus pressure.  No fever. No GI.  OTC : none      Review of Systems   Constitutional:  Positive for fatigue. Negative for activity change, appetite change and fever.   HENT:  Positive for congestion, postnasal drip, sinus pressure, sinus pain and sore throat. Negative for ear pain.    Respiratory:  Positive for cough. Negative for chest tightness, shortness of breath and wheezing.    Cardiovascular: Negative.    Gastrointestinal: Negative.    Neurological: Negative.    Psychiatric/Behavioral: Negative.         Objective   /78   Pulse 87   Temp 97.9 °F (36.6 °C)   Ht 5' 8.11\" (1.73 m)   Wt 94.3 kg (207 lb 12.8 oz)   SpO2 96%   BMI 31.49 kg/m²      Physical Exam  Vitals and nursing note reviewed.   Constitutional:       General: He is not in acute distress.     Appearance: Normal appearance. He is well-developed and well-groomed. He is not ill-appearing.   HENT:      Right Ear: Tympanic membrane and ear canal normal.      Left Ear: Tympanic membrane and ear canal normal.      Nose: Mucosal edema, congestion and rhinorrhea present. Rhinorrhea is purulent.      Right Turbinates: Swollen.      Left Turbinates: Swollen.      Right Sinus: Maxillary " sinus tenderness present.      Left Sinus: Maxillary sinus tenderness present.      Mouth/Throat:      Pharynx: Oropharynx is clear. No oropharyngeal exudate or posterior oropharyngeal erythema.   Cardiovascular:      Rate and Rhythm: Normal rate and regular rhythm.   Pulmonary:      Effort: Pulmonary effort is normal. No respiratory distress.      Breath sounds: Normal breath sounds and air entry.   Lymphadenopathy:      Cervical: No cervical adenopathy.   Skin:     General: Skin is warm and dry.   Neurological:      General: No focal deficit present.      Mental Status: He is alert and oriented to person, place, and time.   Psychiatric:         Attention and Perception: Attention normal.         Mood and Affect: Mood normal.         Behavior: Behavior normal.         Thought Content: Thought content normal.         Judgment: Judgment normal.

## 2024-12-22 ENCOUNTER — PATIENT MESSAGE (OUTPATIENT)
Dept: FAMILY MEDICINE CLINIC | Facility: CLINIC | Age: 69
End: 2024-12-22

## 2024-12-22 DIAGNOSIS — E53.8 B12 DEFICIENCY: ICD-10-CM

## 2024-12-22 DIAGNOSIS — E53.1 VITAMIN B6 DEFICIENCY: Primary | ICD-10-CM

## 2024-12-30 PROBLEM — K59.01 SLOW TRANSIT CONSTIPATION: Status: ACTIVE | Noted: 2024-12-30

## 2024-12-30 PROBLEM — Z86.0100 HISTORY OF COLON POLYPS: Status: ACTIVE | Noted: 2024-12-30

## 2025-01-03 DIAGNOSIS — K58.9 IRRITABLE BOWEL SYNDROME, UNSPECIFIED TYPE: ICD-10-CM

## 2025-01-03 RX ORDER — OMEPRAZOLE 40 MG/1
40 CAPSULE, DELAYED RELEASE ORAL AS NEEDED
Qty: 90 CAPSULE | Refills: 1 | Status: SHIPPED | OUTPATIENT
Start: 2025-01-03

## 2025-01-05 LAB
MAGNESIUM SERPL-MCNC: 1.9 MG/DL (ref 1.5–2.5)
VIT B12 SERPL-MCNC: 385 PG/ML (ref 200–1100)

## 2025-01-09 ENCOUNTER — PATIENT MESSAGE (OUTPATIENT)
Dept: FAMILY MEDICINE CLINIC | Facility: CLINIC | Age: 70
End: 2025-01-09

## 2025-01-09 LAB
MAGNESIUM SERPL-MCNC: 1.9 MG/DL (ref 1.5–2.5)
VIT B12 SERPL-MCNC: 385 PG/ML (ref 200–1100)
VIT B6 SERPL-MCNC: 71.5 NG/ML (ref 2.1–21.7)

## 2025-02-04 ENCOUNTER — PATIENT MESSAGE (OUTPATIENT)
Dept: FAMILY MEDICINE CLINIC | Facility: CLINIC | Age: 70
End: 2025-02-04

## 2025-02-14 ENCOUNTER — CLINICAL SUPPORT (OUTPATIENT)
Dept: FAMILY MEDICINE CLINIC | Facility: CLINIC | Age: 70
End: 2025-02-14

## 2025-02-14 VITALS — SYSTOLIC BLOOD PRESSURE: 173 MMHG | DIASTOLIC BLOOD PRESSURE: 81 MMHG

## 2025-02-14 DIAGNOSIS — I10 BENIGN ESSENTIAL HYPERTENSION: Primary | ICD-10-CM

## 2025-02-14 PROCEDURE — NURSE

## 2025-02-14 NOTE — PROGRESS NOTES
Patient came today to check blood pressure. Today BP is 173/81/ Patient did not take his hydrochlorothiazide. Patient is asking does he should take that or not.

## 2025-02-18 ENCOUNTER — OFFICE VISIT (OUTPATIENT)
Dept: FAMILY MEDICINE CLINIC | Facility: CLINIC | Age: 70
End: 2025-02-18
Payer: MEDICARE

## 2025-02-18 ENCOUNTER — PATIENT MESSAGE (OUTPATIENT)
Dept: FAMILY MEDICINE CLINIC | Facility: CLINIC | Age: 70
End: 2025-02-18

## 2025-02-18 VITALS
TEMPERATURE: 98.7 F | DIASTOLIC BLOOD PRESSURE: 70 MMHG | BODY MASS INDEX: 30.87 KG/M2 | OXYGEN SATURATION: 98 % | WEIGHT: 203 LBS | SYSTOLIC BLOOD PRESSURE: 114 MMHG | HEART RATE: 79 BPM

## 2025-02-18 DIAGNOSIS — R00.2 PALPITATIONS: ICD-10-CM

## 2025-02-18 DIAGNOSIS — I10 BENIGN ESSENTIAL HYPERTENSION: Primary | ICD-10-CM

## 2025-02-18 PROCEDURE — 99214 OFFICE O/P EST MOD 30 MIN: CPT | Performed by: FAMILY MEDICINE

## 2025-02-18 PROCEDURE — 93000 ELECTROCARDIOGRAM COMPLETE: CPT | Performed by: FAMILY MEDICINE

## 2025-02-18 PROCEDURE — G2211 COMPLEX E/M VISIT ADD ON: HCPCS | Performed by: FAMILY MEDICINE

## 2025-02-18 RX ORDER — VITAMIN B COMPLEX
1000 TABLET ORAL DAILY
COMMUNITY

## 2025-02-18 NOTE — ASSESSMENT & PLAN NOTE
Stable today.  Continue with routine home monitoring as well as current treatment with his Bystolic as well as hydrochlorothiazide.

## 2025-02-18 NOTE — PROGRESS NOTES
Name: Bradley Evans      : 1955      MRN: [id number]  Encounter Provider: Justin Abdalla DO  Encounter Date: 2025   Encounter department: [facility]  :  Assessment & Plan  Benign essential hypertension  Stable today.  Continue with routine home monitoring as well as current treatment with his Bystolic as well as hydrochlorothiazide.       Palpitations  Unclear as exact cause of patient's symptoms today.  At this time, reviewed potential adverse reactions to his lacosamide.  His EKG today appeared to show normal sinus rhythm, no ST or T wave changes.  Will check 48-hour Holter.  If he has any potential arrhythmias found, this could be potentially related to his lacosamide treatment.  Orders:    POCT ECG    Holter monitor; Future           History of Present Illness   HPI  Patient is a 69-year-old male presents today for a follow-up on his hypertension.  Since last visit, he states that he has been taking his medications regularly.  He has noticed elevations in the past.   in addition, he has been having symptoms of palpitations over the past few days.  Denies any chest pain lightheadedness or dizziness.  He he did restart taking his hydrochlorothiazide.  Review of Systems   Constitutional:  Negative for activity change, chills, fatigue and fever.   HENT:  Negative for congestion, ear pain, sinus pressure and sore throat.    Eyes:  Negative for redness, itching and visual disturbance.   Respiratory:  Negative for cough and shortness of breath.    Cardiovascular:  Negative for chest pain and palpitations.   Gastrointestinal:  Negative for abdominal pain, diarrhea and nausea.   Endocrine: Negative for cold intolerance and heat intolerance.   Genitourinary:  Negative for dysuria, flank pain and frequency.   Musculoskeletal:  Negative for arthralgias, back pain, gait problem and myalgias.   Skin:  Negative for color change.   Allergic/Immunologic: Negative for environmental  allergies.   Neurological:  Negative for dizziness, numbness and headaches.   Psychiatric/Behavioral:  Negative for behavioral problems and sleep disturbance.        Objective   /70 (BP Location: Left arm, Patient Position: Sitting, Cuff Size: Standard)   Pulse 79   Temp 98.7 °F (37.1 °C)   Wt 92.1 kg (203 lb)   SpO2 98%   BMI 30.87 kg/m²      Physical Exam  Vitals reviewed.   Constitutional:       General: He is not in acute distress.     Appearance: Normal appearance. He is well-developed.   HENT:      Head: Normocephalic and atraumatic.      Right Ear: Tympanic membrane, ear canal and external ear normal. There is no impacted cerumen.      Left Ear: Tympanic membrane, ear canal and external ear normal. There is no impacted cerumen.      Nose: Nose normal. No congestion or rhinorrhea.      Mouth/Throat:      Mouth: Mucous membranes are moist.      Pharynx: No oropharyngeal exudate or posterior oropharyngeal erythema.   Eyes:      General: No scleral icterus.        Right eye: No discharge.         Left eye: No discharge.      Extraocular Movements: Extraocular movements intact.      Conjunctiva/sclera: Conjunctivae normal.      Pupils: Pupils are equal, round, and reactive to light.   Neck:      Trachea: No tracheal deviation.   Cardiovascular:      Rate and Rhythm: Normal rate and regular rhythm.      Pulses: Normal pulses.           Dorsalis pedis pulses are 2+ on the right side and 2+ on the left side.        Posterior tibial pulses are 2+ on the right side and 2+ on the left side.      Heart sounds: Normal heart sounds. No murmur heard.     No friction rub. No gallop.   Pulmonary:      Effort: Pulmonary effort is normal. No respiratory distress.      Breath sounds: Normal breath sounds. No wheezing, rhonchi or rales.   Abdominal:      General: Bowel sounds are normal. There is no distension.      Palpations: Abdomen is soft.      Tenderness: There is no abdominal tenderness. There is no guarding or  rebound.   Musculoskeletal:         General: Normal range of motion.      Cervical back: Normal range of motion and neck supple.      Right lower leg: No edema.      Left lower leg: No edema.   Lymphadenopathy:      Head:      Right side of head: No submental or submandibular adenopathy.      Left side of head: No submental or submandibular adenopathy.      Cervical: No cervical adenopathy.      Right cervical: No superficial, deep or posterior cervical adenopathy.     Left cervical: No superficial, deep or posterior cervical adenopathy.   Skin:     General: Skin is warm and dry.      Findings: No erythema.   Neurological:      General: No focal deficit present.      Mental Status: He is alert and oriented to person, place, and time.      Cranial Nerves: No cranial nerve deficit.      Sensory: Sensation is intact. No sensory deficit.      Motor: Motor function is intact.   Psychiatric:         Attention and Perception: Attention and perception normal.         Mood and Affect: Mood is not anxious or depressed.         Speech: Speech normal.         Behavior: Behavior normal.         Thought Content: Thought content normal.         Judgment: Judgment normal.

## 2025-02-26 ENCOUNTER — TELEPHONE (OUTPATIENT)
Age: 70
End: 2025-02-26

## 2025-02-26 NOTE — TELEPHONE ENCOUNTER
Pt calling to make he was on wait list for sooner appt. Pt is currently scheduled on 7/14 at 230 with Dr Alvarenga. Informed pt was already added to wait list.

## 2025-03-10 NOTE — PATIENT COMMUNICATION
Called pt and lvm in regards to scheduling an appointment and forwarding Dr. Abdalla's message.     Dr Abdalla's message:    Please call patient, he may take his omega-3 supplement every day if you would like.  If he is having active shingles, please advise him to call and schedule appointment immediately.  Please schedule him with me anytime he is available.  Thank you

## 2025-03-11 DIAGNOSIS — R35.0 URINARY FREQUENCY: ICD-10-CM

## 2025-03-11 RX ORDER — TAMSULOSIN HYDROCHLORIDE 0.4 MG/1
0.4 CAPSULE ORAL
Qty: 90 CAPSULE | Refills: 1 | Status: SHIPPED | OUTPATIENT
Start: 2025-03-11

## 2025-03-12 ENCOUNTER — TELEPHONE (OUTPATIENT)
Dept: ADMINISTRATIVE | Facility: OTHER | Age: 70
End: 2025-03-12

## 2025-03-12 NOTE — TELEPHONE ENCOUNTER
03/12/25 2:11 PM    Patient contacted to bring Advance Directive, POLST, or Living Will document to next scheduled pcp visit.[facility] Department left message.    Thank you.  Marita Sprague MA  [facility]

## 2025-03-13 ENCOUNTER — OFFICE VISIT (OUTPATIENT)
Dept: FAMILY MEDICINE CLINIC | Facility: CLINIC | Age: 70
End: 2025-03-13
Payer: MEDICARE

## 2025-03-13 VITALS
TEMPERATURE: 98.1 F | BODY MASS INDEX: 31.17 KG/M2 | SYSTOLIC BLOOD PRESSURE: 124 MMHG | OXYGEN SATURATION: 99 % | WEIGHT: 205 LBS | HEART RATE: 84 BPM | DIASTOLIC BLOOD PRESSURE: 76 MMHG

## 2025-03-13 DIAGNOSIS — J96.01 ACUTE RESPIRATORY FAILURE WITH HYPOXIA (HCC): Primary | ICD-10-CM

## 2025-03-13 DIAGNOSIS — R56.9 SEIZURE (HCC): ICD-10-CM

## 2025-03-13 DIAGNOSIS — L20.9 ATOPIC DERMATITIS OF SCALP: ICD-10-CM

## 2025-03-13 DIAGNOSIS — N18.31 STAGE 3A CHRONIC KIDNEY DISEASE (HCC): ICD-10-CM

## 2025-03-13 DIAGNOSIS — G11.9 CEREBELLAR ATAXIA (HCC): ICD-10-CM

## 2025-03-13 PROCEDURE — 99214 OFFICE O/P EST MOD 30 MIN: CPT | Performed by: FAMILY MEDICINE

## 2025-03-13 PROCEDURE — G2211 COMPLEX E/M VISIT ADD ON: HCPCS | Performed by: FAMILY MEDICINE

## 2025-03-13 RX ORDER — KETOCONAZOLE 20 MG/ML
1 SHAMPOO, SUSPENSION TOPICAL 2 TIMES WEEKLY
Qty: 120 ML | Refills: 0 | Status: SHIPPED | OUTPATIENT
Start: 2025-03-13

## 2025-03-13 NOTE — PROGRESS NOTES
Name: Bradley Evans      : 1955      MRN: [id number]  Encounter Provider: Justin Abdalla DO  Encounter Date: 3/13/2025   Encounter department: [facility]  :  Assessment & Plan  Atopic dermatitis of scalp  Symptoms.  Secondary to his scalp dermatitis.  At this time, we will start treatment with ketoconazole shampoo.  If any symptoms are worsening he was advised to call or follow-up.  Orders:    ketoconazole (NIZORAL) 2 % shampoo; Apply 1 Application topically 2 (two) times a week    Acute respiratory failure with hypoxia (HCC)         Cerebellar ataxia (HCC)         Seizure (HCC)         Stage 3a chronic kidney disease (HCC)  Lab Results   Component Value Date    EGFR 61 2025    EGFR 73 2024    EGFR 67 2024    CREATININE 1.28 2025    CREATININE 1.1 2024    CREATININE 1.18 2024                   History of Present Illness   HPI  Is a 69-year-old male presents today with a CC of scalp irritation.  He states that symptoms started on Monday.  He has been having itching in this area.  He believes it was possibly secondary to recurrence of shingles.  He was applying a over-the-counter lotion which did appear to help mildly.  Review of Systems   Constitutional:  Negative for activity change, chills, fatigue and fever.   HENT:  Negative for congestion, ear pain, sinus pressure and sore throat.    Eyes:  Negative for redness, itching and visual disturbance.   Respiratory:  Negative for cough and shortness of breath.    Cardiovascular:  Negative for chest pain and palpitations.   Gastrointestinal:  Negative for abdominal pain, diarrhea and nausea.   Endocrine: Negative for cold intolerance and heat intolerance.   Genitourinary:  Negative for dysuria, flank pain and frequency.   Musculoskeletal:  Negative for arthralgias, back pain, gait problem and myalgias.   Skin:  Negative for color change.   Allergic/Immunologic: Negative for environmental allergies.    Neurological:  Negative for dizziness, numbness and headaches.   Psychiatric/Behavioral:  Negative for behavioral problems and sleep disturbance.        Objective   /76 (BP Location: Left arm, Patient Position: Sitting, Cuff Size: Large)   Pulse 84   Temp 98.1 °F (36.7 °C) (Temporal)   Wt 93 kg (205 lb)   SpO2 99%   BMI 31.17 kg/m²      Physical Exam  Vitals reviewed.   Constitutional:       Appearance: Normal appearance. He is well-developed.   HENT:      Head: Normocephalic and atraumatic.      Right Ear: Hearing normal.      Left Ear: Hearing normal.      Nose: Nose normal. No septal deviation.   Eyes:      General: Lids are normal.      Pupils: Pupils are equal, round, and reactive to light.   Neck:      Thyroid: No thyroid mass or thyromegaly.      Trachea: Trachea normal.   Cardiovascular:      Rate and Rhythm: Normal rate.   Pulmonary:      Effort: Pulmonary effort is normal. No respiratory distress.      Breath sounds: No decreased breath sounds.   Abdominal:      Tenderness: There is no guarding.   Musculoskeletal:         General: Normal range of motion.      Cervical back: Normal range of motion.   Skin:     General: Skin is warm and dry.      Findings: Erythema present.          Neurological:      Mental Status: He is alert and oriented to person, place, and time.      Cranial Nerves: No cranial nerve deficit.      Sensory: No sensory deficit.   Psychiatric:         Speech: Speech normal.         Behavior: Behavior normal.         Thought Content: Thought content normal.         Judgment: Judgment normal.

## 2025-03-13 NOTE — ASSESSMENT & PLAN NOTE
Lab Results   Component Value Date    EGFR 61 02/08/2025    EGFR 73 11/19/2024    EGFR 67 11/18/2024    CREATININE 1.28 02/08/2025    CREATININE 1.1 11/19/2024    CREATININE 1.18 11/18/2024

## 2025-03-14 DIAGNOSIS — J31.0 CHRONIC RHINITIS: Primary | ICD-10-CM

## 2025-03-18 ENCOUNTER — TELEPHONE (OUTPATIENT)
Dept: NEUROLOGY | Facility: CLINIC | Age: 70
End: 2025-03-18

## 2025-04-08 ENCOUNTER — HOSPITAL ENCOUNTER (OUTPATIENT)
Dept: NON INVASIVE DIAGNOSTICS | Facility: HOSPITAL | Age: 70
Discharge: HOME | End: 2025-04-08
Payer: MEDICARE

## 2025-04-08 DIAGNOSIS — R00.2 PALPITATIONS: ICD-10-CM

## 2025-04-08 PROCEDURE — 93226 XTRNL ECG REC<48 HR SCAN A/R: CPT

## 2025-04-08 PROCEDURE — 93225 XTRNL ECG REC<48 HRS REC: CPT

## 2025-04-17 PROCEDURE — 93227 XTRNL ECG REC<48 HR R&I: CPT

## 2025-04-18 ENCOUNTER — RESULTS FOLLOW-UP (OUTPATIENT)
Dept: FAMILY MEDICINE CLINIC | Facility: CLINIC | Age: 70
End: 2025-04-18

## 2025-04-24 DIAGNOSIS — E53.8 B12 DEFICIENCY: Primary | ICD-10-CM

## 2025-04-26 ENCOUNTER — APPOINTMENT (OUTPATIENT)
Dept: LAB | Facility: CLINIC | Age: 70
End: 2025-04-26
Payer: MEDICARE

## 2025-04-26 DIAGNOSIS — Z12.5 SPECIAL SCREENING FOR MALIGNANT NEOPLASM OF PROSTATE: Primary | ICD-10-CM

## 2025-04-26 LAB — VIT B12 SERPL-MCNC: 933 PG/ML (ref 180–914)

## 2025-04-26 PROCEDURE — 36415 COLL VENOUS BLD VENIPUNCTURE: CPT

## 2025-04-26 PROCEDURE — 82607 VITAMIN B-12: CPT

## 2025-04-28 ENCOUNTER — RESULTS FOLLOW-UP (OUTPATIENT)
Dept: FAMILY MEDICINE CLINIC | Facility: CLINIC | Age: 70
End: 2025-04-28

## 2025-05-07 DIAGNOSIS — R79.89 ELEVATED SERUM CREATININE: Primary | ICD-10-CM

## 2025-05-08 DIAGNOSIS — I10 BENIGN ESSENTIAL HYPERTENSION: ICD-10-CM

## 2025-05-09 RX ORDER — NEBIVOLOL 10 MG/1
10 TABLET ORAL DAILY
Qty: 90 TABLET | Refills: 1 | Status: SHIPPED | OUTPATIENT
Start: 2025-05-09

## 2025-05-21 DIAGNOSIS — I10 BENIGN ESSENTIAL HYPERTENSION: ICD-10-CM

## 2025-05-22 RX ORDER — HYDROCHLOROTHIAZIDE 25 MG/1
25 TABLET ORAL DAILY
Qty: 90 TABLET | Refills: 1 | Status: SHIPPED | OUTPATIENT
Start: 2025-05-22

## 2025-05-25 ENCOUNTER — PATIENT MESSAGE (OUTPATIENT)
Dept: FAMILY MEDICINE CLINIC | Facility: CLINIC | Age: 70
End: 2025-05-25

## 2025-05-25 ENCOUNTER — RESULTS FOLLOW-UP (OUTPATIENT)
Dept: FAMILY MEDICINE CLINIC | Facility: CLINIC | Age: 70
End: 2025-05-25

## 2025-05-25 LAB
ALBUMIN SERPL-MCNC: 4.2 G/DL (ref 3.6–5.1)
ALBUMIN/GLOB SERPL: 1.8 (CALC) (ref 1–2.5)
ALP SERPL-CCNC: 68 U/L (ref 35–144)
ALT SERPL-CCNC: 11 U/L (ref 9–46)
AST SERPL-CCNC: 17 U/L (ref 10–35)
BILIRUB SERPL-MCNC: 1.5 MG/DL (ref 0.2–1.2)
BUN SERPL-MCNC: 16 MG/DL (ref 7–25)
BUN/CREAT SERPL: 12 (CALC) (ref 6–22)
CALCIUM SERPL-MCNC: 9.3 MG/DL (ref 8.6–10.3)
CHLORIDE SERPL-SCNC: 101 MMOL/L (ref 98–110)
CO2 SERPL-SCNC: 32 MMOL/L (ref 20–32)
CREAT SERPL-MCNC: 1.38 MG/DL (ref 0.7–1.35)
GFR/BSA.PRED SERPLBLD CYS-BASED-ARV: 55 ML/MIN/1.73M2
GLOBULIN SER CALC-MCNC: 2.3 G/DL (CALC) (ref 1.9–3.7)
GLUCOSE SERPL-MCNC: 99 MG/DL (ref 65–99)
POTASSIUM SERPL-SCNC: 3.7 MMOL/L (ref 3.5–5.3)
PROT SERPL-MCNC: 6.5 G/DL (ref 6.1–8.1)
SODIUM SERPL-SCNC: 140 MMOL/L (ref 135–146)

## 2025-05-27 ENCOUNTER — TELEPHONE (OUTPATIENT)
Dept: FAMILY MEDICINE CLINIC | Facility: CLINIC | Age: 70
End: 2025-05-27

## 2025-05-28 ENCOUNTER — TELEPHONE (OUTPATIENT)
Dept: NEUROLOGY | Facility: CLINIC | Age: 70
End: 2025-05-28

## 2025-05-28 NOTE — TELEPHONE ENCOUNTER
Called pt regarding r/s July appt w/ Dr. Alvarenga. However there are no openings until September. Pt asked if there's another provider he can see sooner for EPILEPSY. Offered soonest avail w/ epilepsy Dr. Haque 07/08 @ 9AM in [facility] office and he happily accepted.

## 2025-06-20 ENCOUNTER — TELEPHONE (OUTPATIENT)
Dept: RADIOLOGY | Facility: MEDICAL CENTER | Age: 70
End: 2025-06-20

## 2025-06-20 ENCOUNTER — OFFICE VISIT (OUTPATIENT)
Dept: PAIN MEDICINE | Facility: CLINIC | Age: 70
End: 2025-06-20
Payer: MEDICARE

## 2025-06-20 VITALS — WEIGHT: 205 LBS | BODY MASS INDEX: 32.18 KG/M2 | HEIGHT: 67 IN

## 2025-06-20 DIAGNOSIS — M54.81 BILATERAL OCCIPITAL NEURALGIA: Primary | ICD-10-CM

## 2025-06-20 DIAGNOSIS — M47.812 CERVICAL SPONDYLOSIS: ICD-10-CM

## 2025-06-20 PROCEDURE — G2211 COMPLEX E/M VISIT ADD ON: HCPCS | Performed by: ANESTHESIOLOGY

## 2025-06-20 PROCEDURE — 99214 OFFICE O/P EST MOD 30 MIN: CPT | Performed by: ANESTHESIOLOGY

## 2025-06-20 NOTE — TELEPHONE ENCOUNTER
Pt called in to get scheduled for in office procedure. Appt + f/u appt both scheduled, Advised pt to reach out with any other questions or concerns. Thank you

## 2025-06-20 NOTE — PROGRESS NOTES
Name: Bradley Evans      : 1955      MRN: [id number]  Encounter Provider: Orion Weir MD  Encounter Date: 2025   Encounter department: [facility] [place]  Assessment & Plan  Bilateral occipital neuralgia    Orders:    US msk guidance; Future    Cervical spondylosis         Pain consistent with occipital neuralgia, cervical radiculitis, myofascial pain components. Symptoms are accompanied by pain consistently 5+/10 on the pain scale with inability to participate in IADLs for >6 weeks. Patient has fully participated with PT.  Denies any gait instability, saddle anesthesia.     Pain currently likely stemming from occipital neuralgia as he described neck pain radiating in the bilateral occipital distributions.     When he was last seen in  we scheduled a cervical MIGUELITO that was never completed, however he has a lesser degree of cervical radicular symptoms at this time.    Discussed and offered US guided bilateral occipital nerve blocks.  Risks and benefits including bleeding, infection, tissue reaction, nerve injury and allergic reaction were discussed. The approach was demonstrated using models and literature was provided. Verbal and written consent was obtained.    Reviewed Neurology, ENT office notes.    My impressions and treatment recommendations were discussed in detail with the patient who verbalized understanding and had no further questions.  Discharge instructions were provided. I personally saw and examined the patient and I agree with the above discussed plan of care.    History of Present Illness     Bradley Evans is a 69 y.o. male who presents for a follow up office visit in regards to Neck Pain. The patient’s current symptoms include continued bilateral neck pains radiating to the posterior head > radiating to the shoulder or arms. Pain is rated 5/10 and is described as a nearly constant burning, sharp, pressure-like, throbbing, aching pain with numbness and  "pins/needles.    Review of Systems   Constitutional:  Negative for chills and fever.   HENT:  Negative for ear pain and sore throat.    Eyes:  Negative for pain and visual disturbance.   Respiratory:  Negative for cough and shortness of breath.    Cardiovascular:  Negative for chest pain and palpitations.   Gastrointestinal:  Negative for abdominal pain and vomiting.   Genitourinary:  Negative for dysuria and hematuria.   Musculoskeletal:  Positive for neck pain and neck stiffness. Negative for arthralgias and back pain.   Skin:  Negative for color change and rash.   Neurological:  Positive for dizziness, weakness, numbness and headaches. Negative for seizures and syncope.   All other systems reviewed and are negative.      Medical History Reviewed by provider this encounter:     .  Medications Ordered Prior to Encounter[1]      Objective   Ht 5' 7\" (1.702 m)   Wt 93 kg (205 lb)   BMI 32.11 kg/m²      Pain Score:   5  Physical Exam  Constitutional: normal, well developed, well nourished, alert, in no distress and non-toxic and no overt pain behavior.  Eyes: anicteric  HEENT: grossly intact  Neck: supple, symmetric, trachea midline and no masses   Pulmonary: even and unlabored  Cardiovascular: No edema or pitting edema present  Skin: Normal without rashes or lesions and well hydrated  Psychiatric: Mood and affect appropriate  Neurologic:  Motor function is grossly intact with no focal neurologic deficits   Musculoskeletal: Tenderness to palpation of bilateral cervical paraspinal muscles.  Pain with cervical range of motion in particular flexion, rotation.    Radiology Results Review: I personally reviewed the following image studies in PACS and associated radiology reports: MRI spine. My interpretation of the radiology images/reports is: Multilevel disc degeneraton. Central stenosis mildly at C5-6 and C6-7. Mild to moderate left foraminal narrowing at C6-7. Moderate right foraminal narrowing at C4-5. Moderate right " foraminal narrowing at C3-4..         [1]   Current Outpatient Medications on File Prior to Visit   Medication Sig Dispense Refill    albuterol (Ventolin HFA) 90 mcg/act inhaler Inhale 2 puffs every 6 (six) hours as needed for wheezing 18 g 0    ALPRAZolam (XANAX) 0.5 mg tablet Take 1 tablet (0.5 mg total) by mouth daily as needed for anxiety 30 tablet 0    apixaban (ELIQUIS) 5 mg Take 5 mg by mouth in the morning and 5 mg in the evening.      azelastine (ASTELIN) 0.1 % nasal spray 1 spray into each nostril 2 (two) times a day 30 mL 11    Coenzyme Q10 300 MG CAPS Take by mouth in the morning.      Cyanocobalamin (VITAMIN B 12 PO) Take 1,000 mcg by mouth      famotidine (Pepcid) 40 MG tablet Take by mouth      fluticasone (FLONASE) 50 mcg/act nasal spray 2 sprays into each nostril daily 48 g 1    hydroCHLOROthiazide 25 mg tablet Take 1 tablet (25 mg total) by mouth daily 90 tablet 1    ketoconazole (NIZORAL) 2 % shampoo Apply 1 Application topically 2 (two) times a week 120 mL 0    lacosamide (VIMPAT) 50 mg Take 50 mg by mouth in the morning and 50 mg in the evening.      multivitamin (THERAGRAN) TABS Take 1 tablet by mouth in the morning.      naproxen sodium (ALEVE) 220 MG tablet Take 220 mg by mouth      nebivolol (BYSTOLIC) 10 mg tablet TAKE 1 TABLET DAILY 90 tablet 1    omeprazole (PriLOSEC) 40 MG capsule Take 1 capsule (40 mg total) by mouth as needed (as needed) (Patient taking differently: Take 40 mg by mouth in the morning.) 90 capsule 1    tamsulosin (FLOMAX) 0.4 mg Take 1 capsule (0.4 mg total) by mouth daily with dinner 90 capsule 1    atorvastatin (LIPITOR) 40 mg tablet Take 1 tablet (40 mg total) by mouth daily (Patient not taking: Reported on 12/18/2024) 90 tablet 1    cholecalciferol (VITAMIN D3) 25 mcg (1,000 units) tablet Take 1,000 Units by mouth daily (Patient not taking: Reported on 5/14/2025)      DULoxetine (CYMBALTA) 30 mg delayed release capsule Take 1 capsule (30 mg total) by mouth daily Can  take at night if it makes you sleepy (Patient not taking: Reported on 6/20/2025) 90 capsule 1    fluticasone 50 mcg/actuation diskus inhaler Inhale 1 puff 2 (two) times a day Rinse mouth after use. (Patient not taking: Reported on 12/30/2024)      hydrocortisone 2.5 % cream  (Patient not taking: Reported on 12/30/2024)      patient supplied medication Take 3 each by mouth in the morning (Patient not taking: Reported on 2/18/2025)       No current facility-administered medications on file prior to visit.

## 2025-06-23 NOTE — TELEPHONE ENCOUNTER
Caller: Bradley GOODE    Doctor: Dr. Weir     Reason for call:Pt called in regard to his taking his blood thinner prior to his procedure     Call back#: [phone]

## 2025-06-23 NOTE — TELEPHONE ENCOUNTER
S/w pt and advised no need to hold blood thinner for US guided procedure 6/24. Pt verbalized understanding and appreciative of call.

## 2025-06-24 ENCOUNTER — PROCEDURE VISIT (OUTPATIENT)
Dept: PAIN MEDICINE | Facility: CLINIC | Age: 70
End: 2025-06-24
Payer: MEDICARE

## 2025-06-24 DIAGNOSIS — M54.81 BILATERAL OCCIPITAL NEURALGIA: Primary | ICD-10-CM

## 2025-06-24 PROCEDURE — 64405 NJX AA&/STRD GR OCPL NRV: CPT | Performed by: ANESTHESIOLOGY

## 2025-06-24 PROCEDURE — 76942 ECHO GUIDE FOR BIOPSY: CPT | Performed by: ANESTHESIOLOGY

## 2025-06-24 RX ORDER — METHYLPREDNISOLONE ACETATE 40 MG/ML
80 INJECTION, SUSPENSION INTRA-ARTICULAR; INTRALESIONAL; INTRAMUSCULAR; SOFT TISSUE ONCE
Status: COMPLETED | OUTPATIENT
Start: 2025-06-24 | End: 2025-06-24

## 2025-06-24 RX ORDER — BUPIVACAINE HYDROCHLORIDE 5 MG/ML
6 INJECTION, SOLUTION EPIDURAL; INTRACAUDAL; PERINEURAL ONCE
Status: COMPLETED | OUTPATIENT
Start: 2025-06-24 | End: 2025-06-24

## 2025-06-24 RX ADMIN — METHYLPREDNISOLONE ACETATE 80 MG: 40 INJECTION, SUSPENSION INTRA-ARTICULAR; INTRALESIONAL; INTRAMUSCULAR; SOFT TISSUE at 07:30

## 2025-06-24 RX ADMIN — BUPIVACAINE HYDROCHLORIDE 6 ML: 5 INJECTION, SOLUTION EPIDURAL; INTRACAUDAL; PERINEURAL at 07:30

## 2025-06-24 NOTE — PROGRESS NOTES
Indication: headache and neck pain  Preoperative diagnosis: bilateral occipital neuralgia  Postoperative diagnosis: Same  Procedure: bilateral greater occipital nerve block with ultrasound guidance    Medications: 8mL used of 2mL 40mg/mL Kenalog/Depomedrol with 6mL 0.5% bupivacaine     After discussing the risks, benefits, and alternatives to the procedure, the patient expressed understanding and wished to proceed. The patient was placed in sitting position. A procedural pause was conducted to verify: Correct patient identity, procedure to be performed and as applicable, correct side and site, correct patient position, and availability of implants, special equipment or special requirements.    The skin was prepped with Chloraprep. A sterile 8-12 MHz linear ultrasound transducer with sterile ultrasound sleeve and gel were utilized.    Under live ultrasound guidance the greater occipital nerve was identified in the plane between the obliquus capitus inferior and the semispinalis capitus muscles at the level of the superior nuchal line.    A 2 inch 25 gauge needle was inserted under live ultrasound guidance in plane from lateral to medial into the plane of the greater occipital nerve between the aforementioned muscles. After negative aspiration, the above injectate was injected slowly in increments.    The procedure was repeated in the same fashion on the opposite side.    The patient tolerated the procedure well and there were no apparent complications. After an appropriate amount of observation, the patient was dismissed from the office in good condition under their own power.

## 2025-07-01 NOTE — PROGRESS NOTES
7/2/2025      Chief Complaint   Patient presents with   • Nocturia         Assessment and Plan    69 y.o. male managed by Dr. Campbell       1. Benign prostatic hyperplasia with urinary frequency  Assessment & Plan:  BPH- urinary frequency  and nocturia   Started flomax- some improvement but still weaker stream   UA- negative for blood and infection   PVR-69  Plan to trial combination therapy with Cialis 5 mg  Reviewed usage in conjunction with Flomax  Discussed surgical workup  Patient would like to defer is now in first trial oral medications  Plan to follow-up in 2 to 3 months to reevaluate    Orders:  -     POCT urine dip  -     tadalafil (CIALIS) 5 MG tablet; Take 1 tablet (5 mg total) by mouth in the morning  -     POCT Measure PVR  2. Screening for prostate cancer  Assessment & Plan:  Needs updated PSA level   PSA level ordered for patient  Plan to review at follow up       Lab Results   Component Value Date    PSA 1.06 06/23/2023    PSA 0.98 09/10/2022    PSA 1.1 05/26/2021     Orders:  -     PSA, Total Screen; Future; Expected date: Collect anytime  -     PSA, Total Screen          History of Present Illness  Bradley Evans is a 69 y.o. male here for evaluation of BPH and prostate cancer screening.  Patient was previously seen in consultation and was only on a prostate supplement.  At that time supplement was working well for him.  He was started on Flomax by his PCP.  He reports slight improvement but still notices a weakened stream.  Nocturia x 3.  Probably goes to the bathroom every 1-2 hours during the day.  He mainly drinks water.  No concern for infection.  Denies any family history of prostate cancer.  Last PSA on file was in 2023.  Plan to get this updated.    Additionally has quit alcohol.        Review of Systems   Constitutional:  Negative for chills and fever.   HENT:  Negative for ear pain and sore throat.    Eyes:  Negative for pain and visual disturbance.   Respiratory:  Negative for cough  "and shortness of breath.    Cardiovascular:  Negative for chest pain and palpitations.   Gastrointestinal:  Negative for abdominal pain and vomiting.   Genitourinary:  Positive for difficulty urinating (weaker stream) and frequency. Negative for dysuria, hematuria and urgency.   Musculoskeletal:  Negative for arthralgias and back pain.   Skin:  Negative for color change and rash.   Neurological:  Negative for seizures and syncope.   All other systems reviewed and are negative.               Vitals  Vitals:    07/02/25 1350   BP: 138/80   BP Location: Left arm   Patient Position: Sitting   Cuff Size: Adult   Pulse: 65   SpO2: 98%   Weight: 91.2 kg (201 lb)   Height: 5' 7\" (1.702 m)       Physical Exam  Vitals reviewed.   Constitutional:       Appearance: Normal appearance.   HENT:      Head: Normocephalic and atraumatic.     Eyes:      Conjunctiva/sclera: Conjunctivae normal.     Pulmonary:      Effort: Pulmonary effort is normal.   Abdominal:      General: Abdomen is flat. There is no distension.      Palpations: Abdomen is soft.      Tenderness: There is no abdominal tenderness.     Musculoskeletal:         General: Normal range of motion.      Cervical back: Normal range of motion.     Skin:     General: Skin is warm and dry.     Neurological:      General: No focal deficit present.      Mental Status: He is alert and oriented to person, place, and time.     Psychiatric:         Mood and Affect: Mood normal.         Behavior: Behavior normal.         Thought Content: Thought content normal.         Judgment: Judgment normal.           Past History  Past Medical History[1]  Social History[2]  Tobacco Use History[3]  Family History[4]    The following portions of the patient's history were reviewed and updated as appropriate: allergies, current medications, past medical history, past social history, past surgical history and problem list.    Results  Recent Results (from the past hour)   POCT urine dip    Collection " Time: 07/02/25  1:58 PM   Result Value Ref Range    LEUKOCYTE ESTERASE,UA neg     NITRITE,UA neg     SL AMB POCT UROBILINOGEN 0.2     POCT URINE PROTEIN neg      PH,UA 6.5     BLOOD,UA neg     SPECIFIC GRAVITY,UA 1.005     KETONES,UA neg     BILIRUBIN,UA neg     GLUCOSE, UA neg      COLOR,UA yellow     CLARITY,UA clear    POCT Measure PVR    Collection Time: 07/02/25  2:31 PM   Result Value Ref Range    POST-VOID RESIDUAL VOLUME, ML POC 69 mL   ]  Lab Results   Component Value Date    PSA 1.06 06/23/2023    PSA 0.98 09/10/2022    PSA 1.1 05/26/2021     Lab Results   Component Value Date    GLUCOSE 101 01/12/2014    CALCIUM 9.3 05/24/2025     09/13/2017    K 3.7 05/24/2025    CO2 32 05/24/2025     05/24/2025    BUN 16 05/24/2025    CREATININE 1.38 (H) 05/24/2025     Lab Results   Component Value Date    WBC 10.43 (H) 06/17/2024    HGB 14.5 06/17/2024    HCT 44.2 06/17/2024    MCV 89 06/17/2024     06/17/2024              [1]  Past Medical History:  Diagnosis Date   • Abdominal pain, right lower quadrant    • Abnormal blood chemistry    • Acid reflux    • Allergic ?   • Aneurysm (HCC) ?    Small brain aneurysm   • Aneurysm of internal carotid artery     left sided - incidental finding on MRI   • Cancer (HCC)     skin   • Colon polyp    • Contact dermatitis due to poison ivy    • Depression ?   • Depression screen    • Dizziness    • Fatigue    • Flu    • GERD (gastroesophageal reflux disease)    • Headache(784.0)    • High blood pressure    • History of acute conjunctivitis     right eye   • History of allergic urticaria    • History of chronic rhinitis    • History of disease of skin and subcutaneous tissue    • History of dysphagia    • History of hemorrhoids    • History of IBS    • History of URI (upper respiratory infection)    • Hyperlipidemia    • Hypertension ?    I ended up being allergic to Lisinapril, Norvasc, and Diltiazem   • Memory loss ?    I’m noticing more memory issues.   • Ocular  migraine    • Open wound of left lower leg    • Other acute sinusitis    • Other disturbances of oral epithelium, including tongue    • Peripheral neuropathy ?    Feet Tingling   • Positive depression screening    • Pulmonary emboli (HCC) 2019    pt is on eliquis   • Screening for prostate cancer    • Shingles ?    When i had shingles they appeared on the back of my head   • Skin rash    • Sleep apnea     snores but has not had the study   • Special screening examination for neoplasm of prostate    • TIA (transient ischemic attack) ?    Small blood clot in my lung.   • Tingling    [2]  Social History  Socioeconomic History   • Marital status:    Occupational History   • Occupation: Retired     Comment:  for [employer]   Tobacco Use   • Smoking status: Former     Current packs/day: 0.00     Average packs/day: 0.5 packs/day for 7.5 years (3.7 ttl pk-yrs)     Types: Cigarettes     Start date:      Quit date: 1982     Years since quittin.0     Passive exposure: Never   • Smokeless tobacco: Never   Vaping Use   • Vaping status: Never Used   Substance and Sexual Activity   • Alcohol use: Not Currently     Comment: Social   • Drug use: Never   Social History Narrative    Feels Safe at Home     Social Drivers of Health     Financial Resource Strain: Low Risk  (2024)    Received from [facility]    Overall Financial Resource Strain (CARDIA)    • Difficulty of Paying Living Expenses: Not very hard   Food Insecurity: No Food Insecurity (2024)    Received from [facility]    Hunger Vital Sign    • Within the past 12 months, you worried that your food would run out before you got the money to buy more.: Never true    • Within the past 12 months, the food you bought just didn't last and you didn't have money to get more.: Never true   Transportation Needs: No Transportation Needs (2024)    [facility]    [facility]    • Lack of Transportation (Medical): No    • Lack of Transportation (Non-Medical): No   Intimate Partner Violence: Not At Risk (2024)    Received from [facility]    Humiliation, Afraid, Rape, and Kick questionnaire    • Within the last year, have you been afraid of your partner or ex-partner?: No    • Within the last year, have you been humiliated or emotionally abused in other ways by your partner or ex-partner?: No    • Within the last year, have you been kicked, hit, slapped, or otherwise physically hurt by your partner or ex-partner?: No    • Within the last year, have you been raped or forced to have any kind of sexual activity by your partner or ex-partner?: No   Housing Stability: Low Risk  (2024)    Received from [facility]    Housing Stability Vital Sign    • In the last 12 months, was there a time when you were not able to pay the mortgage or rent on time?: No    • In the past 12 months, how many times have you moved where you were living?: 0    • At any time in the past 12 months, were you homeless or living in a shelter (including now)?: No   [3]  Social History  Tobacco Use   Smoking Status Former   • Current packs/day: 0.00   • Average packs/day: 0.5 packs/day for 7.5 years (3.7 ttl pk-yrs)   • Types: Cigarettes   • Start date:    • Quit date: 1982   • Years since quittin.0   • Passive exposure: Never   Smokeless Tobacco Never   [4]  Family History  Problem Relation Name Age of Onset   • Breast cancer Mother he 66   • Brain cancer Mother he    • Cancer Mother he    • Lung cancer Father     • No Known Problems Sister     • No Known Problems Maternal Grandmother     • No Known Problems Maternal Grandfather     • No Known Problems Paternal Grandmother     • No Known Problems Paternal Grandfather     • No Known Problems Daughter     • Substance Abuse Neg Hx          Mother and Father   • Mental illness Neg Hx           Mother and Father

## 2025-07-02 ENCOUNTER — OFFICE VISIT (OUTPATIENT)
Dept: UROLOGY | Facility: CLINIC | Age: 70
End: 2025-07-02
Payer: MEDICARE

## 2025-07-02 ENCOUNTER — TELEPHONE (OUTPATIENT)
Age: 70
End: 2025-07-02

## 2025-07-02 VITALS
WEIGHT: 201 LBS | HEART RATE: 65 BPM | HEIGHT: 67 IN | SYSTOLIC BLOOD PRESSURE: 138 MMHG | DIASTOLIC BLOOD PRESSURE: 80 MMHG | OXYGEN SATURATION: 98 % | BODY MASS INDEX: 31.55 KG/M2

## 2025-07-02 DIAGNOSIS — Z12.5 SCREENING FOR PROSTATE CANCER: ICD-10-CM

## 2025-07-02 DIAGNOSIS — R35.0 BENIGN PROSTATIC HYPERPLASIA WITH URINARY FREQUENCY: Primary | ICD-10-CM

## 2025-07-02 DIAGNOSIS — N40.1 BENIGN PROSTATIC HYPERPLASIA WITH URINARY FREQUENCY: Primary | ICD-10-CM

## 2025-07-02 LAB
POST-VOID RESIDUAL VOLUME, ML POC: 69 ML
SL AMB  POCT GLUCOSE, UA: NORMAL
SL AMB LEUKOCYTE ESTERASE,UA: NORMAL
SL AMB POCT BILIRUBIN,UA: NORMAL
SL AMB POCT BLOOD,UA: NORMAL
SL AMB POCT CLARITY,UA: CLEAR
SL AMB POCT COLOR,UA: YELLOW
SL AMB POCT KETONES,UA: NORMAL
SL AMB POCT NITRITE,UA: NORMAL
SL AMB POCT PH,UA: 6.5
SL AMB POCT SPECIFIC GRAVITY,UA: 1
SL AMB POCT URINE PROTEIN: NORMAL
SL AMB POCT UROBILINOGEN: 0.2

## 2025-07-02 PROCEDURE — 99213 OFFICE O/P EST LOW 20 MIN: CPT

## 2025-07-02 PROCEDURE — 51798 US URINE CAPACITY MEASURE: CPT

## 2025-07-02 PROCEDURE — 81002 URINALYSIS NONAUTO W/O SCOPE: CPT

## 2025-07-02 RX ORDER — LACOSAMIDE 100 MG/1
CAPSULE, EXTENDED RELEASE ORAL
COMMUNITY
Start: 2025-05-01 | End: 2025-07-08 | Stop reason: SINTOL

## 2025-07-02 RX ORDER — FLUOCINONIDE TOPICAL SOLUTION USP, 0.05% 0.5 MG/ML
SOLUTION TOPICAL
COMMUNITY
Start: 2025-02-19

## 2025-07-02 RX ORDER — TADALAFIL 5 MG/1
5 TABLET ORAL DAILY
Qty: 90 TABLET | Refills: 3 | Status: SHIPPED | OUTPATIENT
Start: 2025-07-02

## 2025-07-02 NOTE — ASSESSMENT & PLAN NOTE
BPH- urinary frequency  and nocturia   Started flomax- some improvement but still weaker stream   UA- negative for blood and infection   PVR-69  Plan to trial combination therapy with Cialis 5 mg  Reviewed usage in conjunction with Flomax  Discussed surgical workup  Patient would like to defer is now in first trial oral medications  Plan to follow-up in 2 to 3 months to reevaluate

## 2025-07-02 NOTE — TELEPHONE ENCOUNTER
PA for TADALAFIL 5 MG SUBMITTED to [employer]    via      [x][employer]-Case ID # : [id number]        [x]PA sent as URGENT    All office notes, labs and other pertaining documents and studies sent. Clinical questions answered. Awaiting determination from insurance company.     Turnaround time for your insurance to make a decision on your Prior Authorization can take 7-21 business days.

## 2025-07-02 NOTE — ASSESSMENT & PLAN NOTE
Needs updated PSA level   PSA level ordered for patient  Plan to review at follow up       Lab Results   Component Value Date    PSA 1.06 06/23/2023    PSA 0.98 09/10/2022    PSA 1.1 05/26/2021

## 2025-07-03 ENCOUNTER — TELEPHONE (OUTPATIENT)
Age: 70
End: 2025-07-03

## 2025-07-03 ENCOUNTER — TELEPHONE (OUTPATIENT)
Dept: NEUROLOGY | Facility: CLINIC | Age: 70
End: 2025-07-03

## 2025-07-03 NOTE — TELEPHONE ENCOUNTER
LMOM for pt to confirm appointment with Dr. Haque on 7/8 in the [place] office. Advised pt to arrive 15 mins early.

## 2025-07-03 NOTE — TELEPHONE ENCOUNTER
PA for TADALAFIL 5 MG  APPROVED     Date(s) approved UNTIL 12/31/2025    Case #    Patient advised by          []MyChart Message  []Phone call   [x]LMOM  []L/M to call office as no active Communication consent on file  []Unable to leave detailed message as VM not approved on Communication consent       Pharmacy advised by    [x]Fax  []Phone call  []Secure Chat    Specialty Pharmacy    []     Approval letter scanned into Media Yes

## 2025-07-03 NOTE — TELEPHONE ENCOUNTER
Patient called in to update medication list warm transfer to practice went unanswered patient stated that he is no longer taking     lacosamide (VIMPAT) 50 mg     Motpoly  MG CP24  albuterol (Ventolin HFA) 90 mcg/act inhaler   Please Advise

## 2025-07-08 ENCOUNTER — TELEPHONE (OUTPATIENT)
Dept: PAIN MEDICINE | Facility: CLINIC | Age: 70
End: 2025-07-08

## 2025-07-08 ENCOUNTER — TELEPHONE (OUTPATIENT)
Age: 70
End: 2025-07-08

## 2025-07-08 ENCOUNTER — OFFICE VISIT (OUTPATIENT)
Dept: NEUROLOGY | Facility: CLINIC | Age: 70
End: 2025-07-08
Payer: MEDICARE

## 2025-07-08 NOTE — TELEPHONE ENCOUNTER
Patient has been added to the Medication Management and Talk Therapy wait list with a referral.    Insurance: Medicare A+B, BCBS Federal Employee Program  Insurance Type:    Commercial []   Medicaid []   [facility] (if applicable) [place] [facility] [x]  Location Preference: [place] Preferred  Provider Preference: No preference  Virtual: Yes [x] No []  Were outside resources sent: Yes [x] No []

## 2025-07-23 DIAGNOSIS — K58.9 IRRITABLE BOWEL SYNDROME, UNSPECIFIED TYPE: ICD-10-CM

## 2025-07-24 RX ORDER — OMEPRAZOLE 40 MG/1
40 CAPSULE, DELAYED RELEASE ORAL AS NEEDED
Qty: 90 CAPSULE | Refills: 1 | Status: SHIPPED | OUTPATIENT
Start: 2025-07-24

## 2025-07-27 LAB — PSA SERPL-MCNC: 1.07 NG/ML

## 2025-08-01 ENCOUNTER — HOSPITAL ENCOUNTER (OUTPATIENT)
Facility: MEDICAL CENTER | Age: 70
Discharge: HOME | End: 2025-08-01
Attending: PSYCHIATRY & NEUROLOGY
Payer: MEDICARE

## 2025-08-01 DIAGNOSIS — G40.909 NONINTRACTABLE EPILEPSY WITHOUT STATUS EPILEPTICUS, UNSPECIFIED EPILEPSY TYPE (HCC): ICD-10-CM

## 2025-08-01 PROBLEM — Z12.5 SCREENING FOR PROSTATE CANCER: Status: RESOLVED | Noted: 2019-02-25 | Resolved: 2025-08-01

## 2025-08-01 PROCEDURE — 70551 MRI BRAIN STEM W/O DYE: CPT

## 2025-08-02 DIAGNOSIS — N18.30 BENIGN HYPERTENSIVE HEART AND CKD, STAGE 3 (GFR 30-59), W CHF (HCC): Primary | ICD-10-CM

## 2025-08-02 DIAGNOSIS — N18.31 STAGE 3A CHRONIC KIDNEY DISEASE (HCC): ICD-10-CM

## 2025-08-02 DIAGNOSIS — I13.0 BENIGN HYPERTENSIVE HEART AND CKD, STAGE 3 (GFR 30-59), W CHF (HCC): Primary | ICD-10-CM

## 2025-08-04 ENCOUNTER — TELEPHONE (OUTPATIENT)
Dept: NEPHROLOGY | Facility: CLINIC | Age: 70
End: 2025-08-04

## 2025-08-04 ENCOUNTER — TELEPHONE (OUTPATIENT)
Age: 70
End: 2025-08-04

## 2025-08-07 ENCOUNTER — CONSULT (OUTPATIENT)
Dept: NEPHROLOGY | Facility: CLINIC | Age: 70
End: 2025-08-07
Payer: MEDICARE

## 2025-08-07 VITALS
OXYGEN SATURATION: 97 % | WEIGHT: 197 LBS | DIASTOLIC BLOOD PRESSURE: 72 MMHG | HEART RATE: 83 BPM | SYSTOLIC BLOOD PRESSURE: 120 MMHG | BODY MASS INDEX: 30.85 KG/M2

## 2025-08-07 DIAGNOSIS — I10 ESSENTIAL HYPERTENSION: ICD-10-CM

## 2025-08-07 DIAGNOSIS — N18.31 STAGE 3A CHRONIC KIDNEY DISEASE (HCC): Primary | ICD-10-CM

## 2025-08-07 DIAGNOSIS — R76.8 ANTINUCLEAR ANTIBODY (ANA) POSITIVE: ICD-10-CM

## 2025-08-07 DIAGNOSIS — N40.1 BENIGN PROSTATIC HYPERPLASIA WITH WEAK URINARY STREAM: ICD-10-CM

## 2025-08-07 DIAGNOSIS — R39.12 BENIGN PROSTATIC HYPERPLASIA WITH WEAK URINARY STREAM: ICD-10-CM

## 2025-08-07 DIAGNOSIS — Z86.711 HX OF PULMONARY EMBOLUS: ICD-10-CM

## 2025-08-07 PROBLEM — F43.9 STRESS: Status: ACTIVE | Noted: 2025-08-07

## 2025-08-07 PROBLEM — K63.5 POLYP OF COLON: Status: ACTIVE | Noted: 2025-08-07

## 2025-08-07 PROBLEM — K59.09 CONSTIPATION, CHRONIC: Status: ACTIVE | Noted: 2025-08-07

## 2025-08-07 PROBLEM — K29.90 GASTRITIS AND DUODENITIS: Status: ACTIVE | Noted: 2025-08-07

## 2025-08-07 PROBLEM — Z71.9 ENCOUNTER FOR EDUCATION: Status: ACTIVE | Noted: 2025-08-07

## 2025-08-07 PROBLEM — L21.9 SEBORRHEIC DERMATITIS: Status: ACTIVE | Noted: 2024-12-19

## 2025-08-07 PROBLEM — R41.0 CONFUSION AND DISORIENTATION: Status: ACTIVE | Noted: 2024-01-25

## 2025-08-07 PROBLEM — E66.9 OBESITY WITH BODY MASS INDEX (BMI) OF 30.0 TO 39.9: Status: ACTIVE | Noted: 2025-08-07

## 2025-08-07 PROBLEM — R13.10 DYSPHAGIA: Status: ACTIVE | Noted: 2025-08-07

## 2025-08-07 PROBLEM — K20.0 EOSINOPHILIC ESOPHAGITIS: Status: ACTIVE | Noted: 2025-08-07

## 2025-08-07 PROBLEM — I26.99 PE (PULMONARY THROMBOEMBOLISM) (HCC): Status: ACTIVE | Noted: 2025-08-07

## 2025-08-07 PROBLEM — F41.9 ANXIETY: Status: ACTIVE | Noted: 2025-08-07

## 2025-08-07 PROBLEM — R79.89 ELEVATED TSH: Status: ACTIVE | Noted: 2025-08-07

## 2025-08-07 PROBLEM — Z86.718 HISTORY OF BLOOD CLOTS: Status: ACTIVE | Noted: 2025-08-07

## 2025-08-07 PROBLEM — R10.13 DYSPEPSIA: Status: ACTIVE | Noted: 2025-08-07

## 2025-08-07 PROBLEM — Z85.828 HISTORY OF MALIGNANT NEOPLASM OF SKIN: Status: ACTIVE | Noted: 2019-09-18

## 2025-08-07 PROBLEM — K59.00 CONSTIPATION IN MALE: Status: ACTIVE | Noted: 2025-08-07

## 2025-08-07 PROCEDURE — 99204 OFFICE O/P NEW MOD 45 MIN: CPT | Performed by: INTERNAL MEDICINE

## 2025-08-12 ENCOUNTER — PATIENT MESSAGE (OUTPATIENT)
Dept: NEUROLOGY | Facility: CLINIC | Age: 70
End: 2025-08-12

## 2025-08-16 ENCOUNTER — PATIENT MESSAGE (OUTPATIENT)
Dept: NEUROLOGY | Facility: CLINIC | Age: 70
End: 2025-08-16

## 2025-08-16 DIAGNOSIS — G40.909 NONINTRACTABLE EPILEPSY WITHOUT STATUS EPILEPTICUS, UNSPECIFIED EPILEPSY TYPE (HCC): Primary | ICD-10-CM

## 2025-08-18 ENCOUNTER — TELEPHONE (OUTPATIENT)
Age: 70
End: 2025-08-18

## 2025-08-19 ENCOUNTER — APPOINTMENT (OUTPATIENT)
Dept: LAB | Facility: CLINIC | Age: 70
End: 2025-08-19
Payer: MEDICARE

## 2025-08-19 DIAGNOSIS — Z12.5 SCREENING FOR PROSTATE CANCER: ICD-10-CM

## 2025-08-19 DIAGNOSIS — N18.31 STAGE 3A CHRONIC KIDNEY DISEASE (HCC): ICD-10-CM

## 2025-08-19 DIAGNOSIS — I67.1 CEREBRAL ANEURYSM, NONRUPTURED: ICD-10-CM

## 2025-08-19 DIAGNOSIS — I10 ESSENTIAL HYPERTENSION: ICD-10-CM

## 2025-08-19 DIAGNOSIS — I47.10 SVT (SUPRAVENTRICULAR TACHYCARDIA) (HCC): ICD-10-CM

## 2025-08-19 LAB
CREAT UR-MCNC: 197.3 MG/DL
MAGNESIUM SERPL-MCNC: 2.2 MG/DL (ref 1.9–2.7)
MICROALBUMIN UR-MCNC: 25.3 MG/L
MICROALBUMIN/CREAT 24H UR: 13 MG/G CREATININE (ref 0–30)
PSA SERPL-MCNC: 1.51 NG/ML (ref 0–4)

## 2025-08-19 PROCEDURE — 82043 UR ALBUMIN QUANTITATIVE: CPT

## 2025-08-19 PROCEDURE — 82570 ASSAY OF URINE CREATININE: CPT

## 2025-08-19 PROCEDURE — 83735 ASSAY OF MAGNESIUM: CPT

## 2025-08-19 PROCEDURE — G0103 PSA SCREENING: HCPCS

## 2025-08-19 PROCEDURE — 36415 COLL VENOUS BLD VENIPUNCTURE: CPT

## 2025-08-22 ENCOUNTER — VBI (OUTPATIENT)
Dept: FAMILY MEDICINE CLINIC | Facility: CLINIC | Age: 70
End: 2025-08-22